# Patient Record
Sex: MALE | Race: WHITE | NOT HISPANIC OR LATINO | Employment: OTHER | URBAN - METROPOLITAN AREA
[De-identification: names, ages, dates, MRNs, and addresses within clinical notes are randomized per-mention and may not be internally consistent; named-entity substitution may affect disease eponyms.]

---

## 2017-07-27 ENCOUNTER — HOSPITAL ENCOUNTER (OUTPATIENT)
Dept: FAMILY MEDICINE CLINIC | Facility: CLINIC | Age: 67
Setting detail: SPECIMEN
Discharge: HOME | End: 2017-07-27
Attending: FAMILY MEDICINE | Admitting: FAMILY MEDICINE

## 2017-07-27 LAB
ALBUMIN SERPL-MCNC: 4.4 G/DL (ref 3.5–4.8)
ALBUMIN/GLOB SERPL: 1.4 {RATIO} (ref 1–1.7)
ALP SERPL-CCNC: 54 IU/L (ref 32–91)
ALT SERPL-CCNC: 43 IU/L (ref 17–63)
ANION GAP SERPL CALC-SCNC: 16.5 MMOL/L (ref 10–20)
AST SERPL-CCNC: 44 IU/L (ref 15–41)
BASOPHILS # BLD AUTO: 0.1 10*3/UL (ref 0–0.2)
BASOPHILS NFR BLD AUTO: 1 % (ref 0–2)
BILIRUB SERPL-MCNC: 0.9 MG/DL (ref 0.3–1.2)
BUN SERPL-MCNC: 8 MG/DL (ref 8–20)
BUN/CREAT SERPL: 8.9 (ref 6.2–20.3)
CALCIUM SERPL-MCNC: 9.6 MG/DL (ref 8.9–10.3)
CHLORIDE SERPL-SCNC: 100 MMOL/L (ref 101–111)
CHOLEST SERPL-MCNC: 306 MG/DL
CHOLEST/HDLC SERPL: 4.1 {RATIO}
CONV CO2: 28 MMOL/L (ref 22–32)
CONV LDL CHOLESTEROL DIRECT: 207 MG/DL (ref 0–100)
CONV TOTAL PROTEIN: 7.5 G/DL (ref 6.1–7.9)
CREAT UR-MCNC: 0.9 MG/DL (ref 0.7–1.2)
DIFFERENTIAL METHOD BLD: (no result)
EOSINOPHIL # BLD AUTO: 0.1 10*3/UL (ref 0–0.3)
EOSINOPHIL # BLD AUTO: 1 % (ref 0–3)
ERYTHROCYTE [DISTWIDTH] IN BLOOD BY AUTOMATED COUNT: 16 % (ref 11.5–14.5)
GLOBULIN UR ELPH-MCNC: 3.1 G/DL (ref 2.5–3.8)
GLUCOSE SERPL-MCNC: 85 MG/DL (ref 65–99)
HCT VFR BLD AUTO: 42.4 % (ref 40–54)
HDLC SERPL-MCNC: 75 MG/DL
HGB BLD-MCNC: 14.2 G/DL (ref 14–18)
LDLC/HDLC SERPL: 2.7 {RATIO}
LIPID INTERPRETATION: ABNORMAL
LYMPHOCYTES # BLD AUTO: 1.8 10*3/UL (ref 0.8–4.8)
LYMPHOCYTES NFR BLD AUTO: 29 % (ref 18–42)
MCH RBC QN AUTO: 32.7 PG (ref 26–32)
MCHC RBC AUTO-ENTMCNC: 33.4 G/DL (ref 32–36)
MCV RBC AUTO: 98 FL (ref 80–94)
MONOCYTES # BLD AUTO: 0.4 10*3/UL (ref 0.1–1.3)
MONOCYTES NFR BLD AUTO: 7 % (ref 2–11)
NEUTROPHILS # BLD AUTO: 4 10*3/UL (ref 2.3–8.6)
NEUTROPHILS NFR BLD AUTO: 62 % (ref 50–75)
NRBC BLD AUTO-RTO: 0 /100{WBCS}
NRBC/RBC NFR BLD MANUAL: 0 10*3/UL
PLATELET # BLD AUTO: 166 10*3/UL (ref 150–450)
PMV BLD AUTO: 9.4 FL (ref 7.4–10.4)
POTASSIUM SERPL-SCNC: 3.5 MMOL/L (ref 3.6–5.1)
PSA SERPL-MCNC: 0.28 NG/ML (ref 0–4)
RBC # BLD AUTO: 4.33 10*6/UL (ref 4.6–6)
SODIUM SERPL-SCNC: 141 MMOL/L (ref 136–144)
TRIGL SERPL-MCNC: 104 MG/DL
VIT B12 SERPL-MCNC: 285 PG/ML (ref 180–914)
VLDLC SERPL CALC-MCNC: 24.2 MG/DL
WBC # BLD AUTO: 6.3 10*3/UL (ref 4.5–11.5)

## 2017-08-08 ENCOUNTER — HOSPITAL ENCOUNTER (OUTPATIENT)
Dept: CARDIOLOGY | Facility: HOSPITAL | Age: 67
Discharge: HOME | End: 2017-08-08
Attending: FAMILY MEDICINE | Admitting: FAMILY MEDICINE

## 2018-03-15 ENCOUNTER — HOSPITAL ENCOUNTER (OUTPATIENT)
Dept: FAMILY MEDICINE CLINIC | Facility: CLINIC | Age: 68
Setting detail: SPECIMEN
Discharge: HOME | End: 2018-03-15
Attending: FAMILY MEDICINE | Admitting: FAMILY MEDICINE

## 2018-03-15 LAB
ALBUMIN SERPL-MCNC: 3.9 G/DL (ref 3.5–4.8)
ALBUMIN/GLOB SERPL: 1.3 {RATIO} (ref 1–1.7)
ALP SERPL-CCNC: 47 IU/L (ref 32–91)
ALT SERPL-CCNC: 33 IU/L (ref 17–63)
ANION GAP SERPL CALC-SCNC: ABNORMAL MMOL/L (ref 10–20)
AST SERPL-CCNC: 47 IU/L (ref 15–41)
BILIRUB SERPL-MCNC: 0.8 MG/DL (ref 0.3–1.2)
BUN SERPL-MCNC: 10 MG/DL (ref 8–20)
BUN/CREAT SERPL: 12.5 (ref 6.2–20.3)
CALCIUM SERPL-MCNC: 8.9 MG/DL (ref 8.9–10.3)
CHLORIDE SERPL-SCNC: 101 MMOL/L (ref 101–111)
CHOLEST SERPL-MCNC: 214 MG/DL
CHOLEST/HDLC SERPL: 2.5 {RATIO}
CONV CO2: ABNORMAL MMOL/L (ref 22–32)
CONV LDL CHOLESTEROL DIRECT: 113 MG/DL (ref 0–100)
CONV TOTAL PROTEIN: 6.8 G/DL (ref 6.1–7.9)
CREAT UR-MCNC: 0.8 MG/DL (ref 0.7–1.2)
GLOBULIN UR ELPH-MCNC: 2.9 G/DL (ref 2.5–3.8)
GLUCOSE SERPL-MCNC: 131 MG/DL (ref 65–99)
HDLC SERPL-MCNC: 86 MG/DL
LDLC/HDLC SERPL: 1.3 {RATIO}
LIPID INTERPRETATION: ABNORMAL
POTASSIUM SERPL-SCNC: 3.4 MMOL/L (ref 3.6–5.1)
SODIUM SERPL-SCNC: 140 MMOL/L (ref 136–144)
TRIGL SERPL-MCNC: 59 MG/DL
VLDLC SERPL CALC-MCNC: ABNORMAL MG/DL

## 2018-06-13 ENCOUNTER — HOSPITAL ENCOUNTER (OUTPATIENT)
Dept: FAMILY MEDICINE CLINIC | Facility: CLINIC | Age: 68
Setting detail: SPECIMEN
Discharge: HOME | End: 2018-06-13
Attending: FAMILY MEDICINE | Admitting: FAMILY MEDICINE

## 2018-06-13 LAB
ALBUMIN SERPL-MCNC: 3.7 G/DL (ref 3.5–4.8)
ALBUMIN/GLOB SERPL: 1.2 {RATIO} (ref 1–1.7)
ALP SERPL-CCNC: 54 IU/L (ref 32–91)
ALT SERPL-CCNC: 21 IU/L (ref 17–63)
ANION GAP SERPL CALC-SCNC: 16.2 MMOL/L (ref 10–20)
AST SERPL-CCNC: 32 IU/L (ref 15–41)
BASOPHILS # BLD AUTO: 0.1 10*3/UL (ref 0–0.2)
BASOPHILS NFR BLD AUTO: 1 % (ref 0–2)
BILIRUB SERPL-MCNC: 0.8 MG/DL (ref 0.3–1.2)
BUN SERPL-MCNC: 8 MG/DL (ref 8–20)
BUN/CREAT SERPL: 8 (ref 6.2–20.3)
CALCIUM SERPL-MCNC: 6.5 MG/DL (ref 8.9–10.3)
CHLORIDE SERPL-SCNC: 100 MMOL/L (ref 101–111)
CONV CO2: 27 MMOL/L (ref 22–32)
CONV TOTAL PROTEIN: 6.9 G/DL (ref 6.1–7.9)
CREAT UR-MCNC: 1 MG/DL (ref 0.7–1.2)
DIFFERENTIAL METHOD BLD: (no result)
EOSINOPHIL # BLD AUTO: 0.1 10*3/UL (ref 0–0.3)
EOSINOPHIL # BLD AUTO: 1 % (ref 0–3)
ERYTHROCYTE [DISTWIDTH] IN BLOOD BY AUTOMATED COUNT: 13.3 % (ref 11.5–14.5)
GLOBULIN UR ELPH-MCNC: 3.2 G/DL (ref 2.5–3.8)
GLUCOSE SERPL-MCNC: 88 MG/DL (ref 65–99)
HCT VFR BLD AUTO: 39.9 % (ref 40–54)
HGB BLD-MCNC: 13.6 G/DL (ref 14–18)
LYMPHOCYTES # BLD AUTO: 1.7 10*3/UL (ref 0.8–4.8)
LYMPHOCYTES NFR BLD AUTO: 23 % (ref 18–42)
MCH RBC QN AUTO: 32.4 PG (ref 26–32)
MCHC RBC AUTO-ENTMCNC: 34.2 G/DL (ref 32–36)
MCV RBC AUTO: 94.8 FL (ref 80–94)
MONOCYTES # BLD AUTO: 0.6 10*3/UL (ref 0.1–1.3)
MONOCYTES NFR BLD AUTO: 8 % (ref 2–11)
NEUTROPHILS # BLD AUTO: 5.1 10*3/UL (ref 2.3–8.6)
NEUTROPHILS NFR BLD AUTO: 67 % (ref 50–75)
NRBC BLD AUTO-RTO: 0 /100{WBCS}
NRBC/RBC NFR BLD MANUAL: 0 10*3/UL
PLATELET # BLD AUTO: 197 10*3/UL (ref 150–450)
PMV BLD AUTO: 10.7 FL (ref 7.4–10.4)
POTASSIUM SERPL-SCNC: 3.2 MMOL/L (ref 3.6–5.1)
RBC # BLD AUTO: 4.2 10*6/UL (ref 4.6–6)
SODIUM SERPL-SCNC: 140 MMOL/L (ref 136–144)
WBC # BLD AUTO: 7.6 10*3/UL (ref 4.5–11.5)

## 2018-06-14 ENCOUNTER — HOSPITAL ENCOUNTER (OUTPATIENT)
Dept: CARDIOLOGY | Facility: HOSPITAL | Age: 68
Discharge: HOME | End: 2018-06-14
Attending: FAMILY MEDICINE | Admitting: FAMILY MEDICINE

## 2018-06-27 ENCOUNTER — HOSPITAL ENCOUNTER (OUTPATIENT)
Dept: FAMILY MEDICINE CLINIC | Facility: CLINIC | Age: 68
Setting detail: SPECIMEN
Discharge: HOME | End: 2018-06-27
Attending: FAMILY MEDICINE | Admitting: FAMILY MEDICINE

## 2018-06-27 LAB
ANION GAP SERPL CALC-SCNC: 13.7 MMOL/L (ref 10–20)
BUN SERPL-MCNC: 7 MG/DL (ref 8–20)
BUN/CREAT SERPL: 8.8 (ref 6.2–20.3)
CALCIUM SERPL-MCNC: 6.4 MG/DL (ref 8.9–10.3)
CHLORIDE SERPL-SCNC: 93 MMOL/L (ref 101–111)
CHOLEST SERPL-MCNC: 221 MG/DL
CHOLEST/HDLC SERPL: 5.5 {RATIO}
CONV CO2: 33 MMOL/L (ref 22–32)
CONV LDL CHOLESTEROL DIRECT: 156 MG/DL (ref 0–100)
CREAT UR-MCNC: 0.8 MG/DL (ref 0.7–1.2)
GLUCOSE SERPL-MCNC: 147 MG/DL (ref 65–99)
HDLC SERPL-MCNC: 40 MG/DL
LDLC/HDLC SERPL: 3.9 {RATIO}
LIPID INTERPRETATION: ABNORMAL
POTASSIUM SERPL-SCNC: 2.7 MMOL/L (ref 3.6–5.1)
SODIUM SERPL-SCNC: 137 MMOL/L (ref 136–144)
TRIGL SERPL-MCNC: 148 MG/DL
VLDLC SERPL CALC-MCNC: 25.3 MG/DL

## 2018-06-28 ENCOUNTER — HOSPITAL ENCOUNTER (OUTPATIENT)
Dept: FAMILY MEDICINE CLINIC | Facility: CLINIC | Age: 68
Setting detail: SPECIMEN
Discharge: HOME | End: 2018-06-28
Attending: FAMILY MEDICINE | Admitting: FAMILY MEDICINE

## 2018-06-28 LAB — MAGNESIUM SERPL-MCNC: 0.5 MG/DL (ref 1.8–2.5)

## 2018-07-02 ENCOUNTER — HOSPITAL ENCOUNTER (OUTPATIENT)
Dept: FAMILY MEDICINE CLINIC | Facility: CLINIC | Age: 68
Setting detail: SPECIMEN
Discharge: HOME | End: 2018-07-02
Attending: FAMILY MEDICINE | Admitting: FAMILY MEDICINE

## 2018-07-02 LAB
ANION GAP SERPL CALC-SCNC: 15.4 MMOL/L (ref 10–20)
BUN SERPL-MCNC: 7 MG/DL (ref 8–20)
BUN/CREAT SERPL: 8.8 (ref 6.2–20.3)
CALCIUM SERPL-MCNC: 6.7 MG/DL (ref 8.9–10.3)
CHLORIDE SERPL-SCNC: 100 MMOL/L (ref 101–111)
CONV CO2: 26 MMOL/L (ref 22–32)
CREAT UR-MCNC: 0.8 MG/DL (ref 0.7–1.2)
GLUCOSE SERPL-MCNC: 222 MG/DL (ref 65–99)
MAGNESIUM SERPL-MCNC: 0.8 MG/DL (ref 1.8–2.5)
PHOSPHATE SERPL-MCNC: 1.4 MG/DL (ref 2.4–4.7)
POTASSIUM SERPL-SCNC: 3.4 MMOL/L (ref 3.6–5.1)
SODIUM SERPL-SCNC: 138 MMOL/L (ref 136–144)

## 2018-07-17 ENCOUNTER — HOSPITAL ENCOUNTER (OUTPATIENT)
Dept: FAMILY MEDICINE CLINIC | Facility: CLINIC | Age: 68
Setting detail: SPECIMEN
Discharge: HOME | End: 2018-07-17
Attending: FAMILY MEDICINE | Admitting: FAMILY MEDICINE

## 2018-07-17 LAB
ANION GAP SERPL CALC-SCNC: 13.6 MMOL/L (ref 10–20)
BUN SERPL-MCNC: 19 MG/DL (ref 8–20)
BUN/CREAT SERPL: 17.3 (ref 6.2–20.3)
CALCIUM SERPL-MCNC: 9.8 MG/DL (ref 8.9–10.3)
CHLORIDE SERPL-SCNC: 97 MMOL/L (ref 101–111)
CONV CO2: 26 MMOL/L (ref 22–32)
CREAT UR-MCNC: 1.1 MG/DL (ref 0.7–1.2)
GLUCOSE SERPL-MCNC: 190 MG/DL (ref 65–99)
MAGNESIUM SERPL-MCNC: 1.3 MG/DL (ref 1.8–2.5)
PHOSPHATE SERPL-MCNC: 3.1 MG/DL (ref 2.4–4.7)
POTASSIUM SERPL-SCNC: 5.6 MMOL/L (ref 3.6–5.1)
SODIUM SERPL-SCNC: 131 MMOL/L (ref 136–144)

## 2018-08-10 ENCOUNTER — HOSPITAL ENCOUNTER (OUTPATIENT)
Dept: FAMILY MEDICINE CLINIC | Facility: CLINIC | Age: 68
Setting detail: SPECIMEN
Discharge: HOME | End: 2018-08-10
Attending: FAMILY MEDICINE | Admitting: FAMILY MEDICINE

## 2018-08-10 LAB
ANION GAP SERPL CALC-SCNC: 14.2 MMOL/L (ref 10–20)
BUN SERPL-MCNC: 19 MG/DL (ref 8–20)
BUN/CREAT SERPL: 17.3 (ref 6.2–20.3)
CALCIUM SERPL-MCNC: 9.6 MG/DL (ref 8.9–10.3)
CHLORIDE SERPL-SCNC: 100 MMOL/L (ref 101–111)
CONV CO2: 28 MMOL/L (ref 22–32)
CREAT UR-MCNC: 1.1 MG/DL (ref 0.7–1.2)
GLUCOSE SERPL-MCNC: 192 MG/DL (ref 65–99)
POTASSIUM SERPL-SCNC: 4.2 MMOL/L (ref 3.6–5.1)
SODIUM SERPL-SCNC: 138 MMOL/L (ref 136–144)

## 2018-10-18 ENCOUNTER — HOSPITAL ENCOUNTER (OUTPATIENT)
Dept: FAMILY MEDICINE CLINIC | Facility: CLINIC | Age: 68
Setting detail: SPECIMEN
Discharge: HOME | End: 2018-10-18
Attending: FAMILY MEDICINE | Admitting: FAMILY MEDICINE

## 2018-10-18 LAB
ALBUMIN SERPL-MCNC: 4.2 G/DL (ref 3.5–4.8)
ALBUMIN/GLOB SERPL: 1.4 {RATIO} (ref 1–1.7)
ALP SERPL-CCNC: 41 IU/L (ref 32–91)
ALT SERPL-CCNC: 21 IU/L (ref 17–63)
ANION GAP SERPL CALC-SCNC: 17.1 MMOL/L (ref 10–20)
AST SERPL-CCNC: 27 IU/L (ref 15–41)
BASOPHILS # BLD AUTO: 0.1 10*3/UL (ref 0–0.2)
BASOPHILS NFR BLD AUTO: 1 % (ref 0–2)
BILIRUB SERPL-MCNC: 0.5 MG/DL (ref 0.3–1.2)
BUN SERPL-MCNC: 34 MG/DL (ref 8–20)
BUN/CREAT SERPL: 17.9 (ref 6.2–20.3)
CALCIUM SERPL-MCNC: 9.3 MG/DL (ref 8.9–10.3)
CHLORIDE SERPL-SCNC: 97 MMOL/L (ref 101–111)
CHOLEST SERPL-MCNC: 258 MG/DL
CHOLEST/HDLC SERPL: 3.5 {RATIO}
CHOLEST/HDLC SERPL: ABNORMAL {RATIO}
CONV CO2: 25 MMOL/L (ref 22–32)
CONV LDL CHOLESTEROL DIRECT: 173 MG/DL (ref 0–100)
CONV TOTAL PROTEIN: 7.1 G/DL (ref 6.1–7.9)
CREAT UR-MCNC: 1.9 MG/DL (ref 0.7–1.2)
DIFFERENTIAL METHOD BLD: (no result)
EOSINOPHIL # BLD AUTO: 0.1 10*3/UL (ref 0–0.3)
EOSINOPHIL # BLD AUTO: 1 % (ref 0–3)
ERYTHROCYTE [DISTWIDTH] IN BLOOD BY AUTOMATED COUNT: 14.5 % (ref 11.5–14.5)
GLOBULIN UR ELPH-MCNC: 2.9 G/DL (ref 2.5–3.8)
GLUCOSE SERPL-MCNC: 56 MG/DL (ref 65–99)
HBA1C MFR BLD: 5.9 % (ref 0–5.6)
HCT VFR BLD AUTO: 37.7 % (ref 40–54)
HDLC SERPL-MCNC: 73 MG/DL
HGB BLD-MCNC: 13 G/DL (ref 14–18)
LDLC/HDLC SERPL: 2.4 {RATIO}
LIPID INTERPRETATION: ABNORMAL
LYMPHOCYTES # BLD AUTO: 2.1 10*3/UL (ref 0.8–4.8)
LYMPHOCYTES NFR BLD AUTO: 26 % (ref 18–42)
MAGNESIUM SERPL-MCNC: 1.2 MG/DL (ref 1.8–2.5)
MCH RBC QN AUTO: 33.6 PG (ref 26–32)
MCHC RBC AUTO-ENTMCNC: 34.4 G/DL (ref 32–36)
MCV RBC AUTO: 97.8 FL (ref 80–94)
MONOCYTES # BLD AUTO: 0.5 10*3/UL (ref 0.1–1.3)
MONOCYTES NFR BLD AUTO: 7 % (ref 2–11)
NEUTROPHILS # BLD AUTO: 5.1 10*3/UL (ref 2.3–8.6)
NEUTROPHILS NFR BLD AUTO: 65 % (ref 50–75)
NRBC BLD AUTO-RTO: 0 /100{WBCS}
NRBC/RBC NFR BLD MANUAL: 0 10*3/UL
PLATELET # BLD AUTO: 205 10*3/UL (ref 150–450)
PMV BLD AUTO: 9.6 FL (ref 7.4–10.4)
POTASSIUM SERPL-SCNC: 5.1 MMOL/L (ref 3.6–5.1)
RBC # BLD AUTO: 3.86 10*6/UL (ref 4.6–6)
SODIUM SERPL-SCNC: 134 MMOL/L (ref 136–144)
TRIGL SERPL-MCNC: 142 MG/DL
VLDLC SERPL CALC-MCNC: 12 MG/DL
VLDLC SERPL CALC-MCNC: ABNORMAL MG/DL
WBC # BLD AUTO: 7.9 10*3/UL (ref 4.5–11.5)

## 2018-10-19 LAB — 25(OH)D3 SERPL-MCNC: 33 NG/ML (ref 30–100)

## 2018-11-01 ENCOUNTER — HOSPITAL ENCOUNTER (OUTPATIENT)
Dept: FAMILY MEDICINE CLINIC | Facility: CLINIC | Age: 68
Setting detail: SPECIMEN
Discharge: HOME | End: 2018-11-01
Attending: FAMILY MEDICINE | Admitting: FAMILY MEDICINE

## 2018-11-01 LAB
ANION GAP SERPL CALC-SCNC: 15.6 MMOL/L (ref 10–20)
BUN SERPL-MCNC: 15 MG/DL (ref 8–20)
BUN/CREAT SERPL: 13.6 (ref 6.2–20.3)
CALCIUM SERPL-MCNC: 9.3 MG/DL (ref 8.9–10.3)
CHLORIDE SERPL-SCNC: 98 MMOL/L (ref 101–111)
CONV CO2: 26 MMOL/L (ref 22–32)
CREAT UR-MCNC: 1.1 MG/DL (ref 0.7–1.2)
GLUCOSE SERPL-MCNC: 100 MG/DL (ref 65–99)
POTASSIUM SERPL-SCNC: 3.6 MMOL/L (ref 3.6–5.1)
SODIUM SERPL-SCNC: 136 MMOL/L (ref 136–144)

## 2019-02-15 ENCOUNTER — HOSPITAL ENCOUNTER (OUTPATIENT)
Dept: FAMILY MEDICINE CLINIC | Facility: CLINIC | Age: 69
Setting detail: SPECIMEN
Discharge: HOME | End: 2019-02-15
Attending: FAMILY MEDICINE | Admitting: FAMILY MEDICINE

## 2019-02-15 LAB
ALBUMIN SERPL-MCNC: 4.1 G/DL (ref 3.5–4.8)
ALBUMIN/GLOB SERPL: 1.3 {RATIO} (ref 1–1.7)
ALP SERPL-CCNC: 41 IU/L (ref 32–91)
ALT SERPL-CCNC: 41 IU/L (ref 17–63)
ANION GAP SERPL CALC-SCNC: 15.3 MMOL/L (ref 10–20)
AST SERPL-CCNC: 50 IU/L (ref 15–41)
BILIRUB SERPL-MCNC: 0.8 MG/DL (ref 0.3–1.2)
BUN SERPL-MCNC: 12 MG/DL (ref 8–20)
BUN/CREAT SERPL: 12 (ref 6.2–20.3)
CALCIUM SERPL-MCNC: 8.6 MG/DL (ref 8.9–10.3)
CHLORIDE SERPL-SCNC: 98 MMOL/L (ref 101–111)
CHOLEST SERPL-MCNC: 227 MG/DL
CHOLEST/HDLC SERPL: 2.6 {RATIO}
CONV CO2: 27 MMOL/L (ref 22–32)
CONV LDL CHOLESTEROL DIRECT: 118 MG/DL (ref 0–100)
CONV TOTAL PROTEIN: 7.2 G/DL (ref 6.1–7.9)
CREAT UR-MCNC: 1 MG/DL (ref 0.7–1.2)
GLOBULIN UR ELPH-MCNC: 3.1 G/DL (ref 2.5–3.8)
GLUCOSE SERPL-MCNC: 102 MG/DL (ref 65–99)
HDLC SERPL-MCNC: 88 MG/DL
LDLC/HDLC SERPL: 1.3 {RATIO}
LIPID INTERPRETATION: ABNORMAL
POTASSIUM SERPL-SCNC: 3.3 MMOL/L (ref 3.6–5.1)
SODIUM SERPL-SCNC: 137 MMOL/L (ref 136–144)
TRIGL SERPL-MCNC: 66 MG/DL
VLDLC SERPL CALC-MCNC: 20.6 MG/DL

## 2019-12-02 ENCOUNTER — TELEPHONE (OUTPATIENT)
Dept: FAMILY MEDICINE CLINIC | Facility: CLINIC | Age: 69
End: 2019-12-02

## 2019-12-02 NOTE — TELEPHONE ENCOUNTER
Spouse called stating that she believes patient is in the beginning stages of dementia. Spouse state that patient is having a hard time remembering things. Spouse is asking if you could call her directly to discuss.     Also, spouse states that she believes patient is due for labs and would like to schedule appt.

## 2019-12-03 NOTE — TELEPHONE ENCOUNTER
Wife Alejandra called to make patient an appt with you.  You are booked for the few weeks.  When would you like to see patient?

## 2019-12-04 ENCOUNTER — OFFICE VISIT (OUTPATIENT)
Dept: FAMILY MEDICINE CLINIC | Facility: CLINIC | Age: 69
End: 2019-12-04

## 2019-12-04 VITALS
HEART RATE: 75 BPM | BODY MASS INDEX: 38.13 KG/M2 | WEIGHT: 273.4 LBS | DIASTOLIC BLOOD PRESSURE: 79 MMHG | RESPIRATION RATE: 12 BRPM | SYSTOLIC BLOOD PRESSURE: 179 MMHG

## 2019-12-04 DIAGNOSIS — R41.3 MEMORY LOSS: Primary | ICD-10-CM

## 2019-12-04 DIAGNOSIS — R27.0 ATAXIA: ICD-10-CM

## 2019-12-04 DIAGNOSIS — I10 ESSENTIAL HYPERTENSION: ICD-10-CM

## 2019-12-04 DIAGNOSIS — E11.9 TYPE 2 DIABETES MELLITUS WITHOUT COMPLICATION, WITHOUT LONG-TERM CURRENT USE OF INSULIN (HCC): ICD-10-CM

## 2019-12-04 DIAGNOSIS — E03.8 HYPOTHYROIDISM DUE TO HASHIMOTO'S THYROIDITIS: ICD-10-CM

## 2019-12-04 DIAGNOSIS — F10.10 ALCOHOL ABUSE: ICD-10-CM

## 2019-12-04 DIAGNOSIS — E06.3 HYPOTHYROIDISM DUE TO HASHIMOTO'S THYROIDITIS: ICD-10-CM

## 2019-12-04 PROCEDURE — 99214 OFFICE O/P EST MOD 30 MIN: CPT | Performed by: FAMILY MEDICINE

## 2019-12-04 RX ORDER — DOXYCYCLINE HYCLATE 100 MG/1
100 CAPSULE ORAL DAILY
Refills: 4 | COMMUNITY
Start: 2019-11-22 | End: 2020-05-26

## 2019-12-04 RX ORDER — LANSOPRAZOLE 30 MG/1
30 CAPSULE, DELAYED RELEASE ORAL DAILY
Refills: 3 | COMMUNITY
Start: 2019-10-30 | End: 2020-01-27

## 2019-12-04 RX ORDER — EZETIMIBE 10 MG/1
10 TABLET ORAL DAILY
Refills: 11 | COMMUNITY
Start: 2019-11-22 | End: 2020-06-29

## 2019-12-04 RX ORDER — PIOGLITAZONEHYDROCHLORIDE 45 MG/1
45 TABLET ORAL DAILY
Refills: 14 | COMMUNITY
Start: 2019-11-22 | End: 2020-06-29

## 2019-12-04 RX ORDER — ATORVASTATIN CALCIUM 40 MG/1
40 TABLET, FILM COATED ORAL DAILY
Refills: 3 | COMMUNITY
Start: 2019-11-21 | End: 2020-04-15

## 2019-12-04 RX ORDER — POTASSIUM CHLORIDE 750 MG/1
20 TABLET, FILM COATED, EXTENDED RELEASE ORAL 2 TIMES DAILY
Refills: 11 | COMMUNITY
Start: 2019-09-11 | End: 2020-09-23 | Stop reason: SDUPTHER

## 2019-12-04 NOTE — PROGRESS NOTES
Rooming Tab(CC,VS,Pt Hx,Fall Screen)  Chief Complaint   Patient presents with   • Extremity Weakness   • Memory Loss   • Balance Issues   • Knee Pain   • Bleeding/Bruising       Subjective 69-year-old here for with his wife for complaints of memory loss.  Patient forgets to eat, he gets frustrated very easily and forgets his phone codes or his iPad codes.  He is short-term memory seems to be getting worse.  He will ask his wife the same question several times in 1 day.  He also stopped playing golf which he generally used to love to do any stop driving his fancy car which he used to love to do.  He is not doing the things he used to like to do.  He sits and watches TV a lot and play solitaire a lot with computer games.  He is unsteady on his feet and ataxic.  He will drink 6 to 8 ounces of bourbon a day throughout the day.  His wife thinks he has trouble learning new things.  He has no headaches and no one-sided weakness and no urinary incontinence or slurred speech.  He does remember to take his medications.  He denies any double vision or blood.  He has diabetes and he checks his sugars daily and they are doing relatively well.  He has not been checking his blood pressure recently.  All this is been going on for several months and progressively worsening.  He is weak in his legs and has trouble standing and has poor balance because he feels like he has trouble getting out of a chair and walking due to his leg weakness.  Of course he does not do much during the day anymore but sit and watch TV and play games.    I have reviewed and updated his medications, medical history and problem list during today's office visit.     Patient Care Team:  Bob Spencer MD as PCP - General (Family Medicine)    Problem List Tab  Medications Tab  Synopsis Tab  Chart Review Tab  Care Everywhere Tab  Immunizations Tab  Patient History Tab    Social History     Tobacco Use   • Smoking status: Never Smoker   • Smokeless tobacco:  Never Used   Substance Use Topics   • Alcohol use: Yes       Review of Systems   Constitutional: Negative for chills, fatigue and fever.   HENT: Negative for nosebleeds.    Eyes: Negative for double vision.   Respiratory: Negative for chest tightness and shortness of breath.    Cardiovascular: Negative for chest pain and palpitations.   Gastrointestinal: Negative for blood in stool.   Genitourinary: Negative for dysuria and hematuria.   Neurological: Positive for memory problem. Negative for dizziness, seizures, syncope and headache.   Psychiatric/Behavioral: Negative for depressed mood.       Objective     Rooming Tab(CC,VS,Pt Hx,Fall Screen)  /79 (BP Location: Right arm, Patient Position: Sitting, Cuff Size: Large Adult)   Pulse 75   Resp 12   Wt 124 kg (273 lb 6.4 oz)   BMI 38.13 kg/m²     Body mass index is 38.13 kg/m².    Physical Exam   Constitutional: He is oriented to person, place, and time. He appears well-developed and well-nourished. No distress.   HENT:   Head: Normocephalic and atraumatic.   Nose: Nose normal.   Mouth/Throat: Oropharynx is clear and moist.   Eyes: Conjunctivae, EOM and lids are normal. Pupils are equal, round, and reactive to light.   Neck: Trachea normal and normal range of motion. Neck supple. No JVD present. Carotid bruit is not present. No thyroid mass and no thyromegaly present.   No carotid bruits   Cardiovascular: Normal rate, regular rhythm, normal heart sounds and intact distal pulses.   Pulmonary/Chest: Effort normal and breath sounds normal.   Abdominal: Soft. Bowel sounds are normal. He exhibits no distension.   Musculoskeletal:   No c/c/e  Sensation in his feet is intact.  He has no diabetic foot ulcers.  DP pulses are palpable.   Neurological: He is alert and oriented to person, place, and time. No cranial nerve deficit.   On his Mini-Mental status exam he did well except he missed all 3 of the memory words.  27 out of 30  He is very ataxic, he has no ulnar  drift.  He is unable to completely do the heel-to-toe, it is not safe for him to even try   Skin: Skin is warm and dry.   Psychiatric: He has a normal mood and affect. His speech is normal and behavior is normal. He is attentive.   Nursing note and vitals reviewed.       Statin Choice Calculator  Data Reviewed:                   Assessment/Plan   Order Review Tab  Health Maintenance Tab  Patient Plan/Order Tab  Diagnoses and all orders for this visit:    1. Memory loss (Primary)  Assessment & Plan:  We will start the work-up with an MRI of the brain.  Also obtain laboratories, he has not fasted today so we will have him come in Friday.  He is not to drive or do any dangerous type work.  We will schedule neuropsychological evaluation as well as start working on getting him a appointment appointment with the memory center at [facility]    Orders:  -     MRI Brain Without Contrast; Future  -     Ambulatory Referral to Neurology  -     Ambulatory Referral to Neuropsychology  -     CBC & Differential; Future  -     Comprehensive Metabolic Panel; Future  -     TSH; Future  -     Vitamin B12; Future  -     THOMAS; Future  -     Sedimentation Rate; Future  -     RPR; Future    2. Ataxia  Assessment & Plan:  He is very ataxic.  I am not certain if this could be related to his chronic alcohol use.  He is instructed in no further alcohol.  Consider physical therapy.  Most of his ataxia is likely due to his proximal leg weakness which is because he sits too much.  He needs to become more active and start exercising and I recommended he go to the [facility].    Orders:  -     MRI Brain Without Contrast; Future    3. Type 2 diabetes mellitus without complication, without long-term current use of insulin (CMS/[facility])  Assessment & Plan:  Diabetes is unchanged.   Continue current treatment regimen.  Reminded to bring in blood sugar diary at next visit.  Dietary recommendations for ADA diet.  Regular aerobic exercise.  Diabetes will be reassessed  in 1 month.    Orders:  -     Lipid Panel; Future  -     Hemoglobin A1c; Future    4. Essential hypertension  Assessment & Plan:  Hypertension is worsening.  Dietary sodium restriction.  Weight loss.  Regular aerobic exercise.  Blood pressure will be reassessed in 4 weeks.  He has a history of whitecoat hypertension.  We are going to monitor his blood pressure 3 times a week and will follow-up in 3 weeks.      5. Hypothyroidism due to Hashimoto's thyroiditis    6. Alcohol abuse  Assessment & Plan:  Not drinking alcohol at this time.  He does not think it will be a problem.  He has stopped in the past without difficulty.  We will see        Wrapup Tab  Return in about 3 weeks (around 12/25/2019) for Recheck.

## 2019-12-05 PROBLEM — E16.0 HYPOGLYCEMIA SECONDARY TO SULFONYLUREA: Status: ACTIVE | Noted: 2018-02-13

## 2019-12-05 PROBLEM — E83.51 HYPOCALCEMIA: Status: ACTIVE | Noted: 2018-06-28

## 2019-12-05 PROBLEM — R27.0 ATAXIA: Status: ACTIVE | Noted: 2019-12-05

## 2019-12-05 PROBLEM — E83.42 HYPOMAGNESEMIA: Status: ACTIVE | Noted: 2018-02-13

## 2019-12-05 PROBLEM — N19 RENAL FAILURE: Status: ACTIVE | Noted: 2018-10-18

## 2019-12-05 PROBLEM — R41.3 MEMORY LOSS: Status: ACTIVE | Noted: 2019-12-05

## 2019-12-05 PROBLEM — E87.6 HYPOKALEMIA: Status: ACTIVE | Noted: 2018-02-13

## 2019-12-05 PROBLEM — T38.3X1A HYPOGLYCEMIA SECONDARY TO SULFONYLUREA: Status: ACTIVE | Noted: 2018-02-13

## 2019-12-05 PROBLEM — I27.20 PULMONARY HYPERTENSION (HCC): Status: ACTIVE | Noted: 2018-02-13

## 2019-12-05 PROBLEM — E53.8 B12 DEFICIENCY: Status: ACTIVE | Noted: 2018-03-15

## 2019-12-05 PROBLEM — E66.9 OBESITY: Status: ACTIVE | Noted: 2019-12-05

## 2019-12-05 PROBLEM — I95.1 ORTHOSTATIC HYPOTENSION: Status: ACTIVE | Noted: 2018-06-13

## 2019-12-05 PROBLEM — L71.9 ROSACEA: Status: ACTIVE | Noted: 2018-02-13

## 2019-12-05 PROBLEM — F10.10 ALCOHOL ABUSE: Status: ACTIVE | Noted: 2019-12-05

## 2019-12-05 PROBLEM — K52.9 CHRONIC DIARRHEA: Status: ACTIVE | Noted: 2018-10-18

## 2019-12-05 PROBLEM — I35.0 AORTIC VALVE STENOSIS: Status: ACTIVE | Noted: 2018-02-13

## 2019-12-05 PROBLEM — E55.9 VITAMIN D DEFICIENCY: Status: ACTIVE | Noted: 2018-10-18

## 2019-12-05 PROBLEM — G47.33 OBSTRUCTIVE SLEEP APNEA SYNDROME: Status: ACTIVE | Noted: 2018-03-09

## 2019-12-05 PROBLEM — R01.1 HEART MURMUR, SYSTOLIC: Status: ACTIVE | Noted: 2017-07-27

## 2019-12-05 NOTE — ASSESSMENT & PLAN NOTE
Hypertension is worsening.  Dietary sodium restriction.  Weight loss.  Regular aerobic exercise.  Blood pressure will be reassessed in 4 weeks.  He has a history of whitecoat hypertension.  We are going to monitor his blood pressure 3 times a week and will follow-up in 3 weeks.

## 2019-12-05 NOTE — ASSESSMENT & PLAN NOTE
We will start the work-up with an MRI of the brain.  Also obtain laboratories, he has not fasted today so we will have him come in Friday.  He is not to drive or do any dangerous type work.  We will schedule neuropsychological evaluation as well as start working on getting him a appointment appointment with the memory center at [facility]

## 2019-12-05 NOTE — ASSESSMENT & PLAN NOTE
Diabetes is unchanged.   Continue current treatment regimen.  Reminded to bring in blood sugar diary at next visit.  Dietary recommendations for ADA diet.  Regular aerobic exercise.  Diabetes will be reassessed in 1 month.

## 2019-12-05 NOTE — ASSESSMENT & PLAN NOTE
Not drinking alcohol at this time.  He does not think it will be a problem.  He has stopped in the past without difficulty.  We will see

## 2019-12-05 NOTE — ASSESSMENT & PLAN NOTE
He is very ataxic.  I am not certain if this could be related to his chronic alcohol use.  He is instructed in no further alcohol.  Consider physical therapy.  Most of his ataxia is likely due to his proximal leg weakness which is because he sits too much.  He needs to become more active and start exercising and I recommended he go to the [facility].

## 2019-12-06 ENCOUNTER — LAB (OUTPATIENT)
Dept: FAMILY MEDICINE CLINIC | Facility: CLINIC | Age: 69
End: 2019-12-06

## 2019-12-06 DIAGNOSIS — E11.9 TYPE 2 DIABETES MELLITUS WITHOUT COMPLICATION, WITHOUT LONG-TERM CURRENT USE OF INSULIN (HCC): ICD-10-CM

## 2019-12-06 DIAGNOSIS — R41.3 MEMORY LOSS: ICD-10-CM

## 2019-12-06 LAB
ALBUMIN SERPL-MCNC: 4.5 G/DL (ref 3.5–5.2)
ALBUMIN/GLOB SERPL: 1.7 G/DL
ALP SERPL-CCNC: 47 U/L (ref 39–117)
ALT SERPL W P-5'-P-CCNC: 29 U/L (ref 1–41)
ANION GAP SERPL CALCULATED.3IONS-SCNC: 14.9 MMOL/L (ref 5–15)
AST SERPL-CCNC: 35 U/L (ref 1–40)
BASOPHILS # BLD AUTO: 0.07 10*3/MM3 (ref 0–0.2)
BASOPHILS NFR BLD AUTO: 1.3 % (ref 0–1.5)
BILIRUB SERPL-MCNC: 0.6 MG/DL (ref 0.2–1.2)
BUN BLD-MCNC: 7 MG/DL (ref 8–23)
BUN/CREAT SERPL: 8.1 (ref 7–25)
CALCIUM SPEC-SCNC: 7.9 MG/DL (ref 8.6–10.5)
CHLORIDE SERPL-SCNC: 96 MMOL/L (ref 98–107)
CHOLEST SERPL-MCNC: 142 MG/DL (ref 0–200)
CO2 SERPL-SCNC: 27.1 MMOL/L (ref 22–29)
CREAT BLD-MCNC: 0.86 MG/DL (ref 0.76–1.27)
DEPRECATED RDW RBC AUTO: 48.8 FL (ref 37–54)
EOSINOPHIL # BLD AUTO: 0.04 10*3/MM3 (ref 0–0.4)
EOSINOPHIL NFR BLD AUTO: 0.8 % (ref 0.3–6.2)
ERYTHROCYTE [DISTWIDTH] IN BLOOD BY AUTOMATED COUNT: 14.5 % (ref 12.3–15.4)
ERYTHROCYTE [SEDIMENTATION RATE] IN BLOOD: 10 MM/HR (ref 0–20)
GFR SERPL CREATININE-BSD FRML MDRD: 88 ML/MIN/1.73
GLOBULIN UR ELPH-MCNC: 2.7 GM/DL
GLUCOSE BLD-MCNC: 187 MG/DL (ref 65–99)
HBA1C MFR BLD: 7.5 % (ref 3.5–5.6)
HCT VFR BLD AUTO: 37 % (ref 37.5–51)
HDLC SERPL-MCNC: 71 MG/DL (ref 40–60)
HGB BLD-MCNC: 12.5 G/DL (ref 13–17.7)
IMM GRANULOCYTES # BLD AUTO: 0.03 10*3/MM3 (ref 0–0.05)
IMM GRANULOCYTES NFR BLD AUTO: 0.6 % (ref 0–0.5)
LDLC SERPL CALC-MCNC: 58 MG/DL (ref 0–100)
LDLC/HDLC SERPL: 0.82 {RATIO}
LYMPHOCYTES # BLD AUTO: 1.49 10*3/MM3 (ref 0.7–3.1)
LYMPHOCYTES NFR BLD AUTO: 28.3 % (ref 19.6–45.3)
MCH RBC QN AUTO: 30.8 PG (ref 26.6–33)
MCHC RBC AUTO-ENTMCNC: 33.8 G/DL (ref 31.5–35.7)
MCV RBC AUTO: 91.1 FL (ref 79–97)
MONOCYTES # BLD AUTO: 0.44 10*3/MM3 (ref 0.1–0.9)
MONOCYTES NFR BLD AUTO: 8.3 % (ref 5–12)
NEUTROPHILS # BLD AUTO: 3.2 10*3/MM3 (ref 1.7–7)
NEUTROPHILS NFR BLD AUTO: 60.7 % (ref 42.7–76)
NRBC BLD AUTO-RTO: 0.2 /100 WBC (ref 0–0.2)
PLATELET # BLD AUTO: 147 10*3/MM3 (ref 140–450)
PMV BLD AUTO: 11.8 FL (ref 6–12)
POTASSIUM BLD-SCNC: 3.5 MMOL/L (ref 3.5–5.2)
PROT SERPL-MCNC: 7.2 G/DL (ref 6–8.5)
RBC # BLD AUTO: 4.06 10*6/MM3 (ref 4.14–5.8)
RPR SER QL: NORMAL
SODIUM BLD-SCNC: 138 MMOL/L (ref 136–145)
TRIGL SERPL-MCNC: 63 MG/DL (ref 0–150)
TSH SERPL DL<=0.05 MIU/L-ACNC: 2.7 UIU/ML (ref 0.27–4.2)
VIT B12 BLD-MCNC: 740 PG/ML (ref 211–946)
VLDLC SERPL-MCNC: 12.6 MG/DL (ref 5–40)
WBC NRBC COR # BLD: 5.27 10*3/MM3 (ref 3.4–10.8)

## 2019-12-06 PROCEDURE — 36415 COLL VENOUS BLD VENIPUNCTURE: CPT

## 2019-12-06 PROCEDURE — 84443 ASSAY THYROID STIM HORMONE: CPT | Performed by: FAMILY MEDICINE

## 2019-12-06 PROCEDURE — 82607 VITAMIN B-12: CPT | Performed by: FAMILY MEDICINE

## 2019-12-06 PROCEDURE — 83036 HEMOGLOBIN GLYCOSYLATED A1C: CPT | Performed by: FAMILY MEDICINE

## 2019-12-06 PROCEDURE — 80053 COMPREHEN METABOLIC PANEL: CPT | Performed by: FAMILY MEDICINE

## 2019-12-06 PROCEDURE — 85025 COMPLETE CBC W/AUTO DIFF WBC: CPT | Performed by: FAMILY MEDICINE

## 2019-12-06 PROCEDURE — 86592 SYPHILIS TEST NON-TREP QUAL: CPT | Performed by: FAMILY MEDICINE

## 2019-12-06 PROCEDURE — 86038 ANTINUCLEAR ANTIBODIES: CPT | Performed by: FAMILY MEDICINE

## 2019-12-06 PROCEDURE — 80061 LIPID PANEL: CPT | Performed by: FAMILY MEDICINE

## 2019-12-06 PROCEDURE — 85652 RBC SED RATE AUTOMATED: CPT | Performed by: FAMILY MEDICINE

## 2019-12-09 ENCOUNTER — HOSPITAL ENCOUNTER (OUTPATIENT)
Dept: MRI IMAGING | Facility: HOSPITAL | Age: 69
Discharge: HOME | End: 2019-12-09
Admitting: FAMILY MEDICINE

## 2019-12-09 DIAGNOSIS — R27.0 ATAXIA: ICD-10-CM

## 2019-12-09 DIAGNOSIS — R41.3 MEMORY LOSS: ICD-10-CM

## 2019-12-09 LAB — ANA SER QL: NEGATIVE

## 2019-12-09 PROCEDURE — 70551 MRI BRAIN STEM W/O DYE: CPT

## 2019-12-23 ENCOUNTER — OFFICE VISIT (OUTPATIENT)
Dept: FAMILY MEDICINE CLINIC | Facility: CLINIC | Age: 69
End: 2019-12-23

## 2019-12-23 VITALS
RESPIRATION RATE: 12 BRPM | BODY MASS INDEX: 37.96 KG/M2 | SYSTOLIC BLOOD PRESSURE: 167 MMHG | DIASTOLIC BLOOD PRESSURE: 91 MMHG | WEIGHT: 272.2 LBS | HEART RATE: 64 BPM

## 2019-12-23 DIAGNOSIS — I10 ESSENTIAL HYPERTENSION: Primary | ICD-10-CM

## 2019-12-23 DIAGNOSIS — M17.12 PRIMARY OSTEOARTHRITIS OF LEFT KNEE: ICD-10-CM

## 2019-12-23 DIAGNOSIS — E11.9 TYPE 2 DIABETES MELLITUS WITHOUT COMPLICATION, WITHOUT LONG-TERM CURRENT USE OF INSULIN (HCC): ICD-10-CM

## 2019-12-23 DIAGNOSIS — R41.3 MEMORY LOSS: ICD-10-CM

## 2019-12-23 PROCEDURE — 99214 OFFICE O/P EST MOD 30 MIN: CPT | Performed by: FAMILY MEDICINE

## 2019-12-23 RX ORDER — AMLODIPINE BESYLATE 2.5 MG/1
2.5 TABLET ORAL DAILY
Qty: 30 TABLET | Refills: 5 | Status: SHIPPED | OUTPATIENT
Start: 2019-12-23 | End: 2020-06-24

## 2019-12-23 NOTE — PROGRESS NOTES
Rooming Tab(CC,VS,Pt Hx,Fall Screen)  Chief Complaint   Patient presents with   • Hypertension       Subjective Patient is here for follow-up of his hypertension.  The patient's blood pressure at home runs 154/ 5/100.  He did have 1 reading of 108/68 and a couple more readings that were in the normal level.  Most of his readings seem to be high though.  Patient diabetes, his sugars are still running in the 200-2 50 range.  He is on the Actos.  He has had no low sugars.    The patient quit drinking alcohol, his mood is a little better but he still still forgets things and continues to have short-term memory loss.  His mother suffered from depression and his sister did as well.  His father had Alzheimer's disease in his maternal grand mother and paternal grandmother had Alzheimer's disease.  His MRI of the brain was normal and his lab work for his memory loss were all normal as well.  He has difficulty remembering simple instructions such as the use of his phone and ask his wife similar things over and over again.  Patient also complains of worsening of his left knee pain.  He has had arthritis in his left knee and he would like to get a referral    I have reviewed and updated his medications, medical history and problem list during today's office visit.     Patient Care Team:  Bob Spencer MD as PCP - General (Family Medicine)    Problem List Tab  Medications Tab  Synopsis Tab  Chart Review Tab  Care Everywhere Tab  Immunizations Tab  Patient History Tab    Social History     Tobacco Use   • Smoking status: Never Smoker   • Smokeless tobacco: Never Used   Substance Use Topics   • Alcohol use: Yes     Frequency: 4 or more times a week     Drinks per session: 3 or 4     Binge frequency: Weekly       Review of Systems   Constitutional: Positive for fatigue. Negative for chills, diaphoresis and fever.   HENT: Negative for nosebleeds.    Eyes: Negative for double vision.   Respiratory: Negative for chest  tightness and shortness of breath.    Cardiovascular: Negative for chest pain and palpitations.   Gastrointestinal: Negative for abdominal pain, blood in stool, nausea and vomiting.   Genitourinary: Negative for dysuria, hematuria and urinary incontinence.   Musculoskeletal: Negative for back pain and neck pain.   Neurological: Positive for weakness, memory problem and confusion. Negative for dizziness, syncope and light-headedness.   Psychiatric/Behavioral: Negative for depressed mood.       Objective     Rooming Tab(CC,VS,Pt Hx,Fall Screen)  /91 (BP Location: Left arm, Patient Position: Sitting, Cuff Size: Large Adult)   Pulse 64   Resp 12   Wt 123 kg (272 lb 3.2 oz)   BMI 37.96 kg/m²     Body mass index is 37.96 kg/m².    Physical Exam   Constitutional: He is oriented to person, place, and time. He appears well-developed and well-nourished. No distress.   HENT:   Head: Normocephalic and atraumatic.   Nose: Nose normal.   Mouth/Throat: Oropharynx is clear and moist.   Eyes: Pupils are equal, round, and reactive to light. Conjunctivae, EOM and lids are normal.   Neck: Trachea normal and normal range of motion. Neck supple. No JVD present. Carotid bruit is not present. No thyroid mass and no thyromegaly present.   No carotid bruits   Cardiovascular: Normal rate, regular rhythm, normal heart sounds and intact distal pulses.   Pulmonary/Chest: Effort normal and breath sounds normal.   Musculoskeletal:   No c/c/e  Osteoarthritic changes to his left knee with crepitation with range of motion no effusion   Neurological: He is alert and oriented to person, place, and time. No cranial nerve deficit.   Mini-Mental status exam was 28 out of 30.   Skin: Skin is warm and dry.   Psychiatric: He has a normal mood and affect. His speech is normal and behavior is normal. He is attentive.   Nursing note and vitals reviewed.       Statin Choice Calculator  Data Reviewed:    Mri Brain Without Contrast    Result Date:  12/9/2019  Impression: 1.No acute intracranial process identified. 2.Mild brain atrophy with findings suggestive of mild chronic small vessel ischemic disease.  Electronically Signed By-DR. Ryan Black MD On:12/9/2019 3:40 PM This report was finalized on [id number] by DR. Ryan Black MD.            Lab Results   Component Value Date    BUN 7 (L) 12/06/2019    CREATININE 0.86 12/06/2019    EGFRIFNONA 88 12/06/2019     12/06/2019    K 3.5 12/06/2019    CL 96 (L) 12/06/2019    CALCIUM 7.9 (L) 12/06/2019    ALBUMIN 4.50 12/06/2019    BILITOT 0.6 12/06/2019    ALKPHOS 47 12/06/2019    AST 35 12/06/2019    ALT 29 12/06/2019    TRIG 63 12/06/2019    HDL 71 (H) 12/06/2019    VLDL 12.6 12/06/2019    LDL 58 12/06/2019    LDLHDL 0.82 12/06/2019    WBC 5.27 12/06/2019    RBC 4.06 (L) 12/06/2019    HCT 37.0 (L) 12/06/2019    MCV 91.1 12/06/2019    MCH 30.8 12/06/2019    TSH 2.700 12/06/2019      Assessment/Plan   Order Review Tab  Health Maintenance Tab  Patient Plan/Order Tab  Diagnoses and all orders for this visit:    1. Essential hypertension (Primary)  Assessment & Plan:  Hypertension is unchanged.  Dietary sodium restriction.  Weight loss.  Regular aerobic exercise.  Medication changes per orders.  Blood pressure will be reassessed at the next regular appointment.  Add Norvasc 2.5 mg daily      2. Primary osteoarthritis of left knee  -     Ambulatory Referral to Orthopedic Surgery    3. Memory loss  Assessment & Plan:  I suspect he has early Alzheimer's dementia.  He has a referral to the [facility] memory clinic however it is going to be several months before he gets in.  We will go ahead and start Namzaric starter pack.  Recommend he stop just sitting around watching TV and put his brain to work doing more in the way of reading and other cognitive enhancing exercises.  There may be a depression component to this as well although he states he is not depressed and he does not act depressed.  We may consider  addressing this at next visit      4. Type 2 diabetes mellitus without complication, without long-term current use of insulin (CMS/[facility])  Assessment & Plan:  Diabetes is worsening.   Reminded to bring in blood sugar diary at next visit.  Dietary recommendations for ADA diet.  Regular aerobic exercise.  Discussed foot care.  Diabetes will be reassessed Next scheduled appointment   Start metformin 500 mg daily.      Other orders  -     Memantine HCl-Donepezil HCl (NAMZARIC) 28-10 MG capsule sustained-release 24 hr; Take 10 mg by mouth Daily.  Dispense: 30 capsule; Refill: 5  -     metFORMIN (GLUCOPHAGE) 500 MG tablet; Take 1 tablet by mouth Daily With Breakfast.  Dispense: 30 tablet; Refill: 5  -     amLODIPine (NORVASC) 2.5 MG tablet; Take 1 tablet by mouth Daily.  Dispense: 30 tablet; Refill: 5  Answers for HPI/ROS submitted by the patient on 12/20/2019   Neurologic complaint  altered mental status: No  clumsiness: No  focal sensory loss: No  focal weakness: Yes  loss of balance: Yes  memory loss: Yes  near-syncope: No  slurred speech: No  visual change: No  Chronicity: recurrent  Onset: more than 1 month ago  Onset quality: gradually  Progression since onset: unchanged  Focality: no focality noted  auditory change: No  aura: No  bowel incontinence: No  headaches: No  vertigo: No  Treatments tried: nothing  Improvement on treatment: no relief      Wrapup Tab  Return in about 6 weeks (around 2/3/2020) for Medicare Wellness.

## 2019-12-26 NOTE — ASSESSMENT & PLAN NOTE
I suspect he has early Alzheimer's dementia.  He has a referral to the [facility] memory clinic however it is going to be several months before he gets in.  We will go ahead and start Namzaric starter pack.  Recommend he stop just sitting around watching TV and put his brain to work doing more in the way of reading and other cognitive enhancing exercises.  There may be a depression component to this as well although he states he is not depressed and he does not act depressed.  We may consider addressing this at next visit

## 2019-12-26 NOTE — ASSESSMENT & PLAN NOTE
Diabetes is worsening.   Reminded to bring in blood sugar diary at next visit.  Dietary recommendations for ADA diet.  Regular aerobic exercise.  Discussed foot care.  Diabetes will be reassessed Next scheduled appointment   Start metformin 500 mg daily.

## 2019-12-26 NOTE — ASSESSMENT & PLAN NOTE
Hypertension is unchanged.  Dietary sodium restriction.  Weight loss.  Regular aerobic exercise.  Medication changes per orders.  Blood pressure will be reassessed at the next regular appointment.  Add Norvasc 2.5 mg daily

## 2020-01-22 RX ORDER — MEMANTINE HYDROCHLORIDE AND DONEPEZIL HYDROCHLORIDE 7-10/14-10
KIT ORAL
Qty: 28 EACH | Refills: 0 | Status: SHIPPED | OUTPATIENT
Start: 2020-01-22 | End: 2020-04-08

## 2020-01-23 ENCOUNTER — TELEPHONE (OUTPATIENT)
Dept: FAMILY MEDICINE CLINIC | Facility: CLINIC | Age: 70
End: 2020-01-23

## 2020-01-23 NOTE — TELEPHONE ENCOUNTER
ASKING FOR HANDWRITTEN RX  Patient is at the end of his Namzaric titration pack. She is asking for a handwritten RX for the maintenance dose of Namzaric. Their insurance has changed and she wants to check with different pharmacies to see which would be cheaper for this RX. I told her I wasn't sure a handwritten RX would be possible. If it's not, can you tell her what the maintenance dose would be and she will call around to see the cost at different pharmacies. Thank you.

## 2020-01-27 RX ORDER — LANSOPRAZOLE 30 MG/1
CAPSULE, DELAYED RELEASE ORAL
Qty: 90 CAPSULE | Refills: 0 | Status: SHIPPED | OUTPATIENT
Start: 2020-01-27 | End: 2020-04-15

## 2020-02-18 ENCOUNTER — TELEPHONE (OUTPATIENT)
Dept: FAMILY MEDICINE CLINIC | Facility: CLINIC | Age: 70
End: 2020-02-18

## 2020-02-18 NOTE — TELEPHONE ENCOUNTER
I left a voicemail re: this option. I just need to know which route they want. We will send to pharmacy

## 2020-02-18 NOTE — TELEPHONE ENCOUNTER
Patient currently takes Namzaric 10/28mg once daily and it costs him $342 a month.  If we split it up with the two separate meds, it will save him 75%.  Needs rx's sent in for Donepezil 10mg once daily and Memantine ER 28mg once daily.

## 2020-02-19 RX ORDER — DONEPEZIL HYDROCHLORIDE 10 MG/1
10 TABLET, ORALLY DISINTEGRATING ORAL NIGHTLY
Qty: 30 TABLET | Refills: 6 | Status: SHIPPED | OUTPATIENT
Start: 2020-02-19 | End: 2021-02-18

## 2020-02-19 RX ORDER — MEMANTINE HYDROCHLORIDE 28 MG/1
28 CAPSULE, EXTENDED RELEASE ORAL DAILY
Qty: 30 CAPSULE | Refills: 6 | Status: SHIPPED | OUTPATIENT
Start: 2020-02-19 | End: 2020-04-08

## 2020-02-19 NOTE — TELEPHONE ENCOUNTER
Daughter called back.   She wants the 2 scripts that she first asked for     Donepezil 10mg  1poqd   Memantine ER 28mg  1poqd

## 2020-04-08 ENCOUNTER — TELEMEDICINE (OUTPATIENT)
Dept: FAMILY MEDICINE CLINIC | Facility: CLINIC | Age: 70
End: 2020-04-08

## 2020-04-08 DIAGNOSIS — I10 ESSENTIAL HYPERTENSION: ICD-10-CM

## 2020-04-08 DIAGNOSIS — E11.9 TYPE 2 DIABETES MELLITUS WITHOUT COMPLICATION, WITHOUT LONG-TERM CURRENT USE OF INSULIN (HCC): ICD-10-CM

## 2020-04-08 DIAGNOSIS — R41.3 MEMORY LOSS: Primary | ICD-10-CM

## 2020-04-08 DIAGNOSIS — F10.10 ALCOHOL ABUSE: ICD-10-CM

## 2020-04-08 PROCEDURE — 99213 OFFICE O/P EST LOW 20 MIN: CPT | Performed by: FAMILY MEDICINE

## 2020-04-08 NOTE — PROGRESS NOTES
Rooming Tab(CC,VS,Pt Hx,Fall Screen)  Chief Complaint   Patient presents with   • Memory Loss   • Diabetes   • Hypertension       Subjective 69-year-old here for follow-up of his memory loss.  He saw Dr. Gilliam on February 20 and March 3.  She felt that his memory loss was primarily related to alcohol use as well as some vitamin deficiencies.  He was placed on B6 folic acid B12 thiamine and vitamin D.  He significantly reduced his alcohol intake and since he is done that his memory has improved.  She recommended he wean off of the medications if he could.  He is on Namenda XR 28 mg daily and Aricept 10 mg daily.  He has a lot of medication less so I hesitate to switch him over we will just go to every other day on the Namenda and if he does well with that he can stop the Aricept and his wife will monitor his symptoms.  Unfortunately he still drinks alcohol but he states a whole lot less than prior.  He was being very active and exercising in the gym but now with a coronavirus he has not been doing near as much.  Patient has diabetes mellitus and his blood sugars in the 150s zone.  He is tolerating metformin once a day.  He cannot take Amaryl because of hypoglycemia.  He is also on Actos 45 mg daily.  He has no low sugars.  He checks his feet daily.  He has not been to the ophthalmologist recently.  Patient has hypertension and his blood pressure was good at his recent visit from  [facility] memory clinic.    I have reviewed and updated his medications, medical history and problem list during today's office visit.     Patient Care Team:  Bob Spencer MD as PCP - General (Family Medicine)    Problem List Tab  Medications Tab  Synopsis Tab  Chart Review Tab  Care Everywhere Tab  Immunizations Tab  Patient History Tab    Social History     Tobacco Use   • Smoking status: Never Smoker   • Smokeless tobacco: Never Used   Substance Use Topics   • Alcohol use: Yes     Frequency: 4 or more times a week      Drinks per session: 3 or 4     Binge frequency: Weekly       Review of Systems   Constitutional: Negative for chills, fatigue and fever.   HENT: Negative for nosebleeds.    Eyes: Negative for double vision.   Respiratory: Negative for chest tightness and shortness of breath.    Cardiovascular: Negative for chest pain and palpitations.   Gastrointestinal: Negative for blood in stool.   Genitourinary: Negative for dysuria and hematuria.   Neurological: Negative for dizziness and syncope.   Psychiatric/Behavioral: Negative for depressed mood.       Objective     Rooming Tab(CC,VS,Pt Hx,Fall Screen)  There were no vitals taken for this visit.    There is no height or weight on file to calculate BMI.    Physical Exam   Constitutional: He is oriented to person, place, and time. He appears well-developed and well-nourished.   HENT:   Head: Normocephalic and atraumatic.   Eyes: Conjunctivae are normal.   Pulmonary/Chest: Effort normal.   Neurological: He is oriented to person, place, and time.   Psychiatric: He has a normal mood and affect. His behavior is normal.        Statin Choice Calculator  Data Reviewed:                   Assessment/Plan   Order Review Tab  Health Maintenance Tab  Patient Plan/Order Tab  Diagnoses and all orders for this visit:    1. Memory loss (Primary)  Comments:  Suspect related to alcoholism which has improved.  Decrease Namenda to every other day for now and when he runs out he can discontinue      2. Essential hypertension  Comments:  Stable    3. Type 2 diabetes mellitus without complication, without long-term current use of insulin (CMS/[facility])  Comments:  Worse  Increase metformin to twice daily, watch for diarrhea    4. Alcohol abuse  Comments:  He needs to completely quit drinking alcohol.  He has decided he will just slow down      Other orders  -     metFORMIN (Glucophage) 500 MG tablet; Take 1 tablet by mouth 2 (Two) Times a Day With Meals.  Dispense: 60 tablet; Refill: 5        Wrapup  Tab  Return in about 2 months (around 6/8/2020) for Medicare Wellness.

## 2020-04-08 NOTE — PATIENT INSTRUCTIONS
Decrease Namenda to every other day until he runs out and discontinue.  If doing well can discontinue Aricept.  I would recommend he get off of alcohol completely but he is just going to continue on a smaller amount.  He is not interested in any other type of treatment for this.  Any type of alcohol use can worsen his memory over time.  I agree with the memory center at [facility] that an attempt to come off of the Namenda and Aricept is certainly reasonable since he really does not have any evidence of Alzheimer's dementia but more related to his alcohol abuse.  Increase metformin to 500 twice daily watch for diarrhea.  Follow-up 2 months for a physical

## 2020-04-15 RX ORDER — LANSOPRAZOLE 30 MG/1
CAPSULE, DELAYED RELEASE ORAL
Qty: 90 CAPSULE | Refills: 0 | Status: SHIPPED | OUTPATIENT
Start: 2020-04-15 | End: 2020-12-10

## 2020-04-15 RX ORDER — ATORVASTATIN CALCIUM 40 MG/1
TABLET, FILM COATED ORAL
Qty: 90 TABLET | Refills: 0 | Status: SHIPPED | OUTPATIENT
Start: 2020-04-15 | End: 2020-12-10

## 2020-05-26 RX ORDER — DOXYCYCLINE HYCLATE 100 MG/1
CAPSULE ORAL
Qty: 90 CAPSULE | Refills: 0 | Status: SHIPPED | OUTPATIENT
Start: 2020-05-26 | End: 2020-07-27

## 2020-06-24 RX ORDER — AMLODIPINE BESYLATE 2.5 MG/1
TABLET ORAL
Qty: 30 TABLET | Refills: 6 | Status: SHIPPED | OUTPATIENT
Start: 2020-06-24 | End: 2021-04-05

## 2020-06-29 RX ORDER — PIOGLITAZONEHYDROCHLORIDE 45 MG/1
TABLET ORAL
Qty: 30 TABLET | Refills: 0 | Status: SHIPPED | OUTPATIENT
Start: 2020-06-29 | End: 2020-07-23

## 2020-06-29 RX ORDER — EZETIMIBE 10 MG/1
TABLET ORAL
Qty: 30 TABLET | Refills: 0 | Status: SHIPPED | OUTPATIENT
Start: 2020-06-29 | End: 2020-07-23

## 2020-07-23 RX ORDER — PIOGLITAZONEHYDROCHLORIDE 45 MG/1
TABLET ORAL
Qty: 30 TABLET | Refills: 0 | Status: SHIPPED | OUTPATIENT
Start: 2020-07-23 | End: 2020-09-01

## 2020-07-23 RX ORDER — EZETIMIBE 10 MG/1
TABLET ORAL
Qty: 30 TABLET | Refills: 0 | Status: SHIPPED | OUTPATIENT
Start: 2020-07-23 | End: 2020-09-01

## 2020-07-27 RX ORDER — DOXYCYCLINE HYCLATE 100 MG/1
CAPSULE ORAL
Qty: 90 CAPSULE | Refills: 0 | Status: SHIPPED | OUTPATIENT
Start: 2020-07-27 | End: 2020-09-22

## 2020-09-01 RX ORDER — EZETIMIBE 10 MG/1
TABLET ORAL
Qty: 30 TABLET | Refills: 0 | Status: SHIPPED | OUTPATIENT
Start: 2020-09-01 | End: 2020-11-02

## 2020-09-01 RX ORDER — PIOGLITAZONEHYDROCHLORIDE 45 MG/1
TABLET ORAL
Qty: 30 TABLET | Refills: 0 | Status: SHIPPED | OUTPATIENT
Start: 2020-09-01 | End: 2020-11-02

## 2020-09-22 RX ORDER — DOXYCYCLINE HYCLATE 100 MG/1
CAPSULE ORAL
Qty: 90 CAPSULE | Refills: 0 | Status: SHIPPED | OUTPATIENT
Start: 2020-09-22 | End: 2021-02-11

## 2020-09-23 RX ORDER — POTASSIUM CHLORIDE 750 MG/1
20 TABLET, FILM COATED, EXTENDED RELEASE ORAL 2 TIMES DAILY
Qty: 360 TABLET | Refills: 1 | Status: SHIPPED | OUTPATIENT
Start: 2020-09-23 | End: 2020-09-24

## 2020-09-23 NOTE — TELEPHONE ENCOUNTER
Caller: Anurag Pickard    Relationship: Self    Best call back number: [phone]     Medication needed:   Requested Prescriptions     Pending Prescriptions Disp Refills   • potassium chloride 10 MEQ CR tablet  11     Sig: Take 2 tablets by mouth 2 (Two) Times a Day.       When do you need the refill by: 09/25/2020  What details did the patient provide when requesting the medication: WILL BE OUT NEXT WEEK    Does the patient have less than a 3 day supply:  [] Yes  [x] No    What is the patient's preferred pharmacy:    [facility] [address] [place] [address] [phone] [facility] [phone]

## 2020-09-24 RX ORDER — POTASSIUM CHLORIDE 750 MG/1
TABLET, FILM COATED, EXTENDED RELEASE ORAL
Qty: 120 TABLET | Refills: 0 | Status: SHIPPED | OUTPATIENT
Start: 2020-09-24 | End: 2021-05-27

## 2020-11-02 RX ORDER — EZETIMIBE 10 MG/1
TABLET ORAL
Qty: 30 TABLET | Refills: 11 | Status: SHIPPED | OUTPATIENT
Start: 2020-11-02 | End: 2021-04-05 | Stop reason: SDUPTHER

## 2020-11-02 RX ORDER — PIOGLITAZONEHYDROCHLORIDE 45 MG/1
TABLET ORAL
Qty: 30 TABLET | Refills: 6 | Status: SHIPPED | OUTPATIENT
Start: 2020-11-02 | End: 2021-04-05 | Stop reason: SDUPTHER

## 2020-12-10 RX ORDER — LANSOPRAZOLE 30 MG/1
CAPSULE, DELAYED RELEASE ORAL
Qty: 90 CAPSULE | Refills: 0 | Status: SHIPPED | OUTPATIENT
Start: 2020-12-10 | End: 2021-02-11

## 2020-12-10 RX ORDER — ATORVASTATIN CALCIUM 40 MG/1
TABLET, FILM COATED ORAL
Qty: 90 TABLET | Refills: 0 | Status: SHIPPED | OUTPATIENT
Start: 2020-12-10 | End: 2021-02-11

## 2021-02-11 RX ORDER — ATORVASTATIN CALCIUM 40 MG/1
TABLET, FILM COATED ORAL
Qty: 90 TABLET | Refills: 0 | Status: SHIPPED | OUTPATIENT
Start: 2021-02-11 | End: 2021-04-05 | Stop reason: SDUPTHER

## 2021-02-11 RX ORDER — LANSOPRAZOLE 30 MG/1
CAPSULE, DELAYED RELEASE ORAL
Qty: 90 CAPSULE | Refills: 0 | Status: SHIPPED | OUTPATIENT
Start: 2021-02-11 | End: 2021-07-22 | Stop reason: SDUPTHER

## 2021-02-11 RX ORDER — DOXYCYCLINE HYCLATE 100 MG/1
CAPSULE ORAL
Qty: 90 CAPSULE | Refills: 0 | Status: SHIPPED | OUTPATIENT
Start: 2021-02-11 | End: 2021-04-05

## 2021-04-05 ENCOUNTER — OFFICE VISIT (OUTPATIENT)
Dept: FAMILY MEDICINE CLINIC | Facility: CLINIC | Age: 71
End: 2021-04-05

## 2021-04-05 ENCOUNTER — LAB (OUTPATIENT)
Dept: FAMILY MEDICINE CLINIC | Facility: CLINIC | Age: 71
End: 2021-04-05

## 2021-04-05 VITALS
OXYGEN SATURATION: 99 % | BODY MASS INDEX: 39.23 KG/M2 | RESPIRATION RATE: 16 BRPM | HEART RATE: 94 BPM | HEIGHT: 71 IN | WEIGHT: 280.25 LBS | TEMPERATURE: 96.8 F | SYSTOLIC BLOOD PRESSURE: 148 MMHG | DIASTOLIC BLOOD PRESSURE: 94 MMHG

## 2021-04-05 DIAGNOSIS — Z11.59 ENCOUNTER FOR HEPATITIS C SCREENING TEST FOR LOW RISK PATIENT: ICD-10-CM

## 2021-04-05 DIAGNOSIS — F10.10 ALCOHOL ABUSE: ICD-10-CM

## 2021-04-05 DIAGNOSIS — Z00.00 MEDICARE ANNUAL WELLNESS VISIT, SUBSEQUENT: Primary | ICD-10-CM

## 2021-04-05 DIAGNOSIS — E11.9 TYPE 2 DIABETES MELLITUS WITHOUT COMPLICATION, WITHOUT LONG-TERM CURRENT USE OF INSULIN (HCC): ICD-10-CM

## 2021-04-05 DIAGNOSIS — I35.1 AORTIC EJECTION MURMUR: ICD-10-CM

## 2021-04-05 PROBLEM — E53.8 B12 DEFICIENCY: Status: RESOLVED | Noted: 2018-03-15 | Resolved: 2021-04-05

## 2021-04-05 PROBLEM — I10 BENIGN ESSENTIAL HYPERTENSION: Status: ACTIVE | Noted: 2021-04-05

## 2021-04-05 PROBLEM — M19.91 LOCALIZED, PRIMARY OSTEOARTHRITIS: Status: ACTIVE | Noted: 2021-04-05

## 2021-04-05 PROBLEM — K52.9 CHRONIC DIARRHEA: Status: RESOLVED | Noted: 2018-10-18 | Resolved: 2021-04-05

## 2021-04-05 PROCEDURE — 85025 COMPLETE CBC W/AUTO DIFF WBC: CPT | Performed by: PHYSICIAN ASSISTANT

## 2021-04-05 PROCEDURE — 86803 HEPATITIS C AB TEST: CPT | Performed by: PHYSICIAN ASSISTANT

## 2021-04-05 PROCEDURE — 36415 COLL VENOUS BLD VENIPUNCTURE: CPT

## 2021-04-05 PROCEDURE — 80053 COMPREHEN METABOLIC PANEL: CPT | Performed by: PHYSICIAN ASSISTANT

## 2021-04-05 PROCEDURE — 80061 LIPID PANEL: CPT | Performed by: PHYSICIAN ASSISTANT

## 2021-04-05 PROCEDURE — G0439 PPPS, SUBSEQ VISIT: HCPCS | Performed by: PHYSICIAN ASSISTANT

## 2021-04-05 PROCEDURE — 99214 OFFICE O/P EST MOD 30 MIN: CPT | Performed by: PHYSICIAN ASSISTANT

## 2021-04-05 PROCEDURE — 82746 ASSAY OF FOLIC ACID SERUM: CPT | Performed by: PHYSICIAN ASSISTANT

## 2021-04-05 PROCEDURE — 83036 HEMOGLOBIN GLYCOSYLATED A1C: CPT | Performed by: PHYSICIAN ASSISTANT

## 2021-04-05 RX ORDER — ATORVASTATIN CALCIUM 40 MG/1
40 TABLET, FILM COATED ORAL DAILY
Qty: 90 TABLET | Refills: 3 | Status: SHIPPED | OUTPATIENT
Start: 2021-04-05 | End: 2021-12-13

## 2021-04-05 RX ORDER — AMLODIPINE BESYLATE 5 MG/1
5 TABLET ORAL DAILY
Qty: 90 TABLET | Refills: 3 | Status: SHIPPED | OUTPATIENT
Start: 2021-04-05 | End: 2021-12-13

## 2021-04-05 RX ORDER — FOLIC ACID 1 MG/1
1 TABLET ORAL DAILY
Qty: 90 TABLET | Refills: 1 | Status: SHIPPED | OUTPATIENT
Start: 2021-04-05 | End: 2022-05-12

## 2021-04-05 RX ORDER — METHION/INOS/CHOL BT/B COM/LIV 110MG-86MG
100 CAPSULE ORAL DAILY
Qty: 90 TABLET | Refills: 3 | Status: SHIPPED | OUTPATIENT
Start: 2021-04-05 | End: 2022-03-04

## 2021-04-05 RX ORDER — EZETIMIBE 10 MG/1
10 TABLET ORAL DAILY
Qty: 90 TABLET | Refills: 3 | Status: SHIPPED | OUTPATIENT
Start: 2021-04-05 | End: 2021-12-13

## 2021-04-05 RX ORDER — PIOGLITAZONEHYDROCHLORIDE 45 MG/1
45 TABLET ORAL DAILY
Qty: 90 TABLET | Refills: 3 | Status: SHIPPED | OUTPATIENT
Start: 2021-04-05 | End: 2021-12-13

## 2021-04-05 NOTE — PROGRESS NOTES
The ABCs of the Annual Wellness Visit  Subsequent Medicare Wellness Visit    Chief Complaint   Patient presents with   • Medicare Wellness-subsequent       Subjective   History of Present Illness:  Anurag Pickard is a 70 y.o. male who presents for a Subsequent Medicare Wellness Visit.  Patient also presents to the clinic for hypertension and hyperlipidemia.    HEALTH RISK ASSESSMENT    Recent Hospitalizations:  No hospitalization(s) within the last year.    Current Medical Providers:  Patient Care Team:  Jorden Whipple PA-C as PCP - General (Physician Assistant)    Smoking Status:  Social History     Tobacco Use   Smoking Status Never Smoker   Smokeless Tobacco Never Used       Alcohol Consumption:  Social History     Substance and Sexual Activity   Alcohol Use Yes       Depression Screen:   PHQ-2/PHQ-9 Depression Screening 4/5/2021   Little interest or pleasure in doing things 0   Feeling down, depressed, or hopeless 0   Total Score 0       Fall Risk Screen:  STEADI Fall Risk Assessment has not been completed.    Health Habits and Functional and Cognitive Screening:  Functional & Cognitive Status 4/5/2021   Do you have difficulty preparing food and eating? No   Do you have difficulty bathing yourself, getting dressed or grooming yourself? No   Do you have difficulty using the toilet? No   Do you have difficulty moving around from place to place? No   Do you have trouble with steps or getting out of a bed or a chair? Yes   Current Diet Well Balanced Diet   Dental Exam Up to date   Eye Exam Up to date   Exercise (times per week) 2 times per week   Current Exercise Activities Include Walking   Do you need help using the phone?  No   Are you deaf or do you have serious difficulty hearing?  No   Do you need help with transportation? No   Do you need help shopping? No   Do you need help preparing meals?  No   Do you need help with housework?  No   Do you need help with laundry? No   Do you need help taking your  medications? No   Do you need help managing money? No   Do you ever drive or ride in a car without wearing a seat belt? No   Have you felt unusual stress, anger or loneliness in the last month? No   Who do you live with? Spouse   If you need help, do you have trouble finding someone available to you? No   Have you been bothered in the last four weeks by sexual problems? No   Do you have difficulty concentrating, remembering or making decisions? No         Does the patient have evidence of cognitive impairment? No    Asprin use counseling:Does not need ASA (and currently is not on it)    Age-appropriate Screening Schedule:  Refer to the list below for future screening recommendations based on patient's age, sex and/or medical conditions. Orders for these recommended tests are listed in the plan section. The patient has been provided with a written plan.    Health Maintenance   Topic Date Due   • URINE MICROALBUMIN  Never done   • DIABETIC EYE EXAM  04/18/2020   • HEMOGLOBIN A1C  06/06/2020   • DIABETIC FOOT EXAM  06/14/2020   • LIPID PANEL  12/06/2020   • ZOSTER VACCINE (1 of 2) 04/14/2022 (Originally 11/23/2001)   • INFLUENZA VACCINE  08/01/2021   • COLONOSCOPY  12/09/2023   • TDAP/TD VACCINES (2 - Td) 12/02/2024          The following portions of the patient's history were reviewed and updated as appropriate: allergies, current medications, past family history, past medical history, past social history, past surgical history and problem list.    Outpatient Medications Prior to Visit   Medication Sig Dispense Refill   • Calcium-Magnesium-Vitamin D 500-250-125 MG-MG-UNIT tablet CALCIUM 500 TABS     • lansoprazole (PREVACID) 30 MG capsule Take 1 capsule by mouth once daily 90 capsule 0   • Memantine HCl-Donepezil HCl 28-10 MG capsule sustained-release 24 hr Take  by mouth.     • ONE TOUCH ULTRA TEST test strip 1 each by Other route 2 (Two) Times a Day. to check glucose  11   • potassium chloride 10 MEQ CR tablet Take  2 tablets by mouth twice daily 120 tablet 0   • amLODIPine (NORVASC) 2.5 MG tablet Take 1 tablet by mouth once daily 30 tablet 6   • atorvastatin (LIPITOR) 40 MG tablet Take 1 tablet by mouth once daily 90 tablet 0   • doxycycline (VIBRAMYCIN) 100 MG capsule Take 1 capsule by mouth once daily 90 capsule 0   • ezetimibe (ZETIA) 10 MG tablet Take 1 tablet by mouth once daily 30 tablet 11   • metFORMIN (Glucophage) 500 MG tablet Take 1 tablet by mouth 2 (Two) Times a Day With Meals. 60 tablet 5   • pioglitazone (ACTOS) 45 MG tablet Take 1 tablet by mouth once daily 30 tablet 6     No facility-administered medications prior to visit.       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   • Hypogonadism   • Hypokalemia   • Hypomagnesemia   • Obesity   • Obstructive sleep apnea syndrome   • Orthostatic hypotension   • Osteoarthritis   • Pulmonary hypertension (CMS/HCC)   • Renal failure   • Rosacea   • Vitamin D deficiency   • Memory loss   • Ataxia   • Alcohol abuse   • Retinal disorder   • Presbyopia   • Nuclear senile cataract   • Localized, primary osteoarthritis   • History of laser assisted in situ keratomileusis   • Benign essential hypertension   • Medicare annual wellness visit, subsequent   • Encounter for hepatitis C screening test for low risk patient   • Aortic ejection murmur       Advanced Care Planning:  ACP discussion was declined by the patient. Patient does not have an advance directive, information provided.    Review of Systems   Constitutional: Negative for activity change, appetite change, fatigue and unexpected weight change.   HENT: Negative for sore throat.    Eyes: Negative for pain and visual disturbance.   Respiratory: Negative for cough, shortness of breath and wheezing.    Cardiovascular: Negative for  "chest pain and leg swelling.   Gastrointestinal: Negative for abdominal distention and blood in stool.   Endocrine: Negative for polydipsia and polyuria.   Genitourinary: Negative for decreased urine volume, difficulty urinating, dysuria, frequency and urgency.   Musculoskeletal: Negative for arthralgias and back pain.   Skin: Negative for color change and rash.   Allergic/Immunologic: Negative for environmental allergies.   Neurological: Negative for dizziness, weakness and headaches.   Hematological: Negative for adenopathy. Does not bruise/bleed easily.   Psychiatric/Behavioral: Positive for confusion. Negative for sleep disturbance. The patient is not nervous/anxious.        Compared to one year ago, the patient feels his physical health is the same.  Compared to one year ago, the patient feels his mental health is worse.    Reviewed chart for potential of high risk medication in the elderly: yes  Reviewed chart for potential of harmful drug interactions in the elderly:yes    Objective         Vitals:    04/05/21 1528   BP: 148/94   BP Location: Right arm   Patient Position: Sitting   Cuff Size: Adult   Pulse: 94   Resp: 16   Temp: 96.8 °F (36 °C)   TempSrc: Skin   SpO2: 99%   Weight: 127 kg (280 lb 4 oz)   Height: 180.3 cm (71\")       Body mass index is 39.09 kg/m².  Discussed the patient's BMI with him. The BMI is above average; BMI management plan is completed.    Physical Exam  Constitutional:       Appearance: He is well-developed. He is obese.   HENT:      Head: Normocephalic and atraumatic.   Eyes:      Conjunctiva/sclera: Conjunctivae normal.      Pupils: Pupils are equal, round, and reactive to light.   Cardiovascular:      Rate and Rhythm: Normal rate and regular rhythm.      Heart sounds: Murmur heard.        Comments: There is a blowing systolic murmur radiating to the neck  Pulmonary:      Effort: Pulmonary effort is normal.      Breath sounds: Normal breath sounds.   Abdominal:      General: Bowel " sounds are normal.      Palpations: Abdomen is soft.      Tenderness: There is no abdominal tenderness.   Musculoskeletal:         General: No deformity. Normal range of motion.      Cervical back: Normal range of motion and neck supple.   Lymphadenopathy:      Cervical: No cervical adenopathy.   Skin:     General: Skin is warm and dry.      Capillary Refill: Capillary refill takes less than 2 seconds.      Findings: No rash.   Neurological:      Mental Status: He is alert and oriented to person, place, and time.      Cranial Nerves: No cranial nerve deficit.      Comments: Shuffling gait   Psychiatric:         Behavior: Behavior normal.         Thought Content: Thought content normal.         Judgment: Judgment normal.               Assessment/Plan   Medicare Risks and Personalized Health Plan  CMS Preventative Services Quick Reference  Alcohol Misuse  Cardiovascular risk  Dementia/Memory   Depression/Dysphoria  Obesity/Overweight     The above risks/problems have been discussed with the patient.  Pertinent information has been shared with the patient in the After Visit Summary.  Follow up plans and orders are seen below in the Assessment/Plan Section.    Diagnoses and all orders for this visit:    1. Medicare annual wellness visit, subsequent (Primary)    2. Type 2 diabetes mellitus without complication, without long-term current use of insulin (CMS/[facility])  Comments:  Continue current medications.  I would like to Actos and switch this but he is reluctant.  Revisit  Orders:  -     Lipid Panel; Future  -     Hemoglobin A1c; Future  -     Comprehensive metabolic panel; Future  -     CBC w AUTO Differential; Future    3. Aortic ejection murmur  Comments:  Will refer to cardiology for further evaluation.  Update echo  Orders:  -     Ambulatory Referral to Cardiology  -     Adult Transthoracic Echo Complete W/ Cont if Necessary Per Protocol; Future    4. Alcohol abuse  Comments:  Begin folate/thiamine. Check lags  including ammonia. Needs to quit drinking but is not interested. This is unfortunately his biggest problem.  Orders:  -     Folate; Future  -     Ammonia; Future  -     US Liver; Future  -     folic acid (FOLVITE) 1 MG tablet; Take 1 tablet by mouth Daily.  Dispense: 90 tablet; Refill: 1  -     thiamine (thiamine) 100 MG tablet tablet; Take 1 tablet by mouth Daily.  Dispense: 90 tablet; Refill: 3    5. Encounter for hepatitis C screening test for low risk patient  -     Hepatitis C antibody; Future    Other orders  -     Cancel: Pneumococcal Polysaccharide Vaccine 23-Valent Greater Than or Equal To 3yo Subcutaneous / IM  -     Cancel: Microalbumin / Creatinine Urine Ratio - Urine, Clean Catch; Future  -     amLODIPine (Norvasc) 5 MG tablet; Take 1 tablet by mouth Daily for 360 days.  Dispense: 90 tablet; Refill: 3  -     atorvastatin (LIPITOR) 40 MG tablet; Take 1 tablet by mouth Daily.  Dispense: 90 tablet; Refill: 3  -     ezetimibe (ZETIA) 10 MG tablet; Take 1 tablet by mouth Daily.  Dispense: 90 tablet; Refill: 3  -     pioglitazone (ACTOS) 45 MG tablet; Take 1 tablet by mouth Daily.  Dispense: 90 tablet; Refill: 3  -     metFORMIN (Glucophage) 500 MG tablet; Take 1 tablet by mouth 2 (Two) Times a Day With Meals for 90 days.  Dispense: 180 tablet; Refill: 3      Follow Up:  Return in about 3 months (around 7/5/2021), or if symptoms worsen or fail to improve.     An After Visit Summary and PPPS were given to the patient.

## 2021-04-06 LAB
ALBUMIN SERPL-MCNC: 4.7 G/DL (ref 3.5–5.2)
ALBUMIN/GLOB SERPL: 1.7 G/DL
ALP SERPL-CCNC: 55 U/L (ref 39–117)
ALT SERPL W P-5'-P-CCNC: 21 U/L (ref 1–41)
ANION GAP SERPL CALCULATED.3IONS-SCNC: 14.7 MMOL/L (ref 5–15)
AST SERPL-CCNC: 25 U/L (ref 1–40)
BASOPHILS # BLD AUTO: 0.05 10*3/MM3 (ref 0–0.2)
BASOPHILS NFR BLD AUTO: 0.6 % (ref 0–1.5)
BILIRUB SERPL-MCNC: 0.5 MG/DL (ref 0–1.2)
BUN SERPL-MCNC: 16 MG/DL (ref 8–23)
BUN/CREAT SERPL: 15 (ref 7–25)
CALCIUM SPEC-SCNC: 10.1 MG/DL (ref 8.6–10.5)
CHLORIDE SERPL-SCNC: 94 MMOL/L (ref 98–107)
CHOLEST SERPL-MCNC: 179 MG/DL (ref 0–200)
CO2 SERPL-SCNC: 27.3 MMOL/L (ref 22–29)
CREAT SERPL-MCNC: 1.07 MG/DL (ref 0.76–1.27)
DEPRECATED RDW RBC AUTO: 43.9 FL (ref 37–54)
EOSINOPHIL # BLD AUTO: 0.03 10*3/MM3 (ref 0–0.4)
EOSINOPHIL NFR BLD AUTO: 0.4 % (ref 0.3–6.2)
ERYTHROCYTE [DISTWIDTH] IN BLOOD BY AUTOMATED COUNT: 13.2 % (ref 12.3–15.4)
FOLATE SERPL-MCNC: 5.25 NG/ML (ref 4.78–24.2)
GFR SERPL CREATININE-BSD FRML MDRD: 69 ML/MIN/1.73
GLOBULIN UR ELPH-MCNC: 2.8 GM/DL
GLUCOSE SERPL-MCNC: 221 MG/DL (ref 65–99)
HBA1C MFR BLD: 8.4 % (ref 3.5–5.6)
HCT VFR BLD AUTO: 45.2 % (ref 37.5–51)
HCV AB SER DONR QL: NORMAL
HDLC SERPL-MCNC: 81 MG/DL (ref 40–60)
HGB BLD-MCNC: 15.1 G/DL (ref 13–17.7)
IMM GRANULOCYTES # BLD AUTO: 0.04 10*3/MM3 (ref 0–0.05)
IMM GRANULOCYTES NFR BLD AUTO: 0.5 % (ref 0–0.5)
LDLC SERPL CALC-MCNC: 78 MG/DL (ref 0–100)
LDLC/HDLC SERPL: 0.93 {RATIO}
LYMPHOCYTES # BLD AUTO: 1.81 10*3/MM3 (ref 0.7–3.1)
LYMPHOCYTES NFR BLD AUTO: 22.1 % (ref 19.6–45.3)
MCH RBC QN AUTO: 30.6 PG (ref 26.6–33)
MCHC RBC AUTO-ENTMCNC: 33.4 G/DL (ref 31.5–35.7)
MCV RBC AUTO: 91.5 FL (ref 79–97)
MONOCYTES # BLD AUTO: 0.67 10*3/MM3 (ref 0.1–0.9)
MONOCYTES NFR BLD AUTO: 8.2 % (ref 5–12)
NEUTROPHILS NFR BLD AUTO: 5.59 10*3/MM3 (ref 1.7–7)
NEUTROPHILS NFR BLD AUTO: 68.2 % (ref 42.7–76)
NRBC BLD AUTO-RTO: 0 /100 WBC (ref 0–0.2)
PLATELET # BLD AUTO: 185 10*3/MM3 (ref 140–450)
PMV BLD AUTO: 11.9 FL (ref 6–12)
POTASSIUM SERPL-SCNC: 4.2 MMOL/L (ref 3.5–5.2)
PROT SERPL-MCNC: 7.5 G/DL (ref 6–8.5)
RBC # BLD AUTO: 4.94 10*6/MM3 (ref 4.14–5.8)
SODIUM SERPL-SCNC: 136 MMOL/L (ref 136–145)
TRIGL SERPL-MCNC: 115 MG/DL (ref 0–150)
VLDLC SERPL-MCNC: 20 MG/DL (ref 5–40)
WBC # BLD AUTO: 8.19 10*3/MM3 (ref 3.4–10.8)

## 2021-04-15 ENCOUNTER — HOSPITAL ENCOUNTER (OUTPATIENT)
Dept: ULTRASOUND IMAGING | Facility: HOSPITAL | Age: 71
Discharge: HOME | End: 2021-04-15

## 2021-04-15 ENCOUNTER — HOSPITAL ENCOUNTER (OUTPATIENT)
Dept: CARDIOLOGY | Facility: HOSPITAL | Age: 71
Discharge: HOME | End: 2021-04-15

## 2021-04-15 DIAGNOSIS — F10.10 ALCOHOL ABUSE: ICD-10-CM

## 2021-04-15 DIAGNOSIS — I35.1 AORTIC EJECTION MURMUR: ICD-10-CM

## 2021-04-15 PROCEDURE — 93306 TTE W/DOPPLER COMPLETE: CPT | Performed by: INTERNAL MEDICINE

## 2021-04-15 PROCEDURE — 76705 ECHO EXAM OF ABDOMEN: CPT

## 2021-04-15 PROCEDURE — 93306 TTE W/DOPPLER COMPLETE: CPT

## 2021-04-20 LAB
BH CV ECHO MEAS - ACS: 1.8 CM
BH CV ECHO MEAS - AO MAX PG (FULL): 43.2 MMHG
BH CV ECHO MEAS - AO MAX PG: 50 MMHG
BH CV ECHO MEAS - AO MEAN PG (FULL): 23.5 MMHG
BH CV ECHO MEAS - AO MEAN PG: 26.8 MMHG
BH CV ECHO MEAS - AO ROOT AREA (BSA CORRECTED): 1.6
BH CV ECHO MEAS - AO ROOT AREA: 11.4 CM^2
BH CV ECHO MEAS - AO ROOT DIAM: 3.8 CM
BH CV ECHO MEAS - AO V2 MAX: 350.7 CM/SEC
BH CV ECHO MEAS - AO V2 MEAN: 238.6 CM/SEC
BH CV ECHO MEAS - AO V2 VTI: 79.7 CM
BH CV ECHO MEAS - AVA(I,A): 1.5 CM^2
BH CV ECHO MEAS - AVA(I,D): 1.5 CM^2
BH CV ECHO MEAS - AVA(V,A): 1.2 CM^2
BH CV ECHO MEAS - AVA(V,D): 1.2 CM^2
BH CV ECHO MEAS - BSA(HAYCOCK): 2.6 M^2
BH CV ECHO MEAS - BSA: 2.4 M^2
BH CV ECHO MEAS - BZI_BMI: 39.1 KILOGRAMS/M^2
BH CV ECHO MEAS - BZI_METRIC_HEIGHT: 180.3 CM
BH CV ECHO MEAS - BZI_METRIC_WEIGHT: 127 KG
BH CV ECHO MEAS - EDV(CUBED): 201.7 ML
BH CV ECHO MEAS - EDV(MOD-SP4): 93.8 ML
BH CV ECHO MEAS - EDV(TEICH): 170.8 ML
BH CV ECHO MEAS - EF(CUBED): 80.5 %
BH CV ECHO MEAS - EF(MOD-BP): 58 %
BH CV ECHO MEAS - EF(MOD-SP4): 57.9 %
BH CV ECHO MEAS - EF(TEICH): 72.2 %
BH CV ECHO MEAS - ESV(CUBED): 39.3 ML
BH CV ECHO MEAS - ESV(MOD-SP4): 39.5 ML
BH CV ECHO MEAS - ESV(TEICH): 47.4 ML
BH CV ECHO MEAS - FS: 42 %
BH CV ECHO MEAS - IVSD: 1.2 CM
BH CV ECHO MEAS - LA DIMENSION(2D): 4.3 CM
BH CV ECHO MEAS - LV DIASTOLIC VOL/BSA (35-75): 38.6 ML/M^2
BH CV ECHO MEAS - LV MAX PG: 6.8 MMHG
BH CV ECHO MEAS - LV MEAN PG: 3.3 MMHG
BH CV ECHO MEAS - LV SYSTOLIC VOL/BSA (12-30): 16.2 ML/M^2
BH CV ECHO MEAS - LV V1 MAX: 130.5 CM/SEC
BH CV ECHO MEAS - LV V1 MEAN: 84.6 CM/SEC
BH CV ECHO MEAS - LV V1 VTI: 37.9 CM
BH CV ECHO MEAS - LVIDD: 5.9 CM
BH CV ECHO MEAS - LVIDS: 3.4 CM
BH CV ECHO MEAS - LVOT AREA: 3.3 CM^2
BH CV ECHO MEAS - LVOT DIAM: 2 CM
BH CV ECHO MEAS - MV A MAX VEL: 83.6 CM/SEC
BH CV ECHO MEAS - MV DEC SLOPE: 394.1 CM/SEC^2
BH CV ECHO MEAS - MV DEC TIME: 0.26 SEC
BH CV ECHO MEAS - MV E MAX VEL: 102.4 CM/SEC
BH CV ECHO MEAS - MV E/A: 1.2
BH CV ECHO MEAS - MV MAX PG: 4 MMHG
BH CV ECHO MEAS - MV MEAN PG: 1.5 MMHG
BH CV ECHO MEAS - MV V2 MAX: 100.2 CM/SEC
BH CV ECHO MEAS - MV V2 MEAN: 56.3 CM/SEC
BH CV ECHO MEAS - MV V2 VTI: 33 CM
BH CV ECHO MEAS - MVA(VTI): 3.7 CM^2
BH CV ECHO MEAS - PA MAX PG (FULL): 5.4 MMHG
BH CV ECHO MEAS - PA MAX PG: 9.5 MMHG
BH CV ECHO MEAS - PA V2 MAX: 154 CM/SEC
BH CV ECHO MEAS - PULM A REVS DUR: 0.09 SEC
BH CV ECHO MEAS - PULM A REVS VEL: 31.6 CM/SEC
BH CV ECHO MEAS - PULM DIAS VEL: 47.3 CM/SEC
BH CV ECHO MEAS - PULM S/D: 1.6
BH CV ECHO MEAS - PULM SYS VEL: 75.4 CM/SEC
BH CV ECHO MEAS - RV MAX PG: 4.1 MMHG
BH CV ECHO MEAS - RV MEAN PG: 2.4 MMHG
BH CV ECHO MEAS - RV V1 MAX: 101.8 CM/SEC
BH CV ECHO MEAS - RV V1 MEAN: 74.1 CM/SEC
BH CV ECHO MEAS - RV V1 VTI: 27 CM
BH CV ECHO MEAS - RVDD: 4 CM
BH CV ECHO MEAS - SI(AO): 374.2 ML/M^2
BH CV ECHO MEAS - SI(CUBED): 66.7 ML/M^2
BH CV ECHO MEAS - SI(LVOT): 50.7 ML/M^2
BH CV ECHO MEAS - SI(MOD-SP4): 22.3 ML/M^2
BH CV ECHO MEAS - SI(TEICH): 50.7 ML/M^2
BH CV ECHO MEAS - SV(AO): 910.4 ML
BH CV ECHO MEAS - SV(CUBED): 162.4 ML
BH CV ECHO MEAS - SV(LVOT): 123.5 ML
BH CV ECHO MEAS - SV(MOD-SP4): 54.3 ML
BH CV ECHO MEAS - SV(TEICH): 123.4 ML
BH CV ECHO MEAS - TR MAX VEL: 231.9 CM/SEC
LV EF 2D ECHO EST: 55 %

## 2021-04-27 RX ORDER — NAPROXEN SODIUM 220 MG
440 TABLET ORAL DAILY
COMMUNITY
End: 2022-04-04 | Stop reason: HOSPADM

## 2021-04-28 ENCOUNTER — HOSPITAL ENCOUNTER (OUTPATIENT)
Dept: GENERAL RADIOLOGY | Facility: HOSPITAL | Age: 71
Discharge: HOME | End: 2021-04-28

## 2021-04-28 ENCOUNTER — HOSPITAL ENCOUNTER (OUTPATIENT)
Dept: CARDIOLOGY | Facility: HOSPITAL | Age: 71
Discharge: HOME | End: 2021-04-28

## 2021-04-28 ENCOUNTER — LAB (OUTPATIENT)
Dept: LAB | Facility: HOSPITAL | Age: 71
End: 2021-04-28

## 2021-04-28 LAB
ABO GROUP BLD: NORMAL
ANION GAP SERPL CALCULATED.3IONS-SCNC: 14.7 MMOL/L (ref 5–15)
APTT PPP: 25.9 SECONDS (ref 24–31)
BILIRUB UR QL STRIP: NEGATIVE
BLD GP AB SCN SERPL QL: NEGATIVE
BUN SERPL-MCNC: 16 MG/DL (ref 8–23)
BUN/CREAT SERPL: 17 (ref 7–25)
CALCIUM SPEC-SCNC: 9.5 MG/DL (ref 8.6–10.5)
CHLORIDE SERPL-SCNC: 100 MMOL/L (ref 98–107)
CLARITY UR: CLEAR
CO2 SERPL-SCNC: 25.3 MMOL/L (ref 22–29)
COLOR UR: ABNORMAL
CREAT SERPL-MCNC: 0.94 MG/DL (ref 0.76–1.27)
GFR SERPL CREATININE-BSD FRML MDRD: 80 ML/MIN/1.73
GLUCOSE SERPL-MCNC: 137 MG/DL (ref 65–99)
GLUCOSE UR STRIP-MCNC: NEGATIVE MG/DL
HGB UR QL STRIP.AUTO: NEGATIVE
INR PPP: 0.98 (ref 0.93–1.1)
KETONES UR QL STRIP: ABNORMAL
LEUKOCYTE ESTERASE UR QL STRIP.AUTO: NEGATIVE
MRSA DNA SPEC QL NAA+PROBE: NORMAL
NITRITE UR QL STRIP: NEGATIVE
PH UR STRIP.AUTO: <=5 [PH] (ref 5–8)
POTASSIUM SERPL-SCNC: 4.4 MMOL/L (ref 3.5–5.2)
PROT UR QL STRIP: ABNORMAL
PROTHROMBIN TIME: 10.8 SECONDS (ref 9.6–11.7)
RH BLD: POSITIVE
SODIUM SERPL-SCNC: 140 MMOL/L (ref 136–145)
SP GR UR STRIP: 1.03 (ref 1–1.03)
T&S EXPIRATION DATE: NORMAL
UROBILINOGEN UR QL STRIP: ABNORMAL

## 2021-04-28 PROCEDURE — 81003 URINALYSIS AUTO W/O SCOPE: CPT

## 2021-04-28 PROCEDURE — 86850 RBC ANTIBODY SCREEN: CPT

## 2021-04-28 PROCEDURE — 86900 BLOOD TYPING SEROLOGIC ABO: CPT

## 2021-04-28 PROCEDURE — 71046 X-RAY EXAM CHEST 2 VIEWS: CPT

## 2021-04-28 PROCEDURE — 36415 COLL VENOUS BLD VENIPUNCTURE: CPT | Performed by: ORTHOPAEDIC SURGERY

## 2021-04-28 PROCEDURE — 80048 BASIC METABOLIC PNL TOTAL CA: CPT

## 2021-04-28 PROCEDURE — 85610 PROTHROMBIN TIME: CPT | Performed by: ORTHOPAEDIC SURGERY

## 2021-04-28 PROCEDURE — 85730 THROMBOPLASTIN TIME PARTIAL: CPT | Performed by: ORTHOPAEDIC SURGERY

## 2021-04-28 PROCEDURE — 87641 MR-STAPH DNA AMP PROBE: CPT

## 2021-04-28 PROCEDURE — 86901 BLOOD TYPING SEROLOGIC RH(D): CPT

## 2021-04-28 PROCEDURE — 93010 ELECTROCARDIOGRAM REPORT: CPT | Performed by: INTERNAL MEDICINE

## 2021-04-28 PROCEDURE — 93005 ELECTROCARDIOGRAM TRACING: CPT | Performed by: ORTHOPAEDIC SURGERY

## 2021-05-01 ENCOUNTER — LAB (OUTPATIENT)
Dept: LAB | Facility: HOSPITAL | Age: 71
End: 2021-05-01

## 2021-05-01 PROCEDURE — C9803 HOPD COVID-19 SPEC COLLECT: HCPCS

## 2021-05-01 PROCEDURE — U0005 INFEC AGEN DETEC AMPLI PROBE: HCPCS

## 2021-05-01 PROCEDURE — U0004 COV-19 TEST NON-CDC HGH THRU: HCPCS

## 2021-05-02 LAB — SARS-COV-2 ORF1AB RESP QL NAA+PROBE: NOT DETECTED

## 2021-05-03 ENCOUNTER — ANESTHESIA EVENT (OUTPATIENT)
Dept: PERIOP | Facility: HOSPITAL | Age: 71
End: 2021-05-03

## 2021-05-04 ENCOUNTER — APPOINTMENT (OUTPATIENT)
Dept: GENERAL RADIOLOGY | Facility: HOSPITAL | Age: 71
End: 2021-05-04

## 2021-05-04 ENCOUNTER — HOSPITAL ENCOUNTER (OUTPATIENT)
Facility: HOSPITAL | Age: 71
Discharge: HOME HEALTH | End: 2021-05-05
Attending: ORTHOPAEDIC SURGERY | Admitting: ORTHOPAEDIC SURGERY

## 2021-05-04 ENCOUNTER — ANESTHESIA (OUTPATIENT)
Dept: PERIOP | Facility: HOSPITAL | Age: 71
End: 2021-05-04

## 2021-05-04 DIAGNOSIS — Z96.652 STATUS POST TOTAL KNEE REPLACEMENT, LEFT: ICD-10-CM

## 2021-05-04 DIAGNOSIS — M17.12 ARTHRITIS OF LEFT KNEE: Primary | ICD-10-CM

## 2021-05-04 LAB
GLUCOSE BLDC GLUCOMTR-MCNC: 182 MG/DL (ref 70–105)
GLUCOSE BLDC GLUCOMTR-MCNC: 190 MG/DL (ref 70–105)
GLUCOSE BLDC GLUCOMTR-MCNC: 192 MG/DL (ref 70–105)
GLUCOSE BLDC GLUCOMTR-MCNC: 225 MG/DL (ref 70–105)

## 2021-05-04 PROCEDURE — 97116 GAIT TRAINING THERAPY: CPT

## 2021-05-04 PROCEDURE — 63710000001 AMLODIPINE 2.5 MG TABLET: Performed by: ORTHOPAEDIC SURGERY

## 2021-05-04 PROCEDURE — C1713 ANCHOR/SCREW BN/BN,TIS/BN: HCPCS | Performed by: ORTHOPAEDIC SURGERY

## 2021-05-04 PROCEDURE — A9270 NON-COVERED ITEM OR SERVICE: HCPCS | Performed by: ORTHOPAEDIC SURGERY

## 2021-05-04 PROCEDURE — 97161 PT EVAL LOW COMPLEX 20 MIN: CPT

## 2021-05-04 PROCEDURE — 25010000002 EPINEPHRINE PER 0.1 MG: Performed by: ORTHOPAEDIC SURGERY

## 2021-05-04 PROCEDURE — 63710000001 ATORVASTATIN 40 MG TABLET: Performed by: ORTHOPAEDIC SURGERY

## 2021-05-04 PROCEDURE — 25010000002 ROPIVACAINE PER 1 MG: Performed by: ORTHOPAEDIC SURGERY

## 2021-05-04 PROCEDURE — 25010000002 CEFAZOLIN PER 500 MG: Performed by: ORTHOPAEDIC SURGERY

## 2021-05-04 PROCEDURE — 63710000001 ASPIRIN EC 325 MG TABLET DELAYED-RELEASE: Performed by: ORTHOPAEDIC SURGERY

## 2021-05-04 PROCEDURE — G0378 HOSPITAL OBSERVATION PER HR: HCPCS

## 2021-05-04 PROCEDURE — 27447 TOTAL KNEE ARTHROPLASTY: CPT | Performed by: NURSE PRACTITIONER

## 2021-05-04 PROCEDURE — 94799 UNLISTED PULMONARY SVC/PX: CPT

## 2021-05-04 PROCEDURE — 63710000001 POLYETHYLENE GLYCOL 17 G PACK: Performed by: ORTHOPAEDIC SURGERY

## 2021-05-04 PROCEDURE — 25010000002 MIDAZOLAM PER 1 MG: Performed by: NURSE ANESTHETIST, CERTIFIED REGISTERED

## 2021-05-04 PROCEDURE — 63710000001 OXYCODONE 5 MG TABLET: Performed by: ORTHOPAEDIC SURGERY

## 2021-05-04 PROCEDURE — 82962 GLUCOSE BLOOD TEST: CPT

## 2021-05-04 PROCEDURE — 99214 OFFICE O/P EST MOD 30 MIN: CPT | Performed by: HOSPITALIST

## 2021-05-04 PROCEDURE — 73560 X-RAY EXAM OF KNEE 1 OR 2: CPT

## 2021-05-04 PROCEDURE — 63710000001 PREGABALIN 75 MG CAPSULE: Performed by: ORTHOPAEDIC SURGERY

## 2021-05-04 PROCEDURE — C1776 JOINT DEVICE (IMPLANTABLE): HCPCS | Performed by: ORTHOPAEDIC SURGERY

## 2021-05-04 PROCEDURE — 25010000002 PROPOFOL 10 MG/ML EMULSION: Performed by: NURSE ANESTHETIST, CERTIFIED REGISTERED

## 2021-05-04 DEVICE — INSRT TIB/KN TRIATHLON PS X3 NMBR6 11MM: Type: IMPLANTABLE DEVICE | Site: KNEE | Status: FUNCTIONAL

## 2021-05-04 DEVICE — CMT BONE SIMPLEX/P SPEEDSET 40G: Type: IMPLANTABLE DEVICE | Site: KNEE | Status: FUNCTIONAL

## 2021-05-04 DEVICE — COMP FEM TRIATH PS CMT NO7 LT: Type: IMPLANTABLE DEVICE | Site: KNEE | Status: FUNCTIONAL

## 2021-05-04 DEVICE — BASEPLT TIB TRIATH CMT NO6: Type: IMPLANTABLE DEVICE | Site: KNEE | Status: FUNCTIONAL

## 2021-05-04 DEVICE — PAT TRIATH SYMM X3 9X33MM: Type: IMPLANTABLE DEVICE | Site: KNEE | Status: FUNCTIONAL

## 2021-05-04 DEVICE — TOTL KN HI DEMAND STRYKER: Type: IMPLANTABLE DEVICE | Site: KNEE | Status: FUNCTIONAL

## 2021-05-04 RX ORDER — IPRATROPIUM BROMIDE AND ALBUTEROL SULFATE 2.5; .5 MG/3ML; MG/3ML
3 SOLUTION RESPIRATORY (INHALATION) ONCE AS NEEDED
Status: DISCONTINUED | OUTPATIENT
Start: 2021-05-04 | End: 2021-05-04 | Stop reason: HOSPADM

## 2021-05-04 RX ORDER — SODIUM CHLORIDE 0.9 % (FLUSH) 0.9 %
3 SYRINGE (ML) INJECTION EVERY 12 HOURS SCHEDULED
Status: DISCONTINUED | OUTPATIENT
Start: 2021-05-04 | End: 2021-05-05 | Stop reason: HOSPADM

## 2021-05-04 RX ORDER — ACETAMINOPHEN 500 MG
1000 TABLET ORAL ONCE
Status: COMPLETED | OUTPATIENT
Start: 2021-05-04 | End: 2021-05-04

## 2021-05-04 RX ORDER — NALOXONE HCL 0.4 MG/ML
0.4 VIAL (ML) INJECTION
Status: DISCONTINUED | OUTPATIENT
Start: 2021-05-04 | End: 2021-05-05 | Stop reason: HOSPADM

## 2021-05-04 RX ORDER — ONDANSETRON 2 MG/ML
4 INJECTION INTRAMUSCULAR; INTRAVENOUS ONCE AS NEEDED
Status: DISCONTINUED | OUTPATIENT
Start: 2021-05-04 | End: 2021-05-04 | Stop reason: HOSPADM

## 2021-05-04 RX ORDER — SODIUM CHLORIDE 9 MG/ML
50 INJECTION, SOLUTION INTRAVENOUS CONTINUOUS
Status: DISCONTINUED | OUTPATIENT
Start: 2021-05-04 | End: 2021-05-04

## 2021-05-04 RX ORDER — PANTOPRAZOLE SODIUM 40 MG/1
40 TABLET, DELAYED RELEASE ORAL EVERY MORNING
Status: DISCONTINUED | OUTPATIENT
Start: 2021-05-05 | End: 2021-05-05 | Stop reason: HOSPADM

## 2021-05-04 RX ORDER — PIOGLITAZONEHYDROCHLORIDE 45 MG/1
45 TABLET ORAL DAILY
Status: DISCONTINUED | OUTPATIENT
Start: 2021-05-05 | End: 2021-05-05 | Stop reason: HOSPADM

## 2021-05-04 RX ORDER — CELECOXIB 200 MG/1
200 CAPSULE ORAL ONCE
Status: COMPLETED | OUTPATIENT
Start: 2021-05-04 | End: 2021-05-04

## 2021-05-04 RX ORDER — POTASSIUM CHLORIDE 20 MEQ/1
20 TABLET, EXTENDED RELEASE ORAL 2 TIMES DAILY
Status: DISCONTINUED | OUTPATIENT
Start: 2021-05-05 | End: 2021-05-05 | Stop reason: HOSPADM

## 2021-05-04 RX ORDER — BUPIVACAINE HYDROCHLORIDE 7.5 MG/ML
INJECTION, SOLUTION EPIDURAL; RETROBULBAR
Status: COMPLETED | OUTPATIENT
Start: 2021-05-04 | End: 2021-05-04

## 2021-05-04 RX ORDER — MORPHINE SULFATE 4 MG/ML
4 INJECTION, SOLUTION INTRAMUSCULAR; INTRAVENOUS
Status: DISCONTINUED | OUTPATIENT
Start: 2021-05-04 | End: 2021-05-05 | Stop reason: HOSPADM

## 2021-05-04 RX ORDER — SODIUM CHLORIDE 9 MG/ML
100 INJECTION, SOLUTION INTRAVENOUS CONTINUOUS
Status: DISCONTINUED | OUTPATIENT
Start: 2021-05-04 | End: 2021-05-04

## 2021-05-04 RX ORDER — SODIUM CHLORIDE 0.9 % (FLUSH) 0.9 %
10 SYRINGE (ML) INJECTION AS NEEDED
Status: DISCONTINUED | OUTPATIENT
Start: 2021-05-04 | End: 2021-05-04 | Stop reason: HOSPADM

## 2021-05-04 RX ORDER — OXYCODONE HYDROCHLORIDE 5 MG/1
5 TABLET ORAL EVERY 4 HOURS PRN
Status: DISCONTINUED | OUTPATIENT
Start: 2021-05-04 | End: 2021-05-05 | Stop reason: HOSPADM

## 2021-05-04 RX ORDER — AMLODIPINE BESYLATE 2.5 MG/1
2.5 TABLET ORAL DAILY
Status: DISCONTINUED | OUTPATIENT
Start: 2021-05-04 | End: 2021-05-05 | Stop reason: HOSPADM

## 2021-05-04 RX ORDER — EPHEDRINE SULFATE 50 MG/ML
INJECTION INTRAVENOUS AS NEEDED
Status: DISCONTINUED | OUTPATIENT
Start: 2021-05-04 | End: 2021-05-04 | Stop reason: SURG

## 2021-05-04 RX ORDER — MELOXICAM 15 MG/1
15 TABLET ORAL ONCE
Status: DISCONTINUED | OUTPATIENT
Start: 2021-05-04 | End: 2021-05-04 | Stop reason: HOSPADM

## 2021-05-04 RX ORDER — PROPOFOL 10 MG/ML
VIAL (ML) INTRAVENOUS CONTINUOUS PRN
Status: DISCONTINUED | OUTPATIENT
Start: 2021-05-04 | End: 2021-05-04 | Stop reason: SURG

## 2021-05-04 RX ORDER — EPHEDRINE SULFATE 50 MG/ML
5 INJECTION, SOLUTION INTRAVENOUS ONCE AS NEEDED
Status: DISCONTINUED | OUTPATIENT
Start: 2021-05-04 | End: 2021-05-04 | Stop reason: HOSPADM

## 2021-05-04 RX ORDER — MIDAZOLAM HYDROCHLORIDE 1 MG/ML
1 INJECTION INTRAMUSCULAR; INTRAVENOUS
Status: DISCONTINUED | OUTPATIENT
Start: 2021-05-04 | End: 2021-05-04 | Stop reason: HOSPADM

## 2021-05-04 RX ORDER — MIDAZOLAM HYDROCHLORIDE 1 MG/ML
INJECTION INTRAMUSCULAR; INTRAVENOUS AS NEEDED
Status: DISCONTINUED | OUTPATIENT
Start: 2021-05-04 | End: 2021-05-04 | Stop reason: SURG

## 2021-05-04 RX ORDER — ATORVASTATIN CALCIUM 40 MG/1
40 TABLET, FILM COATED ORAL NIGHTLY
Status: DISCONTINUED | OUTPATIENT
Start: 2021-05-04 | End: 2021-05-05 | Stop reason: HOSPADM

## 2021-05-04 RX ORDER — LABETALOL HYDROCHLORIDE 5 MG/ML
5 INJECTION, SOLUTION INTRAVENOUS
Status: DISCONTINUED | OUTPATIENT
Start: 2021-05-04 | End: 2021-05-04 | Stop reason: HOSPADM

## 2021-05-04 RX ORDER — NALOXONE HCL 0.4 MG/ML
0.1 VIAL (ML) INJECTION
Status: DISCONTINUED | OUTPATIENT
Start: 2021-05-04 | End: 2021-05-05 | Stop reason: HOSPADM

## 2021-05-04 RX ORDER — LIDOCAINE HYDROCHLORIDE 10 MG/ML
0.5 INJECTION, SOLUTION INFILTRATION; PERINEURAL ONCE AS NEEDED
Status: DISCONTINUED | OUTPATIENT
Start: 2021-05-04 | End: 2021-05-04 | Stop reason: HOSPADM

## 2021-05-04 RX ORDER — ACETAMINOPHEN 650 MG/1
650 SUPPOSITORY RECTAL ONCE AS NEEDED
Status: DISCONTINUED | OUTPATIENT
Start: 2021-05-04 | End: 2021-05-04 | Stop reason: HOSPADM

## 2021-05-04 RX ORDER — ASPIRIN 325 MG
325 TABLET, DELAYED RELEASE (ENTERIC COATED) ORAL EVERY 12 HOURS SCHEDULED
Status: DISCONTINUED | OUTPATIENT
Start: 2021-05-04 | End: 2021-05-05 | Stop reason: HOSPADM

## 2021-05-04 RX ORDER — ACETAMINOPHEN 325 MG/1
325 TABLET ORAL EVERY 4 HOURS PRN
Status: DISCONTINUED | OUTPATIENT
Start: 2021-05-04 | End: 2021-05-05 | Stop reason: HOSPADM

## 2021-05-04 RX ORDER — PHENYLEPHRINE HCL IN 0.9% NACL 1 MG/10 ML
SYRINGE (ML) INTRAVENOUS AS NEEDED
Status: DISCONTINUED | OUTPATIENT
Start: 2021-05-04 | End: 2021-05-04 | Stop reason: SURG

## 2021-05-04 RX ORDER — SODIUM CHLORIDE, SODIUM LACTATE, POTASSIUM CHLORIDE, CALCIUM CHLORIDE 600; 310; 30; 20 MG/100ML; MG/100ML; MG/100ML; MG/100ML
1000 INJECTION, SOLUTION INTRAVENOUS CONTINUOUS
Status: DISCONTINUED | OUTPATIENT
Start: 2021-05-04 | End: 2021-05-05 | Stop reason: HOSPADM

## 2021-05-04 RX ORDER — MELOXICAM 15 MG/1
15 TABLET ORAL DAILY
Status: DISCONTINUED | OUTPATIENT
Start: 2021-05-05 | End: 2021-05-05 | Stop reason: HOSPADM

## 2021-05-04 RX ORDER — IBUPROFEN 400 MG/1
600 TABLET ORAL ONCE AS NEEDED
Status: DISCONTINUED | OUTPATIENT
Start: 2021-05-04 | End: 2021-05-04 | Stop reason: HOSPADM

## 2021-05-04 RX ORDER — TRANEXAMIC ACID 10 MG/ML
1000 INJECTION, SOLUTION INTRAVENOUS ONCE
Status: COMPLETED | OUTPATIENT
Start: 2021-05-04 | End: 2021-05-04

## 2021-05-04 RX ORDER — OXYCODONE HYDROCHLORIDE 5 MG/1
10 TABLET ORAL EVERY 4 HOURS PRN
Status: DISCONTINUED | OUTPATIENT
Start: 2021-05-04 | End: 2021-05-05 | Stop reason: HOSPADM

## 2021-05-04 RX ORDER — OXYCODONE HYDROCHLORIDE 5 MG/1
7.5 TABLET ORAL EVERY 4 HOURS PRN
Status: DISCONTINUED | OUTPATIENT
Start: 2021-05-04 | End: 2021-05-04 | Stop reason: HOSPADM

## 2021-05-04 RX ORDER — SODIUM CHLORIDE 0.9 % (FLUSH) 0.9 %
3-10 SYRINGE (ML) INJECTION AS NEEDED
Status: DISCONTINUED | OUTPATIENT
Start: 2021-05-04 | End: 2021-05-05 | Stop reason: HOSPADM

## 2021-05-04 RX ORDER — TRANEXAMIC ACID 10 MG/ML
1000 INJECTION, SOLUTION INTRAVENOUS ONCE
Status: DISCONTINUED | OUTPATIENT
Start: 2021-05-04 | End: 2021-05-04 | Stop reason: HOSPADM

## 2021-05-04 RX ORDER — HYDROMORPHONE HCL 110MG/55ML
0.5 PATIENT CONTROLLED ANALGESIA SYRINGE INTRAVENOUS
Status: DISCONTINUED | OUTPATIENT
Start: 2021-05-04 | End: 2021-05-04 | Stop reason: HOSPADM

## 2021-05-04 RX ORDER — POLYETHYLENE GLYCOL 3350 17 G/17G
17 POWDER, FOR SOLUTION ORAL DAILY
Status: DISCONTINUED | OUTPATIENT
Start: 2021-05-04 | End: 2021-05-05 | Stop reason: HOSPADM

## 2021-05-04 RX ORDER — SODIUM CHLORIDE 9 MG/ML
125 INJECTION, SOLUTION INTRAVENOUS CONTINUOUS
Status: DISCONTINUED | OUTPATIENT
Start: 2021-05-04 | End: 2021-05-05 | Stop reason: HOSPADM

## 2021-05-04 RX ORDER — HYDROMORPHONE HCL 110MG/55ML
0.5 PATIENT CONTROLLED ANALGESIA SYRINGE INTRAVENOUS
Status: DISCONTINUED | OUTPATIENT
Start: 2021-05-04 | End: 2021-05-05 | Stop reason: HOSPADM

## 2021-05-04 RX ORDER — HYDROMORPHONE HCL 110MG/55ML
1 PATIENT CONTROLLED ANALGESIA SYRINGE INTRAVENOUS
Status: DISCONTINUED | OUTPATIENT
Start: 2021-05-04 | End: 2021-05-04 | Stop reason: HOSPADM

## 2021-05-04 RX ORDER — ACETAMINOPHEN 325 MG/1
650 TABLET ORAL ONCE AS NEEDED
Status: DISCONTINUED | OUTPATIENT
Start: 2021-05-04 | End: 2021-05-04 | Stop reason: HOSPADM

## 2021-05-04 RX ORDER — ONDANSETRON 2 MG/ML
4 INJECTION INTRAMUSCULAR; INTRAVENOUS EVERY 6 HOURS PRN
Status: DISCONTINUED | OUTPATIENT
Start: 2021-05-04 | End: 2021-05-05 | Stop reason: HOSPADM

## 2021-05-04 RX ORDER — ONDANSETRON 4 MG/1
4 TABLET, FILM COATED ORAL EVERY 6 HOURS PRN
Status: DISCONTINUED | OUTPATIENT
Start: 2021-05-04 | End: 2021-05-05 | Stop reason: HOSPADM

## 2021-05-04 RX ORDER — PHENYLEPHRINE HCL IN 0.9% NACL 0.5 MG/5ML
.5-3 SYRINGE (ML) INTRAVENOUS CONTINUOUS PRN
Status: DISCONTINUED | OUTPATIENT
Start: 2021-05-04 | End: 2021-05-05 | Stop reason: HOSPADM

## 2021-05-04 RX ORDER — CEFAZOLIN SODIUM IN 0.9 % NACL 3 G/100 ML
3 INTRAVENOUS SOLUTION, PIGGYBACK (ML) INTRAVENOUS EVERY 8 HOURS
Status: COMPLETED | OUTPATIENT
Start: 2021-05-04 | End: 2021-05-05

## 2021-05-04 RX ORDER — LORAZEPAM 2 MG/ML
1 INJECTION INTRAMUSCULAR
Status: DISCONTINUED | OUTPATIENT
Start: 2021-05-04 | End: 2021-05-04 | Stop reason: HOSPADM

## 2021-05-04 RX ORDER — PREGABALIN 75 MG/1
75 CAPSULE ORAL ONCE
Status: COMPLETED | OUTPATIENT
Start: 2021-05-04 | End: 2021-05-04

## 2021-05-04 RX ADMIN — ACETAMINOPHEN 1000 MG: 500 TABLET, FILM COATED ORAL at 06:43

## 2021-05-04 RX ADMIN — EPHEDRINE SULFATE 5 MG: 50 INJECTION INTRAVENOUS at 07:59

## 2021-05-04 RX ADMIN — SODIUM CHLORIDE: 9 INJECTION, SOLUTION INTRAVENOUS at 09:02

## 2021-05-04 RX ADMIN — ATORVASTATIN CALCIUM 40 MG: 40 TABLET, FILM COATED ORAL at 21:05

## 2021-05-04 RX ADMIN — CEFAZOLIN SODIUM 3 G: 10 INJECTION, POWDER, FOR SOLUTION INTRAVENOUS at 21:05

## 2021-05-04 RX ADMIN — OXYCODONE 5 MG: 5 TABLET ORAL at 16:36

## 2021-05-04 RX ADMIN — EPHEDRINE SULFATE 10 MG: 50 INJECTION INTRAVENOUS at 08:00

## 2021-05-04 RX ADMIN — AMLODIPINE BESYLATE 2.5 MG: 2.5 TABLET ORAL at 21:05

## 2021-05-04 RX ADMIN — OXYCODONE 5 MG: 5 TABLET ORAL at 12:43

## 2021-05-04 RX ADMIN — MIDAZOLAM 2 MG: 1 INJECTION INTRAMUSCULAR; INTRAVENOUS at 07:37

## 2021-05-04 RX ADMIN — SODIUM CHLORIDE 50 ML/HR: 9 INJECTION, SOLUTION INTRAVENOUS at 06:43

## 2021-05-04 RX ADMIN — OXYCODONE 10 MG: 5 TABLET ORAL at 21:10

## 2021-05-04 RX ADMIN — EPHEDRINE SULFATE 10 MG: 50 INJECTION INTRAVENOUS at 09:05

## 2021-05-04 RX ADMIN — CELECOXIB 200 MG: 200 CAPSULE ORAL at 06:43

## 2021-05-04 RX ADMIN — Medication 3 ML: at 21:05

## 2021-05-04 RX ADMIN — MIDAZOLAM 2 MG: 1 INJECTION INTRAMUSCULAR; INTRAVENOUS at 07:53

## 2021-05-04 RX ADMIN — ASPIRIN 325 MG: 325 TABLET, COATED ORAL at 21:04

## 2021-05-04 RX ADMIN — BUPIVACAINE HYDROCHLORIDE 1.8 ML: 7.5 INJECTION, SOLUTION EPIDURAL; RETROBULBAR at 07:48

## 2021-05-04 RX ADMIN — POLYETHYLENE GLYCOL 3350 17 G: 17 POWDER, FOR SOLUTION ORAL at 21:05

## 2021-05-04 RX ADMIN — SODIUM CHLORIDE 125 ML/HR: 9 INJECTION, SOLUTION INTRAVENOUS at 21:10

## 2021-05-04 RX ADMIN — TRANEXAMIC ACID 1000 MG: 10 INJECTION, SOLUTION INTRAVENOUS at 07:43

## 2021-05-04 RX ADMIN — CEFAZOLIN SODIUM 3 G: 1 INJECTION, POWDER, FOR SOLUTION INTRAMUSCULAR; INTRAVENOUS at 07:43

## 2021-05-04 RX ADMIN — PREGABALIN 75 MG: 75 CAPSULE ORAL at 07:07

## 2021-05-04 RX ADMIN — Medication 100 MCG: at 07:43

## 2021-05-04 RX ADMIN — PROPOFOL 125 MCG/KG/MIN: 10 INJECTION, EMULSION INTRAVENOUS at 07:53

## 2021-05-04 NOTE — ANESTHESIA PROCEDURE NOTES
Spinal Block    Pre-sedation assessment completed: 5/4/2021 7:41 AM    Patient reassessed immediately prior to procedure    Patient location during procedure: OR  Start Time: 5/4/2021 7:41 AM  Stop Time: 5/4/2021 7:48 AM  Indication:at surgeon's request and post-op pain management  Performed By  Anesthesiologist: Mai Kevin CRNA  Preanesthetic Checklist  Completed: patient identified, IV checked, site marked, risks and benefits discussed, surgical consent, monitors and equipment checked, pre-op evaluation and timeout performed  Spinal Block Prep:  Patient Position:sitting  Sterile Tech:gloves, mask, sterile barriers and cap  Prep:Betadine  Patient Monitoring:blood pressure monitoring, continuous pulse oximetry and EKG  Spinal Block Procedure  Approach:midline  Guidance:landmark technique and palpation technique  Location:L3-L4  Needle Type:Alyson  Needle Gauge:22 G  Placement of Spinal needle event:cerebrospinal fluid aspirated  Paresthesia: no  Fluid Appearance:clear  Medications: bupivacaine PF (MARCAINE) injection 0.75%, 1.8 mL  Med Administered at 5/4/2021 7:48 AM   Post Assessment  Patient Tolerance:patient tolerated the procedure well with no apparent complications  Complications no

## 2021-05-04 NOTE — ANESTHESIA POSTPROCEDURE EVALUATION
Patient: Anurag Pickard    Procedure Summary     Date: 05/04/21 Room / Location: [facility] OR 11 / [facility] MAIN OR    Anesthesia Start: 0733 Anesthesia Stop: 0938    Procedure: TOTAL KNEE ARTHROPLASTY (Left Knee) Diagnosis:       Osteoarthritis of left knee      (Osteoarthritis of left knee [M17.12])    Surgeons: Otf Redmond MD Provider: Ruben Jarrett MD    Anesthesia Type: spinal ASA Status: 3          Anesthesia Type: spinal    Vitals  Vitals Value Taken Time   /53 05/04/21 1134   Temp 98.7 °F (37.1 °C) 05/04/21 1134   Pulse 57 05/04/21 1136   Resp 15 05/04/21 1134   SpO2 96 % 05/04/21 1136   Vitals shown include unvalidated device data.        Post Anesthesia Care and Evaluation    Patient location during evaluation: PACU  Patient participation: complete - patient participated  Level of consciousness: awake  Pain scale: See nurse's notes for pain score.  Pain management: adequate  Airway patency: patent  Anesthetic complications: No anesthetic complications  PONV Status: none  Cardiovascular status: acceptable  Respiratory status: acceptable  Hydration status: acceptable    Comments: Patient seen and examined postoperatively; vital signs stable; SpO2 greater than or equal to 90%; cardiopulmonary status stable; nausea/vomiting adequately controlled; pain adequately controlled; no apparent anesthesia complications; patient discharged from anesthesia care when discharge criteria were met

## 2021-05-04 NOTE — ANESTHESIA PREPROCEDURE EVALUATION
Anesthesia Evaluation     Patient summary reviewed and Nursing notes reviewed   NPO Solid Status: > 8 hours  NPO Liquid Status: > 8 hours           Airway   Mallampati: II  TM distance: >3 FB  Neck ROM: full  No difficulty expected  Dental - normal exam     Pulmonary - normal exam    breath sounds clear to auscultation  (+) sleep apnea,   Cardiovascular - normal exam  Exercise tolerance: unable to assess    Rhythm: regular  Rate: normal    (+) hypertension, valvular problems/murmurs AS and TI, hyperlipidemia,     ROS comment: Interpretation Summary    · The left ventricular cavity is mildly dilated.  · Left ventricular wall thickness is consistent with mild concentric hypertrophy.  · Estimated left ventricular EF = 55% Estimated left ventricular EF was in agreement with the calculated left ventricular EF. Left ventricular systolic function is normal.  · The right ventricular cavity is mildly dilated.  · Moderate         Neuro/Psych  (+) psychiatric history Anxiety,     GI/Hepatic/Renal/Endo    (+) morbid obesity, GERD,  renal disease CRI, diabetes mellitus,     Musculoskeletal     Abdominal  - normal exam   Substance History   (+) alcohol use,      OB/GYN negative ob/gyn ROS         Other   arthritis,                      Anesthesia Plan    ASA 3     spinal   (ERAS, Spinal, BEWARE OF AFTERLOAD REDUCTION - HX AORTIC STENOSIS. )  intravenous induction     Anesthetic plan, all risks, benefits, and alternatives have been provided, discussed and informed consent has been obtained with: patient.

## 2021-05-05 ENCOUNTER — READMISSION MANAGEMENT (OUTPATIENT)
Dept: CALL CENTER | Facility: HOSPITAL | Age: 71
End: 2021-05-05

## 2021-05-05 VITALS
RESPIRATION RATE: 20 BRPM | BODY MASS INDEX: 38.86 KG/M2 | WEIGHT: 277.6 LBS | SYSTOLIC BLOOD PRESSURE: 132 MMHG | OXYGEN SATURATION: 95 % | HEIGHT: 71 IN | HEART RATE: 78 BPM | DIASTOLIC BLOOD PRESSURE: 60 MMHG | TEMPERATURE: 99.2 F

## 2021-05-05 LAB
ANION GAP SERPL CALCULATED.3IONS-SCNC: 11 MMOL/L (ref 5–15)
BUN SERPL-MCNC: 8 MG/DL (ref 8–23)
BUN/CREAT SERPL: 9.8 (ref 7–25)
CALCIUM SPEC-SCNC: 8.7 MG/DL (ref 8.6–10.5)
CHLORIDE SERPL-SCNC: 100 MMOL/L (ref 98–107)
CO2 SERPL-SCNC: 27 MMOL/L (ref 22–29)
CREAT SERPL-MCNC: 0.82 MG/DL (ref 0.76–1.27)
GFR SERPL CREATININE-BSD FRML MDRD: 93 ML/MIN/1.73
GLUCOSE SERPL-MCNC: 231 MG/DL (ref 65–99)
HCT VFR BLD AUTO: 36 % (ref 37.5–51)
HGB BLD-MCNC: 12 G/DL (ref 13–17.7)
POTASSIUM SERPL-SCNC: 3.9 MMOL/L (ref 3.5–5.2)
SODIUM SERPL-SCNC: 138 MMOL/L (ref 136–145)

## 2021-05-05 PROCEDURE — A9270 NON-COVERED ITEM OR SERVICE: HCPCS | Performed by: ORTHOPAEDIC SURGERY

## 2021-05-05 PROCEDURE — 63710000001 OXYCODONE 5 MG TABLET: Performed by: ORTHOPAEDIC SURGERY

## 2021-05-05 PROCEDURE — 85014 HEMATOCRIT: CPT | Performed by: ORTHOPAEDIC SURGERY

## 2021-05-05 PROCEDURE — 63710000001 MELOXICAM 15 MG TABLET: Performed by: ORTHOPAEDIC SURGERY

## 2021-05-05 PROCEDURE — 85018 HEMOGLOBIN: CPT | Performed by: ORTHOPAEDIC SURGERY

## 2021-05-05 PROCEDURE — 97110 THERAPEUTIC EXERCISES: CPT

## 2021-05-05 PROCEDURE — 97116 GAIT TRAINING THERAPY: CPT

## 2021-05-05 PROCEDURE — 63710000001 PIOGLITAZONE 45 MG TABLET: Performed by: ORTHOPAEDIC SURGERY

## 2021-05-05 PROCEDURE — G0378 HOSPITAL OBSERVATION PER HR: HCPCS

## 2021-05-05 PROCEDURE — 63710000001 AMLODIPINE 2.5 MG TABLET: Performed by: ORTHOPAEDIC SURGERY

## 2021-05-05 PROCEDURE — 63710000001 POLYETHYLENE GLYCOL 17 G PACK: Performed by: ORTHOPAEDIC SURGERY

## 2021-05-05 PROCEDURE — 63710000001 PANTOPRAZOLE 40 MG TABLET DELAYED-RELEASE: Performed by: ORTHOPAEDIC SURGERY

## 2021-05-05 PROCEDURE — 63710000001 ASPIRIN EC 325 MG TABLET DELAYED-RELEASE: Performed by: ORTHOPAEDIC SURGERY

## 2021-05-05 PROCEDURE — 63710000001 POTASSIUM CHLORIDE 20 MEQ TABLET CONTROLLED-RELEASE: Performed by: ORTHOPAEDIC SURGERY

## 2021-05-05 PROCEDURE — 99213 OFFICE O/P EST LOW 20 MIN: CPT | Performed by: HOSPITALIST

## 2021-05-05 PROCEDURE — 25010000002 CEFAZOLIN PER 500 MG: Performed by: ORTHOPAEDIC SURGERY

## 2021-05-05 PROCEDURE — 80048 BASIC METABOLIC PNL TOTAL CA: CPT | Performed by: ORTHOPAEDIC SURGERY

## 2021-05-05 PROCEDURE — 63710000001 ACETAMINOPHEN 325 MG TABLET: Performed by: ORTHOPAEDIC SURGERY

## 2021-05-05 PROCEDURE — 63710000001 METFORMIN 500 MG TABLET: Performed by: ORTHOPAEDIC SURGERY

## 2021-05-05 RX ORDER — GABAPENTIN 300 MG/1
300 CAPSULE ORAL NIGHTLY
Qty: 14 CAPSULE | Refills: 0 | Status: SHIPPED | OUTPATIENT
Start: 2021-05-05 | End: 2021-05-27

## 2021-05-05 RX ORDER — ASPIRIN 325 MG
325 TABLET, DELAYED RELEASE (ENTERIC COATED) ORAL EVERY 12 HOURS SCHEDULED
Qty: 28 TABLET | Status: ON HOLD
Start: 2021-05-05 | End: 2021-12-17

## 2021-05-05 RX ORDER — OXYCODONE HYDROCHLORIDE AND ACETAMINOPHEN 5; 325 MG/1; MG/1
TABLET ORAL
Qty: 40 TABLET | Refills: 0 | Status: SHIPPED | OUTPATIENT
Start: 2021-05-05 | End: 2021-08-17

## 2021-05-05 RX ADMIN — OXYCODONE 10 MG: 5 TABLET ORAL at 09:45

## 2021-05-05 RX ADMIN — PANTOPRAZOLE SODIUM 40 MG: 40 TABLET, DELAYED RELEASE ORAL at 06:15

## 2021-05-05 RX ADMIN — AMLODIPINE BESYLATE 2.5 MG: 2.5 TABLET ORAL at 09:45

## 2021-05-05 RX ADMIN — METFORMIN HYDROCHLORIDE 500 MG: 500 TABLET ORAL at 09:46

## 2021-05-05 RX ADMIN — POLYETHYLENE GLYCOL 3350 17 G: 17 POWDER, FOR SOLUTION ORAL at 09:45

## 2021-05-05 RX ADMIN — OXYCODONE 10 MG: 5 TABLET ORAL at 04:50

## 2021-05-05 RX ADMIN — CEFAZOLIN SODIUM 3 G: 10 INJECTION, POWDER, FOR SOLUTION INTRAVENOUS at 03:31

## 2021-05-05 RX ADMIN — MELOXICAM 15 MG: 15 TABLET ORAL at 09:46

## 2021-05-05 RX ADMIN — POTASSIUM CHLORIDE 20 MEQ: 1500 TABLET, EXTENDED RELEASE ORAL at 09:46

## 2021-05-05 RX ADMIN — PIOGLITAZONE 45 MG: 45 TABLET ORAL at 09:46

## 2021-05-05 RX ADMIN — ACETAMINOPHEN 325 MG: 325 TABLET, FILM COATED ORAL at 04:50

## 2021-05-05 RX ADMIN — ASPIRIN 325 MG: 325 TABLET, COATED ORAL at 09:45

## 2021-05-05 NOTE — OUTREACH NOTE
Prep Survey      Responses   [facility] patient discharged from?  [facility]   Is LACE score < 7 ?  Yes   Emergency Room discharge w/ pulse ox?  No   Eligibility  [facility]   Date of Admission  05/04/21   Date of Discharge  05/05/21   Discharge Disposition  Home or Self Care   Discharge diagnosis  TOTAL KNEE ARTHROPLASTY   Does the patient have one of the following disease processes/diagnoses(primary or secondary)?  Total Joint Replacement   Does the patient have Home health ordered?  Yes   What is the Home health agency?    [facility]   Is there a DME ordered?  Yes   What DME was ordered?  RW from [place]   Prep survey completed?  Yes          Dalila Dang RN

## 2021-05-06 ENCOUNTER — TRANSITIONAL CARE MANAGEMENT TELEPHONE ENCOUNTER (OUTPATIENT)
Dept: CALL CENTER | Facility: HOSPITAL | Age: 71
End: 2021-05-06

## 2021-05-06 NOTE — OUTREACH NOTE
Call Center TCM Note      Responses   [facility] patient discharged from?  [facility]   Does the patient have one of the following disease processes/diagnoses(primary or secondary)?  Total Joint Replacement   TCM attempt successful?  Yes [Verbal release out of date ]   Call start time  1346   Call end time  1349   Discharge diagnosis  TOTAL KNEE ARTHROPLASTY   Does the patient have all medications related to this admission filled (includes all antibiotics, pain medications, etc.)  Yes   Is the patient taking all medications as directed (includes completed medication regime)?  Yes   Is the patient able to teach back alternate methods of pain control?  Ice   Comments regarding appointments  3:00 today PT    Does the patient have a follow up appointment with their surgeon?  -- [unsure-pt said he will check on that today ]   Nursing Interventions  Advised patient to make appointment   Has the patient kept scheduled appointments due by today?  N/A   Comments  Patient states he would like to make his own hosp d/c f/u apt    What is the Home health agency?    [facility]   What DME was ordered?  RW from [employer]   Psychosocial issues?  No   Did the patient receive a copy of their discharge instructions?  Yes   Nursing interventions  Reviewed instructions with patient   What is the patient's perception of their functional status since discharge?  Improving   Is the patient able to teach back signs and symptoms of infection?  Increased swelling or redness around incision (not associated with surgical edema)   Is the patient able to teach back how to prevent infection?  Check incision daily   Is the patient able to teach back signs and symptoms of DVT?  Redness in calf, Swelling in calf   If the patient is a current smoker, are they able to teach back resources for cessation?  Not a smoker   Is the patient/caregiver able to teach back the hierarchy of who to call/visit for symptoms/problems? PCP, Specialist, Home health  nurse, Urgent Care, ED, 911  Yes   TCM call completed?  Yes          Мария Flannery RN    5/6/2021, 13:50 EDT

## 2021-05-14 ENCOUNTER — OFFICE VISIT (OUTPATIENT)
Dept: FAMILY MEDICINE CLINIC | Facility: CLINIC | Age: 71
End: 2021-05-14

## 2021-05-14 VITALS
TEMPERATURE: 96.9 F | SYSTOLIC BLOOD PRESSURE: 150 MMHG | DIASTOLIC BLOOD PRESSURE: 76 MMHG | OXYGEN SATURATION: 98 % | HEIGHT: 71 IN | BODY MASS INDEX: 39.76 KG/M2 | HEART RATE: 64 BPM | WEIGHT: 284 LBS

## 2021-05-14 DIAGNOSIS — E11.9 TYPE 2 DIABETES MELLITUS WITHOUT COMPLICATION, WITHOUT LONG-TERM CURRENT USE OF INSULIN (HCC): ICD-10-CM

## 2021-05-14 DIAGNOSIS — E78.5 HYPERLIPIDEMIA, UNSPECIFIED HYPERLIPIDEMIA TYPE: ICD-10-CM

## 2021-05-14 DIAGNOSIS — Z09 HOSPITAL DISCHARGE FOLLOW-UP: Primary | ICD-10-CM

## 2021-05-14 DIAGNOSIS — Z12.5 SCREENING PSA (PROSTATE SPECIFIC ANTIGEN): ICD-10-CM

## 2021-05-14 PROCEDURE — 99213 OFFICE O/P EST LOW 20 MIN: CPT | Performed by: NURSE PRACTITIONER

## 2021-05-14 NOTE — PROGRESS NOTES
Transitional Care Follow Up Visit  Subjective     Anurag MARCUS Melly is a 70 y.o. male who presents for a transitional care management visit.    Within 48 business hours after discharge our office contacted him via telephone to coordinate his care and needs.      I reviewed and discussed the details of that call along with the discharge summary, hospital problems, inpatient lab results, inpatient diagnostic studies, and consultation reports with Anurag.     Current outpatient and discharge medications have been reconciled for the patient.  Reviewed by: SERAFIN Sanabria        Date of TCM Phone Call 5/5/2021   [facility]   Date of Admission 5/4/2021   Date of Discharge 5/5/2021   Discharge Disposition Home or Self Care     Risk for Readmission (LACE) Score: 6 (5/5/2021  6:01 AM)      History of Present Illness   Course During Hospital Stay:     Admitted for 1 overnight for left total knee replacement    The following portions of the patient's history were reviewed and updated as appropriate: allergies, current medications, past family history, past medical history, past social history, past surgical history and problem list.    Review of Systems   Respiratory: Negative.    Cardiovascular: Positive for leg swelling.   Gastrointestinal: Negative.    Neurological: Negative.    Psychiatric/Behavioral: Negative.      Denies any problems or concerns today  Had follow up with ortho today, staples removed  Does have some lle edema  Continues to take medicine for pain, percoset every 6-8 hours when needed   Denies any problem with constipation, appetite is ok  BP is elevated today at 150/76, recheck BP was 140/70  Denies any c/o chest pain, shortness of breath, ha, dizziness, weakness  Does have some edema of the right ankle, and expected post-surgical edema of lle  Home health is seeing him twice weekly, expects that to go on another week or two  Thinks BP with Home Health has been running  130s/70s    Checks blood sugars at home running 140-170 and checks them either before meals or 2 hours post-prandial    Declines labs today    Objective   Physical Exam  Vitals reviewed.   Constitutional:       Appearance: Normal appearance. He is well-developed and well-groomed.   HENT:      Head: Normocephalic.   Cardiovascular:      Rate and Rhythm: Normal rate.      Heart sounds: Normal heart sounds.   Pulmonary:      Effort: Pulmonary effort is normal.      Breath sounds: Normal breath sounds.   Abdominal:      General: Bowel sounds are normal.      Palpations: Abdomen is soft.   Musculoskeletal:         General: Normal range of motion.      Cervical back: Normal range of motion.   Skin:     General: Skin is warm.   Neurological:      Mental Status: He is alert and oriented to person, place, and time.   Psychiatric:         Mood and Affect: Mood normal.         Assessment/Plan   There are no diagnoses linked to this encounter.     Recommend annual physical, follow-up diabetes and HTN within next 3 months     Will return prior to next schedule visit for fasting labs, ordered today LIPID, A1C, PSA   scheduled today for labs 8/10 & follow-up 8/17

## 2021-05-27 ENCOUNTER — CONSULT (OUTPATIENT)
Dept: CARDIOLOGY | Facility: CLINIC | Age: 71
End: 2021-05-27

## 2021-05-27 VITALS
HEART RATE: 66 BPM | BODY MASS INDEX: 39.9 KG/M2 | WEIGHT: 285 LBS | RESPIRATION RATE: 18 BRPM | HEIGHT: 71 IN | DIASTOLIC BLOOD PRESSURE: 85 MMHG | SYSTOLIC BLOOD PRESSURE: 159 MMHG | OXYGEN SATURATION: 100 %

## 2021-05-27 DIAGNOSIS — E78.5 HYPERLIPIDEMIA, UNSPECIFIED HYPERLIPIDEMIA TYPE: ICD-10-CM

## 2021-05-27 DIAGNOSIS — I10 ESSENTIAL HYPERTENSION: ICD-10-CM

## 2021-05-27 DIAGNOSIS — I35.1 AORTIC EJECTION MURMUR: Primary | ICD-10-CM

## 2021-05-27 DIAGNOSIS — E11.9 TYPE 2 DIABETES MELLITUS WITHOUT COMPLICATION, WITHOUT LONG-TERM CURRENT USE OF INSULIN (HCC): ICD-10-CM

## 2021-05-27 DIAGNOSIS — I10 BENIGN ESSENTIAL HYPERTENSION: ICD-10-CM

## 2021-05-27 DIAGNOSIS — I35.0 NONRHEUMATIC AORTIC VALVE STENOSIS: ICD-10-CM

## 2021-05-27 PROCEDURE — 99204 OFFICE O/P NEW MOD 45 MIN: CPT | Performed by: INTERNAL MEDICINE

## 2021-05-27 NOTE — PROGRESS NOTES
Cardiology Office Consultation      Encounter Date:  05/27/2021    Patient ID:   Anurag Pickard is a 70 y.o. male.    Reason For Consultation:  Valvular heart disease    History of Present Illness:  Dear Jorden Martin PA-C    I had the pleasure of seeing Anurag Pickard today. As you are well aware, this is a 70 y.o. male with no established history of ischemic heart disease.  He does have a history of aortic stenosis, hypertension, diabetes mellitus, hyperlipidemia, osteoarthritis, obesity, and antiplatelet therapy.  He presents today to establish care on his valvular heart disease.    He denies any chest pain pressure heaviness or tightness.  He does report some shortness of breath however this is related to his recent knee surgery.  He denies any PND orthopnea.  He denies any syncope or near syncope.    He had a physical exam before having his knee replaced.  He had a notable murmur that was auscultated on his exam.  2D echocardiogram performed in April 2021 demonstrated moderate to severe aortic stenosis.  His 2D echocardiogram report and images were personally reviewed with the patient today in the office in detail.    The patient reports that he is never been told he had a murmur before.  He does not have a strong family history of coronary disease.  In fact his mom and dad did not suffer from CAD.    He did have his left knee replaced about 2 weeks ago.  He reports that he was up on that knee and walking in 2 days and has been doing quite well with this.  He had no complications from surgery.    He reports that he is normally an active individual.  He plays golf frequently.  He does not smoke.  He does have 2-3 mixed drinks per evening.    Assessment & Plan    Impressions:  Aortic stenosis     Moderate to severe echocardiogram April 2021  Hypertension  Hypertensive heart disease  Hyperlipidemia  Osteoarthritis  Diabetes mellitus  Obesity  Antiplatelet therapy    Recommendations:  Continuation of  "his current cardiovascular regimen at the present time.  Guideline directed medical therapy for his diabetes, lipids, and blood pressure control.  Risk benefits and options were discussed with the patient.  Transesophageal echocardiogram offered.  Plan for repeat limited echocardiogram in 6 months to evaluate for progression.  Follow-up in 6 months time sooner should there be difficulties    Objective:    Vitals:  Vitals:    05/27/21 1451   BP: 159/85   BP Location: Left arm   Patient Position: Sitting   Cuff Size: Large Adult   Pulse: 66   Resp: 18   SpO2: 100%   Weight: 129 kg (285 lb)   Height: 180.3 cm (71\")       Physical Exam:    General: Alert, cooperative, no distress, appears stated age  Head:  Normocephalic, atraumatic, mucous membranes moist  Eyes:  Conjunctiva/corneas clear, EOM's intact     Neck:  Supple,  no bruit     Lungs: Clear to auscultation bilaterally, no wheezes rhonchi rales are noted  Chest wall: No tenderness  Heart::  Regular rate and rhythm, S1 and S2 normal, 2/6 systolic ejection murmur.  No rub or gallop  Abdomen: Soft, non-tender, nondistended bowel sounds active.  Obese  Extremities: No cyanosis, clubbing, or edema  Pulses: 2+ and symmetric all extremities  Skin:  No rashes or lesions.  Nicely healing incisions on left knee.  Neuro/psych: A&O x3. CN II through XII are grossly intact with appropriate affect    History of Present Illness      Allergies:  Allergies   Allergen Reactions   • Lisinopril Cough   • Glipizide Other (See Comments)     Sugar too low       Medication Review:     Current Outpatient Medications:   •  amLODIPine (Norvasc) 5 MG tablet, Take 1 tablet by mouth Daily for 360 days. (Patient taking differently: Take 2.5 mg by mouth Daily. Take preop), Disp: 90 tablet, Rfl: 3  •  aspirin  MG tablet, Take 1 tablet by mouth Every 12 (Twelve) Hours., Disp: 28 tablet, Rfl:   •  atorvastatin (LIPITOR) 40 MG tablet, Take 1 tablet by mouth Daily. (Patient taking differently: " Take 40 mg by mouth Daily. May take preop), Disp: 90 tablet, Rfl: 3  •  DOXYCYCLINE HYCLATE PO, Take 100 mg by mouth Daily. Take preop, Disp: , Rfl:   •  ezetimibe (ZETIA) 10 MG tablet, Take 1 tablet by mouth Daily. (Patient taking differently: Take 10 mg by mouth Daily. Take preop), Disp: 90 tablet, Rfl: 3  •  folic acid (FOLVITE) 1 MG tablet, Take 1 tablet by mouth Daily., Disp: 90 tablet, Rfl: 1  •  lansoprazole (PREVACID) 30 MG capsule, Take 1 capsule by mouth once daily (Patient taking differently: Take 30 mg by mouth Daily. Take preop), Disp: 90 capsule, Rfl: 0  •  metFORMIN (Glucophage) 1000 MG tablet, Take 1 tablet by mouth 2 (Two) Times a Day With Meals for 90 days. (Patient taking differently: Take 500 mg by mouth 2 (Two) Times a Day With Meals. Last dose 5/2 before 8am), Disp: 180 tablet, Rfl: 3  •  naproxen sodium (ALEVE) 220 MG tablet, Take 440 mg by mouth Daily., Disp: , Rfl:   •  ONE TOUCH ULTRA TEST test strip, 1 each by Other route 2 (Two) Times a Day. to check glucose, Disp: , Rfl: 11  •  oxyCODONE-acetaminophen (PERCOCET) 5-325 MG per tablet, 1 or 2 tablets p.o. every 4 hours as needed, Disp: 40 tablet, Rfl: 0  •  pioglitazone (ACTOS) 45 MG tablet, Take 1 tablet by mouth Daily. (Patient taking differently: Take 45 mg by mouth Daily. dont take preop), Disp: 90 tablet, Rfl: 3  •  thiamine (thiamine) 100 MG tablet tablet, Take 1 tablet by mouth Daily., Disp: 90 tablet, Rfl: 3    Family History:  Family History   Problem Relation Age of Onset   • Diabetes Mother    • Other Mother    • Other Father        Past Medical History:  Past Medical History:   Diagnosis Date   • Aortic stenosis     Moderate   • B12 deficiency    • Bell's palsy    • CRF (chronic renal failure)    • Diabetes mellitus (CMS/HCC)    • Erectile dysfunction    • GERD (gastroesophageal reflux disease)    • Heart murmur    • Hyperlipidemia    • Hypertension    • Hypokalemia    • Hypomagnesemia    • YANA (obstructive sleep apnea)     cpap  bring dos   • Osteoarthritis    • Pulmonary HTN (CMS/HCC)    • Rosacea    • Vitamin D deficiency        Past surgical History:  Past Surgical History:   Procedure Laterality Date   • CARDIAC CATHETERIZATION     • COLONOSCOPY     • JOINT REPLACEMENT     • TONSILLECTOMY     • TOTAL KNEE ARTHROPLASTY     • TOTAL KNEE ARTHROPLASTY Left 5/4/2021    Procedure: TOTAL KNEE ARTHROPLASTY;  Surgeon: Otf Redmond MD;  Location: [facility] MAIN OR;  Service: Orthopedics;  Laterality: Left;       Social History:  Social History     Socioeconomic History   • Marital status:      Spouse name: Not on file   • Number of children: Not on file   • Years of education: Not on file   • Highest education level: Not on file   Tobacco Use   • Smoking status: Never Smoker   • Smokeless tobacco: Never Used   Vaping Use   • Vaping Use: Never used   Substance and Sexual Activity   • Alcohol use: Yes     Alcohol/week: 14.0 standard drinks     Types: 14 Shots of liquor per week   • Drug use: No   • Sexual activity: Defer       Review of Systems:  The following systems were reviewed as they relate to the cardiovascular system: Constitutional, Eyes, ENT, Cardiovascular, Respiratory, Gastrointestinal, Integumentary, Neurological, Psychiatric, Hematologic, Endocrine, Musculoskeletal, and Genitourinary. The pertinent cardiovascular findings are reported above with all other cardiovascular points within those systems being negative.    Diagnostic Study Review:     Current Electrocardiogram:  Procedures no new EKG.  EKG dated 28 April 2021 demonstrates sinus rhythm with a ventricular rate of 69 bpm.      NOTE: The following portions of the patient's note were reviewed, confirmed and/or updated this visit as appropriate: History of present illness/Interval history, physical examination, assessment & plan, allergies, current medications, past family history, past medical history, past social history, past surgical history and problem  list.      Answers for HPI/ROS submitted by the patient on 5/26/2021  What is the primary reason for your visit?: Other  Please describe your symptoms.: Heart murmur  Have you had these symptoms before?: Yes  How long have you been having these symptoms?: Greater than 2 weeks  Please describe any probable cause for these symptoms. : See echocardiogram test results

## 2021-07-22 RX ORDER — LANSOPRAZOLE 30 MG/1
30 CAPSULE, DELAYED RELEASE ORAL DAILY
Qty: 90 CAPSULE | Refills: 0 | Status: SHIPPED | OUTPATIENT
Start: 2021-07-22 | End: 2021-11-01

## 2021-08-10 ENCOUNTER — LAB (OUTPATIENT)
Dept: FAMILY MEDICINE CLINIC | Facility: CLINIC | Age: 71
End: 2021-08-10

## 2021-08-10 DIAGNOSIS — Z12.5 SCREENING PSA (PROSTATE SPECIFIC ANTIGEN): ICD-10-CM

## 2021-08-10 DIAGNOSIS — E78.5 HYPERLIPIDEMIA, UNSPECIFIED HYPERLIPIDEMIA TYPE: ICD-10-CM

## 2021-08-10 DIAGNOSIS — E11.9 TYPE 2 DIABETES MELLITUS WITHOUT COMPLICATION, WITHOUT LONG-TERM CURRENT USE OF INSULIN (HCC): ICD-10-CM

## 2021-08-10 LAB
CHOLEST SERPL-MCNC: 116 MG/DL (ref 0–200)
HBA1C MFR BLD: 7.5 % (ref 3.5–5.6)
HDLC SERPL-MCNC: 48 MG/DL (ref 40–60)
LDLC SERPL CALC-MCNC: 47 MG/DL (ref 0–100)
LDLC/HDLC SERPL: 0.94 {RATIO}
PSA SERPL-MCNC: 0.23 NG/ML (ref 0–4)
TRIGL SERPL-MCNC: 115 MG/DL (ref 0–150)
VLDLC SERPL-MCNC: 21 MG/DL (ref 5–40)

## 2021-08-10 PROCEDURE — 80061 LIPID PANEL: CPT | Performed by: NURSE PRACTITIONER

## 2021-08-10 PROCEDURE — 83036 HEMOGLOBIN GLYCOSYLATED A1C: CPT | Performed by: NURSE PRACTITIONER

## 2021-08-10 PROCEDURE — G0103 PSA SCREENING: HCPCS | Performed by: NURSE PRACTITIONER

## 2021-08-10 PROCEDURE — 36415 COLL VENOUS BLD VENIPUNCTURE: CPT

## 2021-08-11 ENCOUNTER — LAB (OUTPATIENT)
Dept: LAB | Facility: HOSPITAL | Age: 71
End: 2021-08-11

## 2021-08-11 DIAGNOSIS — F10.10 ALCOHOL ABUSE: ICD-10-CM

## 2021-08-11 LAB — AMMONIA BLD-SCNC: 12 UMOL/L (ref 16–60)

## 2021-08-11 PROCEDURE — 82140 ASSAY OF AMMONIA: CPT

## 2021-08-11 PROCEDURE — 36415 COLL VENOUS BLD VENIPUNCTURE: CPT

## 2021-08-17 ENCOUNTER — OFFICE VISIT (OUTPATIENT)
Dept: FAMILY MEDICINE CLINIC | Facility: CLINIC | Age: 71
End: 2021-08-17

## 2021-08-17 VITALS
BODY MASS INDEX: 38.78 KG/M2 | DIASTOLIC BLOOD PRESSURE: 86 MMHG | RESPIRATION RATE: 16 BRPM | WEIGHT: 277 LBS | HEIGHT: 71 IN | HEART RATE: 66 BPM | OXYGEN SATURATION: 98 % | SYSTOLIC BLOOD PRESSURE: 144 MMHG

## 2021-08-17 DIAGNOSIS — E11.65 TYPE 2 DIABETES MELLITUS WITH HYPERGLYCEMIA, WITHOUT LONG-TERM CURRENT USE OF INSULIN (HCC): ICD-10-CM

## 2021-08-17 DIAGNOSIS — I35.0 NONRHEUMATIC AORTIC VALVE STENOSIS: Primary | ICD-10-CM

## 2021-08-17 DIAGNOSIS — I10 BENIGN ESSENTIAL HYPERTENSION: ICD-10-CM

## 2021-08-17 PROBLEM — M19.91 LOCALIZED, PRIMARY OSTEOARTHRITIS: Status: RESOLVED | Noted: 2021-04-05 | Resolved: 2021-08-17

## 2021-08-17 PROBLEM — N19 RENAL FAILURE: Status: RESOLVED | Noted: 2018-10-18 | Resolved: 2021-08-17

## 2021-08-17 PROBLEM — M17.12 ARTHRITIS OF LEFT KNEE: Status: RESOLVED | Noted: 2021-05-04 | Resolved: 2021-08-17

## 2021-08-17 PROBLEM — I35.1 AORTIC EJECTION MURMUR: Status: RESOLVED | Noted: 2021-04-05 | Resolved: 2021-08-17

## 2021-08-17 PROBLEM — Z11.59 ENCOUNTER FOR HEPATITIS C SCREENING TEST FOR LOW RISK PATIENT: Status: RESOLVED | Noted: 2021-04-05 | Resolved: 2021-08-17

## 2021-08-17 PROBLEM — E83.51 HYPOCALCEMIA: Status: RESOLVED | Noted: 2018-06-28 | Resolved: 2021-08-17

## 2021-08-17 PROBLEM — E16.0 HYPOGLYCEMIA SECONDARY TO SULFONYLUREA: Status: RESOLVED | Noted: 2018-02-13 | Resolved: 2021-08-17

## 2021-08-17 PROBLEM — I95.1 ORTHOSTATIC HYPOTENSION: Status: RESOLVED | Noted: 2018-06-13 | Resolved: 2021-08-17

## 2021-08-17 PROBLEM — Z47.1 AFTERCARE FOLLOWING JOINT REPLACEMENT SURGERY: Status: RESOLVED | Noted: 2021-08-17 | Resolved: 2021-08-17

## 2021-08-17 PROBLEM — E83.42 HYPOMAGNESEMIA: Status: RESOLVED | Noted: 2018-02-13 | Resolved: 2021-08-17

## 2021-08-17 PROBLEM — E87.6 HYPOKALEMIA: Status: RESOLVED | Noted: 2018-02-13 | Resolved: 2021-08-17

## 2021-08-17 PROBLEM — T38.3X1A HYPOGLYCEMIA SECONDARY TO SULFONYLUREA: Status: RESOLVED | Noted: 2018-02-13 | Resolved: 2021-08-17

## 2021-08-17 PROBLEM — Z47.1 AFTERCARE FOLLOWING JOINT REPLACEMENT SURGERY: Status: ACTIVE | Noted: 2021-08-17

## 2021-08-17 PROCEDURE — 99214 OFFICE O/P EST MOD 30 MIN: CPT | Performed by: PHYSICIAN ASSISTANT

## 2021-08-17 NOTE — PROGRESS NOTES
"Subjective   Anurag Pickard is a 70 y.o. male.     Chief Complaint   Patient presents with   • Hypertension     3 month f/u       /86 (BP Location: Left arm, Patient Position: Sitting, Cuff Size: Adult)   Pulse 66   Resp 16   Ht 180.3 cm (71\")   Wt 126 kg (277 lb)   SpO2 98%   BMI 38.63 kg/m²     BP Readings from Last 3 Encounters:   08/17/21 144/86   05/27/21 159/85   05/14/21 150/76       Wt Readings from Last 3 Encounters:   08/17/21 126 kg (277 lb)   05/27/21 129 kg (285 lb)   05/14/21 129 kg (284 lb)       HPI Patient presents to the clinic for follow up on diabetes, htn, AS, and hepatic steatosis. No current problems or issues. A1c went down from 8.4% to 7.5%. He is watching his diet. He denies polyuria but does have nocturia. No pain on urination. Following with cards now for AS and has repeat echo in 6 months. No chest pain, soa, syncope, or dizziness. Still drinking about the same amount. Liver us showed no cirrhosis- had hepatic steatosis. Had knee replacement on the left and is doing well.     The following portions of the patient's history were reviewed and updated as appropriate: allergies, current medications, past family history, past medical history, past social history, past surgical history and problem list.    Review of Systems    Objective   Physical Exam  Constitutional:       Appearance: He is well-developed.   HENT:      Head: Normocephalic and atraumatic.   Eyes:      Conjunctiva/sclera: Conjunctivae normal.      Pupils: Pupils are equal, round, and reactive to light.   Cardiovascular:      Rate and Rhythm: Normal rate and regular rhythm.      Heart sounds: No murmur heard.     Pulmonary:      Effort: Pulmonary effort is normal.      Breath sounds: Normal breath sounds.   Abdominal:      General: Bowel sounds are normal.      Palpations: Abdomen is soft.      Tenderness: There is no abdominal tenderness.   Musculoskeletal:         General: No deformity. Normal range of motion. "      Cervical back: Normal range of motion and neck supple.   Lymphadenopathy:      Cervical: No cervical adenopathy.   Skin:     General: Skin is warm and dry.      Capillary Refill: Capillary refill takes less than 2 seconds.      Findings: No rash.   Neurological:      Mental Status: He is alert and oriented to person, place, and time.      Cranial Nerves: No cranial nerve deficit.   Psychiatric:         Behavior: Behavior normal.         Thought Content: Thought content normal.         Judgment: Judgment normal.           Diagnoses and all orders for this visit:    1. Nonrheumatic aortic valve stenosis (Primary)  Comments:  Continue follow up with  and repeat echo in november .    2. Benign essential hypertension  Comments:  we will continue to monitor but we may need to add an agent if remains up. has come down with weight loss    3. Type 2 diabetes mellitus with hyperglycemia, without long-term current use of insulin (CMS/[facility])  Comments:  doing better with diet. continue current treatments. Again consider swtich actos at some point to glp 1 or sglt2. Cost is prohibitive.     Other orders  -     Cancel: Microalbumin / Creatinine Urine Ratio - Urine, Clean Catch; Future        Return in about 6 months (around 2/17/2022), or if symptoms worsen or fail to improve.

## 2021-11-01 RX ORDER — LANSOPRAZOLE 30 MG/1
CAPSULE, DELAYED RELEASE ORAL
Qty: 90 CAPSULE | Refills: 0 | Status: ON HOLD | OUTPATIENT
Start: 2021-11-01 | End: 2021-12-17

## 2021-11-02 RX ORDER — POTASSIUM CHLORIDE 750 MG/1
20 TABLET, EXTENDED RELEASE ORAL 2 TIMES DAILY
Qty: 360 TABLET | Refills: 2 | Status: SHIPPED | OUTPATIENT
Start: 2021-11-02 | End: 2022-04-04 | Stop reason: HOSPADM

## 2021-11-09 ENCOUNTER — HOSPITAL ENCOUNTER (OUTPATIENT)
Dept: CARDIOLOGY | Facility: HOSPITAL | Age: 71
Discharge: HOME | End: 2021-11-09
Admitting: INTERNAL MEDICINE

## 2021-11-09 VITALS
HEIGHT: 71 IN | BODY MASS INDEX: 38.78 KG/M2 | WEIGHT: 277 LBS | DIASTOLIC BLOOD PRESSURE: 52 MMHG | SYSTOLIC BLOOD PRESSURE: 150 MMHG | HEART RATE: 58 BPM

## 2021-11-09 DIAGNOSIS — I35.1 AORTIC EJECTION MURMUR: ICD-10-CM

## 2021-11-09 DIAGNOSIS — I35.0 NONRHEUMATIC AORTIC VALVE STENOSIS: ICD-10-CM

## 2021-11-09 PROCEDURE — 93325 DOPPLER ECHO COLOR FLOW MAPG: CPT

## 2021-11-09 PROCEDURE — 93308 TTE F-UP OR LMTD: CPT

## 2021-11-09 PROCEDURE — 93321 DOPPLER ECHO F-UP/LMTD STD: CPT

## 2021-11-09 PROCEDURE — 93321 DOPPLER ECHO F-UP/LMTD STD: CPT | Performed by: INTERNAL MEDICINE

## 2021-11-09 PROCEDURE — 93308 TTE F-UP OR LMTD: CPT | Performed by: INTERNAL MEDICINE

## 2021-11-09 PROCEDURE — 93325 DOPPLER ECHO COLOR FLOW MAPG: CPT | Performed by: INTERNAL MEDICINE

## 2021-11-15 LAB
BH CV ECHO MEAS - ACS: 0.71 CM
BH CV ECHO MEAS - AO MAX PG (FULL): 69.9 MMHG
BH CV ECHO MEAS - AO MAX PG: 76.5 MMHG
BH CV ECHO MEAS - AO MEAN PG (FULL): 44.2 MMHG
BH CV ECHO MEAS - AO MEAN PG: 47.7 MMHG
BH CV ECHO MEAS - AO V2 MAX: 437.5 CM/SEC
BH CV ECHO MEAS - AO V2 MEAN: 329.3 CM/SEC
BH CV ECHO MEAS - AO V2 VTI: 123 CM
BH CV ECHO MEAS - AVA PLANIMETRY TRACED: 1.1 CM2
BH CV ECHO MEAS - AVA(I,A): 0.95 CM^2
BH CV ECHO MEAS - AVA(I,D): 0.95 CM^2
BH CV ECHO MEAS - AVA(V,A): 1 CM^2
BH CV ECHO MEAS - AVA(V,D): 1 CM^2
BH CV ECHO MEAS - BSA(HAYCOCK): 2.6 M^2
BH CV ECHO MEAS - BSA: 2.4 M^2
BH CV ECHO MEAS - BZI_BMI: 38.6 KILOGRAMS/M^2
BH CV ECHO MEAS - BZI_METRIC_HEIGHT: 180.3 CM
BH CV ECHO MEAS - BZI_METRIC_WEIGHT: 125.6 KG
BH CV ECHO MEAS - IVS/LVPW: 1.1
BH CV ECHO MEAS - IVSD: 1.5 CM
BH CV ECHO MEAS - LV MAX PG: 6.6 MMHG
BH CV ECHO MEAS - LV MEAN PG: 3.5 MMHG
BH CV ECHO MEAS - LV V1 MAX: 128.6 CM/SEC
BH CV ECHO MEAS - LV V1 MEAN: 87.6 CM/SEC
BH CV ECHO MEAS - LV V1 VTI: 34.4 CM
BH CV ECHO MEAS - LVOT AREA: 3.4 CM^2
BH CV ECHO MEAS - LVOT DIAM: 2.1 CM
BH CV ECHO MEAS - LVPWD: 1.4 CM
BH CV ECHO MEAS - SI(LVOT): 48.5 ML/M^2
BH CV ECHO MEAS - SV(LVOT): 117.5 ML
BH CV VAS BP LEFT ARM: NORMAL MMHG

## 2021-12-08 ENCOUNTER — OFFICE VISIT (OUTPATIENT)
Dept: CARDIOLOGY | Facility: CLINIC | Age: 71
End: 2021-12-08

## 2021-12-08 VITALS
HEIGHT: 71 IN | OXYGEN SATURATION: 100 % | WEIGHT: 278 LBS | BODY MASS INDEX: 38.92 KG/M2 | HEART RATE: 75 BPM | DIASTOLIC BLOOD PRESSURE: 86 MMHG | SYSTOLIC BLOOD PRESSURE: 166 MMHG

## 2021-12-08 DIAGNOSIS — I10 BENIGN ESSENTIAL HYPERTENSION: ICD-10-CM

## 2021-12-08 DIAGNOSIS — R06.09 DYSPNEA ON EXERTION: ICD-10-CM

## 2021-12-08 DIAGNOSIS — I35.0 NONRHEUMATIC AORTIC VALVE STENOSIS: Primary | ICD-10-CM

## 2021-12-08 DIAGNOSIS — E78.5 HYPERLIPIDEMIA, UNSPECIFIED HYPERLIPIDEMIA TYPE: ICD-10-CM

## 2021-12-08 PROCEDURE — 99214 OFFICE O/P EST MOD 30 MIN: CPT | Performed by: INTERNAL MEDICINE

## 2021-12-08 PROCEDURE — 93000 ELECTROCARDIOGRAM COMPLETE: CPT | Performed by: INTERNAL MEDICINE

## 2021-12-08 NOTE — PROGRESS NOTES
Cardiology Office Visit      Encounter Date:  12/08/2021    Patient ID:   Anurag Pickard is a 71 y.o. male.    Reason For Followup:  Shortness of breath  Aortic stenosis    Brief Clinical History:  Dear Jorden Martin PA-C    I had the pleasure of seeing Anurag Pickard today. As you are well aware, this is a 71 y.o. male with no established history of ischemic heart disease.  He does have a history of aortic stenosis, hypertension, diabetes mellitus, hyperlipidemia, osteoarthritis, obesity, and antiplatelet therapy.  He presents today to establish care on his valvular heart disease.    Interval History:  At his last visit, he denied any chest pain pressure heaviness or tightness.  He does report some shortness of breath however this is related to his recent knee surgery.  He denies any PND orthopnea.  He denies any syncope or near syncope.  He reported feeling quite well.    His wife accompanies him to his visit today and she reports that he is severely under playing his symptoms.  She reports that he has a significant unsteady gait and has fallen several times.  She reports that they have had to walk short distances downtown and he has had to stop several times to catch his breath.  Even though the patient reported that he plays golf regularly, his wife says that he has not played golf in 2 years.  She reports that his memory is not very good.  She notes that his mom did suffer from coronary artery disease although he did not recall that his last visit.    He had a 2D echocardiogram performed in April 2021 secondary to a incidental murmur.  The echocardiogram demonstrated moderate to severe aortic stenosis.  It certainly possible that this could be more hemodynamically significant given the patient's symptoms.  We discussed risk benefits and options together today.  They wish to proceed with PAULO and cardiac catheterization to determine epicardial vasculature and to get definitive images and  "hemodynamic assessment of the aortic valve.    Assessment & Plan    Impressions:  Aortic stenosis     Moderate to severe echocardiogram April 2021     Now with significant dyspnea on exertion and decreased endurance  Hypertension  Hypertensive heart disease  Hyperlipidemia  Osteoarthritis  Diabetes mellitus  Obesity  Antiplatelet therapy    Recommendations:  Transesophageal echocardiogram  Left and right heart catheterization  Continue with his current medication regimen including antiplatelet therapy, statin therapy, and antihypertensives.  Follow-up in 6 weeks    Objective:    Vitals:  Vitals:    12/08/21 1441   BP: 166/86   BP Location: Left arm   Patient Position: Sitting   Cuff Size: Large Adult   Pulse: 75   SpO2: 100%   Weight: 126 kg (278 lb)   Height: 180.3 cm (71\")       Physical Exam:    General: Alert, cooperative, no distress, appears stated age  Head:  Normocephalic, atraumatic, mucous membranes moist  Eyes:  Conjunctiva/corneas clear, EOM's intact     Neck:  Supple,  no bruit  Lungs: Clear to auscultation bilaterally, no wheezes rhonchi rales are noted  Chest wall: No tenderness  Heart::  Regular rate and rhythm, S1 and S2 normal, 2/6 systolic ejection murmur.  No rub or gallop  Abdomen: Soft, non-tender, nondistended bowel sounds active.  Obese  Extremities: No cyanosis, clubbing, or edema  Pulses: 2+ and symmetric all extremities  Skin:  No rashes or lesions  Neuro/psych: A&O x3. CN II through XII are grossly intact with appropriate affect      Allergies:  Allergies   Allergen Reactions   • Lisinopril Cough   • Glipizide Other (See Comments)     Sugar too low       Medication Review:     Current Outpatient Medications:   •  amLODIPine (Norvasc) 5 MG tablet, Take 1 tablet by mouth Daily for 360 days. (Patient taking differently: Take 2.5 mg by mouth Daily. Take preop), Disp: 90 tablet, Rfl: 3  •  aspirin  MG tablet, Take 1 tablet by mouth Every 12 (Twelve) Hours., Disp: 28 tablet, Rfl:   •  " atorvastatin (LIPITOR) 40 MG tablet, Take 1 tablet by mouth Daily. (Patient taking differently: Take 40 mg by mouth Daily. May take preop), Disp: 90 tablet, Rfl: 3  •  ezetimibe (ZETIA) 10 MG tablet, Take 1 tablet by mouth Daily. (Patient taking differently: Take 10 mg by mouth Daily. Take preop), Disp: 90 tablet, Rfl: 3  •  folic acid (FOLVITE) 1 MG tablet, Take 1 tablet by mouth Daily., Disp: 90 tablet, Rfl: 1  •  lansoprazole (PREVACID) 30 MG capsule, Take 1 capsule by mouth once daily, Disp: 90 capsule, Rfl: 0  •  naproxen sodium (ALEVE) 220 MG tablet, Take 440 mg by mouth Daily., Disp: , Rfl:   •  ONE TOUCH ULTRA TEST test strip, 1 each by Other route 2 (Two) Times a Day. to check glucose, Disp: , Rfl: 11  •  pioglitazone (ACTOS) 45 MG tablet, Take 1 tablet by mouth Daily. (Patient taking differently: Take 45 mg by mouth Daily. dont take preop), Disp: 90 tablet, Rfl: 3  •  potassium chloride (K-DUR,KLOR-CON) 10 MEQ CR tablet, Take 2 tablets by mouth 2 (Two) Times a Day., Disp: 360 tablet, Rfl: 2  •  thiamine (thiamine) 100 MG tablet tablet, Take 1 tablet by mouth Daily., Disp: 90 tablet, Rfl: 3  •  metFORMIN (Glucophage) 1000 MG tablet, Take 1 tablet by mouth 2 (Two) Times a Day With Meals for 90 days. (Patient taking differently: Take 500 mg by mouth 2 (Two) Times a Day With Meals. Last dose 5/2 before 8am), Disp: 180 tablet, Rfl: 3    Family History:  Family History   Problem Relation Age of Onset   • Diabetes Mother    • Other Mother    • Other Father    • Heart attack Father    • Other Sister    • Diabetes Sister    • Diabetes Brother    • Other Other    • Diabetes Other        Past Medical History:  Past Medical History:   Diagnosis Date   • Aortic stenosis     Moderate   • B12 deficiency    • Bell's palsy    • CRF (chronic renal failure)    • Diabetes mellitus (HCC)    • Erectile dysfunction    • GERD (gastroesophageal reflux disease)    • Heart murmur    • Hyperlipidemia    • Hypertension    •  Hypokalemia    • Hypomagnesemia    • YANA (obstructive sleep apnea)     cpap bring dos   • Osteoarthritis    • Pulmonary HTN (HCC)    • Rosacea    • Vitamin D deficiency        Past surgical History:  Past Surgical History:   Procedure Laterality Date   • CARDIAC CATHETERIZATION     • COLONOSCOPY     • JOINT REPLACEMENT     • TONSILLECTOMY     • TOTAL KNEE ARTHROPLASTY     • TOTAL KNEE ARTHROPLASTY Left 5/4/2021    Procedure: TOTAL KNEE ARTHROPLASTY;  Surgeon: Otf Redmond MD;  Location: [facility] MAIN OR;  Service: Orthopedics;  Laterality: Left;       Social History:  Social History     Socioeconomic History   • Marital status:    Tobacco Use   • Smoking status: Never Smoker   • Smokeless tobacco: Never Used   Vaping Use   • Vaping Use: Never used   Substance and Sexual Activity   • Alcohol use: Yes     Comment: Socially   • Drug use: No   • Sexual activity: Defer       Review of Systems:  The following systems were reviewed as they relate to the cardiovascular system: Constitutional, Eyes, ENT, Cardiovascular, Respiratory, Gastrointestinal, Integumentary, Neurological, Psychiatric, Hematologic, Endocrine, Musculoskeletal, and Genitourinary. The pertinent cardiovascular findings are reported above with all other cardiovascular points within those systems being negative.    Diagnostic Study Review:     Current Electrocardiogram:    ECG 12 Lead    Date/Time: 12/8/2021 6:05 PM  Performed by: Juan Castellon DO  Authorized by: Juan Castellon DO   Comparison: not compared with previous ECG   Previous ECG: no previous ECG available  Comments: Normal sinus rhythm with a ventricular rate of 78 bpm.  Nonspecific polarization changes.  Normal QT and QTc intervals.              NOTE: The following portions of the patient's note were reviewed, confirmed and/or updated this visit as appropriate: History of present illness/Interval history, physical examination, assessment & plan, allergies,  current medications, past family history, past medical history, past social history, past surgical history and problem list.

## 2021-12-08 NOTE — H&P (VIEW-ONLY)
Cardiology Office Visit      Encounter Date:  12/08/2021    Patient ID:   Anruag Pickard is a 71 y.o. male.    Reason For Followup:  Shortness of breath  Aortic stenosis    Brief Clinical History:  Dear Jorden Martin PA-C    I had the pleasure of seeing Anurag Pickard today. As you are well aware, this is a 71 y.o. male with no established history of ischemic heart disease.  He does have a history of aortic stenosis, hypertension, diabetes mellitus, hyperlipidemia, osteoarthritis, obesity, and antiplatelet therapy.  He presents today to establish care on his valvular heart disease.    Interval History:  At his last visit, he denied any chest pain pressure heaviness or tightness.  He does report some shortness of breath however this is related to his recent knee surgery.  He denies any PND orthopnea.  He denies any syncope or near syncope.  He reported feeling quite well.    His wife accompanies him to his visit today and she reports that he is severely under playing his symptoms.  She reports that he has a significant unsteady gait and has fallen several times.  She reports that they have had to walk short distances downtown and he has had to stop several times to catch his breath.  Even though the patient reported that he plays golf regularly, his wife says that he has not played golf in 2 years.  She reports that his memory is not very good.  She notes that his mom did suffer from coronary artery disease although he did not recall that his last visit.    He had a 2D echocardiogram performed in April 2021 secondary to a incidental murmur.  The echocardiogram demonstrated moderate to severe aortic stenosis.  It certainly possible that this could be more hemodynamically significant given the patient's symptoms.  We discussed risk benefits and options together today.  They wish to proceed with PAULO and cardiac catheterization to determine epicardial vasculature and to get definitive images and  "hemodynamic assessment of the aortic valve.    Assessment & Plan    Impressions:  Aortic stenosis     Moderate to severe echocardiogram April 2021     Now with significant dyspnea on exertion and decreased endurance  Hypertension  Hypertensive heart disease  Hyperlipidemia  Osteoarthritis  Diabetes mellitus  Obesity  Antiplatelet therapy    Recommendations:  Transesophageal echocardiogram  Left and right heart catheterization  Continue with his current medication regimen including antiplatelet therapy, statin therapy, and antihypertensives.  Follow-up in 6 weeks    Objective:    Vitals:  Vitals:    12/08/21 1441   BP: 166/86   BP Location: Left arm   Patient Position: Sitting   Cuff Size: Large Adult   Pulse: 75   SpO2: 100%   Weight: 126 kg (278 lb)   Height: 180.3 cm (71\")       Physical Exam:    General: Alert, cooperative, no distress, appears stated age  Head:  Normocephalic, atraumatic, mucous membranes moist  Eyes:  Conjunctiva/corneas clear, EOM's intact     Neck:  Supple,  no bruit  Lungs: Clear to auscultation bilaterally, no wheezes rhonchi rales are noted  Chest wall: No tenderness  Heart::  Regular rate and rhythm, S1 and S2 normal, 2/6 systolic ejection murmur.  No rub or gallop  Abdomen: Soft, non-tender, nondistended bowel sounds active.  Obese  Extremities: No cyanosis, clubbing, or edema  Pulses: 2+ and symmetric all extremities  Skin:  No rashes or lesions  Neuro/psych: A&O x3. CN II through XII are grossly intact with appropriate affect      Allergies:  Allergies   Allergen Reactions   • Lisinopril Cough   • Glipizide Other (See Comments)     Sugar too low       Medication Review:     Current Outpatient Medications:   •  amLODIPine (Norvasc) 5 MG tablet, Take 1 tablet by mouth Daily for 360 days. (Patient taking differently: Take 2.5 mg by mouth Daily. Take preop), Disp: 90 tablet, Rfl: 3  •  aspirin  MG tablet, Take 1 tablet by mouth Every 12 (Twelve) Hours., Disp: 28 tablet, Rfl:   •  " atorvastatin (LIPITOR) 40 MG tablet, Take 1 tablet by mouth Daily. (Patient taking differently: Take 40 mg by mouth Daily. May take preop), Disp: 90 tablet, Rfl: 3  •  ezetimibe (ZETIA) 10 MG tablet, Take 1 tablet by mouth Daily. (Patient taking differently: Take 10 mg by mouth Daily. Take preop), Disp: 90 tablet, Rfl: 3  •  folic acid (FOLVITE) 1 MG tablet, Take 1 tablet by mouth Daily., Disp: 90 tablet, Rfl: 1  •  lansoprazole (PREVACID) 30 MG capsule, Take 1 capsule by mouth once daily, Disp: 90 capsule, Rfl: 0  •  naproxen sodium (ALEVE) 220 MG tablet, Take 440 mg by mouth Daily., Disp: , Rfl:   •  ONE TOUCH ULTRA TEST test strip, 1 each by Other route 2 (Two) Times a Day. to check glucose, Disp: , Rfl: 11  •  pioglitazone (ACTOS) 45 MG tablet, Take 1 tablet by mouth Daily. (Patient taking differently: Take 45 mg by mouth Daily. dont take preop), Disp: 90 tablet, Rfl: 3  •  potassium chloride (K-DUR,KLOR-CON) 10 MEQ CR tablet, Take 2 tablets by mouth 2 (Two) Times a Day., Disp: 360 tablet, Rfl: 2  •  thiamine (thiamine) 100 MG tablet tablet, Take 1 tablet by mouth Daily., Disp: 90 tablet, Rfl: 3  •  metFORMIN (Glucophage) 1000 MG tablet, Take 1 tablet by mouth 2 (Two) Times a Day With Meals for 90 days. (Patient taking differently: Take 500 mg by mouth 2 (Two) Times a Day With Meals. Last dose 5/2 before 8am), Disp: 180 tablet, Rfl: 3    Family History:  Family History   Problem Relation Age of Onset   • Diabetes Mother    • Other Mother    • Other Father    • Heart attack Father    • Other Sister    • Diabetes Sister    • Diabetes Brother    • Other Other    • Diabetes Other        Past Medical History:  Past Medical History:   Diagnosis Date   • Aortic stenosis     Moderate   • B12 deficiency    • Bell's palsy    • CRF (chronic renal failure)    • Diabetes mellitus (HCC)    • Erectile dysfunction    • GERD (gastroesophageal reflux disease)    • Heart murmur    • Hyperlipidemia    • Hypertension    •  Hypokalemia    • Hypomagnesemia    • YANA (obstructive sleep apnea)     cpap bring dos   • Osteoarthritis    • Pulmonary HTN (HCC)    • Rosacea    • Vitamin D deficiency        Past surgical History:  Past Surgical History:   Procedure Laterality Date   • CARDIAC CATHETERIZATION     • COLONOSCOPY     • JOINT REPLACEMENT     • TONSILLECTOMY     • TOTAL KNEE ARTHROPLASTY     • TOTAL KNEE ARTHROPLASTY Left 5/4/2021    Procedure: TOTAL KNEE ARTHROPLASTY;  Surgeon: Otf Redmond MD;  Location: [facility] MAIN OR;  Service: Orthopedics;  Laterality: Left;       Social History:  Social History     Socioeconomic History   • Marital status:    Tobacco Use   • Smoking status: Never Smoker   • Smokeless tobacco: Never Used   Vaping Use   • Vaping Use: Never used   Substance and Sexual Activity   • Alcohol use: Yes     Comment: Socially   • Drug use: No   • Sexual activity: Defer       Review of Systems:  The following systems were reviewed as they relate to the cardiovascular system: Constitutional, Eyes, ENT, Cardiovascular, Respiratory, Gastrointestinal, Integumentary, Neurological, Psychiatric, Hematologic, Endocrine, Musculoskeletal, and Genitourinary. The pertinent cardiovascular findings are reported above with all other cardiovascular points within those systems being negative.    Diagnostic Study Review:     Current Electrocardiogram:    ECG 12 Lead    Date/Time: 12/8/2021 6:05 PM  Performed by: Juan Castellon DO  Authorized by: Juan Castellon DO   Comparison: not compared with previous ECG   Previous ECG: no previous ECG available  Comments: Normal sinus rhythm with a ventricular rate of 78 bpm.  Nonspecific polarization changes.  Normal QT and QTc intervals.              NOTE: The following portions of the patient's note were reviewed, confirmed and/or updated this visit as appropriate: History of present illness/Interval history, physical examination, assessment & plan, allergies,  current medications, past family history, past medical history, past social history, past surgical history and problem list.

## 2021-12-09 PROBLEM — R06.09 DYSPNEA ON EXERTION: Status: ACTIVE | Noted: 2021-12-09

## 2021-12-13 RX ORDER — AMLODIPINE BESYLATE 2.5 MG/1
2.5 TABLET ORAL DAILY
COMMUNITY
End: 2022-03-17

## 2021-12-13 RX ORDER — ATORVASTATIN CALCIUM 40 MG/1
40 TABLET, FILM COATED ORAL DAILY
COMMUNITY
End: 2022-02-21

## 2021-12-13 RX ORDER — PIOGLITAZONEHYDROCHLORIDE 45 MG/1
45 TABLET ORAL DAILY
COMMUNITY
End: 2022-03-02

## 2021-12-13 RX ORDER — EZETIMIBE 10 MG/1
10 TABLET ORAL DAILY
COMMUNITY
End: 2022-03-02

## 2021-12-15 ENCOUNTER — LAB (OUTPATIENT)
Dept: LAB | Facility: HOSPITAL | Age: 71
End: 2021-12-15

## 2021-12-15 ENCOUNTER — HOSPITAL ENCOUNTER (OUTPATIENT)
Dept: GENERAL RADIOLOGY | Facility: HOSPITAL | Age: 71
Discharge: HOME | End: 2021-12-15

## 2021-12-15 DIAGNOSIS — N18.4 CHRONIC KIDNEY DISEASE, STAGE 4 (SEVERE) (HCC): ICD-10-CM

## 2021-12-15 DIAGNOSIS — Z01.818 PRE-PROCEDURAL EXAMINATION: ICD-10-CM

## 2021-12-15 LAB
ANION GAP SERPL CALCULATED.3IONS-SCNC: 10.8 MMOL/L (ref 5–15)
APTT PPP: 27.3 SECONDS (ref 24–31)
BASOPHILS # BLD AUTO: 0.1 10*3/MM3 (ref 0–0.2)
BASOPHILS NFR BLD AUTO: 1.3 % (ref 0–1.5)
BUN SERPL-MCNC: 10 MG/DL (ref 8–23)
BUN/CREAT SERPL: 10 (ref 7–25)
CALCIUM SPEC-SCNC: 9.7 MG/DL (ref 8.6–10.5)
CHLORIDE SERPL-SCNC: 97 MMOL/L (ref 98–107)
CO2 SERPL-SCNC: 29.2 MMOL/L (ref 22–29)
CREAT SERPL-MCNC: 1 MG/DL (ref 0.76–1.27)
DEPRECATED RDW RBC AUTO: 49.4 FL (ref 37–54)
EOSINOPHIL # BLD AUTO: 0.1 10*3/MM3 (ref 0–0.4)
EOSINOPHIL NFR BLD AUTO: 1.4 % (ref 0.3–6.2)
ERYTHROCYTE [DISTWIDTH] IN BLOOD BY AUTOMATED COUNT: 14.9 % (ref 12.3–15.4)
GFR SERPL CREATININE-BSD FRML MDRD: 74 ML/MIN/1.73
GLUCOSE SERPL-MCNC: 161 MG/DL (ref 65–99)
HCT VFR BLD AUTO: 40 % (ref 37.5–51)
HGB BLD-MCNC: 13.2 G/DL (ref 13–17.7)
INR PPP: 0.97 (ref 0.93–1.1)
LYMPHOCYTES # BLD AUTO: 1.9 10*3/MM3 (ref 0.7–3.1)
LYMPHOCYTES NFR BLD AUTO: 30.5 % (ref 19.6–45.3)
MCH RBC QN AUTO: 31.6 PG (ref 26.6–33)
MCHC RBC AUTO-ENTMCNC: 33.1 G/DL (ref 31.5–35.7)
MCV RBC AUTO: 95.3 FL (ref 79–97)
MONOCYTES # BLD AUTO: 0.5 10*3/MM3 (ref 0.1–0.9)
MONOCYTES NFR BLD AUTO: 8.9 % (ref 5–12)
NEUTROPHILS NFR BLD AUTO: 3.5 10*3/MM3 (ref 1.7–7)
NEUTROPHILS NFR BLD AUTO: 57.9 % (ref 42.7–76)
NRBC BLD AUTO-RTO: 0 /100 WBC (ref 0–0.2)
PLATELET # BLD AUTO: 159 10*3/MM3 (ref 140–450)
PMV BLD AUTO: 8.9 FL (ref 6–12)
POTASSIUM SERPL-SCNC: 4.5 MMOL/L (ref 3.5–5.2)
PROTHROMBIN TIME: 10.8 SECONDS (ref 9.6–11.7)
RBC # BLD AUTO: 4.2 10*6/MM3 (ref 4.14–5.8)
SARS-COV-2 ORF1AB RESP QL NAA+PROBE: NOT DETECTED
SODIUM SERPL-SCNC: 137 MMOL/L (ref 136–145)
WBC NRBC COR # BLD: 6.1 10*3/MM3 (ref 3.4–10.8)

## 2021-12-15 PROCEDURE — U0005 INFEC AGEN DETEC AMPLI PROBE: HCPCS

## 2021-12-15 PROCEDURE — 71046 X-RAY EXAM CHEST 2 VIEWS: CPT

## 2021-12-15 PROCEDURE — 36415 COLL VENOUS BLD VENIPUNCTURE: CPT

## 2021-12-15 PROCEDURE — 85025 COMPLETE CBC W/AUTO DIFF WBC: CPT | Performed by: INTERNAL MEDICINE

## 2021-12-15 PROCEDURE — 85730 THROMBOPLASTIN TIME PARTIAL: CPT

## 2021-12-15 PROCEDURE — 85610 PROTHROMBIN TIME: CPT | Performed by: INTERNAL MEDICINE

## 2021-12-15 PROCEDURE — U0004 COV-19 TEST NON-CDC HGH THRU: HCPCS

## 2021-12-15 PROCEDURE — 80048 BASIC METABOLIC PNL TOTAL CA: CPT

## 2021-12-15 PROCEDURE — C9803 HOPD COVID-19 SPEC COLLECT: HCPCS

## 2021-12-17 ENCOUNTER — ANESTHESIA EVENT (OUTPATIENT)
Dept: CARDIOLOGY | Facility: HOSPITAL | Age: 71
End: 2021-12-17

## 2021-12-17 ENCOUNTER — HOSPITAL ENCOUNTER (OUTPATIENT)
Dept: CARDIOLOGY | Facility: HOSPITAL | Age: 71
Discharge: HOME | End: 2021-12-17

## 2021-12-17 ENCOUNTER — HOSPITAL ENCOUNTER (OUTPATIENT)
Facility: HOSPITAL | Age: 71
Setting detail: HOSPITAL OUTPATIENT SURGERY
Discharge: HOME | End: 2021-12-17
Attending: INTERNAL MEDICINE | Admitting: INTERNAL MEDICINE

## 2021-12-17 ENCOUNTER — ANESTHESIA (OUTPATIENT)
Dept: CARDIOLOGY | Facility: HOSPITAL | Age: 71
End: 2021-12-17

## 2021-12-17 VITALS
RESPIRATION RATE: 18 BRPM | OXYGEN SATURATION: 99 % | DIASTOLIC BLOOD PRESSURE: 77 MMHG | SYSTOLIC BLOOD PRESSURE: 147 MMHG | HEART RATE: 50 BPM

## 2021-12-17 VITALS — SYSTOLIC BLOOD PRESSURE: 116 MMHG | OXYGEN SATURATION: 100 % | DIASTOLIC BLOOD PRESSURE: 66 MMHG

## 2021-12-17 VITALS
HEART RATE: 57 BPM | WEIGHT: 277.34 LBS | SYSTOLIC BLOOD PRESSURE: 164 MMHG | DIASTOLIC BLOOD PRESSURE: 94 MMHG | RESPIRATION RATE: 17 BRPM | OXYGEN SATURATION: 100 % | BODY MASS INDEX: 39.7 KG/M2 | HEIGHT: 70 IN | TEMPERATURE: 98.1 F

## 2021-12-17 DIAGNOSIS — R06.09 DYSPNEA ON EXERTION: ICD-10-CM

## 2021-12-17 DIAGNOSIS — N18.4 CHRONIC KIDNEY DISEASE, STAGE 4 (SEVERE) (HCC): ICD-10-CM

## 2021-12-17 DIAGNOSIS — I35.0 NONRHEUMATIC AORTIC VALVE STENOSIS: Primary | ICD-10-CM

## 2021-12-17 DIAGNOSIS — I35.0 NONRHEUMATIC AORTIC VALVE STENOSIS: ICD-10-CM

## 2021-12-17 DIAGNOSIS — Z01.818 PRE-PROCEDURAL EXAMINATION: Primary | ICD-10-CM

## 2021-12-17 DIAGNOSIS — Z01.812 PRE-PROCEDURE LAB EXAM: Primary | ICD-10-CM

## 2021-12-17 LAB
ARTERIAL PATENCY WRIST A: ABNORMAL
ARTERIAL PATENCY WRIST A: ABNORMAL
ATMOSPHERIC PRESS: ABNORMAL MM[HG]
ATMOSPHERIC PRESS: ABNORMAL MM[HG]
BASE EXCESS BLDA CALC-SCNC: 0.8 MMOL/L (ref 0–3)
BASE EXCESS BLDA CALC-SCNC: 1.7 MMOL/L (ref 0–3)
BDY SITE: ABNORMAL
BDY SITE: ABNORMAL
CO2 BLDA-SCNC: 27.6 MMOL/L (ref 22–29)
CO2 BLDA-SCNC: 27.9 MMOL/L (ref 22–29)
HCO3 BLDA-SCNC: 26.3 MMOL/L (ref 21–28)
HCO3 BLDA-SCNC: 26.6 MMOL/L (ref 21–28)
HEMODILUTION: NO
HEMODILUTION: NO
INHALED O2 CONCENTRATION: 21 %
INHALED O2 CONCENTRATION: 21 %
MODALITY: ABNORMAL
MODALITY: ABNORMAL
PCO2 BLDA: 42.1 MM HG (ref 35–48)
PCO2 BLDA: 44.5 MM HG (ref 35–48)
PH BLDA: 7.38 PH UNITS (ref 7.35–7.45)
PH BLDA: 7.41 PH UNITS (ref 7.35–7.45)
PO2 BLDA: 36.5 MM HG (ref 83–108)
PO2 BLDA: 70.6 MM HG (ref 83–108)
SAO2 % BLDCOA: 68.2 % (ref 94–98)
SAO2 % BLDCOA: 94 % (ref 94–98)

## 2021-12-17 PROCEDURE — 36600 WITHDRAWAL OF ARTERIAL BLOOD: CPT

## 2021-12-17 PROCEDURE — 0 IOPAMIDOL PER 1 ML: Performed by: INTERNAL MEDICINE

## 2021-12-17 PROCEDURE — 93312 ECHO TRANSESOPHAGEAL: CPT

## 2021-12-17 PROCEDURE — C1894 INTRO/SHEATH, NON-LASER: HCPCS | Performed by: INTERNAL MEDICINE

## 2021-12-17 PROCEDURE — 93325 DOPPLER ECHO COLOR FLOW MAPG: CPT | Performed by: INTERNAL MEDICINE

## 2021-12-17 PROCEDURE — 99152 MOD SED SAME PHYS/QHP 5/>YRS: CPT | Performed by: INTERNAL MEDICINE

## 2021-12-17 PROCEDURE — 93456 R HRT CORONARY ARTERY ANGIO: CPT | Performed by: INTERNAL MEDICINE

## 2021-12-17 PROCEDURE — C1769 GUIDE WIRE: HCPCS | Performed by: INTERNAL MEDICINE

## 2021-12-17 PROCEDURE — 93325 DOPPLER ECHO COLOR FLOW MAPG: CPT

## 2021-12-17 PROCEDURE — 93312 ECHO TRANSESOPHAGEAL: CPT | Performed by: INTERNAL MEDICINE

## 2021-12-17 PROCEDURE — 99153 MOD SED SAME PHYS/QHP EA: CPT | Performed by: INTERNAL MEDICINE

## 2021-12-17 PROCEDURE — 82803 BLOOD GASES ANY COMBINATION: CPT

## 2021-12-17 PROCEDURE — 25010000002 FENTANYL CITRATE (PF) 100 MCG/2ML SOLUTION: Performed by: INTERNAL MEDICINE

## 2021-12-17 PROCEDURE — 25010000002 MIDAZOLAM PER 1 MG: Performed by: INTERNAL MEDICINE

## 2021-12-17 PROCEDURE — 93320 DOPPLER ECHO COMPLETE: CPT

## 2021-12-17 PROCEDURE — 93320 DOPPLER ECHO COMPLETE: CPT | Performed by: INTERNAL MEDICINE

## 2021-12-17 PROCEDURE — 25010000002 PROPOFOL 10 MG/ML EMULSION

## 2021-12-17 RX ORDER — ASPIRIN 325 MG
325 TABLET ORAL DAILY
COMMUNITY
End: 2022-04-04 | Stop reason: HOSPADM

## 2021-12-17 RX ORDER — PHENYLEPHRINE HCL IN 0.9% NACL 1 MG/10 ML
SYRINGE (ML) INTRAVENOUS AS NEEDED
Status: DISCONTINUED | OUTPATIENT
Start: 2021-12-17 | End: 2021-12-17 | Stop reason: SURG

## 2021-12-17 RX ORDER — SODIUM CHLORIDE 9 MG/ML
250 INJECTION, SOLUTION INTRAVENOUS ONCE AS NEEDED
Status: DISCONTINUED | OUTPATIENT
Start: 2021-12-17 | End: 2021-12-17 | Stop reason: HOSPADM

## 2021-12-17 RX ORDER — SODIUM CHLORIDE 9 MG/ML
30 INJECTION, SOLUTION INTRAVENOUS CONTINUOUS
Status: DISPENSED | OUTPATIENT
Start: 2021-12-17 | End: 2021-12-17

## 2021-12-17 RX ORDER — ONDANSETRON 2 MG/ML
4 INJECTION INTRAMUSCULAR; INTRAVENOUS EVERY 6 HOURS PRN
Status: DISCONTINUED | OUTPATIENT
Start: 2021-12-17 | End: 2021-12-17 | Stop reason: HOSPADM

## 2021-12-17 RX ORDER — MIDAZOLAM HYDROCHLORIDE 1 MG/ML
INJECTION INTRAMUSCULAR; INTRAVENOUS AS NEEDED
Status: DISCONTINUED | OUTPATIENT
Start: 2021-12-17 | End: 2021-12-17 | Stop reason: HOSPADM

## 2021-12-17 RX ORDER — LIDOCAINE HYDROCHLORIDE 20 MG/ML
INJECTION, SOLUTION INFILTRATION; PERINEURAL AS NEEDED
Status: DISCONTINUED | OUTPATIENT
Start: 2021-12-17 | End: 2021-12-17 | Stop reason: HOSPADM

## 2021-12-17 RX ORDER — ONDANSETRON 4 MG/1
4 TABLET, FILM COATED ORAL EVERY 6 HOURS PRN
Status: DISCONTINUED | OUTPATIENT
Start: 2021-12-17 | End: 2021-12-17 | Stop reason: HOSPADM

## 2021-12-17 RX ORDER — LANSOPRAZOLE 30 MG/1
30 CAPSULE, DELAYED RELEASE ORAL DAILY
COMMUNITY
End: 2022-02-21

## 2021-12-17 RX ORDER — SODIUM CHLORIDE 9 MG/ML
INJECTION, SOLUTION INTRAVENOUS CONTINUOUS PRN
Status: DISCONTINUED | OUTPATIENT
Start: 2021-12-17 | End: 2021-12-17 | Stop reason: SURG

## 2021-12-17 RX ORDER — ACETAMINOPHEN 325 MG/1
650 TABLET ORAL EVERY 4 HOURS PRN
Status: DISCONTINUED | OUTPATIENT
Start: 2021-12-17 | End: 2021-12-17 | Stop reason: HOSPADM

## 2021-12-17 RX ORDER — ALUMINA, MAGNESIA, AND SIMETHICONE 2400; 2400; 240 MG/30ML; MG/30ML; MG/30ML
15 SUSPENSION ORAL EVERY 6 HOURS PRN
Status: DISCONTINUED | OUTPATIENT
Start: 2021-12-17 | End: 2021-12-17 | Stop reason: HOSPADM

## 2021-12-17 RX ORDER — FENTANYL CITRATE 50 UG/ML
INJECTION, SOLUTION INTRAMUSCULAR; INTRAVENOUS AS NEEDED
Status: DISCONTINUED | OUTPATIENT
Start: 2021-12-17 | End: 2021-12-17 | Stop reason: HOSPADM

## 2021-12-17 RX ORDER — DIPHENHYDRAMINE HCL 25 MG
25 CAPSULE ORAL EVERY 6 HOURS PRN
Status: DISCONTINUED | OUTPATIENT
Start: 2021-12-17 | End: 2021-12-17 | Stop reason: HOSPADM

## 2021-12-17 RX ORDER — ASPIRIN 81 MG/1
81 TABLET ORAL DAILY
Status: DISCONTINUED | OUTPATIENT
Start: 2021-12-17 | End: 2021-12-17 | Stop reason: HOSPADM

## 2021-12-17 RX ORDER — PROPOFOL 10 MG/ML
VIAL (ML) INTRAVENOUS AS NEEDED
Status: DISCONTINUED | OUTPATIENT
Start: 2021-12-17 | End: 2021-12-17 | Stop reason: SURG

## 2021-12-17 RX ADMIN — Medication 200 MCG: at 10:47

## 2021-12-17 RX ADMIN — PROPOFOL 50 MG: 10 INJECTION, EMULSION INTRAVENOUS at 10:51

## 2021-12-17 RX ADMIN — Medication 100 MCG: at 11:03

## 2021-12-17 RX ADMIN — SODIUM CHLORIDE: 0.9 INJECTION, SOLUTION INTRAVENOUS at 10:46

## 2021-12-17 RX ADMIN — PROPOFOL 70 MG: 10 INJECTION, EMULSION INTRAVENOUS at 10:47

## 2021-12-17 RX ADMIN — PROPOFOL 50 MG: 10 INJECTION, EMULSION INTRAVENOUS at 11:00

## 2021-12-17 RX ADMIN — PROPOFOL 50 MG: 10 INJECTION, EMULSION INTRAVENOUS at 10:55

## 2021-12-17 RX ADMIN — Medication 100 MCG: at 10:54

## 2021-12-17 NOTE — DISCHARGE INSTR - ACTIVITY
Post Cath Instructions      Call Dr. Castellon’s office to schedule a follow up appointment.  Specific Physician Instructions: ***    Drink plenty of fluids for the next 24 hours.  This helps to eliminate the dye used in your procedure through urination.  You may resume a normal diet; however, try to avoid foods that would cause gas or constipation.    Sedative medication given to you during your catheterization may decrease your judgement and reaction time for up to 24-48 hours.  Therefore:  DO NOT drive or operate hazardous machinery (48 hours)  DO NOT consume alcoholic beverages  DO NOT make any important/legal decisions  Have someone stay with you for at least 24 hours    To allow proper healing and prevent bleeding, the following activities are to be strictly avoided for the next 24-48 hours:  Excessive bending at wound site  Straining (anything that would tense up muscles around the affected puncture site)  Lifting objects greater than 5 pounds, pushing, or pulling for 5 days    For Groin Cases:  Refrain from sexual activity  Refrain from running or vigorous walking  No prolonged sitting or standing  Limit stair climbing as much as possible    Keep the puncture site clean and dry.  You may remove the dressing tomorrow and replace it with a band-aid for at least one additional day.  Gently clean the site with mild soap and water.  No scrubbing/rubbing and lightly pat the area dry.  Showers are acceptable; however, avoid submerging in water (tub baths, hot tubs, swimming pools, etc…) for at least one week.  The site should be completely healed before resuming these activities to reduce the risk of infection.  Check the site often.  Watch for signs and symptoms of infection and notify your physician if any of the following occur:  Bleeding or an increase in swelling at the puncture site  Fever  Increased soreness around puncture site  Foul odor or significant drainage from the puncture site  Swelling, redness, or  warmth at the puncture site    **A bruise or small “pea sized” lump under the skin at the puncture site is not unusual.  This should disappear within 3-4 weeks.**  CONTACT YOUR PHYSICIAN OR CALL 911 IF YOU EXPERIENCE ANY OF THE FOLLOWING:  Increased angina (chest pain) or frequent sensations of pressure, burning, pain, or other discomfort in the chest, arm, jaws, or stomach  Lightheadedness, dizziness, faint feeling, sweating, or difficulty breathing  Odd sensation changes like numbness, tingling, coldness, or pain in the arm or leg in which the catheter was inserted  Limb in which the catheter was inserted becomes pale/bluish in color    IMPORTANT:  Although this occurs very rarely, if you should develop bright red or excessive bleeding, feel a “pop” inside at the insertion site, or notice a sudden increase in swelling larger than a walnut, you should call 911.  Hold continuous firm pressure to the access site until emergency personnel arrive.  It is best if someone else can do this for you.

## 2021-12-17 NOTE — ANESTHESIA POSTPROCEDURE EVALUATION
Patient: Anurag Pickard    Procedure Summary     Date: 12/17/21 Room / Location: [facility]    Anesthesia Start: 1046 Anesthesia Stop: 1107    Procedure: ADULT TRANSESOPHAGEAL ECHO (PAULO) W/ CONT IF NECESSARY PER PROTOCOL Diagnosis:       Nonrheumatic aortic valve stenosis      Dyspnea on exertion      (Valvular Disease)      (Aortic Valve Assessment)    Scheduled Providers: Juan Castellon DO Provider: Edenilson Beebe MD    Anesthesia Type: Not recorded ASA Status: Not recorded          Anesthesia Type: No value filed.    Vitals  Vitals Value Taken Time   BP 97/81 12/17/21 1535   Temp     Pulse 60 12/17/21 1535   Resp 17 12/17/21 1150   SpO2 100 % 12/17/21 1400           Post Anesthesia Care and Evaluation    Patient location during evaluation: bedside  Patient participation: complete - patient participated  Level of consciousness: awake  Pain scale: See nurse's notes for pain score.  Pain management: adequate  Airway patency: patent  Anesthetic complications: No anesthetic complications  PONV Status: none  Cardiovascular status: acceptable  Respiratory status: acceptable  Hydration status: acceptable    Comments: Patient seen and examined postoperatively; vital signs stable; SpO2 greater than or equal to 90%; cardiopulmonary status stable; nausea/vomiting adequately controlled; pain adequately controlled; no apparent anesthesia complications; patient discharged from anesthesia care when discharge criteria were met

## 2021-12-17 NOTE — ANESTHESIA PREPROCEDURE EVALUATION
Anesthesia Evaluation     Patient summary reviewed and Nursing notes reviewed   no history of anesthetic complications:  NPO Solid Status: > 8 hours  NPO Liquid Status: > 8 hours           Airway   Dental      Pulmonary    (+) sleep apnea,   Cardiovascular     ECG reviewed  PT is on anticoagulation therapy    (+) hypertension, valvular problems/murmurs AS, MR and TI, ABDUL, hyperlipidemia,       Neuro/Psych  GI/Hepatic/Renal/Endo    (+) obesity,  GERD,  diabetes mellitus,     Musculoskeletal     Abdominal    Substance History   (+) alcohol use,      OB/GYN          Other   arthritis,      ROS/Med Hx Other: H/O alcohol abuse, pulmonary hypertension, memory loss, ataxia, low vit D and B12, hypogonadism, Bell's palsy, hypomagnesemia    Left Ventricle Left ventricular ejection fraction appears to be 61 - 65%. Left ventricular systolic function is normal.   Normal left ventricular cavity size noted. Left ventricular wall thickness is consistent with moderate concentric hypertrophy.  Aortic Valve The aortic valve is abnormal in structure. There is severe calcification of the aortic valve mainly affecting the non-coronary, left coronary and right coronary cusp(s). Mild aortic valve regurgitation is present. Severe aortic valve stenosis is present. Aortic valve maximum pressure gradient is 76.5 mmHg. Aortic valve mean pressure gradient is 47.7 mmHg.  Pericardium There is no evidence of pericardial effusion. .    Echocardiogram Findings    Left Ventricle Calculated left ventricular EF = 58% Estimated left ventricular EF = 55% Estimated left ventricular EF was in agreement with the calculated left ventricular EF. Left ventricular systolic function is normal.   The left ventricular cavity is mildly dilated. Left ventricular wall thickness is consistent with mild concentric hypertrophy. All left ventricular wall segments contract normally.  Right Ventricle The right ventricular cavity is mildly dilated. Normal right ventricular  systolic function noted.  Left Atrium The left atrial cavity is moderately dilated.  Right Atrium Normal right atrial cavity size noted. The inferior vena cava is normally sized. Normal IVC inspiratory collapse of greater than 50% noted.  Aortic Valve The aortic valve is abnormal in structure. There is calcification of the aortic valve. No aortic valve regurgitation is present. Moderate to severe aortic valve stenosis is present.  Mitral Valve The mitral valve is grossly normal in structure. Mild mitral valve regurgitation is present. No significant mitral valve stenosis is present.  Tricuspid Valve Mild tricuspid valve regurgitation is present. Estimated right ventricular systolic pressure from tricuspid regurgitation is normal (<35 mmHg). No evidence of significant tricuspid valve stenosis is present.  Pulmonic Valve The pulmonic valve is not well visualized. There is trace pulmonic valve regurgitation present. There is no pulmonic valve stenosis present.  Greater Vessels No dilation of the aortic root is present.  Pericardium The pericardium is normal. There is no evidence of pericardial effusion. .        PSH  CARDIAC CATHETERIZATION TONSILLECTOMY  COLONOSCOPY TOTAL KNEE ARTHROPLASTY  TOTAL KNEE ARTHROPLASTY JOINT REPLACEMENT                Anesthesia Plan    ASA 4     MAC   (Patient identified; pre-operative vital signs, all relevant labs/studies, complete medical/surgical/anesthetic history, full medication list, full allergy list, and NPO status obtained/reviewed; physical assessment performed; anesthetic options, side effects, potential complications, risks, and benefits discussed; questions answered; written anesthesia consent obtained; patient cleared for procedure; anesthesia machine and equipment checked and functioning)    Anesthetic plan, all risks, benefits, and alternatives have been provided, discussed and informed consent has been obtained with: patient.    Plan discussed with CRNA and CAA.

## 2021-12-24 LAB
BH CV ECHO MEAS - AO MAX PG: 72 MMHG
BH CV ECHO MEAS - AO MEAN PG: 44.6 MMHG
BH CV ECHO MEAS - AO V2 MAX: 424.4 CM/SEC
BH CV ECHO MEAS - AO V2 MEAN: 317.4 CM/SEC
BH CV ECHO MEAS - AO V2 VTI: 140.6 CM

## 2022-01-03 ENCOUNTER — TELEPHONE (OUTPATIENT)
Dept: CARDIAC SURGERY | Facility: CLINIC | Age: 72
End: 2022-01-03

## 2022-01-03 ENCOUNTER — OFFICE VISIT (OUTPATIENT)
Dept: CARDIOLOGY | Facility: CLINIC | Age: 72
End: 2022-01-03

## 2022-01-03 VITALS
RESPIRATION RATE: 18 BRPM | SYSTOLIC BLOOD PRESSURE: 130 MMHG | HEART RATE: 81 BPM | HEIGHT: 71 IN | WEIGHT: 284 LBS | DIASTOLIC BLOOD PRESSURE: 64 MMHG | OXYGEN SATURATION: 97 % | BODY MASS INDEX: 39.76 KG/M2

## 2022-01-03 DIAGNOSIS — I50.31 ACUTE DIASTOLIC CHF (CONGESTIVE HEART FAILURE): ICD-10-CM

## 2022-01-03 DIAGNOSIS — I35.0 NONRHEUMATIC AORTIC VALVE STENOSIS: Primary | ICD-10-CM

## 2022-01-03 DIAGNOSIS — R06.09 DYSPNEA ON EXERTION: ICD-10-CM

## 2022-01-03 DIAGNOSIS — I10 BENIGN ESSENTIAL HYPERTENSION: ICD-10-CM

## 2022-01-03 PROCEDURE — 99214 OFFICE O/P EST MOD 30 MIN: CPT | Performed by: INTERNAL MEDICINE

## 2022-01-03 NOTE — PROGRESS NOTES
Subjective:     Encounter Date:01/03/2022      Patient ID: Anurag Pickard is a 71 y.o. male.    Chief Complaint:  Chief Complaint   Patient presents with   • Establish Care   • Cardiac Valve Problem     Discuss TAVR       HPI:  Sj is a very pleasant 71-year-old male patient of Dr. Castellon past medical history significant for aortic stenosis, pulmonary hypertension, dyslipidemia, and diabetes mellitus type 2.    His most recent cardiac catheterization which I personally reviewed the images from revealed nonobstructive insignificant coronary artery disease.  I also personally reviewed his transesophageal echo which showed severe aortic stenosis with compensated LV systolic function with an ejection fraction of 55 to 60% with moderate aortic regurgitation (mean gradient across aortic valve was 45 mmHg with a V-max of 4.2 m/s.  He is being referred for evaluation of aortic valve replacement.    He currently has what I would classify as class III New York Heart Association symptoms with orthopnea but without PND.  His [facility] Q12 questionnaire shows a moderate decrease in his overall quality of life.  His 15 foot walk test took 6 to 7 seconds.    The following portions of the patient's history were reviewed and updated as appropriate: allergies, current medications, past family history, past medical history, past social history, past surgical history and problem list.    Problem List:  Patient Active Problem List   Diagnosis   • Aortic valve stenosis   • Diabetes mellitus, type II (HCC)   • Gastroesophageal reflux disease   • Hyperlipidemia   • Hypogonadism   • Obesity   • Obstructive sleep apnea syndrome   • Osteoarthritis   • Pulmonary hypertension (HCC)   • Rosacea   • Vitamin D deficiency   • Memory loss   • Ataxia   • Alcohol abuse   • Retinal disorder   • Presbyopia   • Nuclear senile cataract   • History of laser assisted in situ keratomileusis   • Benign essential hypertension   • Medicare annual wellness  visit, subsequent   • Dyspnea on exertion   • Acute diastolic CHF (congestive heart failure) ([facility])       Past Medical History:  Past Medical History:   Diagnosis Date   • Acute diastolic CHF (congestive heart failure) ([facility]) 1/3/2022   • Aortic stenosis     Moderate   • B12 deficiency    • Bell's palsy    • Diabetes mellitus (HCC)    • Erectile dysfunction    • GERD (gastroesophageal reflux disease)    • Heart murmur    • Hyperlipidemia    • Hypertension    • Hypokalemia    • Hypomagnesemia    • YANA (obstructive sleep apnea)     cpap bring dos   • Osteoarthritis    • Pulmonary HTN ([facility])    • Rosacea    • Vitamin D deficiency        Past Surgical History:  Past Surgical History:   Procedure Laterality Date   • CARDIAC CATHETERIZATION     • CARDIAC CATHETERIZATION N/A 12/17/2021    Procedure: Right and Left Heart Cath;  Surgeon: Juan Castellon DO;  Location: [facility] CATH INVASIVE LOCATION;  Service: Cardiology;  Laterality: N/A;   • COLONOSCOPY     • JOINT REPLACEMENT     • TONSILLECTOMY     • TOTAL KNEE ARTHROPLASTY     • TOTAL KNEE ARTHROPLASTY Left 5/4/2021    Procedure: TOTAL KNEE ARTHROPLASTY;  Surgeon: Otf Redmond MD;  Location: [facility] MAIN OR;  Service: Orthopedics;  Laterality: Left;       Social History:  Social History     Socioeconomic History   • Marital status:    Tobacco Use   • Smoking status: Never Smoker   • Smokeless tobacco: Never Used   Vaping Use   • Vaping Use: Never used   Substance and Sexual Activity   • Alcohol use: Yes     Comment: Socially   • Drug use: No   • Sexual activity: Defer       Allergies:  Allergies   Allergen Reactions   • Lisinopril Cough   • Glipizide Other (See Comments)     Sugar too low           Review of Symptoms:  Constitutional: Patient afebrile no chills or unexpected weight changes  Respiratory: No cough, no wheezing or dyspnea  Cardiovascular: Today the patient complains of no chest pain, palpitations, dyspnea, orthopnea and no  "edema  Gastrointestinal: No nausea, vomiting, constipation or diarrhea.  No melena or dark stools    All other systems reviewed and are negative           Objective:         /64   Pulse 81   Resp 18   Ht 180.3 cm (71\")   Wt 129 kg (284 lb)   SpO2 97%   BMI 39.61 kg/m²       Physical exam  Constitutional: well-nourished, and appears stated age in no acute distress  PERRL: Conjunctiva clear, no pallor, anicteric  HENMT: normocephalic, normal dentition, no cyanosis or pallor  Neck:no bruits, or thrills and bilateral normal carotid upstroke. Normal jugular venous pressure  Cardiovascular: No parasternal heaves an non-displaced focal PMI. Normal rate and rhythm: no rub, gallop, 2 out of 6 late peaking systolic ejection murmur with an audible A2 component of S2 and normal S1; no lower or upper extremity edema.   Lungs: unlabored, no wheezing with no rales or rhonchi on auscultation.  Extremities: Warm, no clubbing, cyanosis. Full and equal peripheral pulses in extremities with no bruits appreciated.   Abdomen: soft, non-tender, non-distended  Musculoskeletal: no joint tenderness or swelling and no erythema  Skin: Warm and dry, non-erythematous   Neuro:alert and normal affect. Oriented to time, place and person.       In-Office Procedure(s):  Procedures    ASCVD RIsk Score::  The ASCVD Risk score (Jsose DC Jr., et al., 2013) failed to calculate for the following reasons:    The valid total cholesterol range is 130 to 320 mg/dL    Recent Radiology:  Imaging Results (Most Recent)     None          Lab Review:   Hospital Outpatient Visit on 12/17/2021   Component Date Value   • Ao pk thoe 12/17/2021 424.4    • Ao max PG 12/17/2021 72.0    • Ao V2 mean 12/17/2021 317.4    • Ao mean PG 12/17/2021 44.6    • Ao V2 VTI 12/17/2021 140.6    Admission on 12/17/2021, Discharged on 12/17/2021   Component Date Value   • Protime 12/15/2021 10.8    • INR 12/15/2021 0.97    • WBC 12/15/2021 6.10    • RBC 12/15/2021 4.20    • " Hemoglobin 12/15/2021 13.2    • Hematocrit 12/15/2021 40.0    • MCV 12/15/2021 95.3    • MCH 12/15/2021 31.6    • MCHC 12/15/2021 33.1    • RDW 12/15/2021 14.9    • RDW-SD 12/15/2021 49.4    • MPV 12/15/2021 8.9    • Platelets 12/15/2021 159    • Neutrophil % 12/15/2021 57.9    • Lymphocyte % 12/15/2021 30.5    • Monocyte % 12/15/2021 8.9    • Eosinophil % 12/15/2021 1.4    • Basophil % 12/15/2021 1.3    • Neutrophils, Absolute 12/15/2021 3.50    • Lymphocytes, Absolute 12/15/2021 1.90    • Monocytes, Absolute 12/15/2021 0.50    • Eosinophils, Absolute 12/15/2021 0.10    • Basophils, Absolute 12/15/2021 0.10    • nRBC 12/15/2021 0.0    • Site 12/17/2021 PA    • Trevin's Test 12/17/2021 N/A    • pH, Arterial 12/17/2021 7.379    • pCO2, Arterial 12/17/2021 44.5    • pO2, Arterial 12/17/2021 36.5*   • HCO3, Arterial 12/17/2021 26.3    • Base Excess, Arterial 12/17/2021 0.8    • O2 Saturation, Arterial 12/17/2021 68.2*   • CO2 Content 12/17/2021 27.6    • Barometric Pressure for * 12/17/2021     • Modality 12/17/2021 Room Air    • FIO2 12/17/2021 21    • Hemodilution 12/17/2021 No    • Site 12/17/2021 Arterial Line    • Trevin's Test 12/17/2021 N/A    • pH, Arterial 12/17/2021 7.409    • pCO2, Arterial 12/17/2021 42.1    • pO2, Arterial 12/17/2021 70.6*   • HCO3, Arterial 12/17/2021 26.6    • Base Excess, Arterial 12/17/2021 1.7    • O2 Saturation, Arterial 12/17/2021 94.0    • CO2 Content 12/17/2021 27.9    • Barometric Pressure for * 12/17/2021     • Modality 12/17/2021 Room Air    • FIO2 12/17/2021 21    • Hemodilution 12/17/2021 No    Lab on 12/15/2021   Component Date Value   • Glucose 12/15/2021 161*   • BUN 12/15/2021 10    • Creatinine 12/15/2021 1.00    • Sodium 12/15/2021 137    • Potassium 12/15/2021 4.5    • Chloride 12/15/2021 97*   • CO2 12/15/2021 29.2*   • Calcium 12/15/2021 9.7    • eGFR Non  Amer 12/15/2021 74    • BUN/Creatinine Ratio 12/15/2021 10.0    • Anion Gap 12/15/2021 10.8    • PTT  12/15/2021 27.3    • COVID19 12/15/2021 Not Detected    Hospital Outpatient Visit on 11/09/2021   Component Date Value   • BSA 11/09/2021 2.4    • IVSd 11/09/2021 1.5    • LVPWd 11/09/2021 1.4    • IVS/LVPW 11/09/2021 1.1    • ACS 11/09/2021 0.71    • LVOT diam 11/09/2021 2.1    • LVOT area 11/09/2021 3.4    • Ao pk theo 11/09/2021 437.5    • Ao max PG 11/09/2021 76.5    • Ao max PG (full) 11/09/2021 69.9    • Ao V2 mean 11/09/2021 329.3    • Ao mean PG 11/09/2021 47.7    • Ao mean PG (full) 11/09/2021 44.2    • Ao V2 VTI 11/09/2021 123.0    • YOSI(I,A) 11/09/2021 0.95    • YOSI(I,D) 11/09/2021 0.95    • YOSI(V,A) 11/09/2021 1.0    • YOSI(V,D) 11/09/2021 1.0    • LV V1 max PG 11/09/2021 6.6    • LV V1 mean PG 11/09/2021 3.5    • LV V1 max 11/09/2021 128.6    • LV V1 mean 11/09/2021 87.6    • LV V1 VTI 11/09/2021 34.4    • SV(LVOT) 11/09/2021 117.5    • SI(LVOT) 11/09/2021 48.5    •  CV ECHO MEREDITH - BZI_BMI 11/09/2021 38.6    •  CV ECHO MEREDITH - BSA(HA* 11/09/2021 2.6    •  CV ECHO MEREDITH - BZI_ME* 11/09/2021 125.6    •  CV ECHO MEREDITH - BZI_ME* 11/09/2021 180.3    •  CV VAS BP LEFT ARM 11/09/2021 150/52    • YOSI (traced) 11/09/2021 1.1               Invalid input(s): ALKPO4                        Invalid input(s): LDLCALC                Assessment:          Diagnosis Plan   1. Nonrheumatic aortic valve stenosis     2. Dyspnea on exertion     3. Benign essential hypertension     4. Acute diastolic CHF (congestive heart failure) (HCC)            Plan:         1. Nonrheumatic aortic valve stenosis  Severe symptomatic aortic stenosis in a patient who would likely be a candidate for either surgical aortic valve replacement versus transcatheter aortic valve replacement.  He has no obstructive coronary artery disease and no other significant valvular heart disease.  I will refer him to cardiothoracic surgery so that we can get a valve team consensus as to the best strategy to consider.  He would alternatively be an excellent  candidate for transcatheter aortic valve replacement should this strategy be chosen.    2. Dyspnea on exertion  Multifactorial but in large part secondary to severe symptomatic aortic stenosis with underlying diastolic dysfunction.    3. Benign essential hypertension  Stable on medical therapy    4. Acute diastolic CHF (congestive heart failure) (HCC)  Euvolemic stable CHF today.       Paolo Garcia MD  01/03/22  .

## 2022-01-10 ENCOUNTER — PATIENT ROUNDING (BHMG ONLY) (OUTPATIENT)
Dept: CARDIAC SURGERY | Facility: CLINIC | Age: 72
End: 2022-01-10

## 2022-01-10 ENCOUNTER — OFFICE VISIT (OUTPATIENT)
Dept: CARDIAC SURGERY | Facility: CLINIC | Age: 72
End: 2022-01-10

## 2022-01-10 VITALS
BODY MASS INDEX: 40.18 KG/M2 | SYSTOLIC BLOOD PRESSURE: 145 MMHG | HEART RATE: 62 BPM | HEIGHT: 71 IN | WEIGHT: 287 LBS | OXYGEN SATURATION: 98 % | RESPIRATION RATE: 20 BRPM | TEMPERATURE: 97.3 F | DIASTOLIC BLOOD PRESSURE: 74 MMHG

## 2022-01-10 DIAGNOSIS — I35.0 NONRHEUMATIC AORTIC VALVE STENOSIS: Primary | ICD-10-CM

## 2022-01-10 DIAGNOSIS — I10 BENIGN ESSENTIAL HYPERTENSION: ICD-10-CM

## 2022-01-10 DIAGNOSIS — I50.32 CHRONIC DIASTOLIC CONGESTIVE HEART FAILURE: ICD-10-CM

## 2022-01-10 DIAGNOSIS — G47.33 OBSTRUCTIVE SLEEP APNEA SYNDROME: ICD-10-CM

## 2022-01-10 DIAGNOSIS — E66.01 MORBID (SEVERE) OBESITY DUE TO EXCESS CALORIES: ICD-10-CM

## 2022-01-10 DIAGNOSIS — E11.65 TYPE 2 DIABETES MELLITUS WITH HYPERGLYCEMIA, WITHOUT LONG-TERM CURRENT USE OF INSULIN: ICD-10-CM

## 2022-01-10 DIAGNOSIS — I27.20 PULMONARY HYPERTENSION: ICD-10-CM

## 2022-01-10 DIAGNOSIS — F10.10 ALCOHOL ABUSE: ICD-10-CM

## 2022-01-10 DIAGNOSIS — E78.5 HYPERLIPIDEMIA, UNSPECIFIED HYPERLIPIDEMIA TYPE: ICD-10-CM

## 2022-01-10 PROCEDURE — 99204 OFFICE O/P NEW MOD 45 MIN: CPT | Performed by: THORACIC SURGERY (CARDIOTHORACIC VASCULAR SURGERY)

## 2022-01-10 NOTE — PROGRESS NOTES
January 10, 2022    MyChart Message    My name is Radha Jaron, Practice Manager.     I am with   [facility] CARDIAC SURGERY     [address] [place] [id number]     [address] [place] [address]    [phone]    I am writing to officially welcome you to our practice and ask about your recent visit.   Can you tell me about your visit with us? What things went well?           We're always looking for ways to make our patients' experiences even better.   Do you have recommendations on ways we may improve?       Overall were you satisfied with your first visit to our practice?     Please let me know if there is anything else we can do.     Thank you for choosing [facility], and have a great day.

## 2022-01-11 ENCOUNTER — PATIENT ROUNDING (BHMG ONLY) (OUTPATIENT)
Dept: CARDIOLOGY | Facility: CLINIC | Age: 72
End: 2022-01-11

## 2022-01-11 NOTE — PROGRESS NOTES
"January 11, 2022    Hello, may I speak with Anurag Pickard?    My name is 'Darryl Vinson'      I am  with [facility] CARDIOLOGY  [address]  [facility] [id number] [phone].    Before we get started may I verify your date of birth? 1950    I am calling to officially welcome you to our practice and ask about your recent visit. Is this a good time to talk? yes    Tell me about your visit with us. What things went well?  \"Excellent communication. He read my chart and was up to date on everything. It was good.\"        We're always looking for ways to make our patients' experiences even better. Do you have recommendations on ways we may improve?  no    Overall were you satisfied with your first visit to our practice? yes       I appreciate you taking the time to speak with me today. Is there anything else I can do for you? no      Thank you, and have a great day.      "

## 2022-01-15 ENCOUNTER — APPOINTMENT (OUTPATIENT)
Dept: LAB | Facility: HOSPITAL | Age: 72
End: 2022-01-15

## 2022-01-15 ENCOUNTER — LAB (OUTPATIENT)
Dept: LAB | Facility: HOSPITAL | Age: 72
End: 2022-01-15

## 2022-01-15 DIAGNOSIS — G47.33 OBSTRUCTIVE SLEEP APNEA SYNDROME: ICD-10-CM

## 2022-01-15 DIAGNOSIS — R06.09 DYSPNEA ON EXERTION: ICD-10-CM

## 2022-01-15 LAB — SARS-COV-2 ORF1AB RESP QL NAA+PROBE: NOT DETECTED

## 2022-01-15 PROCEDURE — U0005 INFEC AGEN DETEC AMPLI PROBE: HCPCS

## 2022-01-15 PROCEDURE — U0004 COV-19 TEST NON-CDC HGH THRU: HCPCS

## 2022-01-15 PROCEDURE — C9803 HOPD COVID-19 SPEC COLLECT: HCPCS

## 2022-01-18 ENCOUNTER — TRANSCRIBE ORDERS (OUTPATIENT)
Dept: ADMINISTRATIVE | Facility: HOSPITAL | Age: 72
End: 2022-01-18

## 2022-01-18 ENCOUNTER — APPOINTMENT (OUTPATIENT)
Dept: RESPIRATORY THERAPY | Facility: HOSPITAL | Age: 72
End: 2022-01-18

## 2022-01-18 ENCOUNTER — HOSPITAL ENCOUNTER (OUTPATIENT)
Dept: CT IMAGING | Facility: HOSPITAL | Age: 72
Discharge: HOME | End: 2022-01-18
Admitting: THORACIC SURGERY (CARDIOTHORACIC VASCULAR SURGERY)

## 2022-01-18 DIAGNOSIS — Z01.818 PRE-OP EXAM: Primary | ICD-10-CM

## 2022-01-18 DIAGNOSIS — I35.0 NONRHEUMATIC AORTIC VALVE STENOSIS: ICD-10-CM

## 2022-01-18 PROCEDURE — 71250 CT THORAX DX C-: CPT

## 2022-01-23 NOTE — PROGRESS NOTES
BCS CONSULT    Reason for Consultation: Surgical opinion regarding aortic valve stenosis.    History of Present Illness:     This is a very pleasant 71 year old man with multiple co-morbidities. He has been suffering worsening dyspnea, even on minor exertion now (NYHA III), over the last several months. He will often have some vague chest tightness as well. He also admits to light headedness, although not to any disorientation or outright syncope. He admits to periodic lower extremity edema that improves with leg elevation. He also has one pillow orthopnea (long standing history of YANA).    Transesophageal echocardiogram on 12/17/2021 showed a mean aortic valve gradient of 44.58 mmHg, a Vmax of 4.23 m/s, and an YOSI of 0.9 cm2. Images also showed moderate aortic valve regurgitation, mild to moderate mitral valve regurgitation, trace tricuspid valve regurgitation, and a left ventricular ejection fraction over 50%.    A right and left heart catheterization on 12/17/2021 showed no significant coronary disease and a RVSBP of 42 mmHg. Pulmonary artery pressure measured at 42/4 mmHg.    Review of Systems   Constitutional: Positive for activity change, fatigue and unexpected weight change. Negative for appetite change, chills, diaphoresis and fever.   HENT: Negative for congestion, dental problem, hearing loss, mouth sores, nosebleeds, postnasal drip, rhinorrhea, sneezing, sore throat, trouble swallowing and voice change.    Eyes: Negative for photophobia, pain, discharge, redness, itching and visual disturbance.   Respiratory: Positive for chest tightness and shortness of breath. Negative for apnea, cough, choking, wheezing and stridor.    Cardiovascular: Positive for leg swelling. Negative for chest pain and palpitations.   Gastrointestinal: Negative for abdominal distention, abdominal pain, constipation, diarrhea, nausea, rectal pain and vomiting.   Endocrine: Negative for cold intolerance, heat intolerance and polyuria.    Genitourinary: Negative for decreased urine volume, dysuria, flank pain, frequency, hematuria and urgency.   Musculoskeletal: Positive for back pain, myalgias and neck stiffness. Negative for arthralgias, gait problem, joint swelling and neck pain.   Skin: Negative for color change, pallor, rash and wound.   Allergic/Immunologic: Negative for environmental allergies, food allergies and immunocompromised state.   Neurological: Negative for dizziness, tremors, seizures, syncope, facial asymmetry, speech difficulty, weakness, light-headedness, numbness and headaches.   Hematological: Negative for adenopathy. Does not bruise/bleed easily.   Psychiatric/Behavioral: Positive for decreased concentration and sleep disturbance. Negative for agitation, behavioral problems, confusion, dysphoric mood, hallucinations, self-injury and suicidal ideas. The patient is not nervous/anxious and is not hyperactive.         Past Medical History:   Diagnosis Date   • Acute diastolic CHF (congestive heart failure) ([facility]) 1/3/2022   • Aortic stenosis     Moderate   • B12 deficiency    • Bell's palsy    • Diabetes mellitus ([facility])    • Erectile dysfunction    • GERD (gastroesophageal reflux disease)    • Heart murmur    • Hyperlipidemia    • Hypertension    • Hypokalemia    • Hypomagnesemia    • YANA (obstructive sleep apnea)     cpap bring dos   • Osteoarthritis    • Pulmonary HTN (HCC)    • Rosacea    • Vitamin D deficiency      Past Surgical History:   Procedure Laterality Date   • CARDIAC CATHETERIZATION     • CARDIAC CATHETERIZATION N/A 12/17/2021    Procedure: Right and Left Heart Cath;  Surgeon: Juan Castellon DO;  Location: [facility] CATH INVASIVE LOCATION;  Service: Cardiology;  Laterality: N/A;   • COLONOSCOPY     • JOINT REPLACEMENT     • TONSILLECTOMY     • TOTAL KNEE ARTHROPLASTY     • TOTAL KNEE ARTHROPLASTY Left 5/4/2021    Procedure: TOTAL KNEE ARTHROPLASTY;  Surgeon: Otf Redmond MD;  Location: [facility] MAIN OR;   "Service: Orthopedics;  Laterality: Left;     Family History   Problem Relation Age of Onset   • Diabetes Mother    • Other Mother    • Other Father    • Heart attack Father    • Other Sister    • Diabetes Sister    • Diabetes Brother    • Other Other    • Diabetes Other      Social History     Tobacco Use   • Smoking status: Never Smoker   • Smokeless tobacco: Never Used   Vaping Use   • Vaping Use: Never used   Substance Use Topics   • Alcohol use: Yes     Comment: Socially   • Drug use: No     (Not in a hospital admission)      Current Outpatient Medications:   •  amLODIPine (NORVASC) 2.5 MG tablet, Take 2.5 mg by mouth Daily., Disp: , Rfl:   •  aspirin 325 MG tablet, Take 325 mg by mouth Daily., Disp: , Rfl:   •  atorvastatin (LIPITOR) 40 MG tablet, Take 40 mg by mouth Daily., Disp: , Rfl:   •  ezetimibe (ZETIA) 10 MG tablet, Take 10 mg by mouth Daily., Disp: , Rfl:   •  folic acid (FOLVITE) 1 MG tablet, Take 1 tablet by mouth Daily., Disp: 90 tablet, Rfl: 1  •  lansoprazole (PREVACID) 30 MG capsule, Take 30 mg by mouth Daily., Disp: , Rfl:   •  metFORMIN (GLUCOPHAGE) 1000 MG tablet, Take 1,000 mg by mouth 2 (Two) Times a Day With Meals., Disp: , Rfl:   •  naproxen sodium (ALEVE) 220 MG tablet, Take 440 mg by mouth Daily., Disp: , Rfl:   •  ONE TOUCH ULTRA TEST test strip, 1 each by Other route 2 (Two) Times a Day. to check glucose, Disp: , Rfl: 11  •  pioglitazone (ACTOS) 45 MG tablet, Take 45 mg by mouth Daily., Disp: , Rfl:   •  potassium chloride (K-DUR,KLOR-CON) 10 MEQ CR tablet, Take 2 tablets by mouth 2 (Two) Times a Day., Disp: 360 tablet, Rfl: 2  •  thiamine (thiamine) 100 MG tablet tablet, Take 1 tablet by mouth Daily., Disp: 90 tablet, Rfl: 3    Allergies:  Lisinopril and Glipizide    Objective      Vital Signs       Flowsheet Rows      First Filed Value   Admission Height 180.3 cm (71\") Documented at 01/10/2022 1156   Admission Weight 130 kg (287 lb) Documented at 01/10/2022 1156        180.3 cm " "(71\")    Physical Exam  Vitals reviewed.   Constitutional:       General: He is awake. He is not in acute distress.     Appearance: Normal appearance. He is well-developed and well-groomed. He is obese. He is not ill-appearing, toxic-appearing or diaphoretic.      Interventions: He is not intubated.  HENT:      Head: Normocephalic and atraumatic. No right periorbital erythema or left periorbital erythema.      Jaw: There is normal jaw occlusion.      Nose: Nose normal. No nasal deformity, congestion or rhinorrhea.      Right Nostril: No foreign body.      Left Nostril: No foreign body.      Right Sinus: No maxillary sinus tenderness or frontal sinus tenderness.      Left Sinus: No maxillary sinus tenderness or frontal sinus tenderness.      Mouth/Throat:      Lips: Pink.      Mouth: Mucous membranes are moist.      Dentition: Normal dentition. No dental tenderness or dental caries.      Tongue: No lesions.      Palate: No mass.      Pharynx: Oropharynx is clear. No posterior oropharyngeal erythema.   Eyes:      General: No scleral icterus.        Right eye: No foreign body, discharge or hordeolum.         Left eye: No foreign body, discharge or hordeolum.      Extraocular Movements: Extraocular movements intact.      Right eye: Normal extraocular motion and no nystagmus.      Left eye: Normal extraocular motion and no nystagmus.      Conjunctiva/sclera: Conjunctivae normal.      Right eye: Right conjunctiva is not injected.      Left eye: Left conjunctiva is not injected.      Pupils: Pupils are equal, round, and reactive to light.   Neck:      Thyroid: No thyroid mass, thyromegaly or thyroid tenderness.      Vascular: Normal carotid pulses. No carotid bruit, hepatojugular reflux or JVD.      Trachea: Trachea normal. No tracheostomy or tracheal deviation.      Meningeal: Brudzinski's sign absent.   Cardiovascular:      Rate and Rhythm: Normal rate and regular rhythm.  No extrasystoles are present.     Chest Wall: " PMI is not displaced. No thrill.      Pulses:           Radial pulses are 2+ on the right side and 2+ on the left side.        Femoral pulses are 2+ on the right side and 2+ on the left side.       Dorsalis pedis pulses are 1+ on the right side and 1+ on the left side.      Heart sounds: Murmur heard.    Systolic murmur is present with a grade of 4/6.   Diastolic murmur is present with a grade of 2/4.  No friction rub. No gallop.    Pulmonary:      Effort: Pulmonary effort is normal. No tachypnea, bradypnea, accessory muscle usage, prolonged expiration, respiratory distress or retractions. He is not intubated.      Breath sounds: No stridor, decreased air movement or transmitted upper airway sounds. Examination of the right-lower field reveals rales. Examination of the left-lower field reveals rales. Rales present. No decreased breath sounds, wheezing or rhonchi.   Chest:      Chest wall: No mass, lacerations, deformity, swelling, tenderness, crepitus or edema. There is no dullness to percussion.   Breasts:      Right: No supraclavicular adenopathy.      Left: No supraclavicular adenopathy.        Comments: No sternal abnormality.  Abdominal:      General: Abdomen is protuberant. Bowel sounds are normal. There is no distension or abdominal bruit. There are no signs of injury.      Palpations: Abdomen is soft. There is no shifting dullness, fluid wave, hepatomegaly, splenomegaly, mass or pulsatile mass.      Tenderness: There is no abdominal tenderness. There is no right CVA tenderness or left CVA tenderness. Negative signs include Fuentes's sign, Rovsing's sign and McBurney's sign.   Musculoskeletal:         General: No swelling, deformity or signs of injury.      Cervical back: Neck supple. No edema, erythema, signs of trauma, rigidity, torticollis, tenderness or crepitus. No pain with movement, spinous process tenderness or muscular tenderness. Decreased range of motion.      Right lower le+ Edema present.       Left lower le+ Edema present.   Lymphadenopathy:      Cervical: No cervical adenopathy.      Right cervical: No superficial cervical adenopathy.     Left cervical: No superficial cervical adenopathy.      Upper Body:      Right upper body: No supraclavicular adenopathy.      Left upper body: No supraclavicular adenopathy.   Skin:     General: Skin is warm and dry.      Capillary Refill: Capillary refill takes 2 to 3 seconds.      Coloration: Skin is not jaundiced or pale.      Findings: No bruising, erythema, lesion or rash.   Neurological:      General: No focal deficit present.      Mental Status: He is alert and oriented to person, place, and time. Mental status is at baseline.      GCS: GCS eye subscore is 4. GCS verbal subscore is 5. GCS motor subscore is 6.      Cranial Nerves: Cranial nerves are intact. No cranial nerve deficit.      Sensory: Sensation is intact. No sensory deficit.      Motor: Motor function is intact. No weakness.      Coordination: Coordination is intact. Coordination normal.      Gait: Gait normal.      Deep Tendon Reflexes: Reflexes normal.   Psychiatric:         Attention and Perception: Attention and perception normal.         Mood and Affect: Mood and affect normal.         Speech: Speech normal.         Behavior: Behavior normal. Behavior is cooperative.         Thought Content: Thought content normal. Thought content is not paranoid or delusional. Thought content does not include homicidal or suicidal ideation. Thought content does not include homicidal or suicidal plan.         Cognition and Memory: Cognition and memory normal.         Judgment: Judgment normal.       Results Review:       Assessment/Plan:     This is a pleasant 71 year old man with severe aortic valve stenosis, moderate aortic valve regurgitation, and chronic diastolic congestive heart failure (NYHA grade III). He qualifies for aortic valve intervention. Given his age and good functional status he should be  considered for surgical aortic valve replacement.     I have requested PFTs and a CT scan of the chest. We are also in the process of obtaining dental clearance. Should workup reveal an elevated risk for surgery he can then be worked up for a TAVR.    Thank you for this kind consultation.    Dennis Brandon MD  01/23/22  12:28 EST

## 2022-02-07 ENCOUNTER — LAB (OUTPATIENT)
Dept: LAB | Facility: HOSPITAL | Age: 72
End: 2022-02-07

## 2022-02-07 DIAGNOSIS — Z01.818 PRE-OP TESTING: Primary | ICD-10-CM

## 2022-02-07 LAB — SARS-COV-2 ORF1AB RESP QL NAA+PROBE: NOT DETECTED

## 2022-02-07 PROCEDURE — U0005 INFEC AGEN DETEC AMPLI PROBE: HCPCS

## 2022-02-07 PROCEDURE — U0004 COV-19 TEST NON-CDC HGH THRU: HCPCS

## 2022-02-09 ENCOUNTER — HOSPITAL ENCOUNTER (OUTPATIENT)
Dept: RESPIRATORY THERAPY | Facility: HOSPITAL | Age: 72
Discharge: HOME | End: 2022-02-09
Admitting: THORACIC SURGERY (CARDIOTHORACIC VASCULAR SURGERY)

## 2022-02-09 DIAGNOSIS — G47.33 OBSTRUCTIVE SLEEP APNEA SYNDROME: ICD-10-CM

## 2022-02-09 LAB
ARTERIAL PATENCY WRIST A: POSITIVE
ATMOSPHERIC PRESS: ABNORMAL MM[HG]
BASE EXCESS BLDA CALC-SCNC: -0.3 MMOL/L (ref 0–3)
BDY SITE: ABNORMAL
CO2 BLDA-SCNC: 24.1 MMOL/L (ref 22–29)
HCO3 BLDA-SCNC: 23.1 MMOL/L (ref 21–28)
HEMODILUTION: NO
INHALED O2 CONCENTRATION: 21 %
MODALITY: ABNORMAL
PCO2 BLDA: 33.2 MM HG (ref 35–48)
PH BLDA: 7.45 PH UNITS (ref 7.35–7.45)
PO2 BLDA: 80.1 MM HG (ref 83–108)
SAO2 % BLDCOA: 96.4 % (ref 94–98)

## 2022-02-09 PROCEDURE — 94729 DIFFUSING CAPACITY: CPT

## 2022-02-09 PROCEDURE — 94010 BREATHING CAPACITY TEST: CPT

## 2022-02-09 PROCEDURE — 94726 PLETHYSMOGRAPHY LUNG VOLUMES: CPT

## 2022-02-09 PROCEDURE — 82803 BLOOD GASES ANY COMBINATION: CPT

## 2022-02-09 PROCEDURE — 36600 WITHDRAWAL OF ARTERIAL BLOOD: CPT

## 2022-02-21 RX ORDER — ATORVASTATIN CALCIUM 40 MG/1
TABLET, FILM COATED ORAL
Qty: 90 TABLET | Refills: 0 | Status: SHIPPED | OUTPATIENT
Start: 2022-02-21 | End: 2023-01-23

## 2022-02-21 RX ORDER — LANSOPRAZOLE 30 MG/1
CAPSULE, DELAYED RELEASE ORAL
Qty: 90 CAPSULE | Refills: 0 | Status: SHIPPED | OUTPATIENT
Start: 2022-02-21 | End: 2022-07-27 | Stop reason: SDUPTHER

## 2022-03-02 RX ORDER — EZETIMIBE 10 MG/1
TABLET ORAL
Qty: 90 TABLET | Refills: 0 | Status: SHIPPED | OUTPATIENT
Start: 2022-03-02 | End: 2023-02-21

## 2022-03-02 RX ORDER — PIOGLITAZONEHYDROCHLORIDE 45 MG/1
TABLET ORAL
Qty: 90 TABLET | Refills: 0 | Status: SHIPPED | OUTPATIENT
Start: 2022-03-02 | End: 2023-01-23

## 2022-03-04 ENCOUNTER — OFFICE VISIT (OUTPATIENT)
Dept: CARDIOLOGY | Facility: CLINIC | Age: 72
End: 2022-03-04

## 2022-03-04 VITALS
SYSTOLIC BLOOD PRESSURE: 140 MMHG | HEART RATE: 69 BPM | HEIGHT: 71 IN | BODY MASS INDEX: 39.62 KG/M2 | DIASTOLIC BLOOD PRESSURE: 72 MMHG | OXYGEN SATURATION: 99 % | WEIGHT: 283 LBS

## 2022-03-04 DIAGNOSIS — F10.10 ALCOHOL ABUSE: ICD-10-CM

## 2022-03-04 DIAGNOSIS — I10 BENIGN ESSENTIAL HYPERTENSION: ICD-10-CM

## 2022-03-04 DIAGNOSIS — I35.0 NONRHEUMATIC AORTIC VALVE STENOSIS: Primary | ICD-10-CM

## 2022-03-04 DIAGNOSIS — E78.2 MIXED HYPERLIPIDEMIA: ICD-10-CM

## 2022-03-04 PROCEDURE — 93000 ELECTROCARDIOGRAM COMPLETE: CPT | Performed by: INTERNAL MEDICINE

## 2022-03-04 PROCEDURE — 99214 OFFICE O/P EST MOD 30 MIN: CPT | Performed by: INTERNAL MEDICINE

## 2022-03-04 RX ORDER — IBUPROFEN 800 MG/1
800 TABLET ORAL EVERY 6 HOURS PRN
COMMUNITY
Start: 2022-02-17 | End: 2022-04-04 | Stop reason: HOSPADM

## 2022-03-04 RX ORDER — AMLODIPINE BESYLATE 5 MG/1
5 TABLET ORAL
COMMUNITY
Start: 2022-02-19 | End: 2022-04-04 | Stop reason: HOSPADM

## 2022-03-04 RX ORDER — LANOLIN ALCOHOL/MO/W.PET/CERES
CREAM (GRAM) TOPICAL
Qty: 90 TABLET | Refills: 0 | Status: SHIPPED | OUTPATIENT
Start: 2022-03-04 | End: 2022-03-04 | Stop reason: ALTCHOICE

## 2022-03-04 RX ORDER — CHLORHEXIDINE GLUCONATE 0.12 MG/ML
RINSE ORAL
COMMUNITY
Start: 2022-02-10 | End: 2022-04-04 | Stop reason: HOSPADM

## 2022-03-04 RX ORDER — TOCOPHERSOLAN (VITAMIN E TPGS) 400/15ML
1 LIQUID (ML) ORAL DAILY
COMMUNITY
End: 2022-04-04 | Stop reason: HOSPADM

## 2022-03-04 NOTE — PROGRESS NOTES
Cardiology Office Visit      Encounter Date:  03/04/2022    Patient ID:   Anurag Pickard is a 71 y.o. male.    Reason For Followup:  Shortness of breath  Aortic stenosis    Brief Clinical History:  Dear Jorden Martin PA-C    I had the pleasure of seeing Anurag Pickard today. As you are well aware, this is a 71 y.o. male with no established history of ischemic heart disease.  He does have a history of aortic stenosis, hypertension, diabetes mellitus, hyperlipidemia, osteoarthritis, obesity, and antiplatelet therapy.  He presents today to establish care on his valvular heart disease.    Interval History:  At his last visit, he denied any chest pain pressure heaviness or tightness.  He is reporting some shortness of breath with activity..  He denies any PND orthopnea.  He denies any syncope or near syncope.  He otherwise reports feeling well.    His wife accompanied him to his last visit and she reports that he is severely under playing his symptoms.  She reports that he has a significant unsteady gait and has fallen several times.  She reports that they have had to walk short distances downtown and he has had to stop several times to catch his breath.  Even though the patient reported that he plays golf regularly, his wife says that he has not played golf in 2 years.  She reports that his memory is not very good.    He had a 2D echocardiogram performed in April 2021 secondary to a incidental murmur.  The echocardiogram demonstrated moderate to severe aortic stenosis.  Subsequent to this, the patient underwent a transesophageal echocardiogram demonstrating normal left ventricular systolic function with severe aortic stenosis and a mean pressure gradient of 44.6 mmHg.  Moderate aortic insufficiency was noted.  Lipomatous hypertrophy of the interatrial septum was noted.  The patient underwent cardiac catheterization demonstrating no significant occlusive disease.  He has been seen by the TAVR team.  He is  "awaiting a final decision regarding TAVR versus SAVR.  He would really prefer TAVR however understands that there may be a need for traditional surgical approach.    Case was discussed after visit with CT surgery.  CT surgery will review notes but initial impression was SAVR was the most appropriate therapy.    Assessment & Plan    Impressions:  Severe aortic stenosis  Hypertension  Hypertensive heart disease  Hyperlipidemia  Osteoarthritis  Diabetes mellitus  Obesity  Antiplatelet therapy    Recommendations:  Continuation of his current cardiovascular regimen at the present time.     This includes antihypertensives, antiplatelet, statin, Zetia, and potassium.  Follow-up with TAVR team as scheduled  Follow-up in 2 months time sooner should there be difficulties.    Diagnoses and all orders for this visit:    1. Nonrheumatic aortic valve stenosis (Primary)  -     ECG 12 Lead    2. Benign essential hypertension  -     ECG 12 Lead    3. Mixed hyperlipidemia  -     ECG 12 Lead          Objective:    Vitals:  Vitals:    03/04/22 1313   BP: 140/72   Pulse: 69   SpO2: 99%   Weight: 128 kg (283 lb)   Height: 180.3 cm (71\")     Body mass index is 39.47 kg/m².      Physical Exam:    General: Alert, cooperative, no distress, appears stated age  Head:  Normocephalic, atraumatic, mucous membranes moist  Eyes:  Conjunctiva/corneas clear, EOM's intact     Neck:  Supple,  no bruit    Lungs: Clear to auscultation bilaterally, no wheezes rhonchi rales are noted  Chest wall: No tenderness  Heart::  Regular rate and rhythm, S1 and S2 normal, 2/6 systolic ejection murmur.  No rub or gallop  Abdomen: Soft, non-tender, nondistended bowel sounds active.  Obese  Extremities: No cyanosis, clubbing, or edema  Pulses: 2+ and symmetric all extremities  Skin:  No rashes or lesions  Neuro/psych: A&O x3. CN II through XII are grossly intact with appropriate affect      Allergies:  Allergies   Allergen Reactions   • Lisinopril Cough   • Glipizide " Other (See Comments)     Sugar too low       Medication Review:     Current Outpatient Medications:   •  amLODIPine (NORVASC) 2.5 MG tablet, Take 2.5 mg by mouth Daily., Disp: , Rfl:   •  aspirin 325 MG tablet, Take 325 mg by mouth Daily., Disp: , Rfl:   •  atorvastatin (LIPITOR) 40 MG tablet, Take 1 tablet by mouth once daily, Disp: 90 tablet, Rfl: 0  •  Calcium-Magnesium-Vitamin D 500-250-200 MG-MG-UNIT tablet, Take 1 tablet by mouth Daily., Disp: , Rfl:   •  ezetimibe (ZETIA) 10 MG tablet, Take 1 tablet by mouth once daily, Disp: 90 tablet, Rfl: 0  •  folic acid (FOLVITE) 1 MG tablet, Take 1 tablet by mouth Daily., Disp: 90 tablet, Rfl: 1  •  ibuprofen (ADVIL,MOTRIN) 800 MG tablet, TAKE 1 TABLET BY MOUTH EVERY 6 TO 8 HOURS, Disp: , Rfl:   •  lansoprazole (PREVACID) 30 MG capsule, Take 1 capsule by mouth once daily, Disp: 90 capsule, Rfl: 0  •  metFORMIN (GLUCOPHAGE) 1000 MG tablet, Take 1,000 mg by mouth 2 (Two) Times a Day With Meals., Disp: , Rfl:   •  naproxen sodium (ALEVE) 220 MG tablet, Take 440 mg by mouth Daily., Disp: , Rfl:   •  pioglitazone (ACTOS) 45 MG tablet, Take 1 tablet by mouth once daily, Disp: 90 tablet, Rfl: 0  •  potassium chloride (K-DUR,KLOR-CON) 10 MEQ CR tablet, Take 2 tablets by mouth 2 (Two) Times a Day., Disp: 360 tablet, Rfl: 2  •  amLODIPine (NORVASC) 5 MG tablet, Take 5 mg by mouth Daily., Disp: , Rfl:   •  chlorhexidine (PERIDEX) 0.12 % solution, RINSE FOR 30 SECOUNDS WITH 1/2OZ (15MLS) IN MOUTH TWICE DAILY, Disp: , Rfl:   •  ONE TOUCH ULTRA TEST test strip, 1 each by Other route 2 (Two) Times a Day. to check glucose, Disp: , Rfl: 11    Family History:  Family History   Problem Relation Age of Onset   • Diabetes Mother    • Other Mother    • Other Father    • Heart attack Father    • Other Sister    • Diabetes Sister    • Diabetes Brother    • Other Other    • Diabetes Other        Past Medical History:  Past Medical History:   Diagnosis Date   • Acute diastolic CHF (congestive  heart failure) (HCC) 1/3/2022   • Aortic stenosis     Moderate   • B12 deficiency    • Bell's palsy    • Diabetes mellitus (HCC)    • Erectile dysfunction    • GERD (gastroesophageal reflux disease)    • Heart murmur    • Hyperlipidemia    • Hypertension    • Hypokalemia    • Hypomagnesemia    • YANA (obstructive sleep apnea)     cpap bring dos   • Osteoarthritis    • Pulmonary HTN (HCC)    • Rosacea    • Vitamin D deficiency        Past Surgical History:  Past Surgical History:   Procedure Laterality Date   • CARDIAC CATHETERIZATION     • CARDIAC CATHETERIZATION N/A 12/17/2021    Procedure: Right and Left Heart Cath;  Surgeon: Juan Castellon DO;  Location: [facility] CATH INVASIVE LOCATION;  Service: Cardiology;  Laterality: N/A;   • COLONOSCOPY     • JOINT REPLACEMENT     • TONSILLECTOMY     • TOTAL KNEE ARTHROPLASTY     • TOTAL KNEE ARTHROPLASTY Left 5/4/2021    Procedure: TOTAL KNEE ARTHROPLASTY;  Surgeon: Otf Redmond MD;  Location: [facility] MAIN OR;  Service: Orthopedics;  Laterality: Left;       Social History:  Social History     Socioeconomic History   • Marital status:    Tobacco Use   • Smoking status: Never Smoker   • Smokeless tobacco: Never Used   Vaping Use   • Vaping Use: Never used   Substance and Sexual Activity   • Alcohol use: Yes     Comment: Socially   • Drug use: No   • Sexual activity: Defer       Review of Systems:  The following systems were reviewed as they relate to the cardiovascular system: Constitutional, Eyes, ENT, Cardiovascular, Respiratory, Gastrointestinal, Integumentary, Neurological, Psychiatric, Hematologic, Endocrine, Musculoskeletal, and Genitourinary. The pertinent cardiovascular findings are reported above with all other cardiovascular points within those systems being negative.    Diagnostic Study Review:     Current Electrocardiogram:    ECG 12 Lead    Date/Time: 3/4/2022 4:47 PM  Performed by: Juan Castellon DO  Authorized by: Ravinder  Juan Dover DO   Comparison: not compared with previous ECG   Previous ECG: no previous ECG available  Comments: Normal sinus rhythm with a ventricular rate of 69 bpm.  Normal QT and QTc intervals.  Normal QRS axis.            Laboratory Data:  Lab Results   Component Value Date    GLUCOSE 161 (H) 12/15/2021    BUN 10 12/15/2021    CREATININE 1.00 12/15/2021    EGFRIFNONA 74 12/15/2021    EGFRIFAFRI 96 05/20/2016    BCR 10.0 12/15/2021    K 4.5 12/15/2021    CO2 29.2 (H) 12/15/2021    CALCIUM 9.7 12/15/2021    ALBUMIN 4.70 04/05/2021    LABIL2 1.5 06/14/2019    AST 25 04/05/2021    ALT 21 04/05/2021     Lab Results   Component Value Date    GLUCOSE 161 (H) 12/15/2021    CALCIUM 9.7 12/15/2021     12/15/2021    K 4.5 12/15/2021    CO2 29.2 (H) 12/15/2021    CL 97 (L) 12/15/2021    BUN 10 12/15/2021    CREATININE 1.00 12/15/2021    EGFRIFAFRI 96 05/20/2016    EGFRIFNONA 74 12/15/2021    BCR 10.0 12/15/2021    ANIONGAP 10.8 12/15/2021     Lab Results   Component Value Date    WBC 6.10 12/15/2021    HGB 13.2 12/15/2021    HCT 40.0 12/15/2021    MCV 95.3 12/15/2021     12/15/2021     Lab Results   Component Value Date    CHOL 116 08/10/2021    TRIG 115 08/10/2021    HDL 48 08/10/2021    LDL 47 08/10/2021     Lab Results   Component Value Date    HGBA1C 7.5 (H) 08/10/2021     Lab Results   Component Value Date    INR 0.97 12/15/2021    INR 0.98 04/28/2021    PROTIME 10.8 12/15/2021    PROTIME 10.8 04/28/2021       Most Recent Echo:  Results for orders placed during the hospital encounter of 12/17/21    Adult Transesophageal Echo (PAULO) W/ Cont if Necessary Per Protocol    Interpretation Summary  · Left ventricular ejection fraction appears to be 56 - 60%. Left ventricular systolic function is normal.  · Moderate aortic valve regurgitation is present.  · Severe aortic valve stenosis is present.  · Aortic valve maximum pressure gradient is 72 mmHg. Aortic valve mean pressure gradient is 44.6 mmHg.  ·  Saline test results are negative.  · Lipomatous hypertrophy of the interatrial septum present.       Most Recent Stress Test:       Most Recent Cardiac Catheterization:   Results for orders placed during the hospital encounter of 12/17/21    Cardiac Catheterization/Vascular Study    Narrative  Cardiac Catheterization Operative Report    Anurag Pickard  [id number]  12/17/2021  @PCP@    He underwent cardiac catheterization.    Indications for the procedure include: Aortic stenosis.    Procedure Details:  The risks, benefits, complications, treatment options, and expected outcomes were discussed with the patient. The patient and/or family concurred with the proposed plan, giving informed consent.    After informed consent the patient was brought to the cath lab after appropriate IV hydration was begun and oral premedication was given. He was further sedated with fentanyl and midazolam. He was prepped and draped in the usual manner. Using the modified Seldinger access technique, a 6 Cam[place]onian sheath was placed in the femoral artery and a 7 [place]onian sheath was placed in the femoral vein. A left and right heart catheterization with coronary arteriography was performed. Findings are discussed below.    After the procedure was completed, sedation was stopped and the sheaths and catheters were all removed. Hemostasis was achieved per established hospital protocols.    Conscious sedation:  Conscious sedation was performed according to protocol.  I supervised and directed an independent trained observer with the assistance of monitoring the patient's level of consciousness and physiologic status throughout the procedure.  Intraoperative service time was 60 minutes.    Findings:    Hemodynamics  LVEDP: Unable to obtain  LV: Unable to obtain  Ao: 132/61  RA: 6  RV: 42/4  PA: 43/15  PAWP: 15  Cardiac output Diane: 6.84 (2.85)  Cardiac output thermal: 7.27 (3.03)  Left Main  no significant disease  RCA  luminal irregularities with no  significant disease  LAD  luminal irregularities with no significant disease  Circ  luminal irregularities with no significant disease  SVG(s)  not applicable  SVITLANA  not applicable  LV  not imaged  Coronary Dominance  circumflex    Estimated Blood Loss:  Minimal    Specimens: None    Complications:  None; patient tolerated the procedure well.    Disposition: PACU - hemodynamically stable    Condition: stable    Impressions:  No angiographic evidence for significant epicardial occlusive disease  Severe aortic stenosis  Moderate pulmonary hypertension    Recommendations:  Structural heart disease referral for TAVR versus SAVR       NOTE: The following portions of the patient's note were reviewed, confirmed and/or updated this visit as appropriate: History of present illness/Interval history, physical examination, assessment & plan, allergies, current medications, past family history, past medical history, past social history, past surgical history and problem list.

## 2022-03-16 ENCOUNTER — PREP FOR SURGERY (OUTPATIENT)
Dept: OTHER | Facility: HOSPITAL | Age: 72
End: 2022-03-16

## 2022-03-16 DIAGNOSIS — R93.3 ABNORMAL FINDINGS ON DIAGNOSTIC IMAGING OF OTHER PARTS OF DIGESTIVE TRACT: ICD-10-CM

## 2022-03-16 DIAGNOSIS — I35.0 NONRHEUMATIC AORTIC VALVE STENOSIS: Primary | ICD-10-CM

## 2022-03-16 DIAGNOSIS — R79.1 ABNORMAL COAGULATION PROFILE: ICD-10-CM

## 2022-03-16 DIAGNOSIS — R79.9 ABNORMAL FINDING OF BLOOD CHEMISTRY, UNSPECIFIED: ICD-10-CM

## 2022-03-16 DIAGNOSIS — Z01.810 PREOP CARDIOVASCULAR EXAM: ICD-10-CM

## 2022-03-16 RX ORDER — ACETAMINOPHEN 325 MG/1
650 TABLET ORAL EVERY 4 HOURS PRN
Status: CANCELLED | OUTPATIENT
Start: 2022-03-16

## 2022-03-16 RX ORDER — NITROGLYCERIN 0.4 MG/1
0.4 TABLET SUBLINGUAL
Status: CANCELLED | OUTPATIENT
Start: 2022-03-16

## 2022-03-16 RX ORDER — CHLORHEXIDINE GLUCONATE 0.12 MG/ML
15 RINSE ORAL ONCE
Status: CANCELLED | OUTPATIENT
Start: 2022-03-16 | End: 2022-03-16

## 2022-03-16 RX ORDER — SODIUM CHLORIDE 0.9 % (FLUSH) 0.9 %
3-10 SYRINGE (ML) INJECTION AS NEEDED
Status: CANCELLED | OUTPATIENT
Start: 2022-03-16

## 2022-03-16 RX ORDER — SODIUM CHLORIDE 0.9 % (FLUSH) 0.9 %
3 SYRINGE (ML) INJECTION EVERY 12 HOURS SCHEDULED
Status: CANCELLED | OUTPATIENT
Start: 2022-03-16

## 2022-03-16 RX ORDER — CHLORHEXIDINE GLUCONATE 0.12 MG/ML
15 RINSE ORAL EVERY 12 HOURS
Status: CANCELLED | OUTPATIENT
Start: 2022-03-16 | End: 2022-03-17

## 2022-03-16 RX ORDER — ALPRAZOLAM 0.25 MG/1
0.25 TABLET ORAL ONCE
Status: CANCELLED | OUTPATIENT
Start: 2022-03-16 | End: 2022-03-16

## 2022-03-16 RX ORDER — CHLORHEXIDINE GLUCONATE 500 MG/1
1 CLOTH TOPICAL EVERY 12 HOURS PRN
Status: CANCELLED | OUTPATIENT
Start: 2022-03-16

## 2022-03-17 ENCOUNTER — TELEPHONE (OUTPATIENT)
Dept: CARDIAC SURGERY | Facility: CLINIC | Age: 72
End: 2022-03-17

## 2022-03-17 NOTE — TELEPHONE ENCOUNTER
Spoke to patient wife with PAT and surgery times. PAT is on 3- at 0800 with any testing to follow.. Surgery is on 3- at 0730 with an 0500 arrival time.  She expressed a verbal understanding of these instructions. Was instructed to call office with any further questions.

## 2022-03-25 ENCOUNTER — HOSPITAL ENCOUNTER (OUTPATIENT)
Dept: CARDIOLOGY | Facility: HOSPITAL | Age: 72
Discharge: HOME | End: 2022-03-25

## 2022-03-25 ENCOUNTER — LAB (OUTPATIENT)
Dept: LAB | Facility: HOSPITAL | Age: 72
End: 2022-03-25

## 2022-03-25 ENCOUNTER — HOSPITAL ENCOUNTER (OUTPATIENT)
Dept: RESPIRATORY THERAPY | Facility: HOSPITAL | Age: 72
Discharge: HOME | End: 2022-03-25

## 2022-03-25 ENCOUNTER — HOSPITAL ENCOUNTER (OUTPATIENT)
Dept: GENERAL RADIOLOGY | Facility: HOSPITAL | Age: 72
Discharge: HOME | End: 2022-03-25

## 2022-03-25 ENCOUNTER — PRE-ADMISSION TESTING (OUTPATIENT)
Dept: PREADMISSION TESTING | Facility: HOSPITAL | Age: 72
End: 2022-03-25

## 2022-03-25 VITALS
TEMPERATURE: 98.1 F | WEIGHT: 281 LBS | SYSTOLIC BLOOD PRESSURE: 148 MMHG | HEART RATE: 82 BPM | DIASTOLIC BLOOD PRESSURE: 82 MMHG | BODY MASS INDEX: 40.23 KG/M2 | OXYGEN SATURATION: 97 % | RESPIRATION RATE: 18 BRPM | HEIGHT: 70 IN

## 2022-03-25 DIAGNOSIS — Z01.810 PREOP CARDIOVASCULAR EXAM: ICD-10-CM

## 2022-03-25 DIAGNOSIS — I35.0 NONRHEUMATIC AORTIC VALVE STENOSIS: ICD-10-CM

## 2022-03-25 DIAGNOSIS — R93.3 ABNORMAL FINDINGS ON DIAGNOSTIC IMAGING OF OTHER PARTS OF DIGESTIVE TRACT: ICD-10-CM

## 2022-03-25 DIAGNOSIS — R79.1 ABNORMAL COAGULATION PROFILE: ICD-10-CM

## 2022-03-25 DIAGNOSIS — R79.9 ABNORMAL FINDING OF BLOOD CHEMISTRY, UNSPECIFIED: ICD-10-CM

## 2022-03-25 LAB
ABO GROUP BLD: NORMAL
ALBUMIN SERPL-MCNC: 4.3 G/DL (ref 3.5–5.2)
ALBUMIN/GLOB SERPL: 1.3 G/DL
ALP SERPL-CCNC: 57 U/L (ref 39–117)
ALT SERPL W P-5'-P-CCNC: 20 U/L (ref 1–41)
ANION GAP SERPL CALCULATED.3IONS-SCNC: 16 MMOL/L (ref 5–15)
APTT PPP: 27.9 SECONDS (ref 24–31)
ARTERIAL PATENCY WRIST A: POSITIVE
AST SERPL-CCNC: 26 U/L (ref 1–40)
ATMOSPHERIC PRESS: ABNORMAL MM[HG]
BASE EXCESS BLDA CALC-SCNC: -0.6 MMOL/L (ref 0–3)
BASOPHILS # BLD AUTO: 0.1 10*3/MM3 (ref 0–0.2)
BASOPHILS NFR BLD AUTO: 1 % (ref 0–1.5)
BDY SITE: ABNORMAL
BH CV XLRA MEAS LEFT DIST CCA EDV: -11.5 CM/SEC
BH CV XLRA MEAS LEFT DIST CCA PSV: -82.9 CM/SEC
BH CV XLRA MEAS LEFT DIST ICA EDV: -23 CM/SEC
BH CV XLRA MEAS LEFT DIST ICA PSV: -91.1 CM/SEC
BH CV XLRA MEAS LEFT ICA/CCA RATIO: -0.77
BH CV XLRA MEAS LEFT PROX CCA EDV: 18.1 CM/SEC
BH CV XLRA MEAS LEFT PROX CCA PSV: 118 CM/SEC
BH CV XLRA MEAS LEFT PROX ECA PSV: -113 CM/SEC
BH CV XLRA MEAS LEFT PROX ICA EDV: 15.4 CM/SEC
BH CV XLRA MEAS LEFT PROX ICA PSV: 80.1 CM/SEC
BH CV XLRA MEAS LEFT PROX SCLA PSV: 180 CM/SEC
BH CV XLRA MEAS LEFT VERTEBRAL A EDV: -10.4 CM/SEC
BH CV XLRA MEAS LEFT VERTEBRAL A PSV: -56 CM/SEC
BH CV XLRA MEAS RIGHT DIST CCA EDV: 14.9 CM/SEC
BH CV XLRA MEAS RIGHT DIST CCA PSV: 82 CM/SEC
BH CV XLRA MEAS RIGHT DIST ICA EDV: -16.5 CM/SEC
BH CV XLRA MEAS RIGHT DIST ICA PSV: -83.4 CM/SEC
BH CV XLRA MEAS RIGHT ICA/CCA RATIO: -0.81
BH CV XLRA MEAS RIGHT PROX CCA EDV: 13 CM/SEC
BH CV XLRA MEAS RIGHT PROX CCA PSV: 103 CM/SEC
BH CV XLRA MEAS RIGHT PROX ECA PSV: -157 CM/SEC
BH CV XLRA MEAS RIGHT PROX ICA EDV: -13.2 CM/SEC
BH CV XLRA MEAS RIGHT PROX ICA PSV: -66.4 CM/SEC
BH CV XLRA MEAS RIGHT PROX SCLA PSV: 137 CM/SEC
BH CV XLRA MEAS RIGHT VERTEBRAL A EDV: -13.2 CM/SEC
BH CV XLRA MEAS RIGHT VERTEBRAL A PSV: -63.7 CM/SEC
BILIRUB SERPL-MCNC: 0.4 MG/DL (ref 0–1.2)
BILIRUB UR QL STRIP: NEGATIVE
BLD GP AB SCN SERPL QL: NEGATIVE
BUN SERPL-MCNC: 8 MG/DL (ref 8–23)
BUN/CREAT SERPL: 9.8 (ref 7–25)
CALCIUM SPEC-SCNC: 9.6 MG/DL (ref 8.6–10.5)
CHLORIDE SERPL-SCNC: 101 MMOL/L (ref 98–107)
CHOLEST SERPL-MCNC: 140 MG/DL (ref 0–200)
CLARITY UR: CLEAR
CLOSE TME COLL+ADP + EPINEP PNL BLD: 98 % (ref 86–100)
CO2 BLDA-SCNC: 24.6 MMOL/L (ref 22–29)
CO2 SERPL-SCNC: 23 MMOL/L (ref 22–29)
COLOR UR: YELLOW
CREAT SERPL-MCNC: 0.82 MG/DL (ref 0.76–1.27)
DEPRECATED RDW RBC AUTO: 46.4 FL (ref 37–54)
EGFRCR SERPLBLD CKD-EPI 2021: 94.5 ML/MIN/1.73
EOSINOPHIL # BLD AUTO: 0.1 10*3/MM3 (ref 0–0.4)
EOSINOPHIL NFR BLD AUTO: 1.6 % (ref 0.3–6.2)
ERYTHROCYTE [DISTWIDTH] IN BLOOD BY AUTOMATED COUNT: 14.5 % (ref 12.3–15.4)
GLOBULIN UR ELPH-MCNC: 3.2 GM/DL
GLUCOSE SERPL-MCNC: 150 MG/DL (ref 65–99)
GLUCOSE UR STRIP-MCNC: NEGATIVE MG/DL
HBA1C MFR BLD: 8 % (ref 3.5–5.6)
HCO3 BLDA-SCNC: 23.5 MMOL/L (ref 21–28)
HCT VFR BLD AUTO: 39.3 % (ref 37.5–51)
HDLC SERPL-MCNC: 63 MG/DL (ref 40–60)
HEMODILUTION: NO
HGB BLD-MCNC: 13.3 G/DL (ref 13–17.7)
HGB UR QL STRIP.AUTO: NEGATIVE
INHALED O2 CONCENTRATION: 21 %
INR PPP: <0.93 (ref 0.93–1.1)
KETONES UR QL STRIP: ABNORMAL
LDLC SERPL CALC-MCNC: 59 MG/DL (ref 0–100)
LDLC/HDLC SERPL: 0.92 {RATIO}
LEFT ARM BP: NORMAL MMHG
LEUKOCYTE ESTERASE UR QL STRIP.AUTO: NEGATIVE
LYMPHOCYTES # BLD AUTO: 1.8 10*3/MM3 (ref 0.7–3.1)
LYMPHOCYTES NFR BLD AUTO: 32.4 % (ref 19.6–45.3)
MAXIMAL PREDICTED HEART RATE: 150 BPM
MCH RBC QN AUTO: 31.1 PG (ref 26.6–33)
MCHC RBC AUTO-ENTMCNC: 33.8 G/DL (ref 31.5–35.7)
MCV RBC AUTO: 91.9 FL (ref 79–97)
MODALITY: ABNORMAL
MONOCYTES # BLD AUTO: 0.4 10*3/MM3 (ref 0.1–0.9)
MONOCYTES NFR BLD AUTO: 6.3 % (ref 5–12)
MRSA DNA SPEC QL NAA+PROBE: NORMAL
NEUTROPHILS NFR BLD AUTO: 3.3 10*3/MM3 (ref 1.7–7)
NEUTROPHILS NFR BLD AUTO: 58.7 % (ref 42.7–76)
NITRITE UR QL STRIP: NEGATIVE
NRBC BLD AUTO-RTO: 0.1 /100 WBC (ref 0–0.2)
PCO2 BLDA: 36.5 MM HG (ref 35–48)
PH BLDA: 7.42 PH UNITS (ref 7.35–7.45)
PH UR STRIP.AUTO: <=5 [PH] (ref 5–8)
PLATELET # BLD AUTO: 136 10*3/MM3 (ref 140–450)
PMV BLD AUTO: 8.7 FL (ref 6–12)
PO2 BLDA: 80.8 MM HG (ref 83–108)
POTASSIUM SERPL-SCNC: 3.8 MMOL/L (ref 3.5–5.2)
PROT SERPL-MCNC: 7.5 G/DL (ref 6–8.5)
PROT UR QL STRIP: NEGATIVE
PROTHROMBIN TIME: 10.3 SECONDS (ref 9.6–11.7)
QT INTERVAL: 409 MS
RBC # BLD AUTO: 4.28 10*6/MM3 (ref 4.14–5.8)
RH BLD: POSITIVE
RIGHT ARM BP: NORMAL MMHG
SAO2 % BLDCOA: 96.1 % (ref 94–98)
SARS-COV-2 ORF1AB RESP QL NAA+PROBE: NOT DETECTED
SODIUM SERPL-SCNC: 140 MMOL/L (ref 136–145)
SP GR UR STRIP: 1.02 (ref 1–1.03)
STRESS TARGET HR: 128 BPM
T&S EXPIRATION DATE: NORMAL
TRIGL SERPL-MCNC: 96 MG/DL (ref 0–150)
UROBILINOGEN UR QL STRIP: ABNORMAL
VLDLC SERPL-MCNC: 18 MG/DL (ref 5–40)
WBC NRBC COR # BLD: 5.7 10*3/MM3 (ref 3.4–10.8)

## 2022-03-25 PROCEDURE — 83036 HEMOGLOBIN GLYCOSYLATED A1C: CPT

## 2022-03-25 PROCEDURE — 36415 COLL VENOUS BLD VENIPUNCTURE: CPT

## 2022-03-25 PROCEDURE — 85610 PROTHROMBIN TIME: CPT

## 2022-03-25 PROCEDURE — 80061 LIPID PANEL: CPT

## 2022-03-25 PROCEDURE — 86850 RBC ANTIBODY SCREEN: CPT

## 2022-03-25 PROCEDURE — 81003 URINALYSIS AUTO W/O SCOPE: CPT

## 2022-03-25 PROCEDURE — C9803 HOPD COVID-19 SPEC COLLECT: HCPCS

## 2022-03-25 PROCEDURE — U0004 COV-19 TEST NON-CDC HGH THRU: HCPCS

## 2022-03-25 PROCEDURE — 36600 WITHDRAWAL OF ARTERIAL BLOOD: CPT

## 2022-03-25 PROCEDURE — 82803 BLOOD GASES ANY COMBINATION: CPT

## 2022-03-25 PROCEDURE — 93880 EXTRACRANIAL BILAT STUDY: CPT

## 2022-03-25 PROCEDURE — 86900 BLOOD TYPING SEROLOGIC ABO: CPT

## 2022-03-25 PROCEDURE — 87641 MR-STAPH DNA AMP PROBE: CPT

## 2022-03-25 PROCEDURE — U0005 INFEC AGEN DETEC AMPLI PROBE: HCPCS

## 2022-03-25 PROCEDURE — 85730 THROMBOPLASTIN TIME PARTIAL: CPT

## 2022-03-25 PROCEDURE — 93005 ELECTROCARDIOGRAM TRACING: CPT | Performed by: NURSE PRACTITIONER

## 2022-03-25 PROCEDURE — 93010 ELECTROCARDIOGRAM REPORT: CPT | Performed by: INTERNAL MEDICINE

## 2022-03-25 PROCEDURE — 86901 BLOOD TYPING SEROLOGIC RH(D): CPT

## 2022-03-25 PROCEDURE — 86923 COMPATIBILITY TEST ELECTRIC: CPT

## 2022-03-25 PROCEDURE — 85576 BLOOD PLATELET AGGREGATION: CPT

## 2022-03-25 PROCEDURE — 85025 COMPLETE CBC W/AUTO DIFF WBC: CPT

## 2022-03-25 PROCEDURE — 71046 X-RAY EXAM CHEST 2 VIEWS: CPT

## 2022-03-25 PROCEDURE — 80053 COMPREHEN METABOLIC PANEL: CPT

## 2022-03-27 ENCOUNTER — ANESTHESIA EVENT (OUTPATIENT)
Dept: PERIOP | Facility: HOSPITAL | Age: 72
End: 2022-03-27

## 2022-03-28 ENCOUNTER — HOSPITAL ENCOUNTER (INPATIENT)
Facility: HOSPITAL | Age: 72
LOS: 7 days | Discharge: NURSING, REHAB OR LONG-TERM CARE | End: 2022-04-04
Attending: THORACIC SURGERY (CARDIOTHORACIC VASCULAR SURGERY) | Admitting: THORACIC SURGERY (CARDIOTHORACIC VASCULAR SURGERY)

## 2022-03-28 ENCOUNTER — APPOINTMENT (OUTPATIENT)
Dept: GENERAL RADIOLOGY | Facility: HOSPITAL | Age: 72
End: 2022-03-28

## 2022-03-28 ENCOUNTER — ANESTHESIA (OUTPATIENT)
Dept: PERIOP | Facility: HOSPITAL | Age: 72
End: 2022-03-28

## 2022-03-28 DIAGNOSIS — I35.0 NONRHEUMATIC AORTIC VALVE STENOSIS: ICD-10-CM

## 2022-03-28 DIAGNOSIS — Z95.2 S/P AVR: Primary | ICD-10-CM

## 2022-03-28 LAB
ACT BLD: 118 SECONDS (ref 89–137)
ACT BLD: 130 SECONDS (ref 89–137)
ACT BLD: 416 SECONDS (ref 89–137)
ACT BLD: 446 SECONDS (ref 89–137)
ACT BLD: 499 SECONDS (ref 89–137)
ACT BLD: 505 SECONDS (ref 89–137)
ALBUMIN SERPL-MCNC: 4.5 G/DL (ref 3.5–5.2)
ALBUMIN SERPL-MCNC: 4.5 G/DL (ref 3.5–5.2)
ALBUMIN/GLOB SERPL: 2 G/DL
ALP SERPL-CCNC: 39 U/L (ref 39–117)
ALT SERPL W P-5'-P-CCNC: 12 U/L (ref 1–41)
ANION GAP SERPL CALCULATED.3IONS-SCNC: 13 MMOL/L (ref 5–15)
ANION GAP SERPL CALCULATED.3IONS-SCNC: 13 MMOL/L (ref 5–15)
ANION GAP SERPL CALCULATED.3IONS-SCNC: 16 MMOL/L (ref 5–15)
APTT PPP: 33.8 SECONDS (ref 24–31)
ARTERIAL PATENCY WRIST A: ABNORMAL
ARTERIAL PATENCY WRIST A: POSITIVE
AST SERPL-CCNC: 44 U/L (ref 1–40)
ATMOSPHERIC PRESS: ABNORMAL MM[HG]
BASE DEFICIT: ABNORMAL
BASE EXCESS BLDA CALC-SCNC: -0.8 MMOL/L (ref 0–3)
BASE EXCESS BLDA CALC-SCNC: -1 MMOL/L (ref 0–3)
BASE EXCESS BLDA CALC-SCNC: -1.6 MMOL/L (ref 0–3)
BASE EXCESS BLDA CALC-SCNC: -2.5 MMOL/L (ref 0–3)
BASE EXCESS BLDA CALC-SCNC: 0.2 MMOL/L (ref 0–3)
BASE EXCESS BLDA CALC-SCNC: 5 MMOL/L (ref 0–3)
BASE EXCESS BLDA CALC-SCNC: 6 MMOL/L (ref 0–3)
BASE EXCESS BLDA CALC-SCNC: 7 MMOL/L (ref 0–3)
BASE EXCESS BLDV CALC-SCNC: ABNORMAL MMOL/L
BASOPHILS # BLD AUTO: 0 10*3/MM3 (ref 0–0.2)
BASOPHILS NFR BLD AUTO: 0.6 % (ref 0–1.5)
BDY SITE: ABNORMAL
BH BB BLOOD EXPIRATION DATE: NORMAL
BH BB BLOOD EXPIRATION DATE: NORMAL
BH BB BLOOD TYPE BARCODE: 6200
BH BB BLOOD TYPE BARCODE: 6200
BH BB DISPENSE STATUS: NORMAL
BH BB DISPENSE STATUS: NORMAL
BH BB PRODUCT CODE: NORMAL
BH BB PRODUCT CODE: NORMAL
BH BB UNIT NUMBER: NORMAL
BH BB UNIT NUMBER: NORMAL
BILIRUB SERPL-MCNC: 0.4 MG/DL (ref 0–1.2)
BUN SERPL-MCNC: 11 MG/DL (ref 8–23)
BUN SERPL-MCNC: 12 MG/DL (ref 8–23)
BUN SERPL-MCNC: 13 MG/DL (ref 8–23)
BUN/CREAT SERPL: 10.9 (ref 7–25)
BUN/CREAT SERPL: 12.4 (ref 7–25)
BUN/CREAT SERPL: 14.1 (ref 7–25)
CA-I BLDA-SCNC: 1.08 MMOL/L (ref 1.12–1.32)
CA-I BLDA-SCNC: 1.09 MMOL/L (ref 1.12–1.32)
CA-I BLDA-SCNC: 1.12 MMOL/L (ref 1.12–1.32)
CA-I BLDA-SCNC: 1.14 MMOL/L (ref 1.12–1.32)
CA-I BLDA-SCNC: 1.18 MMOL/L (ref 1.12–1.32)
CA-I BLDA-SCNC: 1.19 MMOL/L (ref 1.15–1.33)
CA-I BLDA-SCNC: 1.21 MMOL/L (ref 1.15–1.33)
CA-I BLDA-SCNC: 1.22 MMOL/L (ref 1.15–1.33)
CA-I BLDA-SCNC: 1.24 MMOL/L (ref 1.15–1.33)
CA-I BLDA-SCNC: 1.24 MMOL/L (ref 1.15–1.33)
CA-I BLDA-SCNC: 1.31 MMOL/L (ref 1.12–1.32)
CA-I SERPL ISE-MCNC: 1.22 MMOL/L (ref 1.2–1.3)
CALCIUM SPEC-SCNC: 9 MG/DL (ref 8.6–10.5)
CALCIUM SPEC-SCNC: 9 MG/DL (ref 8.6–10.5)
CALCIUM SPEC-SCNC: 9.5 MG/DL (ref 8.6–10.5)
CHLORIDE SERPL-SCNC: 102 MMOL/L (ref 98–107)
CHLORIDE SERPL-SCNC: 103 MMOL/L (ref 98–107)
CHLORIDE SERPL-SCNC: 104 MMOL/L (ref 98–107)
CO2 BLDA-SCNC: 23.6 MMOL/L (ref 22–29)
CO2 BLDA-SCNC: 24.4 MMOL/L (ref 22–29)
CO2 BLDA-SCNC: 25.7 MMOL/L (ref 22–29)
CO2 BLDA-SCNC: 25.8 MMOL/L (ref 22–29)
CO2 BLDA-SCNC: 26.3 MMOL/L (ref 22–29)
CO2 BLDA-SCNC: 31 MMOL/L (ref 23–27)
CO2 BLDA-SCNC: 32 MMOL/L (ref 23–27)
CO2 BLDA-SCNC: 32 MMOL/L (ref 23–27)
CO2 BLDA-SCNC: 33 MMOL/L (ref 23–27)
CO2 BLDA-SCNC: 33 MMOL/L (ref 23–27)
CO2 CONTENT VENOUS: ABNORMAL
CO2 SERPL-SCNC: 22 MMOL/L (ref 22–29)
CO2 SERPL-SCNC: 24 MMOL/L (ref 22–29)
CO2 SERPL-SCNC: 24 MMOL/L (ref 22–29)
CREAT SERPL-MCNC: 0.92 MG/DL (ref 0.76–1.27)
CREAT SERPL-MCNC: 0.97 MG/DL (ref 0.76–1.27)
CREAT SERPL-MCNC: 1.01 MG/DL (ref 0.76–1.27)
CROSSMATCH INTERPRETATION: NORMAL
CROSSMATCH INTERPRETATION: NORMAL
DEPRECATED RDW RBC AUTO: 44.6 FL (ref 37–54)
EGFRCR SERPLBLD CKD-EPI 2021: 80 ML/MIN/1.73
EGFRCR SERPLBLD CKD-EPI 2021: 84 ML/MIN/1.73
EGFRCR SERPLBLD CKD-EPI 2021: 89.5 ML/MIN/1.73
EOSINOPHIL # BLD AUTO: 0 10*3/MM3 (ref 0–0.4)
EOSINOPHIL NFR BLD AUTO: 0.5 % (ref 0.3–6.2)
ERYTHROCYTE [DISTWIDTH] IN BLOOD BY AUTOMATED COUNT: 14.5 % (ref 12.3–15.4)
GLOBULIN UR ELPH-MCNC: 2.2 GM/DL
GLUCOSE BLDC GLUCOMTR-MCNC: 106 MG/DL (ref 74–100)
GLUCOSE BLDC GLUCOMTR-MCNC: 106 MG/DL (ref 74–100)
GLUCOSE BLDC GLUCOMTR-MCNC: 146 MG/DL (ref 70–105)
GLUCOSE BLDC GLUCOMTR-MCNC: 148 MG/DL (ref 70–105)
GLUCOSE BLDC GLUCOMTR-MCNC: 157 MG/DL (ref 70–105)
GLUCOSE BLDC GLUCOMTR-MCNC: 159 MG/DL (ref 70–105)
GLUCOSE BLDC GLUCOMTR-MCNC: 161 MG/DL (ref 70–105)
GLUCOSE BLDC GLUCOMTR-MCNC: 163 MG/DL (ref 70–105)
GLUCOSE BLDC GLUCOMTR-MCNC: 164 MG/DL (ref 74–100)
GLUCOSE BLDC GLUCOMTR-MCNC: 164 MG/DL (ref 74–100)
GLUCOSE BLDC GLUCOMTR-MCNC: 165 MG/DL (ref 70–105)
GLUCOSE BLDC GLUCOMTR-MCNC: 167 MG/DL (ref 70–105)
GLUCOSE BLDC GLUCOMTR-MCNC: 171 MG/DL (ref 70–105)
GLUCOSE BLDC GLUCOMTR-MCNC: 173 MG/DL (ref 74–100)
GLUCOSE BLDC GLUCOMTR-MCNC: 173 MG/DL (ref 74–100)
GLUCOSE BLDC GLUCOMTR-MCNC: 179 MG/DL (ref 70–105)
GLUCOSE BLDC GLUCOMTR-MCNC: 186 MG/DL (ref 74–100)
GLUCOSE BLDC GLUCOMTR-MCNC: 186 MG/DL (ref 74–100)
GLUCOSE BLDC GLUCOMTR-MCNC: 197 MG/DL (ref 74–100)
GLUCOSE BLDC GLUCOMTR-MCNC: 197 MG/DL (ref 74–100)
GLUCOSE SERPL-MCNC: 164 MG/DL (ref 65–99)
GLUCOSE SERPL-MCNC: 172 MG/DL (ref 65–99)
GLUCOSE SERPL-MCNC: 98 MG/DL (ref 65–99)
HCO3 BLDA-SCNC: 22.4 MMOL/L (ref 21–28)
HCO3 BLDA-SCNC: 23.3 MMOL/L (ref 21–28)
HCO3 BLDA-SCNC: 24.5 MMOL/L (ref 21–28)
HCO3 BLDA-SCNC: 24.6 MMOL/L (ref 21–28)
HCO3 BLDA-SCNC: 24.9 MMOL/L (ref 21–28)
HCO3 BLDA-SCNC: 29.7 MMOL/L (ref 22–26)
HCO3 BLDA-SCNC: 30.4 MMOL/L (ref 22–26)
HCO3 BLDA-SCNC: 31.1 MMOL/L (ref 22–26)
HCO3 BLDA-SCNC: 31.5 MMOL/L (ref 22–26)
HCO3 BLDA-SCNC: 31.7 MMOL/L (ref 22–26)
HCO3 BLDV-SCNC: 27.8 MMOL/L (ref 23–28)
HCT VFR BLD AUTO: 32.2 % (ref 37.5–51)
HCT VFR BLDA CALC: 31 % (ref 38–51)
HCT VFR BLDA CALC: 32 % (ref 38–51)
HCT VFR BLDA CALC: 32 % (ref 38–51)
HCT VFR BLDA CALC: 33 % (ref 38–51)
HCT VFR BLDA CALC: 34 % (ref 38–51)
HEMODILUTION: NO
HEMODILUTION: NO
HEMODILUTION: YES
HGB BLD-MCNC: 11.3 G/DL (ref 13–17.7)
HGB BLDA-MCNC: 10.5 G/DL (ref 12–17)
HGB BLDA-MCNC: 10.9 G/DL (ref 12–17)
HGB BLDA-MCNC: 10.9 G/DL (ref 12–17)
HGB BLDA-MCNC: 11.2 G/DL (ref 12–17)
HGB BLDA-MCNC: 11.3 G/DL (ref 12–17)
HGB BLDA-MCNC: 11.4 G/DL (ref 12–17)
HGB BLDA-MCNC: 11.5 G/DL (ref 12–17)
HGB BLDA-MCNC: 11.6 G/DL (ref 12–17)
HGB BLDA-MCNC: 11.6 G/DL (ref 12–17)
INHALED O2 CONCENTRATION: 36 %
INHALED O2 CONCENTRATION: 40 %
INHALED O2 CONCENTRATION: 70 %
INR PPP: 1.09 (ref 0.93–1.1)
LYMPHOCYTES # BLD AUTO: 0.8 10*3/MM3 (ref 0.7–3.1)
LYMPHOCYTES NFR BLD AUTO: 10.5 % (ref 19.6–45.3)
MCH RBC QN AUTO: 31.7 PG (ref 26.6–33)
MCHC RBC AUTO-ENTMCNC: 35.2 G/DL (ref 31.5–35.7)
MCV RBC AUTO: 89.8 FL (ref 79–97)
MODALITY: ABNORMAL
MONOCYTES # BLD AUTO: 0.5 10*3/MM3 (ref 0.1–0.9)
MONOCYTES NFR BLD AUTO: 6.9 % (ref 5–12)
NEUTROPHILS NFR BLD AUTO: 6.4 10*3/MM3 (ref 1.7–7)
NEUTROPHILS NFR BLD AUTO: 81.5 % (ref 42.7–76)
NRBC BLD AUTO-RTO: 0 /100 WBC (ref 0–0.2)
PCO2 BLDA: 37.9 MM HG (ref 35–48)
PCO2 BLDA: 38.4 MM HG (ref 35–48)
PCO2 BLDA: 38.7 MM HG (ref 35–48)
PCO2 BLDA: 42.5 MM HG (ref 35–48)
PCO2 BLDA: 44.8 MM HG (ref 35–45)
PCO2 BLDA: 44.8 MM HG (ref 35–45)
PCO2 BLDA: 45.9 MM HG (ref 35–48)
PCO2 BLDA: 47.6 MM HG (ref 35–45)
PCO2 BLDA: 49.3 MM HG (ref 35–45)
PCO2 BLDA: 49.5 MM HG (ref 35–45)
PCO2 BLDV: 44.7 MM HG (ref 41–51)
PEEP RESPIRATORY: 5 CM[H2O]
PEEP RESPIRATORY: 5 CM[H2O]
PEEP RESPIRATORY: 8 CM[H2O]
PEEP RESPIRATORY: 8 CM[H2O]
PH BLDA: 7.34 PH UNITS (ref 7.35–7.45)
PH BLDA: 7.37 PH UNITS (ref 7.35–7.45)
PH BLDA: 7.37 PH UNITS (ref 7.35–7.45)
PH BLDA: 7.39 PH UNITS (ref 7.35–7.45)
PH BLDA: 7.41 PH UNITS (ref 7.35–7.45)
PH BLDA: 7.42 PH UNITS (ref 7.35–7.45)
PH BLDA: 7.43 PH UNITS (ref 7.35–7.45)
PH BLDA: 7.44 PH UNITS (ref 7.35–7.45)
PH BLDV: 7.4 PH UNITS (ref 7.31–7.41)
PHOSPHATE SERPL-MCNC: 1.6 MG/DL (ref 2.5–4.5)
PLATELET # BLD AUTO: 95 10*3/MM3 (ref 140–450)
PMV BLD AUTO: 8.9 FL (ref 6–12)
PO2 BLDA: 115.9 MM HG (ref 83–108)
PO2 BLDA: 116.7 MM HG (ref 83–108)
PO2 BLDA: 118.3 MM HG (ref 83–108)
PO2 BLDA: 178.8 MM HG (ref 83–108)
PO2 BLDA: 371 MM HG (ref 80–105)
PO2 BLDA: 420 MM HG (ref 80–105)
PO2 BLDA: 422 MM HG (ref 80–105)
PO2 BLDA: 423 MM HG (ref 80–105)
PO2 BLDA: 446 MM HG (ref 80–105)
PO2 BLDA: 99.5 MM HG (ref 83–108)
PO2 BLDV: 45 MM HG (ref 35–42)
POTASSIUM BLDA-SCNC: 2.9 MMOL/L (ref 3.5–4.5)
POTASSIUM BLDA-SCNC: 3.4 MMOL/L (ref 3.5–4.9)
POTASSIUM BLDA-SCNC: 3.5 MMOL/L (ref 3.5–4.5)
POTASSIUM BLDA-SCNC: 3.5 MMOL/L (ref 3.5–4.9)
POTASSIUM BLDA-SCNC: 3.6 MMOL/L (ref 3.5–4.9)
POTASSIUM BLDA-SCNC: 3.9 MMOL/L (ref 3.5–4.9)
POTASSIUM BLDA-SCNC: 4 MMOL/L (ref 3.5–4.5)
POTASSIUM SERPL-SCNC: 3.2 MMOL/L (ref 3.5–5.2)
POTASSIUM SERPL-SCNC: 3.6 MMOL/L (ref 3.5–5.2)
POTASSIUM SERPL-SCNC: 4.1 MMOL/L (ref 3.5–5.2)
PROT SERPL-MCNC: 6.7 G/DL (ref 6–8.5)
PROTHROMBIN TIME: 12 SECONDS (ref 9.6–11.7)
PSV: 10 CMH2O
RBC # BLD AUTO: 3.58 10*6/MM3 (ref 4.14–5.8)
RESPIRATORY RATE: 14
SAO2 % BLDCOA: 100 % (ref 95–98)
SAO2 % BLDCOA: 97.3 % (ref 94–98)
SAO2 % BLDCOA: 98.5 % (ref 94–98)
SAO2 % BLDCOA: 99.6 % (ref 94–98)
SAO2 % BLDCOV: 29 % (ref 45–75)
SODIUM BLD-SCNC: 136 MMOL/L (ref 138–146)
SODIUM BLD-SCNC: 137 MMOL/L (ref 138–146)
SODIUM BLD-SCNC: 137 MMOL/L (ref 138–146)
SODIUM BLD-SCNC: 138 MMOL/L (ref 138–146)
SODIUM BLD-SCNC: 139 MMOL/L (ref 138–146)
SODIUM BLD-SCNC: 141 MMOL/L (ref 138–146)
SODIUM BLD-SCNC: 142 MMOL/L (ref 138–146)
SODIUM BLD-SCNC: 143 MMOL/L (ref 138–146)
SODIUM BLD-SCNC: 143 MMOL/L (ref 138–146)
SODIUM SERPL-SCNC: 139 MMOL/L (ref 136–145)
SODIUM SERPL-SCNC: 141 MMOL/L (ref 136–145)
SODIUM SERPL-SCNC: 141 MMOL/L (ref 136–145)
UNIT  ABO: NORMAL
UNIT  ABO: NORMAL
UNIT  RH: NORMAL
UNIT  RH: NORMAL
VENTILATOR MODE: ABNORMAL
VT ON VENT VENT: 650 ML
WBC NRBC COR # BLD: 7.9 10*3/MM3 (ref 3.4–10.8)

## 2022-03-28 PROCEDURE — 25010000002 FENTANYL CITRATE (PF) 250 MCG/5ML SOLUTION: Performed by: ANESTHESIOLOGY

## 2022-03-28 PROCEDURE — 33999 UNLISTED PX CARDIAC SURGERY: CPT

## 2022-03-28 PROCEDURE — C1751 CATH, INF, PER/CENT/MIDLINE: HCPCS | Performed by: ANESTHESIOLOGY

## 2022-03-28 PROCEDURE — 88311 DECALCIFY TISSUE: CPT | Performed by: THORACIC SURGERY (CARDIOTHORACIC VASCULAR SURGERY)

## 2022-03-28 PROCEDURE — 25010000002 CEFAZOLIN PER 500 MG: Performed by: NURSE PRACTITIONER

## 2022-03-28 PROCEDURE — 02RF08Z REPLACEMENT OF AORTIC VALVE WITH ZOOPLASTIC TISSUE, OPEN APPROACH: ICD-10-PCS | Performed by: THORACIC SURGERY (CARDIOTHORACIC VASCULAR SURGERY)

## 2022-03-28 PROCEDURE — C1889 IMPLANT/INSERT DEVICE, NOC: HCPCS | Performed by: THORACIC SURGERY (CARDIOTHORACIC VASCULAR SURGERY)

## 2022-03-28 PROCEDURE — 71045 X-RAY EXAM CHEST 1 VIEW: CPT

## 2022-03-28 PROCEDURE — 33999 UNLISTED PX CARDIAC SURGERY: CPT | Performed by: THORACIC SURGERY (CARDIOTHORACIC VASCULAR SURGERY)

## 2022-03-28 PROCEDURE — 82962 GLUCOSE BLOOD TEST: CPT

## 2022-03-28 PROCEDURE — P9041 ALBUMIN (HUMAN),5%, 50ML: HCPCS | Performed by: ANESTHESIOLOGY

## 2022-03-28 PROCEDURE — 63710000001 INSULIN REGULAR HUMAN PER 5 UNITS: Performed by: ANESTHESIOLOGY

## 2022-03-28 PROCEDURE — 0 POTASSIUM CHLORIDE 10 MEQ/100ML SOLUTION: Performed by: NURSE PRACTITIONER

## 2022-03-28 PROCEDURE — 33405 REPLACEMENT AORTIC VALVE OPN: CPT | Performed by: THORACIC SURGERY (CARDIOTHORACIC VASCULAR SURGERY)

## 2022-03-28 PROCEDURE — 85610 PROTHROMBIN TIME: CPT | Performed by: NURSE PRACTITIONER

## 2022-03-28 PROCEDURE — B24BZZ4 ULTRASONOGRAPHY OF HEART WITH AORTA, TRANSESOPHAGEAL: ICD-10-PCS | Performed by: ANESTHESIOLOGY

## 2022-03-28 PROCEDURE — 25010000002 SUCCINYLCHOLINE PER 20 MG: Performed by: ANESTHESIOLOGY

## 2022-03-28 PROCEDURE — 94799 UNLISTED PULMONARY SVC/PX: CPT

## 2022-03-28 PROCEDURE — 85730 THROMBOPLASTIN TIME PARTIAL: CPT | Performed by: NURSE PRACTITIONER

## 2022-03-28 PROCEDURE — P9041 ALBUMIN (HUMAN),5%, 50ML: HCPCS | Performed by: NURSE PRACTITIONER

## 2022-03-28 PROCEDURE — 82947 ASSAY GLUCOSE BLOOD QUANT: CPT

## 2022-03-28 PROCEDURE — 82803 BLOOD GASES ANY COMBINATION: CPT

## 2022-03-28 PROCEDURE — 88305 TISSUE EXAM BY PATHOLOGIST: CPT | Performed by: THORACIC SURGERY (CARDIOTHORACIC VASCULAR SURGERY)

## 2022-03-28 PROCEDURE — 85347 COAGULATION TIME ACTIVATED: CPT

## 2022-03-28 PROCEDURE — 93318 ECHO TRANSESOPHAGEAL INTRAOP: CPT | Performed by: ANESTHESIOLOGY

## 2022-03-28 PROCEDURE — 25010000002 PROTAMINE SULFATE PER 10 MG: Performed by: ANESTHESIOLOGY

## 2022-03-28 PROCEDURE — 84132 ASSAY OF SERUM POTASSIUM: CPT

## 2022-03-28 PROCEDURE — 25010000002 MIDAZOLAM PER 1 MG: Performed by: ANESTHESIOLOGY

## 2022-03-28 PROCEDURE — 84100 ASSAY OF PHOSPHORUS: CPT | Performed by: NURSE PRACTITIONER

## 2022-03-28 PROCEDURE — 84295 ASSAY OF SERUM SODIUM: CPT

## 2022-03-28 PROCEDURE — 25010000002 MAGNESIUM SULFATE PER 500 MG OF MAGNESIUM: Performed by: ANESTHESIOLOGY

## 2022-03-28 PROCEDURE — 0 POTASSIUM CHLORIDE 10 MEQ/100ML SOLUTION

## 2022-03-28 PROCEDURE — 02C00ZZ EXTIRPATION OF MATTER FROM CORONARY ARTERY, ONE ARTERY, OPEN APPROACH: ICD-10-PCS | Performed by: THORACIC SURGERY (CARDIOTHORACIC VASCULAR SURGERY)

## 2022-03-28 PROCEDURE — 25010000002 ALBUMIN HUMAN 5% PER 50 ML

## 2022-03-28 PROCEDURE — 25010000002 HEPARIN (PORCINE) PER 1000 UNITS: Performed by: THORACIC SURGERY (CARDIOTHORACIC VASCULAR SURGERY)

## 2022-03-28 PROCEDURE — 5A1221Z PERFORMANCE OF CARDIAC OUTPUT, CONTINUOUS: ICD-10-PCS | Performed by: THORACIC SURGERY (CARDIOTHORACIC VASCULAR SURGERY)

## 2022-03-28 PROCEDURE — P9041 ALBUMIN (HUMAN),5%, 50ML: HCPCS

## 2022-03-28 PROCEDURE — 25010000002 CEFAZOLIN PER 500 MG: Performed by: ANESTHESIOLOGY

## 2022-03-28 PROCEDURE — 80053 COMPREHEN METABOLIC PANEL: CPT | Performed by: NURSE PRACTITIONER

## 2022-03-28 PROCEDURE — 25010000002 AMIODARONE PER 30 MG: Performed by: ANESTHESIOLOGY

## 2022-03-28 PROCEDURE — 33405 REPLACEMENT AORTIC VALVE OPN: CPT

## 2022-03-28 PROCEDURE — 25010000002 MAGNESIUM SULFATE IN D5W 1G/100ML (PREMIX) 1-5 GM/100ML-% SOLUTION: Performed by: NURSE PRACTITIONER

## 2022-03-28 PROCEDURE — 80051 ELECTROLYTE PANEL: CPT

## 2022-03-28 PROCEDURE — 25010000002 HEPARIN (PORCINE) PER 1000 UNITS: Performed by: ANESTHESIOLOGY

## 2022-03-28 PROCEDURE — C1729 CATH, DRAINAGE: HCPCS | Performed by: THORACIC SURGERY (CARDIOTHORACIC VASCULAR SURGERY)

## 2022-03-28 PROCEDURE — 85018 HEMOGLOBIN: CPT

## 2022-03-28 PROCEDURE — C1713 ANCHOR/SCREW BN/BN,TIS/BN: HCPCS | Performed by: THORACIC SURGERY (CARDIOTHORACIC VASCULAR SURGERY)

## 2022-03-28 PROCEDURE — 82330 ASSAY OF CALCIUM: CPT

## 2022-03-28 PROCEDURE — 88304 TISSUE EXAM BY PATHOLOGIST: CPT | Performed by: THORACIC SURGERY (CARDIOTHORACIC VASCULAR SURGERY)

## 2022-03-28 PROCEDURE — 85025 COMPLETE CBC W/AUTO DIFF WBC: CPT | Performed by: NURSE PRACTITIONER

## 2022-03-28 PROCEDURE — 25010000002 ALBUMIN HUMAN 5% PER 50 ML: Performed by: ANESTHESIOLOGY

## 2022-03-28 PROCEDURE — 82330 ASSAY OF CALCIUM: CPT | Performed by: NURSE PRACTITIONER

## 2022-03-28 PROCEDURE — 25010000002 ALBUMIN HUMAN 5% PER 50 ML: Performed by: NURSE PRACTITIONER

## 2022-03-28 PROCEDURE — 85014 HEMATOCRIT: CPT

## 2022-03-28 PROCEDURE — 99221 1ST HOSP IP/OBS SF/LOW 40: CPT | Performed by: INTERNAL MEDICINE

## 2022-03-28 PROCEDURE — 94002 VENT MGMT INPAT INIT DAY: CPT

## 2022-03-28 DEVICE — SS SUTURE, 6 PER SLEEVE
Type: IMPLANTABLE DEVICE | Site: STERNUM | Status: FUNCTIONAL
Brand: MYO/WIRE II

## 2022-03-28 DEVICE — INCISIONLINE PLEDGET TFLN SFT LG: Type: IMPLANTABLE DEVICE | Site: CHEST | Status: FUNCTIONAL

## 2022-03-28 DEVICE — COR-KNOT® QUICK LOAD® SINGLES
Type: IMPLANTABLE DEVICE | Site: HEART | Status: FUNCTIONAL
Brand: COR-KNOT® QUICK LOAD®

## 2022-03-28 DEVICE — ABSORBABLE HEMOSTAT (OXIDIZED REGENERATED CELLULOSE, U.S.P.)
Type: IMPLANTABLE DEVICE | Site: CHEST | Status: FUNCTIONAL
Brand: SURGICEL

## 2022-03-28 DEVICE — VLV PERICARD PERIMOUNT MAGNA EASE 27: Type: IMPLANTABLE DEVICE | Site: HEART | Status: FUNCTIONAL

## 2022-03-28 DEVICE — COR-KNOT MINI® COMBO KITBASE PACKAGE TYPE - KITEACH STERILE PACKAGE KIT CONTAINS (2) SINGLE PATIENT USE COR-KNOT MINI® DEVICES AND (12) COR-KNOT® QUICK LOADS®.
Type: IMPLANTABLE DEVICE | Site: HEART | Status: FUNCTIONAL
Brand: COR-KNOT MINI®

## 2022-03-28 DEVICE — DEV CONTRL TISS STRATAFIX SPIRAL MNCRYL UD 3/0 PLS 30CM: Type: IMPLANTABLE DEVICE | Site: CHEST | Status: FUNCTIONAL

## 2022-03-28 RX ORDER — CHLORHEXIDINE GLUCONATE 0.12 MG/ML
15 RINSE ORAL EVERY 12 HOURS
Status: DISCONTINUED | OUTPATIENT
Start: 2022-03-28 | End: 2022-04-04 | Stop reason: HOSPADM

## 2022-03-28 RX ORDER — NICOTINE POLACRILEX 4 MG
15 LOZENGE BUCCAL
Status: DISCONTINUED | OUTPATIENT
Start: 2022-03-28 | End: 2022-04-04 | Stop reason: HOSPADM

## 2022-03-28 RX ORDER — MAGNESIUM HYDROXIDE 1200 MG/15ML
LIQUID ORAL AS NEEDED
Status: DISCONTINUED | OUTPATIENT
Start: 2022-03-28 | End: 2022-03-28 | Stop reason: HOSPADM

## 2022-03-28 RX ORDER — POTASSIUM CHLORIDE 1.5 G/1.77G
20 POWDER, FOR SOLUTION ORAL AS NEEDED
Status: DISCONTINUED | OUTPATIENT
Start: 2022-03-29 | End: 2022-04-04 | Stop reason: HOSPADM

## 2022-03-28 RX ORDER — AMINOCAPROIC ACID 250 MG/ML
INJECTION, SOLUTION INTRAVENOUS AS NEEDED
Status: DISCONTINUED | OUTPATIENT
Start: 2022-03-28 | End: 2022-03-28 | Stop reason: SURG

## 2022-03-28 RX ORDER — SODIUM CHLORIDE 9 MG/ML
INJECTION, SOLUTION INTRAVENOUS CONTINUOUS PRN
Status: DISCONTINUED | OUTPATIENT
Start: 2022-03-28 | End: 2022-03-28 | Stop reason: SURG

## 2022-03-28 RX ORDER — MIDAZOLAM HYDROCHLORIDE 1 MG/ML
INJECTION INTRAMUSCULAR; INTRAVENOUS AS NEEDED
Status: DISCONTINUED | OUTPATIENT
Start: 2022-03-28 | End: 2022-03-28 | Stop reason: SURG

## 2022-03-28 RX ORDER — ACETAMINOPHEN 650 MG/1
650 SUPPOSITORY RECTAL EVERY 6 HOURS
Status: COMPLETED | OUTPATIENT
Start: 2022-03-28 | End: 2022-03-29

## 2022-03-28 RX ORDER — SODIUM CHLORIDE 9 MG/ML
30 INJECTION, SOLUTION INTRAVENOUS CONTINUOUS PRN
Status: DISCONTINUED | OUTPATIENT
Start: 2022-03-28 | End: 2022-03-28

## 2022-03-28 RX ORDER — POTASSIUM CHLORIDE 7.45 MG/ML
10 INJECTION INTRAVENOUS
Status: DISCONTINUED | OUTPATIENT
Start: 2022-03-28 | End: 2022-03-30

## 2022-03-28 RX ORDER — FUROSEMIDE 10 MG/ML
20 INJECTION INTRAMUSCULAR; INTRAVENOUS ONCE
Status: COMPLETED | OUTPATIENT
Start: 2022-03-29 | End: 2022-03-28

## 2022-03-28 RX ORDER — XYLITOL/YERBA SANTA
2 AEROSOL, SPRAY WITH PUMP (ML) MUCOUS MEMBRANE EVERY 4 HOURS PRN
Status: DISCONTINUED | OUTPATIENT
Start: 2022-03-28 | End: 2022-03-30

## 2022-03-28 RX ORDER — POLYETHYLENE GLYCOL 3350 17 G/17G
17 POWDER, FOR SOLUTION ORAL 2 TIMES DAILY
Status: DISCONTINUED | OUTPATIENT
Start: 2022-03-29 | End: 2022-04-04 | Stop reason: HOSPADM

## 2022-03-28 RX ORDER — NOREPINEPHRINE BIT/0.9 % NACL 8 MG/250ML
.02-.06 INFUSION BOTTLE (ML) INTRAVENOUS CONTINUOUS PRN
Status: DISCONTINUED | OUTPATIENT
Start: 2022-03-28 | End: 2022-03-29

## 2022-03-28 RX ORDER — SODIUM CHLORIDE 0.9 % (FLUSH) 0.9 %
30 SYRINGE (ML) INJECTION ONCE AS NEEDED
Status: DISCONTINUED | OUTPATIENT
Start: 2022-03-28 | End: 2022-03-31

## 2022-03-28 RX ORDER — CEFAZOLIN SODIUM 1 G/3ML
INJECTION, POWDER, FOR SOLUTION INTRAMUSCULAR; INTRAVENOUS AS NEEDED
Status: DISCONTINUED | OUTPATIENT
Start: 2022-03-28 | End: 2022-03-28 | Stop reason: SURG

## 2022-03-28 RX ORDER — VECURONIUM BROMIDE 1 MG/ML
INJECTION, POWDER, LYOPHILIZED, FOR SOLUTION INTRAVENOUS AS NEEDED
Status: DISCONTINUED | OUTPATIENT
Start: 2022-03-28 | End: 2022-03-28 | Stop reason: SURG

## 2022-03-28 RX ORDER — OLANZAPINE 10 MG/2ML
1 INJECTION, POWDER, LYOPHILIZED, FOR SOLUTION INTRAMUSCULAR
Status: DISCONTINUED | OUTPATIENT
Start: 2022-03-28 | End: 2022-04-04 | Stop reason: HOSPADM

## 2022-03-28 RX ORDER — AMIODARONE HCL/D5W 450 MG/250
PLASTIC BAG, INJECTION (ML) INTRAVENOUS CONTINUOUS PRN
Status: DISCONTINUED | OUTPATIENT
Start: 2022-03-28 | End: 2022-03-28 | Stop reason: SURG

## 2022-03-28 RX ORDER — ALBUMIN, HUMAN INJ 5% 5 %
SOLUTION INTRAVENOUS CONTINUOUS PRN
Status: DISCONTINUED | OUTPATIENT
Start: 2022-03-28 | End: 2022-03-28 | Stop reason: SURG

## 2022-03-28 RX ORDER — SENNA PLUS 8.6 MG/1
2 TABLET ORAL NIGHTLY
Status: DISCONTINUED | OUTPATIENT
Start: 2022-03-28 | End: 2022-03-28

## 2022-03-28 RX ORDER — NALOXONE HCL 0.4 MG/ML
0.4 VIAL (ML) INJECTION
Status: DISCONTINUED | OUTPATIENT
Start: 2022-03-28 | End: 2022-04-04 | Stop reason: HOSPADM

## 2022-03-28 RX ORDER — ACETAMINOPHEN 650 MG/1
650 SUPPOSITORY RECTAL EVERY 4 HOURS PRN
Status: DISCONTINUED | OUTPATIENT
Start: 2022-03-29 | End: 2022-04-02

## 2022-03-28 RX ORDER — CHLORHEXIDINE GLUCONATE 0.12 MG/ML
15 RINSE ORAL ONCE
Status: DISCONTINUED | OUTPATIENT
Start: 2022-03-28 | End: 2022-03-28 | Stop reason: HOSPADM

## 2022-03-28 RX ORDER — MEPERIDINE HYDROCHLORIDE 25 MG/ML
25 INJECTION INTRAMUSCULAR; INTRAVENOUS; SUBCUTANEOUS ONCE AS NEEDED
Status: DISCONTINUED | OUTPATIENT
Start: 2022-03-28 | End: 2022-03-29

## 2022-03-28 RX ORDER — DEXMEDETOMIDINE HYDROCHLORIDE 4 UG/ML
.2-1.5 INJECTION, SOLUTION INTRAVENOUS
Status: DISCONTINUED | OUTPATIENT
Start: 2022-03-28 | End: 2022-03-29

## 2022-03-28 RX ORDER — SUCCINYLCHOLINE CHLORIDE 20 MG/ML
INJECTION INTRAMUSCULAR; INTRAVENOUS AS NEEDED
Status: DISCONTINUED | OUTPATIENT
Start: 2022-03-28 | End: 2022-03-28 | Stop reason: SURG

## 2022-03-28 RX ORDER — CHLORHEXIDINE GLUCONATE 500 MG/1
1 CLOTH TOPICAL EVERY 12 HOURS PRN
Status: DISCONTINUED | OUTPATIENT
Start: 2022-03-28 | End: 2022-03-28 | Stop reason: HOSPADM

## 2022-03-28 RX ORDER — ALBUMIN, HUMAN INJ 5% 5 %
12.5 SOLUTION INTRAVENOUS AS NEEDED
Status: DISCONTINUED | OUTPATIENT
Start: 2022-03-28 | End: 2022-03-30

## 2022-03-28 RX ORDER — PANTOPRAZOLE SODIUM 40 MG/10ML
40 INJECTION, POWDER, LYOPHILIZED, FOR SOLUTION INTRAVENOUS ONCE
Status: COMPLETED | OUTPATIENT
Start: 2022-03-28 | End: 2022-03-28

## 2022-03-28 RX ORDER — ASPIRIN 325 MG
325 TABLET ORAL ONCE
Status: COMPLETED | OUTPATIENT
Start: 2022-03-28 | End: 2022-03-28

## 2022-03-28 RX ORDER — ALPRAZOLAM 0.25 MG/1
0.25 TABLET ORAL ONCE
Status: COMPLETED | OUTPATIENT
Start: 2022-03-28 | End: 2022-03-28

## 2022-03-28 RX ORDER — NITROGLYCERIN 20 MG/100ML
5-50 INJECTION INTRAVENOUS CONTINUOUS PRN
Status: DISCONTINUED | OUTPATIENT
Start: 2022-03-28 | End: 2022-03-29

## 2022-03-28 RX ORDER — MORPHINE SULFATE 2 MG/ML
2 INJECTION, SOLUTION INTRAMUSCULAR; INTRAVENOUS
Status: DISCONTINUED | OUTPATIENT
Start: 2022-03-28 | End: 2022-03-29

## 2022-03-28 RX ORDER — AMOXICILLIN 250 MG
2 CAPSULE ORAL 2 TIMES DAILY
Status: DISCONTINUED | OUTPATIENT
Start: 2022-03-29 | End: 2022-04-04 | Stop reason: HOSPADM

## 2022-03-28 RX ORDER — ACETAMINOPHEN 325 MG/1
650 TABLET ORAL EVERY 4 HOURS PRN
Status: DISCONTINUED | OUTPATIENT
Start: 2022-03-28 | End: 2022-03-28 | Stop reason: HOSPADM

## 2022-03-28 RX ORDER — PANTOPRAZOLE SODIUM 40 MG/1
40 TABLET, DELAYED RELEASE ORAL
Status: DISCONTINUED | OUTPATIENT
Start: 2022-03-29 | End: 2022-04-04 | Stop reason: HOSPADM

## 2022-03-28 RX ORDER — SODIUM CHLORIDE 9 MG/ML
INJECTION, SOLUTION INTRAVENOUS AS NEEDED
Status: DISCONTINUED | OUTPATIENT
Start: 2022-03-28 | End: 2022-03-28 | Stop reason: HOSPADM

## 2022-03-28 RX ORDER — NICARDIPINE HYDROCHLORIDE 2.5 MG/ML
INJECTION INTRAVENOUS AS NEEDED
Status: DISCONTINUED | OUTPATIENT
Start: 2022-03-28 | End: 2022-03-28 | Stop reason: SURG

## 2022-03-28 RX ORDER — SODIUM CHLORIDE 0.9 % (FLUSH) 0.9 %
3-10 SYRINGE (ML) INJECTION AS NEEDED
Status: DISCONTINUED | OUTPATIENT
Start: 2022-03-28 | End: 2022-03-28 | Stop reason: HOSPADM

## 2022-03-28 RX ORDER — POTASSIUM CHLORIDE 20 MEQ/1
20 TABLET, EXTENDED RELEASE ORAL AS NEEDED
Status: DISCONTINUED | OUTPATIENT
Start: 2022-03-29 | End: 2022-04-04 | Stop reason: HOSPADM

## 2022-03-28 RX ORDER — POTASSIUM CHLORIDE 7.45 MG/ML
INJECTION INTRAVENOUS
Status: COMPLETED
Start: 2022-03-28 | End: 2022-03-28

## 2022-03-28 RX ORDER — ACETAMINOPHEN 325 MG/1
650 TABLET ORAL EVERY 4 HOURS PRN
Status: DISCONTINUED | OUTPATIENT
Start: 2022-03-29 | End: 2022-04-02

## 2022-03-28 RX ORDER — SODIUM CHLORIDE 0.9 % (FLUSH) 0.9 %
3 SYRINGE (ML) INJECTION EVERY 12 HOURS SCHEDULED
Status: DISCONTINUED | OUTPATIENT
Start: 2022-03-28 | End: 2022-03-28 | Stop reason: HOSPADM

## 2022-03-28 RX ORDER — NITROGLYCERIN 0.4 MG/1
0.4 TABLET SUBLINGUAL
Status: DISCONTINUED | OUTPATIENT
Start: 2022-03-28 | End: 2022-03-28 | Stop reason: HOSPADM

## 2022-03-28 RX ORDER — ONDANSETRON 2 MG/ML
4 INJECTION INTRAMUSCULAR; INTRAVENOUS EVERY 6 HOURS PRN
Status: DISCONTINUED | OUTPATIENT
Start: 2022-03-28 | End: 2022-04-04 | Stop reason: HOSPADM

## 2022-03-28 RX ORDER — ATORVASTATIN CALCIUM 40 MG/1
40 TABLET, FILM COATED ORAL NIGHTLY
Status: DISCONTINUED | OUTPATIENT
Start: 2022-03-29 | End: 2022-04-04 | Stop reason: HOSPADM

## 2022-03-28 RX ORDER — SODIUM CHLORIDE 9 MG/ML
30 INJECTION, SOLUTION INTRAVENOUS CONTINUOUS
Status: DISCONTINUED | OUTPATIENT
Start: 2022-03-28 | End: 2022-03-29

## 2022-03-28 RX ORDER — HYDROCODONE BITARTRATE AND ACETAMINOPHEN 5; 325 MG/1; MG/1
1 TABLET ORAL EVERY 4 HOURS PRN
Status: DISCONTINUED | OUTPATIENT
Start: 2022-03-28 | End: 2022-04-04 | Stop reason: HOSPADM

## 2022-03-28 RX ORDER — ACETAMINOPHEN 160 MG/5ML
650 SOLUTION ORAL EVERY 4 HOURS PRN
Status: DISCONTINUED | OUTPATIENT
Start: 2022-03-29 | End: 2022-04-02

## 2022-03-28 RX ORDER — ACETAMINOPHEN 325 MG/1
650 TABLET ORAL EVERY 6 HOURS
Status: COMPLETED | OUTPATIENT
Start: 2022-03-28 | End: 2022-03-29

## 2022-03-28 RX ORDER — ALBUMIN, HUMAN INJ 5% 5 %
SOLUTION INTRAVENOUS
Status: COMPLETED
Start: 2022-03-28 | End: 2022-03-28

## 2022-03-28 RX ORDER — ASPIRIN 81 MG/1
81 TABLET ORAL DAILY
Status: DISCONTINUED | OUTPATIENT
Start: 2022-03-29 | End: 2022-04-02

## 2022-03-28 RX ORDER — KETAMINE HCL IN NACL, ISO-OSM 100MG/10ML
SYRINGE (ML) INJECTION AS NEEDED
Status: DISCONTINUED | OUTPATIENT
Start: 2022-03-28 | End: 2022-03-28 | Stop reason: SURG

## 2022-03-28 RX ORDER — MAGNESIUM SULFATE 1 G/100ML
1 INJECTION INTRAVENOUS EVERY 8 HOURS
Status: COMPLETED | OUTPATIENT
Start: 2022-03-28 | End: 2022-03-29

## 2022-03-28 RX ORDER — DEXTROSE MONOHYDRATE 25 G/50ML
25-50 INJECTION, SOLUTION INTRAVENOUS
Status: DISCONTINUED | OUTPATIENT
Start: 2022-03-28 | End: 2022-03-31

## 2022-03-28 RX ORDER — ACETAMINOPHEN 160 MG/5ML
650 SOLUTION ORAL EVERY 6 HOURS
Status: COMPLETED | OUTPATIENT
Start: 2022-03-28 | End: 2022-03-29

## 2022-03-28 RX ORDER — SODIUM CHLORIDE 0.9 % (FLUSH) 0.9 %
30 SYRINGE (ML) INJECTION ONCE AS NEEDED
Status: DISCONTINUED | OUTPATIENT
Start: 2022-03-28 | End: 2022-04-04 | Stop reason: HOSPADM

## 2022-03-28 RX ORDER — HEPARIN SODIUM 1000 [USP'U]/ML
INJECTION, SOLUTION INTRAVENOUS; SUBCUTANEOUS AS NEEDED
Status: DISCONTINUED | OUTPATIENT
Start: 2022-03-28 | End: 2022-03-28 | Stop reason: SURG

## 2022-03-28 RX ORDER — FENTANYL CITRATE 50 UG/ML
INJECTION, SOLUTION INTRAMUSCULAR; INTRAVENOUS AS NEEDED
Status: DISCONTINUED | OUTPATIENT
Start: 2022-03-28 | End: 2022-03-28 | Stop reason: SURG

## 2022-03-28 RX ORDER — DEXTROSE MONOHYDRATE 25 G/50ML
25 INJECTION, SOLUTION INTRAVENOUS
Status: DISCONTINUED | OUTPATIENT
Start: 2022-03-28 | End: 2022-04-04 | Stop reason: HOSPADM

## 2022-03-28 RX ORDER — NITROGLYCERIN 20 MG/100ML
INJECTION INTRAVENOUS AS NEEDED
Status: DISCONTINUED | OUTPATIENT
Start: 2022-03-28 | End: 2022-03-28 | Stop reason: SURG

## 2022-03-28 RX ADMIN — WATER 2 G: 1000 INJECTION, SOLUTION INTRAVENOUS at 17:51

## 2022-03-28 RX ADMIN — MIDAZOLAM 4 MG: 1 INJECTION INTRAMUSCULAR; INTRAVENOUS at 07:09

## 2022-03-28 RX ADMIN — SODIUM CHLORIDE 6 UNITS/HR: 900 INJECTION INTRAVENOUS at 08:10

## 2022-03-28 RX ADMIN — NITROGLYCERIN 200 MCG: 20 INJECTION INTRAVENOUS at 08:19

## 2022-03-28 RX ADMIN — ALBUMIN (HUMAN): 12.5 INJECTION, SOLUTION INTRAVENOUS at 10:51

## 2022-03-28 RX ADMIN — PANTOPRAZOLE SODIUM 40 MG: 40 INJECTION, POWDER, LYOPHILIZED, FOR SOLUTION INTRAVENOUS at 17:51

## 2022-03-28 RX ADMIN — POTASSIUM CHLORIDE 10 MEQ: 7.45 INJECTION INTRAVENOUS at 13:00

## 2022-03-28 RX ADMIN — VECURONIUM BROMIDE 4 MG: 1 INJECTION, POWDER, LYOPHILIZED, FOR SOLUTION INTRAVENOUS at 08:12

## 2022-03-28 RX ADMIN — ASPIRIN 325 MG ORAL TABLET 325 MG: 325 PILL ORAL at 17:51

## 2022-03-28 RX ADMIN — VECURONIUM BROMIDE 2 MG: 1 INJECTION, POWDER, LYOPHILIZED, FOR SOLUTION INTRAVENOUS at 10:29

## 2022-03-28 RX ADMIN — NITROGLYCERIN 40 MCG/MIN: 20 INJECTION INTRAVENOUS at 08:22

## 2022-03-28 RX ADMIN — Medication 0.5 MG/MIN: at 10:15

## 2022-03-28 RX ADMIN — NICARDIPINE HYDROCHLORIDE 0.5 MG: 25 INJECTION, SOLUTION INTRAVENOUS at 10:46

## 2022-03-28 RX ADMIN — VECURONIUM BROMIDE 10 MG: 1 INJECTION, POWDER, LYOPHILIZED, FOR SOLUTION INTRAVENOUS at 07:15

## 2022-03-28 RX ADMIN — ALBUMIN (HUMAN) 12.5 G: 12.5 INJECTION, SOLUTION INTRAVENOUS at 13:00

## 2022-03-28 RX ADMIN — FUROSEMIDE 20 MG: 10 INJECTION, SOLUTION INTRAMUSCULAR; INTRAVENOUS at 23:22

## 2022-03-28 RX ADMIN — FENTANYL CITRATE 500 MCG: 0.05 INJECTION, SOLUTION INTRAMUSCULAR; INTRAVENOUS at 08:04

## 2022-03-28 RX ADMIN — MIDAZOLAM 4 MG: 1 INJECTION INTRAMUSCULAR; INTRAVENOUS at 07:01

## 2022-03-28 RX ADMIN — FENTANYL CITRATE 250 MCG: 0.05 INJECTION, SOLUTION INTRAMUSCULAR; INTRAVENOUS at 08:19

## 2022-03-28 RX ADMIN — VECURONIUM BROMIDE 6 MG: 1 INJECTION, POWDER, LYOPHILIZED, FOR SOLUTION INTRAVENOUS at 08:43

## 2022-03-28 RX ADMIN — FENTANYL CITRATE 250 MCG: 0.05 INJECTION, SOLUTION INTRAMUSCULAR; INTRAVENOUS at 09:55

## 2022-03-28 RX ADMIN — FENTANYL CITRATE 250 MCG: 0.05 INJECTION, SOLUTION INTRAMUSCULAR; INTRAVENOUS at 07:09

## 2022-03-28 RX ADMIN — POTASSIUM CHLORIDE 10 MEQ: 10 INJECTION, SOLUTION INTRAVENOUS at 22:48

## 2022-03-28 RX ADMIN — CHLORHEXIDINE GLUCONATE 15 ML: 1.2 SOLUTION ORAL at 21:29

## 2022-03-28 RX ADMIN — PROTAMINE SULFATE 250 MG: 10 INJECTION, SOLUTION INTRAVENOUS at 10:28

## 2022-03-28 RX ADMIN — SODIUM CHLORIDE: 0.9 INJECTION, SOLUTION INTRAVENOUS at 06:56

## 2022-03-28 RX ADMIN — HEPARIN SODIUM 30000 UNITS: 1000 INJECTION INTRAVENOUS; SUBCUTANEOUS at 08:29

## 2022-03-28 RX ADMIN — POTASSIUM PHOSPHATE, MONOBASIC AND POTASSIUM PHOSPHATE, DIBASIC 21 MMOL: 224; 236 INJECTION, SOLUTION, CONCENTRATE INTRAVENOUS at 14:05

## 2022-03-28 RX ADMIN — DEXMEDETOMIDINE HYDROCHLORIDE 0.5 MCG/KG/HR: 100 INJECTION, SOLUTION INTRAVENOUS at 07:01

## 2022-03-28 RX ADMIN — MAGNESIUM SULFATE HEPTAHYDRATE 2 G: 500 INJECTION, SOLUTION INTRAMUSCULAR; INTRAVENOUS at 10:06

## 2022-03-28 RX ADMIN — ALBUMIN (HUMAN) 12.5 G: 12.5 INJECTION, SOLUTION INTRAVENOUS at 13:54

## 2022-03-28 RX ADMIN — Medication 25 MG: at 07:09

## 2022-03-28 RX ADMIN — ALPRAZOLAM 0.25 MG: 0.25 TABLET ORAL at 06:11

## 2022-03-28 RX ADMIN — CEFAZOLIN SODIUM 3 G: 1 INJECTION, POWDER, FOR SOLUTION INTRAMUSCULAR; INTRAVENOUS at 10:28

## 2022-03-28 RX ADMIN — POTASSIUM CHLORIDE 10 MEQ: 10 INJECTION, SOLUTION INTRAVENOUS at 13:00

## 2022-03-28 RX ADMIN — MAGNESIUM SULFATE 1 G: 1 INJECTION INTRAVENOUS at 17:50

## 2022-03-28 RX ADMIN — MUPIROCIN 1 APPLICATION: 20 OINTMENT TOPICAL at 21:29

## 2022-03-28 RX ADMIN — VECURONIUM BROMIDE 3 MG: 1 INJECTION, POWDER, LYOPHILIZED, FOR SOLUTION INTRAVENOUS at 09:52

## 2022-03-28 RX ADMIN — DEXMEDETOMIDINE HYDROCHLORIDE 0.5 MCG/KG/HR: 4 INJECTION, SOLUTION INTRAVENOUS at 13:00

## 2022-03-28 RX ADMIN — SODIUM CHLORIDE 30 ML/HR: 9 INJECTION, SOLUTION INTRAVENOUS at 13:55

## 2022-03-28 RX ADMIN — Medication 25 MG: at 07:01

## 2022-03-28 RX ADMIN — METOPROLOL TARTRATE 12.5 MG: 25 TABLET, FILM COATED ORAL at 06:15

## 2022-03-28 RX ADMIN — ACETAMINOPHEN 649.6 MG: 160 SOLUTION ORAL at 21:29

## 2022-03-28 RX ADMIN — WATER 3 G: 1000 INJECTION, SOLUTION INTRAVENOUS at 07:39

## 2022-03-28 RX ADMIN — AMINOCAPROIC ACID 10 G: 250 INJECTION, SOLUTION INTRAVENOUS at 08:04

## 2022-03-28 RX ADMIN — SUCCINYLCHOLINE CHLORIDE 100 MG: 20 INJECTION INTRAMUSCULAR; INTRAVENOUS at 07:09

## 2022-03-28 RX ADMIN — SODIUM CHLORIDE: 0.9 INJECTION, SOLUTION INTRAVENOUS at 10:49

## 2022-03-28 NOTE — ANESTHESIA PROCEDURE NOTES
Emergent/Open-Heart Anesthesia PAULO    Procedure Performed: Emergent/Open-Heart Anesthesia PAULO     Start Time:        End Time:        General Procedure Information  Location performed:  OR  Intubated  Bite block placed  Heart visualized  Probe Insertion:  Easy  Probe Type:  Biplane and multiplane  Modalities:  Color flow mapping, pulse wave Doppler and continuous wave Doppler    Echocardiographic and Doppler Measurements    Ventricles    Right Ventricle:  Ejection Fraction 60%.    Left Ventricle:  Global Function normal.  Ejection Fraction 60%.                                  Anesthesia Information  Performed Personally  Anesthesiologist:  Van Frost MD      Echocardiogram Comments:       PAULO placed easily with no resistance ,  Lubricated , bite guard used     Pre cpb   LV EF 60 no wma   RV nl SF   MV tr/mild MR   AV Ca++ mild mod AI ,  Mod AS YOSI 1.2cm2 by cont  tv TR TR   Suspicious for smal PFO by CFD       Post cpb   #27 av well seated no para / trans leak seen   No other change

## 2022-03-28 NOTE — ANESTHESIA PROCEDURE NOTES
Central Line      Patient reassessed immediately prior to procedure    Staff  Anesthesiologist: Van Frost MD  Preanesthetic Checklist  Completed: patient identified, IV checked, site marked, risks and benefits discussed, surgical consent, monitors and equipment checked, pre-op evaluation and timeout performed  Central Line Prep  Sterile Tech:gloves, cap, gown, mask and sterile barriers  Prep: chloraprep  Patient monitoring: blood pressure monitoring, continuous pulse oximetry and EKG  Central Line Procedure  Laterality:right  Location:internal jugular  Catheter Type:[place]-Luz  Catheter Size:9 Fr  Guidance:landmark technique and palpation technique  Assessment  Post procedure:biopatch applied, line sutured and occlusive dressing applied  Assessement:blood return through all ports, free fluid flow, chest x-ray ordered and Alfred Test  Complications:no  Patient Tolerance:patient tolerated the procedure well with no apparent complications  Additional Notes  Floated to 50cm 1 attempt no resistance no wedge

## 2022-03-28 NOTE — ANESTHESIA POSTPROCEDURE EVALUATION
Patient: Anurag Pickard    Procedure Summary     Date: 03/28/22 Room / Location: [facility] CVOR 02 / [facility] CVOR    Anesthesia Start: 0656 Anesthesia Stop: 1210    Procedure: AORTIC VALVE REPAIR/REPLACEMENT (N/A Chest) Diagnosis:       Nonrheumatic aortic valve stenosis      (Nonrheumatic aortic valve stenosis [I35.0])    Surgeons: Dennis Brandon MD Provider: Van Frost MD    Anesthesia Type: general, CVL, PAC, [place] ASA Status: 4          Anesthesia Type: general, CVL, PAC, [place]    Vitals  Vitals Value Taken Time   BP     Temp 95.18 °F (35.1 °C) 03/28/22 1216   Pulse 80 03/28/22 1216   Resp     SpO2 100 % 03/28/22 1216   Vitals shown include unvalidated device data.        Post Anesthesia Care and Evaluation    Patient location during evaluation: ICU  Patient participation: complete - patient cannot participate  Level of consciousness: obtunded/minimal responses  Pain scale: See nurse's notes for pain score.  Pain management: adequate  Airway patency: patent  Anesthetic complications: No anesthetic complications  PONV Status: none  Cardiovascular status: acceptable  Respiratory status: acceptable  Hydration status: acceptable    Comments: Patient seen and examined postoperatively; vital signs stable; SpO2 greater than or equal to 90%; cardiopulmonary status stable; nausea/vomiting adequately controlled; pain adequately controlled; no apparent anesthesia complications; patient discharged from anesthesia care when discharge criteria were met

## 2022-03-28 NOTE — ANESTHESIA PROCEDURE NOTES
Arterial Line      Patient reassessed immediately prior to procedure    Patient location during procedure: OR  Start time: 3/28/2022 7:00 AM  Stop Time:3/28/2022 7:05 AM       Line placed for hemodynamic monitoring and ABGs/Labs/ISTAT.  Performed By   Anesthesiologist: Van Frost MD  Preanesthetic Checklist  Completed: patient identified, IV checked, site marked, risks and benefits discussed, surgical consent, monitors and equipment checked, pre-op evaluation and timeout performed  Arterial Line Prep   Sterile Tech: cap, gloves and mask  Prep: ChloraPrep  Patient monitoring: blood pressure monitoring, continuous pulse oximetry and EKG  Arterial Line Procedure   Laterality:left  Location:  radial artery  Catheter size: 20 G   Guidance: landmark technique and palpation technique  Number of attempts: 1  Successful placement: yes  Post Assessment   Dressing Type: occlusive dressing applied, secured with tape and wrist guard applied.   Complications no  Circ/Move/Sens Assessment: unchanged.   Patient Tolerance: patient tolerated the procedure well with no apparent complications  Additional Notes  Pre-procedure:  Patient identified; pre-procedure vital signs, all relevant labs/studies, complete medical/surgical/anesthetic history, full medication list, full allergy list, and NPO status obtained/reviewed; physical assessment performed; anesthetic options, side effects, potential complications, risks, and benefits discussed; questions answered; written anesthesia procedure consent obtained; patient cleared for procedure; IV access in situ    Procedure:  Arterial line placed under anesthesia time, but before induction of general anesthesia; ASA monitor placed; patient positioned; hand hygiene performed; sterile technique maintained throughout the procedure; sterile prep and drape applied; insertion site determined by anatomical landmarks and palpation; skin and subcutaneous tissues numbed by injection of 1% lidocaine;  needle placed into the skin and advanced into the target artery with correct placement confirmed by return of arterial blood; wire slide into the target artery; arterial catheter threaded into the target artery over the needle/wire; needle/wire removed; correct catheter placement confirmed by transducing systemic arterial blood pressure; catheter secured with occlusive dressing and tape    Post-procedure:  Arterial catheter placed successfully; no apparent complications; minimal estimated blood loss; vital signs stable throughout; general anesthesia induced

## 2022-03-28 NOTE — ANESTHESIA PROCEDURE NOTES
Airway  Urgency: elective    Date/Time: 3/28/2022 7:12 AM  Airway not difficult    General Information and Staff    Patient location during procedure: OR  Anesthesiologist: Edenilson Beebe MD    Indications and Patient Condition  Indications for airway management: airway protection    Preoxygenated: yes  MILS not maintained throughout  Mask difficulty assessment: 1 - vent by mask    Final Airway Details  Final airway type: endotracheal airway      Successful airway: ETT  Cuffed: yes   Successful intubation technique: direct laryngoscopy  Endotracheal tube insertion site: oral  Blade: Lida  Blade size: 4  ETT size (mm): 7.5  Cormack-Lehane Classification: grade I - full view of glottis  Placement verified by: capnometry and palpation of cuff   Measured from: lips  ETT/EBT  to lips (cm): 21  Number of attempts at approach: 1  Assessment: lips, teeth, and gum same as pre-op and atraumatic intubation    Additional Comments  ASA monitors applied; preoxygenated with 100% FiO2 via anesthesia face mask; induction of general anesthesia; bag-mask ventilation; patient's position optimized; laryngoscopy; cuffed ETT lubricated with lidocaine jelly and placed into the trachea; cuff inflated to seal with minimally occlusive airway cuff pressure; ETT connected to anesthesia circuit; atraumatic/dentition in preoperative condition; ETT secured in place; correct placement in the trachea confirmed by bilateral chest rise, tube condensation, and return of EtCO2 > 30 mmHg x3

## 2022-03-28 NOTE — ANESTHESIA PROCEDURE NOTES
Central Line      Patient reassessed immediately prior to procedure    Start time: 3/28/2022 7:20 AM  Stop Time:3/28/2022 7:36 AM  Staff  Anesthesiologist: Van Frost MD  Preanesthetic Checklist  Completed: patient identified, IV checked, site marked, risks and benefits discussed, surgical consent, monitors and equipment checked, pre-op evaluation and timeout performed  Central Line Prep  Sterile Tech:gloves, cap, gown, mask and sterile barriers  Prep: chloraprep  Patient monitoring: blood pressure monitoring, continuous pulse oximetry and EKG  Central Line Procedure  Laterality:right  Location:internal jugular  Catheter Type:Cordis  Catheter Size:9 Fr  Guidance:ultrasound guided, landmark technique and palpation technique  PROCEDURE NOTE/ULTRASOUND INTERPRETATION.  Using ultrasound guidance the potential vascular sites for insertion of the catheter were visualized to determine the patency of the vessel to be used for vascular access.  After selecting the appropriate site for insertion, the needle was visualized under ultrasound being inserted into the internal jugular vein, followed by ultrasound confirmation of wire and catheter placement. There were no abnormalities seen on ultrasound; an image was taken; and the patient tolerated the procedure with no complications.   Assessment  Post procedure:biopatch applied, line sutured and occlusive dressing applied  Assessement:blood return through all ports, free fluid flow, chest x-ray ordered and Alfred Test  Complications:no  Patient Tolerance:patient tolerated the procedure well with no apparent complications  Additional Notes  Sterile prep, drape, gown and gloves.  Negative alfred negative carotid, no difficulties.  Tolerated well.

## 2022-03-28 NOTE — ANESTHESIA PREPROCEDURE EVALUATION
Anesthesia Evaluation     Patient summary reviewed and Nursing notes reviewed   NPO Solid Status: > 8 hours  NPO Liquid Status: > 8 hours           Airway   Mallampati: II  TM distance: >3 FB  Neck ROM: full  No difficulty expected  Dental - normal exam   (+) poor dentition        Pulmonary - normal exam   (+) shortness of breath, sleep apnea,   Cardiovascular - normal exam    ECG reviewed  Beta blocker given within 24 hours of surgery    (+) hypertension, valvular problems/murmurs AS, CHF Diastolic >=55%, hyperlipidemia,       Neuro/Psych  (+) numbness,    GI/Hepatic/Renal/Endo    (+) morbid obesity, GERD,  diabetes mellitus,     Musculoskeletal     Abdominal  - normal exam    Bowel sounds: normal.   Substance History   (+) alcohol use,      OB/GYN          Other   arthritis,      ROS/Med Hx Other: No angiographic evidence for significant epicardial occlusive disease  Severe aortic stenosis  Moderate pulmonary hypertension      · Left ventricular ejection fraction appears to be 56 - 60%. Left ventricular systolic function is normal.  · Moderate aortic valve regurgitation is present.  · Severe aortic valve stenosis is present.  · Aortic valve maximum pressure gradient is 72 mmHg. Aortic valve mean pressure gradient is 44.6 mmHg.  · Saline test results are negative.  · Lipomatous hypertrophy of the interatrial septum present.                     Anesthesia Plan    ASA 4     general, CVL, PAC and [place]     intravenous induction   Postoperative Plan: Expected vent after surgery  Anesthetic plan, all risks, benefits, and alternatives have been provided, discussed and informed consent has been obtained with: patient.        CODE STATUS:

## 2022-03-29 ENCOUNTER — APPOINTMENT (OUTPATIENT)
Dept: GENERAL RADIOLOGY | Facility: HOSPITAL | Age: 72
End: 2022-03-29

## 2022-03-29 LAB
ANION GAP SERPL CALCULATED.3IONS-SCNC: 14 MMOL/L (ref 5–15)
APTT PPP: 28.7 SECONDS (ref 24–31)
BASOPHILS # BLD MANUAL: 0.11 10*3/MM3 (ref 0–0.2)
BASOPHILS NFR BLD MANUAL: 1 % (ref 0–1.5)
BUN SERPL-MCNC: 11 MG/DL (ref 8–23)
BUN/CREAT SERPL: 11.1 (ref 7–25)
CA-I SERPL ISE-MCNC: 1.16 MMOL/L (ref 1.2–1.3)
CALCIUM SPEC-SCNC: 8.9 MG/DL (ref 8.6–10.5)
CHLORIDE SERPL-SCNC: 102 MMOL/L (ref 98–107)
CO2 SERPL-SCNC: 23 MMOL/L (ref 22–29)
CREAT SERPL-MCNC: 0.99 MG/DL (ref 0.76–1.27)
DEPRECATED RDW RBC AUTO: 45.1 FL (ref 37–54)
EGFRCR SERPLBLD CKD-EPI 2021: 81.9 ML/MIN/1.73
ERYTHROCYTE [DISTWIDTH] IN BLOOD BY AUTOMATED COUNT: 14.7 % (ref 12.3–15.4)
GLUCOSE BLDC GLUCOMTR-MCNC: 105 MG/DL (ref 70–105)
GLUCOSE BLDC GLUCOMTR-MCNC: 107 MG/DL (ref 70–105)
GLUCOSE BLDC GLUCOMTR-MCNC: 117 MG/DL (ref 70–105)
GLUCOSE BLDC GLUCOMTR-MCNC: 118 MG/DL (ref 70–105)
GLUCOSE BLDC GLUCOMTR-MCNC: 122 MG/DL (ref 70–105)
GLUCOSE BLDC GLUCOMTR-MCNC: 125 MG/DL (ref 70–105)
GLUCOSE BLDC GLUCOMTR-MCNC: 135 MG/DL (ref 70–105)
GLUCOSE BLDC GLUCOMTR-MCNC: 137 MG/DL (ref 70–105)
GLUCOSE BLDC GLUCOMTR-MCNC: 139 MG/DL (ref 70–105)
GLUCOSE BLDC GLUCOMTR-MCNC: 148 MG/DL (ref 70–105)
GLUCOSE BLDC GLUCOMTR-MCNC: 148 MG/DL (ref 70–105)
GLUCOSE BLDC GLUCOMTR-MCNC: 230 MG/DL (ref 70–105)
GLUCOSE BLDC GLUCOMTR-MCNC: 90 MG/DL (ref 70–105)
GLUCOSE SERPL-MCNC: 134 MG/DL (ref 65–99)
HCT VFR BLD AUTO: 31.6 % (ref 37.5–51)
HGB BLD-MCNC: 11 G/DL (ref 13–17.7)
INR PPP: 0.96 (ref 0.93–1.1)
LAB AP CASE REPORT: NORMAL
LYMPHOCYTES # BLD MANUAL: 0.8 10*3/MM3 (ref 0.7–3.1)
LYMPHOCYTES NFR BLD MANUAL: 7 % (ref 5–12)
MAGNESIUM SERPL-MCNC: 1.8 MG/DL (ref 1.6–2.4)
MCH RBC QN AUTO: 31.3 PG (ref 26.6–33)
MCHC RBC AUTO-ENTMCNC: 34.8 G/DL (ref 31.5–35.7)
MCV RBC AUTO: 89.8 FL (ref 79–97)
MONOCYTES # BLD: 0.8 10*3/MM3 (ref 0.1–0.9)
NEUTROPHILS # BLD AUTO: 9.69 10*3/MM3 (ref 1.7–7)
NEUTROPHILS NFR BLD MANUAL: 73 % (ref 42.7–76)
NEUTS BAND NFR BLD MANUAL: 12 % (ref 0–5)
PATH REPORT.FINAL DX SPEC: NORMAL
PATH REPORT.GROSS SPEC: NORMAL
PLAT MORPH BLD: NORMAL
PLATELET # BLD AUTO: 120 10*3/MM3 (ref 140–450)
PMV BLD AUTO: 8.9 FL (ref 6–12)
POTASSIUM SERPL-SCNC: 4.1 MMOL/L (ref 3.5–5.2)
PROTHROMBIN TIME: 10.7 SECONDS (ref 9.6–11.7)
RBC # BLD AUTO: 3.52 10*6/MM3 (ref 4.14–5.8)
RBC MORPH BLD: NORMAL
SCAN SLIDE: NORMAL
SODIUM SERPL-SCNC: 139 MMOL/L (ref 136–145)
VARIANT LYMPHS NFR BLD MANUAL: 1 % (ref 0–5)
VARIANT LYMPHS NFR BLD MANUAL: 6 % (ref 19.6–45.3)
WBC MORPH BLD: NORMAL
WBC NRBC COR # BLD: 11.4 10*3/MM3 (ref 3.4–10.8)

## 2022-03-29 PROCEDURE — 85025 COMPLETE CBC W/AUTO DIFF WBC: CPT | Performed by: NURSE PRACTITIONER

## 2022-03-29 PROCEDURE — 97166 OT EVAL MOD COMPLEX 45 MIN: CPT

## 2022-03-29 PROCEDURE — 25010000002 CEFAZOLIN PER 500 MG: Performed by: NURSE PRACTITIONER

## 2022-03-29 PROCEDURE — 82962 GLUCOSE BLOOD TEST: CPT

## 2022-03-29 PROCEDURE — 97163 PT EVAL HIGH COMPLEX 45 MIN: CPT

## 2022-03-29 PROCEDURE — 93010 ELECTROCARDIOGRAM REPORT: CPT | Performed by: INTERNAL MEDICINE

## 2022-03-29 PROCEDURE — 25010000002 CALCIUM GLUCONATE-NACL 1-0.675 GM/50ML-% SOLUTION: Performed by: NURSE PRACTITIONER

## 2022-03-29 PROCEDURE — 25010000002 MAGNESIUM SULFATE IN D5W 1G/100ML (PREMIX) 1-5 GM/100ML-% SOLUTION: Performed by: NURSE PRACTITIONER

## 2022-03-29 PROCEDURE — 85730 THROMBOPLASTIN TIME PARTIAL: CPT | Performed by: THORACIC SURGERY (CARDIOTHORACIC VASCULAR SURGERY)

## 2022-03-29 PROCEDURE — 25010000002 FUROSEMIDE PER 20 MG: Performed by: NURSE PRACTITIONER

## 2022-03-29 PROCEDURE — 80048 BASIC METABOLIC PNL TOTAL CA: CPT | Performed by: THORACIC SURGERY (CARDIOTHORACIC VASCULAR SURGERY)

## 2022-03-29 PROCEDURE — 0 POTASSIUM CHLORIDE 10 MEQ/100ML SOLUTION: Performed by: NURSE PRACTITIONER

## 2022-03-29 PROCEDURE — 71045 X-RAY EXAM CHEST 1 VIEW: CPT

## 2022-03-29 PROCEDURE — 83735 ASSAY OF MAGNESIUM: CPT | Performed by: NURSE PRACTITIONER

## 2022-03-29 PROCEDURE — 85007 BL SMEAR W/DIFF WBC COUNT: CPT | Performed by: NURSE PRACTITIONER

## 2022-03-29 PROCEDURE — 85610 PROTHROMBIN TIME: CPT | Performed by: NURSE PRACTITIONER

## 2022-03-29 PROCEDURE — 63710000001 INSULIN REGULAR HUMAN PER 5 UNITS: Performed by: NURSE PRACTITIONER

## 2022-03-29 PROCEDURE — 99024 POSTOP FOLLOW-UP VISIT: CPT | Performed by: NURSE PRACTITIONER

## 2022-03-29 PROCEDURE — 82330 ASSAY OF CALCIUM: CPT | Performed by: NURSE PRACTITIONER

## 2022-03-29 PROCEDURE — 99232 SBSQ HOSP IP/OBS MODERATE 35: CPT | Performed by: INTERNAL MEDICINE

## 2022-03-29 PROCEDURE — 25010000002 FUROSEMIDE PER 20 MG: Performed by: THORACIC SURGERY (CARDIOTHORACIC VASCULAR SURGERY)

## 2022-03-29 PROCEDURE — 25010000002 MORPHINE PER 10 MG: Performed by: NURSE PRACTITIONER

## 2022-03-29 PROCEDURE — 94799 UNLISTED PULMONARY SVC/PX: CPT

## 2022-03-29 PROCEDURE — 93005 ELECTROCARDIOGRAM TRACING: CPT | Performed by: NURSE PRACTITIONER

## 2022-03-29 RX ORDER — POTASSIUM CHLORIDE 20 MEQ/1
20 TABLET, EXTENDED RELEASE ORAL ONCE
Status: COMPLETED | OUTPATIENT
Start: 2022-03-29 | End: 2022-03-29

## 2022-03-29 RX ORDER — FUROSEMIDE 10 MG/ML
20 INJECTION INTRAMUSCULAR; INTRAVENOUS ONCE
Status: DISCONTINUED | OUTPATIENT
Start: 2022-03-30 | End: 2022-03-30

## 2022-03-29 RX ORDER — GUAIFENESIN 600 MG/1
1200 TABLET, EXTENDED RELEASE ORAL EVERY 12 HOURS SCHEDULED
Status: DISCONTINUED | OUTPATIENT
Start: 2022-03-29 | End: 2022-04-04 | Stop reason: HOSPADM

## 2022-03-29 RX ORDER — CALCIUM GLUCONATE 20 MG/ML
1 INJECTION, SOLUTION INTRAVENOUS ONCE
Status: COMPLETED | OUTPATIENT
Start: 2022-03-29 | End: 2022-03-29

## 2022-03-29 RX ORDER — DIPHENOXYLATE HYDROCHLORIDE AND ATROPINE SULFATE 2.5; .025 MG/1; MG/1
1 TABLET ORAL DAILY
Status: DISCONTINUED | OUTPATIENT
Start: 2022-03-29 | End: 2022-04-04 | Stop reason: HOSPADM

## 2022-03-29 RX ORDER — FUROSEMIDE 10 MG/ML
40 INJECTION INTRAMUSCULAR; INTRAVENOUS ONCE
Status: COMPLETED | OUTPATIENT
Start: 2022-03-29 | End: 2022-03-29

## 2022-03-29 RX ORDER — FOLIC ACID 1 MG/1
1 TABLET ORAL DAILY
Status: DISCONTINUED | OUTPATIENT
Start: 2022-03-29 | End: 2022-04-04 | Stop reason: HOSPADM

## 2022-03-29 RX ADMIN — CHLORHEXIDINE GLUCONATE 15 ML: 1.2 SOLUTION ORAL at 08:51

## 2022-03-29 RX ADMIN — GUAIFENESIN 1200 MG: 600 TABLET, EXTENDED RELEASE ORAL at 20:21

## 2022-03-29 RX ADMIN — POTASSIUM CHLORIDE 10 MEQ: 10 INJECTION, SOLUTION INTRAVENOUS at 03:26

## 2022-03-29 RX ADMIN — CALCIUM GLUCONATE 1 G: 20 INJECTION, SOLUTION INTRAVENOUS at 11:14

## 2022-03-29 RX ADMIN — WATER 2 G: 1000 INJECTION, SOLUTION INTRAVENOUS at 09:36

## 2022-03-29 RX ADMIN — WATER 2 G: 1000 INJECTION, SOLUTION INTRAVENOUS at 01:54

## 2022-03-29 RX ADMIN — FUROSEMIDE 40 MG: 10 INJECTION, SOLUTION INTRAMUSCULAR; INTRAVENOUS at 08:50

## 2022-03-29 RX ADMIN — FOLIC ACID 1 MG: 1 TABLET ORAL at 11:15

## 2022-03-29 RX ADMIN — SENNOSIDES AND DOCUSATE SODIUM 2 TABLET: 50; 8.6 TABLET ORAL at 08:50

## 2022-03-29 RX ADMIN — ATORVASTATIN CALCIUM 40 MG: 40 TABLET, FILM COATED ORAL at 20:21

## 2022-03-29 RX ADMIN — MAGNESIUM SULFATE 1 G: 1 INJECTION INTRAVENOUS at 01:54

## 2022-03-29 RX ADMIN — MORPHINE SULFATE 2 MG: 2 INJECTION, SOLUTION INTRAMUSCULAR; INTRAVENOUS at 08:46

## 2022-03-29 RX ADMIN — POTASSIUM CHLORIDE 20 MEQ: 1500 TABLET, EXTENDED RELEASE ORAL at 08:57

## 2022-03-29 RX ADMIN — ACETAMINOPHEN 650 MG: 325 TABLET ORAL at 15:11

## 2022-03-29 RX ADMIN — POLYETHYLENE GLYCOL 3350 17 G: 17 POWDER, FOR SOLUTION ORAL at 20:45

## 2022-03-29 RX ADMIN — THERA TABS 1 TABLET: TAB at 11:15

## 2022-03-29 RX ADMIN — ACETAMINOPHEN 650 MG: 325 TABLET ORAL at 02:02

## 2022-03-29 RX ADMIN — SENNOSIDES AND DOCUSATE SODIUM 2 TABLET: 50; 8.6 TABLET ORAL at 20:21

## 2022-03-29 RX ADMIN — HYDROCODONE BITARTRATE AND ACETAMINOPHEN 1 TABLET: 5; 325 TABLET ORAL at 11:14

## 2022-03-29 RX ADMIN — ASPIRIN 81 MG: 81 TABLET, COATED ORAL at 08:50

## 2022-03-29 RX ADMIN — POTASSIUM CHLORIDE 10 MEQ: 10 INJECTION, SOLUTION INTRAVENOUS at 01:10

## 2022-03-29 RX ADMIN — SODIUM CHLORIDE 5.4 UNITS/HR: 9 INJECTION, SOLUTION INTRAVENOUS at 03:25

## 2022-03-29 RX ADMIN — MUPIROCIN 1 APPLICATION: 20 OINTMENT TOPICAL at 08:58

## 2022-03-29 RX ADMIN — HYDROCODONE BITARTRATE AND ACETAMINOPHEN 1 TABLET: 5; 325 TABLET ORAL at 20:21

## 2022-03-29 RX ADMIN — PANTOPRAZOLE SODIUM 40 MG: 40 TABLET, DELAYED RELEASE ORAL at 06:06

## 2022-03-29 RX ADMIN — POTASSIUM CHLORIDE 10 MEQ: 10 INJECTION, SOLUTION INTRAVENOUS at 02:00

## 2022-03-29 RX ADMIN — SILVER NITRATE APPLICATORS 1 APPLICATION: 25; 75 STICK TOPICAL at 08:50

## 2022-03-29 RX ADMIN — CHLORHEXIDINE GLUCONATE 15 ML: 1.2 SOLUTION ORAL at 20:21

## 2022-03-29 RX ADMIN — POLYETHYLENE GLYCOL 3350 17 G: 17 POWDER, FOR SOLUTION ORAL at 08:49

## 2022-03-29 RX ADMIN — WATER 2 G: 1000 INJECTION, SOLUTION INTRAVENOUS at 17:49

## 2022-03-29 RX ADMIN — MAGNESIUM SULFATE 1 G: 1 INJECTION INTRAVENOUS at 09:36

## 2022-03-29 RX ADMIN — Medication 100 MG: at 11:14

## 2022-03-29 RX ADMIN — GUAIFENESIN 1200 MG: 600 TABLET, EXTENDED RELEASE ORAL at 11:14

## 2022-03-29 RX ADMIN — ACETAMINOPHEN 650 MG: 325 TABLET ORAL at 08:50

## 2022-03-30 ENCOUNTER — APPOINTMENT (OUTPATIENT)
Dept: GENERAL RADIOLOGY | Facility: HOSPITAL | Age: 72
End: 2022-03-30

## 2022-03-30 LAB
ANION GAP SERPL CALCULATED.3IONS-SCNC: 14 MMOL/L (ref 5–15)
BUN SERPL-MCNC: 14 MG/DL (ref 8–23)
BUN/CREAT SERPL: 13.5 (ref 7–25)
CALCIUM SPEC-SCNC: 9.1 MG/DL (ref 8.6–10.5)
CHLORIDE SERPL-SCNC: 95 MMOL/L (ref 98–107)
CO2 SERPL-SCNC: 23 MMOL/L (ref 22–29)
CREAT SERPL-MCNC: 1.04 MG/DL (ref 0.76–1.27)
DEPRECATED RDW RBC AUTO: 46.4 FL (ref 37–54)
EGFRCR SERPLBLD CKD-EPI 2021: 77.2 ML/MIN/1.73
ERYTHROCYTE [DISTWIDTH] IN BLOOD BY AUTOMATED COUNT: 14.9 % (ref 12.3–15.4)
GLUCOSE BLDC GLUCOMTR-MCNC: 137 MG/DL (ref 70–105)
GLUCOSE BLDC GLUCOMTR-MCNC: 141 MG/DL (ref 70–105)
GLUCOSE BLDC GLUCOMTR-MCNC: 147 MG/DL (ref 70–105)
GLUCOSE BLDC GLUCOMTR-MCNC: 149 MG/DL (ref 70–105)
GLUCOSE BLDC GLUCOMTR-MCNC: 151 MG/DL (ref 70–105)
GLUCOSE BLDC GLUCOMTR-MCNC: 153 MG/DL (ref 70–105)
GLUCOSE BLDC GLUCOMTR-MCNC: 158 MG/DL (ref 70–105)
GLUCOSE BLDC GLUCOMTR-MCNC: 159 MG/DL (ref 70–105)
GLUCOSE BLDC GLUCOMTR-MCNC: 167 MG/DL (ref 70–105)
GLUCOSE BLDC GLUCOMTR-MCNC: 170 MG/DL (ref 70–105)
GLUCOSE BLDC GLUCOMTR-MCNC: 174 MG/DL (ref 70–105)
GLUCOSE BLDC GLUCOMTR-MCNC: 196 MG/DL (ref 70–105)
GLUCOSE SERPL-MCNC: 139 MG/DL (ref 65–99)
HCT VFR BLD AUTO: 29.7 % (ref 37.5–51)
HGB BLD-MCNC: 10.2 G/DL (ref 13–17.7)
HOLD SPECIMEN: NORMAL
MCH RBC QN AUTO: 31.5 PG (ref 26.6–33)
MCHC RBC AUTO-ENTMCNC: 34.4 G/DL (ref 31.5–35.7)
MCV RBC AUTO: 91.5 FL (ref 79–97)
PLATELET # BLD AUTO: 100 10*3/MM3 (ref 140–450)
PMV BLD AUTO: 10 FL (ref 6–12)
POTASSIUM SERPL-SCNC: 3.7 MMOL/L (ref 3.5–5.2)
RBC # BLD AUTO: 3.24 10*6/MM3 (ref 4.14–5.8)
SODIUM SERPL-SCNC: 132 MMOL/L (ref 136–145)
WBC NRBC COR # BLD: 11.4 10*3/MM3 (ref 3.4–10.8)

## 2022-03-30 PROCEDURE — 63710000001 INSULIN REGULAR HUMAN PER 5 UNITS: Performed by: NURSE PRACTITIONER

## 2022-03-30 PROCEDURE — 25010000002 AMIODARONE IN DEXTROSE 5% 150-4.21 MG/100ML-% SOLUTION: Performed by: NURSE PRACTITIONER

## 2022-03-30 PROCEDURE — 25010000002 FUROSEMIDE PER 20 MG: Performed by: THORACIC SURGERY (CARDIOTHORACIC VASCULAR SURGERY)

## 2022-03-30 PROCEDURE — 82962 GLUCOSE BLOOD TEST: CPT

## 2022-03-30 PROCEDURE — 93005 ELECTROCARDIOGRAM TRACING: CPT | Performed by: NURSE PRACTITIONER

## 2022-03-30 PROCEDURE — 71045 X-RAY EXAM CHEST 1 VIEW: CPT

## 2022-03-30 PROCEDURE — 63710000001 INSULIN REGULAR HUMAN PER 5 UNITS

## 2022-03-30 PROCEDURE — 93005 ELECTROCARDIOGRAM TRACING: CPT | Performed by: THORACIC SURGERY (CARDIOTHORACIC VASCULAR SURGERY)

## 2022-03-30 PROCEDURE — 97116 GAIT TRAINING THERAPY: CPT

## 2022-03-30 PROCEDURE — 25010000002 ENOXAPARIN PER 10 MG: Performed by: NURSE PRACTITIONER

## 2022-03-30 PROCEDURE — 25010000002 MAGNESIUM SULFATE 2 GM/50ML SOLUTION: Performed by: THORACIC SURGERY (CARDIOTHORACIC VASCULAR SURGERY)

## 2022-03-30 PROCEDURE — 85027 COMPLETE CBC AUTOMATED: CPT | Performed by: NURSE PRACTITIONER

## 2022-03-30 PROCEDURE — 25010000002 AMIODARONE IN DEXTROSE 5% 150-4.21 MG/100ML-% SOLUTION

## 2022-03-30 PROCEDURE — 25010000002 CEFAZOLIN PER 500 MG: Performed by: NURSE PRACTITIONER

## 2022-03-30 PROCEDURE — 93010 ELECTROCARDIOGRAM REPORT: CPT | Performed by: INTERNAL MEDICINE

## 2022-03-30 PROCEDURE — 99233 SBSQ HOSP IP/OBS HIGH 50: CPT | Performed by: INTERNAL MEDICINE

## 2022-03-30 PROCEDURE — 80048 BASIC METABOLIC PNL TOTAL CA: CPT | Performed by: NURSE PRACTITIONER

## 2022-03-30 PROCEDURE — 25010000002 AMIODARONE IN DEXTROSE 5% 360-4.14 MG/200ML-% SOLUTION: Performed by: NURSE PRACTITIONER

## 2022-03-30 PROCEDURE — 99024 POSTOP FOLLOW-UP VISIT: CPT

## 2022-03-30 PROCEDURE — 25010000002 AMIODARONE IN DEXTROSE 5% 360-4.14 MG/200ML-% SOLUTION

## 2022-03-30 RX ORDER — POTASSIUM CHLORIDE 750 MG/1
10 TABLET, FILM COATED, EXTENDED RELEASE ORAL 2 TIMES DAILY WITH MEALS
Status: DISCONTINUED | OUTPATIENT
Start: 2022-03-30 | End: 2022-04-02

## 2022-03-30 RX ORDER — FUROSEMIDE 40 MG/1
40 TABLET ORAL
Status: DISCONTINUED | OUTPATIENT
Start: 2022-03-30 | End: 2022-04-02

## 2022-03-30 RX ORDER — INSULIN LISPRO 100 [IU]/ML
0-9 INJECTION, SOLUTION INTRAVENOUS; SUBCUTANEOUS AS NEEDED
Status: DISCONTINUED | OUTPATIENT
Start: 2022-03-31 | End: 2022-04-03

## 2022-03-30 RX ORDER — FUROSEMIDE 10 MG/ML
40 INJECTION INTRAMUSCULAR; INTRAVENOUS ONCE
Status: COMPLETED | OUTPATIENT
Start: 2022-03-30 | End: 2022-03-30

## 2022-03-30 RX ORDER — ISOPROPYL ALCOHOL 700 MG/ML
1 CLOTH TOPICAL DAILY
Qty: 100 EACH | Refills: 2 | OUTPATIENT
Start: 2022-03-30

## 2022-03-30 RX ORDER — INSULIN LISPRO 100 [IU]/ML
0-9 INJECTION, SOLUTION INTRAVENOUS; SUBCUTANEOUS
Status: DISCONTINUED | OUTPATIENT
Start: 2022-03-31 | End: 2022-04-03

## 2022-03-30 RX ORDER — AMIODARONE HYDROCHLORIDE 200 MG/1
200 TABLET ORAL 2 TIMES DAILY WITH MEALS
Status: DISCONTINUED | OUTPATIENT
Start: 2022-03-30 | End: 2022-03-30

## 2022-03-30 RX ORDER — FUROSEMIDE 10 MG/ML
INJECTION INTRAMUSCULAR; INTRAVENOUS
Status: DISPENSED
Start: 2022-03-30 | End: 2022-03-31

## 2022-03-30 RX ORDER — LANCETS
1 EACH MISCELLANEOUS DAILY
Qty: 100 EACH | Refills: 2 | OUTPATIENT
Start: 2022-03-30

## 2022-03-30 RX ORDER — MAGNESIUM SULFATE HEPTAHYDRATE 40 MG/ML
2 INJECTION, SOLUTION INTRAVENOUS ONCE
Status: COMPLETED | OUTPATIENT
Start: 2022-03-30 | End: 2022-03-30

## 2022-03-30 RX ORDER — TAMSULOSIN HYDROCHLORIDE 0.4 MG/1
0.4 CAPSULE ORAL NIGHTLY
Status: DISCONTINUED | OUTPATIENT
Start: 2022-03-30 | End: 2022-04-04 | Stop reason: HOSPADM

## 2022-03-30 RX ORDER — FUROSEMIDE 10 MG/ML
20 INJECTION INTRAMUSCULAR; INTRAVENOUS ONCE
Status: COMPLETED | OUTPATIENT
Start: 2022-03-30 | End: 2022-03-30

## 2022-03-30 RX ORDER — BLOOD SUGAR DIAGNOSTIC
1 STRIP MISCELLANEOUS DAILY
Qty: 100 EACH | Refills: 2 | OUTPATIENT
Start: 2022-03-30

## 2022-03-30 RX ADMIN — WATER 2 G: 1000 INJECTION, SOLUTION INTRAVENOUS at 01:36

## 2022-03-30 RX ADMIN — MAGNESIUM SULFATE HEPTAHYDRATE 2 G: 2 INJECTION, SOLUTION INTRAVENOUS at 09:51

## 2022-03-30 RX ADMIN — Medication 100 MG: at 09:50

## 2022-03-30 RX ADMIN — AMIODARONE HYDROCHLORIDE 0.5 MG/MIN: 1.8 INJECTION, SOLUTION INTRAVENOUS at 18:07

## 2022-03-30 RX ADMIN — CHLORHEXIDINE GLUCONATE 15 ML: 1.2 SOLUTION ORAL at 09:49

## 2022-03-30 RX ADMIN — POLYETHYLENE GLYCOL 3350 17 G: 17 POWDER, FOR SOLUTION ORAL at 21:04

## 2022-03-30 RX ADMIN — THERA TABS 1 TABLET: TAB at 09:50

## 2022-03-30 RX ADMIN — GUAIFENESIN 1200 MG: 600 TABLET, EXTENDED RELEASE ORAL at 09:50

## 2022-03-30 RX ADMIN — FUROSEMIDE 20 MG: 10 INJECTION, SOLUTION INTRAMUSCULAR; INTRAVENOUS at 18:47

## 2022-03-30 RX ADMIN — POTASSIUM CHLORIDE 20 MEQ: 1500 TABLET, EXTENDED RELEASE ORAL at 08:57

## 2022-03-30 RX ADMIN — AMIODARONE HYDROCHLORIDE 1 MG/MIN: 1.8 INJECTION, SOLUTION INTRAVENOUS at 09:48

## 2022-03-30 RX ADMIN — AMIODARONE HYDROCHLORIDE 150 MG: 1.5 INJECTION, SOLUTION INTRAVENOUS at 04:16

## 2022-03-30 RX ADMIN — PANTOPRAZOLE SODIUM 40 MG: 40 TABLET, DELAYED RELEASE ORAL at 06:03

## 2022-03-30 RX ADMIN — ENOXAPARIN SODIUM 40 MG: 40 INJECTION SUBCUTANEOUS at 16:46

## 2022-03-30 RX ADMIN — ATORVASTATIN CALCIUM 40 MG: 40 TABLET, FILM COATED ORAL at 21:02

## 2022-03-30 RX ADMIN — FUROSEMIDE 40 MG: 40 TABLET ORAL at 16:59

## 2022-03-30 RX ADMIN — SENNOSIDES AND DOCUSATE SODIUM 2 TABLET: 50; 8.6 TABLET ORAL at 09:49

## 2022-03-30 RX ADMIN — FUROSEMIDE 40 MG: 10 INJECTION, SOLUTION INTRAMUSCULAR; INTRAVENOUS at 00:14

## 2022-03-30 RX ADMIN — MUPIROCIN 1 APPLICATION: 20 OINTMENT TOPICAL at 09:49

## 2022-03-30 RX ADMIN — SENNOSIDES AND DOCUSATE SODIUM 2 TABLET: 50; 8.6 TABLET ORAL at 21:04

## 2022-03-30 RX ADMIN — POTASSIUM CHLORIDE 10 MEQ: 750 TABLET, EXTENDED RELEASE ORAL at 16:58

## 2022-03-30 RX ADMIN — CHLORHEXIDINE GLUCONATE 15 ML: 1.2 SOLUTION ORAL at 21:00

## 2022-03-30 RX ADMIN — AMIODARONE HYDROCHLORIDE 150 MG: 1.5 INJECTION, SOLUTION INTRAVENOUS at 09:47

## 2022-03-30 RX ADMIN — FOLIC ACID 1 MG: 1 TABLET ORAL at 09:49

## 2022-03-30 RX ADMIN — HYDROCODONE BITARTRATE AND ACETAMINOPHEN 1 TABLET: 5; 325 TABLET ORAL at 09:50

## 2022-03-30 RX ADMIN — POTASSIUM CHLORIDE 20 MEQ: 1500 TABLET, EXTENDED RELEASE ORAL at 00:17

## 2022-03-30 RX ADMIN — AMIODARONE HYDROCHLORIDE 1 MG/MIN: 1.8 INJECTION, SOLUTION INTRAVENOUS at 04:28

## 2022-03-30 RX ADMIN — FUROSEMIDE 40 MG: 40 TABLET ORAL at 09:49

## 2022-03-30 RX ADMIN — SODIUM CHLORIDE 4.5 UNITS/HR: 9 INJECTION, SOLUTION INTRAVENOUS at 04:25

## 2022-03-30 RX ADMIN — POLYETHYLENE GLYCOL 3350 17 G: 17 POWDER, FOR SOLUTION ORAL at 09:49

## 2022-03-30 RX ADMIN — SODIUM CHLORIDE 7.2 UNITS/HR: 9 INJECTION, SOLUTION INTRAVENOUS at 23:49

## 2022-03-30 RX ADMIN — SODIUM CHLORIDE 7.2 UNITS/HR: 9 INJECTION, SOLUTION INTRAVENOUS at 22:30

## 2022-03-30 RX ADMIN — ASPIRIN 81 MG: 81 TABLET, COATED ORAL at 09:50

## 2022-03-30 RX ADMIN — GUAIFENESIN 1200 MG: 600 TABLET, EXTENDED RELEASE ORAL at 21:03

## 2022-03-30 RX ADMIN — MUPIROCIN 1 APPLICATION: 20 OINTMENT TOPICAL at 21:00

## 2022-03-31 LAB
ANION GAP SERPL CALCULATED.3IONS-SCNC: 15 MMOL/L (ref 5–15)
BUN SERPL-MCNC: 16 MG/DL (ref 8–23)
BUN/CREAT SERPL: 15.4 (ref 7–25)
CALCIUM SPEC-SCNC: 8.8 MG/DL (ref 8.6–10.5)
CHLORIDE SERPL-SCNC: 95 MMOL/L (ref 98–107)
CO2 SERPL-SCNC: 26 MMOL/L (ref 22–29)
CREAT SERPL-MCNC: 1.04 MG/DL (ref 0.76–1.27)
DEPRECATED RDW RBC AUTO: 45.5 FL (ref 37–54)
EGFRCR SERPLBLD CKD-EPI 2021: 77.2 ML/MIN/1.73
ERYTHROCYTE [DISTWIDTH] IN BLOOD BY AUTOMATED COUNT: 14.7 % (ref 12.3–15.4)
GLUCOSE BLDC GLUCOMTR-MCNC: 105 MG/DL (ref 70–105)
GLUCOSE BLDC GLUCOMTR-MCNC: 111 MG/DL (ref 70–105)
GLUCOSE BLDC GLUCOMTR-MCNC: 170 MG/DL (ref 70–105)
GLUCOSE BLDC GLUCOMTR-MCNC: 172 MG/DL (ref 70–105)
GLUCOSE BLDC GLUCOMTR-MCNC: 220 MG/DL (ref 70–105)
GLUCOSE BLDC GLUCOMTR-MCNC: 272 MG/DL (ref 70–105)
GLUCOSE BLDC GLUCOMTR-MCNC: 304 MG/DL (ref 70–105)
GLUCOSE SERPL-MCNC: 118 MG/DL (ref 65–99)
HCT VFR BLD AUTO: 31.8 % (ref 37.5–51)
HGB BLD-MCNC: 10.9 G/DL (ref 13–17.7)
MCH RBC QN AUTO: 30.4 PG (ref 26.6–33)
MCHC RBC AUTO-ENTMCNC: 34.2 G/DL (ref 31.5–35.7)
MCV RBC AUTO: 89 FL (ref 79–97)
PLATELET # BLD AUTO: 123 10*3/MM3 (ref 140–450)
PMV BLD AUTO: 9.6 FL (ref 6–12)
POTASSIUM SERPL-SCNC: 3.5 MMOL/L (ref 3.5–5.2)
POTASSIUM SERPL-SCNC: 3.8 MMOL/L (ref 3.5–5.2)
RBC # BLD AUTO: 3.57 10*6/MM3 (ref 4.14–5.8)
SODIUM SERPL-SCNC: 136 MMOL/L (ref 136–145)
WBC NRBC COR # BLD: 9.9 10*3/MM3 (ref 3.4–10.8)

## 2022-03-31 PROCEDURE — 99232 SBSQ HOSP IP/OBS MODERATE 35: CPT | Performed by: INTERNAL MEDICINE

## 2022-03-31 PROCEDURE — 84132 ASSAY OF SERUM POTASSIUM: CPT | Performed by: THORACIC SURGERY (CARDIOTHORACIC VASCULAR SURGERY)

## 2022-03-31 PROCEDURE — 63710000001 INSULIN LISPRO (HUMAN) PER 5 UNITS: Performed by: NURSE PRACTITIONER

## 2022-03-31 PROCEDURE — 93005 ELECTROCARDIOGRAM TRACING: CPT

## 2022-03-31 PROCEDURE — 97535 SELF CARE MNGMENT TRAINING: CPT

## 2022-03-31 PROCEDURE — 99024 POSTOP FOLLOW-UP VISIT: CPT | Performed by: NURSE PRACTITIONER

## 2022-03-31 PROCEDURE — 93010 ELECTROCARDIOGRAM REPORT: CPT | Performed by: INTERNAL MEDICINE

## 2022-03-31 PROCEDURE — 85027 COMPLETE CBC AUTOMATED: CPT | Performed by: NURSE PRACTITIONER

## 2022-03-31 PROCEDURE — 82962 GLUCOSE BLOOD TEST: CPT

## 2022-03-31 PROCEDURE — 25010000002 MAGNESIUM SULFATE 2 GM/50ML SOLUTION: Performed by: THORACIC SURGERY (CARDIOTHORACIC VASCULAR SURGERY)

## 2022-03-31 PROCEDURE — 80048 BASIC METABOLIC PNL TOTAL CA: CPT | Performed by: NURSE PRACTITIONER

## 2022-03-31 PROCEDURE — 25010000002 AMIODARONE IN DEXTROSE 5% 360-4.14 MG/200ML-% SOLUTION: Performed by: NURSE PRACTITIONER

## 2022-03-31 PROCEDURE — 94762 N-INVAS EAR/PLS OXIMTRY CONT: CPT

## 2022-03-31 PROCEDURE — 97530 THERAPEUTIC ACTIVITIES: CPT

## 2022-03-31 PROCEDURE — 25010000002 ENOXAPARIN PER 10 MG: Performed by: NURSE PRACTITIONER

## 2022-03-31 RX ORDER — LORAZEPAM 0.5 MG/1
0.25 TABLET ORAL EVERY 12 HOURS SCHEDULED
Status: DISCONTINUED | OUTPATIENT
Start: 2022-03-31 | End: 2022-04-04 | Stop reason: HOSPADM

## 2022-03-31 RX ORDER — AMIODARONE HYDROCHLORIDE 200 MG/1
400 TABLET ORAL 2 TIMES DAILY WITH MEALS
Status: DISCONTINUED | OUTPATIENT
Start: 2022-03-31 | End: 2022-04-02

## 2022-03-31 RX ORDER — MAGNESIUM SULFATE HEPTAHYDRATE 40 MG/ML
2 INJECTION, SOLUTION INTRAVENOUS ONCE
Status: COMPLETED | OUTPATIENT
Start: 2022-03-31 | End: 2022-03-31

## 2022-03-31 RX ADMIN — POTASSIUM CHLORIDE 10 MEQ: 750 TABLET, EXTENDED RELEASE ORAL at 17:17

## 2022-03-31 RX ADMIN — POTASSIUM CHLORIDE 20 MEQ: 1500 TABLET, EXTENDED RELEASE ORAL at 10:48

## 2022-03-31 RX ADMIN — MUPIROCIN 1 APPLICATION: 20 OINTMENT TOPICAL at 20:30

## 2022-03-31 RX ADMIN — POLYETHYLENE GLYCOL 3350 17 G: 17 POWDER, FOR SOLUTION ORAL at 20:30

## 2022-03-31 RX ADMIN — SENNOSIDES AND DOCUSATE SODIUM 2 TABLET: 50; 8.6 TABLET ORAL at 08:25

## 2022-03-31 RX ADMIN — ENOXAPARIN SODIUM 40 MG: 40 INJECTION SUBCUTANEOUS at 17:04

## 2022-03-31 RX ADMIN — LORAZEPAM 0.25 MG: 0.5 TABLET ORAL at 11:32

## 2022-03-31 RX ADMIN — TAMSULOSIN HYDROCHLORIDE 0.4 MG: 0.4 CAPSULE ORAL at 20:30

## 2022-03-31 RX ADMIN — PANTOPRAZOLE SODIUM 40 MG: 40 TABLET, DELAYED RELEASE ORAL at 06:14

## 2022-03-31 RX ADMIN — AMIODARONE HYDROCHLORIDE 0.5 MG/MIN: 1.8 INJECTION, SOLUTION INTRAVENOUS at 06:19

## 2022-03-31 RX ADMIN — AMIODARONE HYDROCHLORIDE 400 MG: 200 TABLET ORAL at 11:32

## 2022-03-31 RX ADMIN — ASPIRIN 81 MG: 81 TABLET, COATED ORAL at 08:21

## 2022-03-31 RX ADMIN — GUAIFENESIN 1200 MG: 600 TABLET, EXTENDED RELEASE ORAL at 20:30

## 2022-03-31 RX ADMIN — FUROSEMIDE 40 MG: 40 TABLET ORAL at 17:06

## 2022-03-31 RX ADMIN — AMIODARONE HYDROCHLORIDE 400 MG: 200 TABLET ORAL at 17:05

## 2022-03-31 RX ADMIN — FUROSEMIDE 40 MG: 40 TABLET ORAL at 08:22

## 2022-03-31 RX ADMIN — CHLORHEXIDINE GLUCONATE 15 ML: 1.2 SOLUTION ORAL at 08:21

## 2022-03-31 RX ADMIN — POLYETHYLENE GLYCOL 3350 17 G: 17 POWDER, FOR SOLUTION ORAL at 08:21

## 2022-03-31 RX ADMIN — INSULIN LISPRO 4 UNITS: 100 INJECTION, SOLUTION INTRAVENOUS; SUBCUTANEOUS at 20:42

## 2022-03-31 RX ADMIN — CHLORHEXIDINE GLUCONATE 15 ML: 1.2 SOLUTION ORAL at 20:30

## 2022-03-31 RX ADMIN — ATORVASTATIN CALCIUM 40 MG: 40 TABLET, FILM COATED ORAL at 20:30

## 2022-03-31 RX ADMIN — LORAZEPAM 0.25 MG: 0.5 TABLET ORAL at 20:30

## 2022-03-31 RX ADMIN — INSULIN LISPRO 6 UNITS: 100 INJECTION, SOLUTION INTRAVENOUS; SUBCUTANEOUS at 17:16

## 2022-03-31 RX ADMIN — SENNOSIDES AND DOCUSATE SODIUM 2 TABLET: 50; 8.6 TABLET ORAL at 20:30

## 2022-03-31 RX ADMIN — FOLIC ACID 1 MG: 1 TABLET ORAL at 08:22

## 2022-03-31 RX ADMIN — POTASSIUM CHLORIDE 20 MEQ: 1500 TABLET, EXTENDED RELEASE ORAL at 09:04

## 2022-03-31 RX ADMIN — GUAIFENESIN 1200 MG: 600 TABLET, EXTENDED RELEASE ORAL at 08:22

## 2022-03-31 RX ADMIN — MAGNESIUM SULFATE HEPTAHYDRATE 2 G: 2 INJECTION, SOLUTION INTRAVENOUS at 08:20

## 2022-03-31 RX ADMIN — THERA TABS 1 TABLET: TAB at 08:22

## 2022-03-31 RX ADMIN — MUPIROCIN 1 APPLICATION: 20 OINTMENT TOPICAL at 08:21

## 2022-03-31 RX ADMIN — Medication 100 MG: at 08:25

## 2022-03-31 RX ADMIN — TAMSULOSIN HYDROCHLORIDE 0.4 MG: 0.4 CAPSULE ORAL at 00:52

## 2022-04-01 LAB
ANION GAP SERPL CALCULATED.3IONS-SCNC: 12 MMOL/L (ref 5–15)
BUN SERPL-MCNC: 15 MG/DL (ref 8–23)
BUN/CREAT SERPL: 16.5 (ref 7–25)
CALCIUM SPEC-SCNC: 8.3 MG/DL (ref 8.6–10.5)
CHLORIDE SERPL-SCNC: 95 MMOL/L (ref 98–107)
CO2 SERPL-SCNC: 28 MMOL/L (ref 22–29)
CREAT SERPL-MCNC: 0.91 MG/DL (ref 0.76–1.27)
DEPRECATED RDW RBC AUTO: 44.6 FL (ref 37–54)
EGFRCR SERPLBLD CKD-EPI 2021: 90.7 ML/MIN/1.73
ERYTHROCYTE [DISTWIDTH] IN BLOOD BY AUTOMATED COUNT: 14.3 % (ref 12.3–15.4)
GLUCOSE BLDC GLUCOMTR-MCNC: 195 MG/DL (ref 70–105)
GLUCOSE BLDC GLUCOMTR-MCNC: 241 MG/DL (ref 70–105)
GLUCOSE BLDC GLUCOMTR-MCNC: 253 MG/DL (ref 70–105)
GLUCOSE BLDC GLUCOMTR-MCNC: 283 MG/DL (ref 70–105)
GLUCOSE SERPL-MCNC: 208 MG/DL (ref 65–99)
HCT VFR BLD AUTO: 27.9 % (ref 37.5–51)
HGB BLD-MCNC: 9.6 G/DL (ref 13–17.7)
MCH RBC QN AUTO: 30.5 PG (ref 26.6–33)
MCHC RBC AUTO-ENTMCNC: 34.3 G/DL (ref 31.5–35.7)
MCV RBC AUTO: 89.1 FL (ref 79–97)
PLATELET # BLD AUTO: 132 10*3/MM3 (ref 140–450)
PMV BLD AUTO: 8.8 FL (ref 6–12)
POTASSIUM SERPL-SCNC: 3.5 MMOL/L (ref 3.5–5.2)
POTASSIUM SERPL-SCNC: 3.9 MMOL/L (ref 3.5–5.2)
QT INTERVAL: 382 MS
RBC # BLD AUTO: 3.13 10*6/MM3 (ref 4.14–5.8)
SODIUM SERPL-SCNC: 135 MMOL/L (ref 136–145)
WBC NRBC COR # BLD: 6.5 10*3/MM3 (ref 3.4–10.8)

## 2022-04-01 PROCEDURE — 93005 ELECTROCARDIOGRAM TRACING: CPT | Performed by: NURSE PRACTITIONER

## 2022-04-01 PROCEDURE — 25010000002 ENOXAPARIN PER 10 MG: Performed by: NURSE PRACTITIONER

## 2022-04-01 PROCEDURE — 84132 ASSAY OF SERUM POTASSIUM: CPT | Performed by: THORACIC SURGERY (CARDIOTHORACIC VASCULAR SURGERY)

## 2022-04-01 PROCEDURE — 85027 COMPLETE CBC AUTOMATED: CPT | Performed by: NURSE PRACTITIONER

## 2022-04-01 PROCEDURE — 97110 THERAPEUTIC EXERCISES: CPT

## 2022-04-01 PROCEDURE — 97116 GAIT TRAINING THERAPY: CPT

## 2022-04-01 PROCEDURE — 99024 POSTOP FOLLOW-UP VISIT: CPT | Performed by: NURSE PRACTITIONER

## 2022-04-01 PROCEDURE — 63710000001 INSULIN LISPRO (HUMAN) PER 5 UNITS: Performed by: NURSE PRACTITIONER

## 2022-04-01 PROCEDURE — 94762 N-INVAS EAR/PLS OXIMTRY CONT: CPT

## 2022-04-01 PROCEDURE — 25010000002 CALCIUM GLUCONATE-NACL 1-0.675 GM/50ML-% SOLUTION: Performed by: THORACIC SURGERY (CARDIOTHORACIC VASCULAR SURGERY)

## 2022-04-01 PROCEDURE — 99232 SBSQ HOSP IP/OBS MODERATE 35: CPT | Performed by: INTERNAL MEDICINE

## 2022-04-01 PROCEDURE — 93010 ELECTROCARDIOGRAM REPORT: CPT | Performed by: INTERNAL MEDICINE

## 2022-04-01 PROCEDURE — 25010000002 MAGNESIUM SULFATE 2 GM/50ML SOLUTION: Performed by: THORACIC SURGERY (CARDIOTHORACIC VASCULAR SURGERY)

## 2022-04-01 PROCEDURE — 80048 BASIC METABOLIC PNL TOTAL CA: CPT | Performed by: NURSE PRACTITIONER

## 2022-04-01 PROCEDURE — 82962 GLUCOSE BLOOD TEST: CPT

## 2022-04-01 PROCEDURE — 97530 THERAPEUTIC ACTIVITIES: CPT

## 2022-04-01 RX ORDER — PIOGLITAZONEHYDROCHLORIDE 45 MG/1
45 TABLET ORAL DAILY
Status: DISCONTINUED | OUTPATIENT
Start: 2022-04-01 | End: 2022-04-04 | Stop reason: HOSPADM

## 2022-04-01 RX ORDER — CALCIUM GLUCONATE 20 MG/ML
1 INJECTION, SOLUTION INTRAVENOUS ONCE
Status: COMPLETED | OUTPATIENT
Start: 2022-04-01 | End: 2022-04-01

## 2022-04-01 RX ORDER — CLOPIDOGREL BISULFATE 75 MG/1
75 TABLET ORAL DAILY
Status: DISCONTINUED | OUTPATIENT
Start: 2022-04-02 | End: 2022-04-04 | Stop reason: HOSPADM

## 2022-04-01 RX ORDER — MAGNESIUM SULFATE HEPTAHYDRATE 40 MG/ML
2 INJECTION, SOLUTION INTRAVENOUS ONCE
Status: COMPLETED | OUTPATIENT
Start: 2022-04-01 | End: 2022-04-01

## 2022-04-01 RX ADMIN — HYDROCODONE BITARTRATE AND ACETAMINOPHEN 1 TABLET: 5; 325 TABLET ORAL at 17:38

## 2022-04-01 RX ADMIN — CHLORHEXIDINE GLUCONATE 15 ML: 1.2 SOLUTION ORAL at 20:37

## 2022-04-01 RX ADMIN — ENOXAPARIN SODIUM 40 MG: 40 INJECTION SUBCUTANEOUS at 15:35

## 2022-04-01 RX ADMIN — MUPIROCIN 1 APPLICATION: 20 OINTMENT TOPICAL at 20:40

## 2022-04-01 RX ADMIN — INSULIN LISPRO 4 UNITS: 100 INJECTION, SOLUTION INTRAVENOUS; SUBCUTANEOUS at 21:19

## 2022-04-01 RX ADMIN — AMIODARONE HYDROCHLORIDE 400 MG: 200 TABLET ORAL at 17:38

## 2022-04-01 RX ADMIN — LORAZEPAM 0.25 MG: 0.5 TABLET ORAL at 20:38

## 2022-04-01 RX ADMIN — MAGNESIUM SULFATE HEPTAHYDRATE 2 G: 2 INJECTION, SOLUTION INTRAVENOUS at 10:09

## 2022-04-01 RX ADMIN — GUAIFENESIN 1200 MG: 600 TABLET, EXTENDED RELEASE ORAL at 09:09

## 2022-04-01 RX ADMIN — PIOGLITAZONE 45 MG: 45 TABLET ORAL at 18:21

## 2022-04-01 RX ADMIN — LORAZEPAM 0.25 MG: 0.5 TABLET ORAL at 09:06

## 2022-04-01 RX ADMIN — INSULIN LISPRO 2 UNITS: 100 INJECTION, SOLUTION INTRAVENOUS; SUBCUTANEOUS at 09:12

## 2022-04-01 RX ADMIN — SENNOSIDES AND DOCUSATE SODIUM 2 TABLET: 50; 8.6 TABLET ORAL at 09:10

## 2022-04-01 RX ADMIN — THERA TABS 1 TABLET: TAB at 09:09

## 2022-04-01 RX ADMIN — CALCIUM GLUCONATE 1 G: 20 INJECTION, SOLUTION INTRAVENOUS at 10:12

## 2022-04-01 RX ADMIN — PANTOPRAZOLE SODIUM 40 MG: 40 TABLET, DELAYED RELEASE ORAL at 05:47

## 2022-04-01 RX ADMIN — POTASSIUM CHLORIDE 20 MEQ: 1500 TABLET, EXTENDED RELEASE ORAL at 12:28

## 2022-04-01 RX ADMIN — METFORMIN HYDROCHLORIDE 1000 MG: 500 TABLET ORAL at 17:38

## 2022-04-01 RX ADMIN — FUROSEMIDE 40 MG: 40 TABLET ORAL at 09:09

## 2022-04-01 RX ADMIN — INSULIN LISPRO 6 UNITS: 100 INJECTION, SOLUTION INTRAVENOUS; SUBCUTANEOUS at 12:27

## 2022-04-01 RX ADMIN — Medication 100 MG: at 09:10

## 2022-04-01 RX ADMIN — POTASSIUM CHLORIDE 20 MEQ: 1500 TABLET, EXTENDED RELEASE ORAL at 09:16

## 2022-04-01 RX ADMIN — ATORVASTATIN CALCIUM 40 MG: 40 TABLET, FILM COATED ORAL at 20:37

## 2022-04-01 RX ADMIN — POLYETHYLENE GLYCOL 3350 17 G: 17 POWDER, FOR SOLUTION ORAL at 20:38

## 2022-04-01 RX ADMIN — POTASSIUM CHLORIDE 10 MEQ: 750 TABLET, EXTENDED RELEASE ORAL at 09:09

## 2022-04-01 RX ADMIN — POLYETHYLENE GLYCOL 3350 17 G: 17 POWDER, FOR SOLUTION ORAL at 09:10

## 2022-04-01 RX ADMIN — INSULIN LISPRO 6 UNITS: 100 INJECTION, SOLUTION INTRAVENOUS; SUBCUTANEOUS at 17:39

## 2022-04-01 RX ADMIN — ACETAMINOPHEN 650 MG: 325 TABLET ORAL at 21:38

## 2022-04-01 RX ADMIN — TAMSULOSIN HYDROCHLORIDE 0.4 MG: 0.4 CAPSULE ORAL at 20:38

## 2022-04-01 RX ADMIN — AMIODARONE HYDROCHLORIDE 400 MG: 200 TABLET ORAL at 09:07

## 2022-04-01 RX ADMIN — FUROSEMIDE 40 MG: 40 TABLET ORAL at 17:38

## 2022-04-01 RX ADMIN — SENNOSIDES AND DOCUSATE SODIUM 2 TABLET: 50; 8.6 TABLET ORAL at 20:38

## 2022-04-01 RX ADMIN — MUPIROCIN 1 APPLICATION: 20 OINTMENT TOPICAL at 09:10

## 2022-04-01 RX ADMIN — ASPIRIN 81 MG: 81 TABLET, COATED ORAL at 09:09

## 2022-04-01 RX ADMIN — GUAIFENESIN 1200 MG: 600 TABLET, EXTENDED RELEASE ORAL at 20:38

## 2022-04-01 RX ADMIN — POTASSIUM CHLORIDE 10 MEQ: 750 TABLET, EXTENDED RELEASE ORAL at 18:21

## 2022-04-01 RX ADMIN — ACETAMINOPHEN 650 MG: 325 TABLET ORAL at 09:08

## 2022-04-01 RX ADMIN — FOLIC ACID 1 MG: 1 TABLET ORAL at 09:09

## 2022-04-01 NOTE — THERAPY TREATMENT NOTE
Subjective: Pt agreeable to therapeutic plan of care.  Cognition: oriented to Person, Place and Time    Objective:     Bed Mobility: N/A or Not attempted.  Functional Transfers: Mod-A  Functional Ambulation: CGA    Lower Body Dressing: Max-A  ADL Position: supported sitting and supported standing    Pain: 3 VAS  Education: Provided education on importance of mobility and skilled verbal / tactile cueing throughout intervention.     Assessment: Anurag Pickard presents with ADL impairments below baseline abilities which indicate the need for continued skilled intervention while inpatient. Tolerating session today without incident. Will continue to follow and progress as tolerated.     Plan/Recommendations:   Pt would benefit from Inpatient Rehabilitation placement at discharge from facility.   Pt desires Inpatient Rehabilitation placement at discharge. Pt cooperative; agreeable to therapeutic recommendations and plan of care.     Modified [place]: N/A = No pre-op stroke/TIA    Post-Tx Position: Up in Chair  PPE: gloves, surgical mask, eyewear protection

## 2022-04-01 NOTE — PROGRESS NOTES
S/P POD# 4 tissue AVR/ endarterectomy of RCA ostium--Aleksandr  EF 55-60% (PAULO)    Subjective:  Reports he is feeling better    No events overnight, he used CPAP  Plans for rehab now        Intake/Output Summary (Last 24 hours) at 4/1/2022 0858  Last data filed at 4/1/2022 0310  Gross per 24 hour   Intake 180 ml   Output 3125 ml   Net -2945 ml     Temp:  [98.1 °F (36.7 °C)-98.7 °F (37.1 °C)] 98.2 °F (36.8 °C)  Heart Rate:  [64-80] 77  Resp:  [15-18] 18  BP: (109-156)/(53-78) 126/65      Results from last 7 days   Lab Units 04/01/22  0531 03/31/22  0503 03/30/22  0535 03/29/22  0320 03/28/22  1244 03/28/22  1225   WBC 10*3/mm3 6.50 9.90   < > 11.40*  --  7.90   HEMOGLOBIN g/dL 9.6* 10.9*   < > 11.0*  --  11.3*   HEMOGLOBIN, POC   --   --   --   --    < >  --    HEMATOCRIT % 27.9* 31.8*   < > 31.6*  --  32.2*   HEMATOCRIT POC   --   --   --   --    < >  --    PLATELETS 10*3/mm3 132* 123*   < > 120*  --  95*   INR   --   --   --  0.96  --  1.09    < > = values in this interval not displayed.     Results from last 7 days   Lab Units 04/01/22  0531 03/30/22  0644 03/29/22  0320 03/28/22  1714 03/28/22  1225   CREATININE mg/dL 0.91   < > 0.99   < > 1.01   POTASSIUM mmol/L 3.5   < > 4.1   < > 3.2*   SODIUM mmol/L 135*   < > 139   < > 139   MAGNESIUM mg/dL  --   --  1.8  --   --    PHOSPHORUS mg/dL  --   --   --   --  1.6*    < > = values in this interval not displayed.       Physical Exam:  Neuro intact, nad, oriented this morning, just returned to bed  Tele:  SR 60s  Diminished bases, 96% RA  Sternotomy healing well  Benign abd, no BM, +flatus  No edema    Assessment/Plan:  Principal Problem:    Aortic valve stenosis  Active Problems:    Nonrheumatic aortic valve stenosis    Severe AS, EF 55-60% (PAULO)--s/p tissue AVR/ endarterectomy of RCA ostium (Aleksandr)  HTN--soft BP, no bb yet  HLD--statin  DM, type 2--preop a1c 8, insulin drip to SSI  YANA with moderate pulm HTN--CPAP compliant  COPD  ETOH abuse--reports last drink 2 days  before adm, 6 oz bourbon daily  Obesity, stage 2--BMI 38  Expected ABLA, postop--watch closely    POD# 4.  Doing well.  On asa/statin, no bb d/t soft bp.  Transition to oral amio.  Low dose Ativan added scheduled BID.  Nocturnal oximetry obtained--pt needs O2 piped thru machine currently.  Add Plavix for endarterectomy of RCA ostium.  DC wires today. Pt is very impulsive on getting up.  Sternal precautions re-iterated to pt.    Routine care--as above  D/w pt/nsg, wife, care mmgt, Dr. Brandon  Anticipate rehab at discharge--BONY Serrano, APRN  4/1/2022  08:58 EDT

## 2022-04-01 NOTE — THERAPY TREATMENT NOTE
Subjective: Pt agreeable to therapeutic plan of care.    Objective:   Bed mobility - Mod-A  Transfers - Mod-A with cues to use heart hugger  Ambulation - 150 feet Min-A and with rolling walker with cues for safe walker use and slow damian with decreased heel strike BLE  AROM BLE 2 x 10 reps in sitting    Vitals  Start- HR 79, /55 (75), sats on room air 96%  End- HR81, /53 (72), sats on room air 94%    Cardiac Rehab Initiated  Level 3: AROM Exercises. Ambulation with any level of assistance up to 150 feet.     Sitting tolerance: >10min and supported  Standing tolerance: 5-10min and supported    Precautions:   Mid-sternal incision; avoid scapular retraction and depression.   Cardiovascular impairment post-sx; encourage energy conservation strategies.    Therapeutic Exercises: 10 reps UE and LE AROM in seated position.     MET level equivalent: 1.4-2.0 (Self care ADLs in sitting / slow ambulation in room, light intensity activities)    Pain: 1 VAS  Education: Provided education on importance of mobility and skilled verbal / tactile cueing throughout intervention.     Assessment: Anurag Pickard still has unsteadiness when up on feet, LE weakness, and high risk for falls thus he is a good candidate for in-pt rehab. . He  presents with functional mobility impairments which indicate the need for skilled intervention. Tolerating session today without incident. Will continue to follow and progress as tolerated.     Plan/Recommendations:   Pt would benefit from Inpatient Rehabilitation placement at discharge from facility and requires no DME at discharge.   Pt desires Inpatient Rehabilitation placement at discharge. Pt cooperative; agreeable to therapeutic recommendations and plan of care.     Basic Mobility 6-click:  Rollin = Total, A lot = 2, A little = 3; 4 = None  Supine>Sit:   1 = Total, A lot = 2, A little = 3; 4 = None   Sit>Stand with arms:  1 = Total, A lot = 2, A little = 3; 4 =  None  Bed>Chair:   1 = Total, A lot = 2, A little = 3; 4 = None  Ambulate in room:  1 = Total, A lot = 2, A little = 3; 4 = None  3-5 Steps with railin = Total, A lot = 2, A little = 3; 4 = None  Score: 15    Modified [place]: N/A = No pre-op stroke/TIA    Post-Tx Position: Up in Chair, Alarms activated and Call light and personal items within reach  PPE: gloves, surgical mask, eyewear protection

## 2022-04-01 NOTE — PROGRESS NOTES
Cardiology Progress  Note      Patient Care Team:  Jorden Whipple PA-C as PCP - General (Physician Assistant)  Paolo Garcia MD as Consulting Physician (Cardiology)  Juan Castellon DO as Consulting Physician (Cardiology)    PATIENT IDENTIFICATION  Name: Anurag Pickard  Age: 71 y.o.  Sex: male  :  1950  MRN: [id number]                 Cardiology assessment and plan       Impressions:  Severe aortic stenosis status post successful surgical bioprosthetic aortic valve replacement  Hypertension  Hypertensive heart disease  Hyperlipidemia  Osteoarthritis  Diabetes mellitus  Obesity  Antiplatelet therapy     Status post successful surgical AVR  Patient is currently on vent sedated  Clinically hemodynamically stable  Paced rhythm  PA pressures are normal  Prior medical records reviewed  Continue current medical management  Continue postsurgical care  Renal function is stable with a creatinine of 0.9  Hemoglobin is stable at 11  Hemodynamics are stable  Vital signs are stable with a T-max of 98 heart rate of 70 respiratory 14 blood pressure is 128/66  Labs and medications reviewed  Continue current medical therapy with aspirin 81 mg p.o. once a day Lipitor 40 mg p.o. once a day  Continue Lovenox for DVT prophylaxis  Continue current medical management  Ambulate as tolerated    2022  Patient is in atrial fibrillation with controlled ventricular response currently on IV amiodarone hopefully convert to sinus rhythm  Off all the drips  Ambulating without any further problems  Hemodynamics are stable  Denies any new cardiac symptoms  Severe aortic valve stenosis status post aortic valve replacement and endarterectomy of the right coronary artery postop day 2  Patient is clinically hemodynamically stable  Agree with switching to p.o. Lasix  Consider antiplatelet therapy at the time of discharge  Hemoglobin is stable at 10.2  Renal function stable at 1.0  Sodium is low  at 132  Twelve-lead EKG shows atrial fibrillation with nonspecific ST-T changes with a heart rate of 101  Patient vital signs T-max is 98.7 heart rate is 97 respiratory rate is 22 blood pressure is 107/59  Current medications include IV amiodarone/aspirin 81 mg p.o. once a day Lipitor 40 mg p.o. once a day Lasix 40 mg p.o. twice daily Lovenox 40 mg once a day  Continue current medical management  Consider starting patient on p.o. beta-blockers if the blood pressure tolerates  Further recommendations based on patient course  Discussed with patient at bedside  Chest wall surgical site looks good  Bilateral air entry is good except for decreased breath sounds at the bases  Abdomen is soft nontender bowels are positive  Complaining of some shortness of breath and chest wall pain otherwise denies any new complaints    March 31, 2022    Converted to sinus rhythm currently in sinus rhythm  Patient amiodarone we will switch from IV to p.o.  Denies any new complaints  Sitting up in chair with no discomfort  Postop day #3 status post tissue AVR and RCA endarterectomy  Blood pressure stable intermittent hypotension  Beta-blockers were not started secondary to low blood pressures  Denies any dizziness or syncope  Continue current medical therapy  Continue supportive care  Discussed with patient and family at bedside  Vital signs T-max is 98.3 pulse is 68 respirations are 18 blood pressure is 144/76 sats are 97%  Sodium is 136 potassium 3.5 chloride is 95 creatinine is 1.0 hemoglobin is 10.9  Current medications include aspirin 81 mg p.o. once a day amiodarone 40 mg p.o. twice daily Lipitor 40 mg p.o. once a day Lasix 40 mg p.o. twice a day  Lovenox 40 mg p.o. once a day  Discussed with the patient family at bedside  Likely discharge home in the next 48 hours    4/1/2022  Patient is alert and awake sitting up in chair  Denies any new cardiac symptoms  Ambulating without any further problems  Denies any symptoms of dizziness  shortness of breath  Compliant with medical therapy  Vital signs are stable with a T-max of 97.4 heart rate is 70 blood pressure is 120 zero 6/60 and respiratory rate is 18 and sats are 97%  Still not on beta-blocker secondary to systolic blood pressures being between 110-120  Currently on amiodarone and maintaining sinus rhythm  Aspirin 81 mg p.o. once a day  Amiodarone 400 mg twice a day  Lipitor 40 mg p.o. at nighttime  Lasix 40 mg p.o. twice daily  Lovenox 40 mg subcu once a day  Consider decreasing the dose of amiodarone to 200mg twice a day are once a day at the time of discharge  Need for close monitoring and follow-up as an outpatient reviewed and discussed with patient  Postop day 4 clinically symptomatically stable  Likely discharge home today or tomorrow             Impressions:  No angiographic evidence for significant epicardial occlusive disease  Severe aortic stenosis  Moderate pulmonary hypertension     Interpretation Summary     · Left ventricular ejection fraction appears to be 56 - 60%. Left ventricular systolic function is normal.  · Moderate aortic valve regurgitation is present.  · Severe aortic valve stenosis is present.  · Aortic valve maximum pressure gradient is 72 mmHg. Aortic valve mean pressure gradient is 44.6 mmHg.  · Saline test results are negative.  · Lipomatous hypertrophy of the interatrial septum present.      Chart and labs reviewed.  History and exam findings are verified with above changes noted.  Assessment and plan notated by APC after being formulated by attending consultant.  Note that greater than 50% of the time spent in care of the patient was provided by attending consultant.          REASON FOR FOLLOW-UP:  Severe aortic valve stenosis status post successful surgical aortic valve replacement-bioprosthetic        SUBJECTIVE    Patient seen and examined, chart and labs reviewed.  Patient up in alfred with PT, now sitting in chair and appears comfortable.  Maintaining sinus  "rhythm.  He denies any nausea, shortness of breath.  Pain controlled with pain medication.  No events overnight.    REVIEW OF SYSTEMS:  Pertinent items are noted in HPI, all other systems reviewed and negative    OBJECTIVE   Potassium 3.5  Hemoglobin 10.9    ASSESSMENT  Severe aortic stenosis  Hypertension  Hypertensive heart disease  Hyperlipidemia  Osteoarthritis  Diabetes mellitus  Obesity  Antiplatelet therapy          RECOMMENDATIONS  Continue CTS postop protocol  Continue CV supportive care  Monitor rhythm closely  Activity as tolerated  Incentive spirometer  CTS note reviewed: On aspirin/statin.  BB on hold due to soft blood pressures.  Transitioning to oral amiodarone.  Nocturnal oximetry tonight with CPAP if patient agreeable.  He is currently not using home CPAP due to difficulty with set up.  Plans for discharge home with Plavix for endarterectomy of RCA ostium.          Vital Signs  Visit Vitals  /55   Pulse 78   Temp 98.2 °F (36.8 °C) (Oral)   Resp 18   Ht 180.3 cm (71\")   Wt 125 kg (276 lb 0.3 oz)   SpO2 95%   BMI 38.50 kg/m²     Oxygen Therapy  SpO2: 95 %  Pulse Oximetry Type: Continuous  Device (Oxygen Therapy): nasal cannula  Flow (L/min): 2  Oxygen Concentration (%): 40  Flowsheet Rows    Flowsheet Row First Filed Value   Admission Height 180.3 cm (71\") Documented at 03/28/2022 0540   Admission Weight 125 kg (274 lb 9.6 oz) Documented at 03/28/2022 0540        Intake & Output (last 3 days)       03/29 0701  03/30 0700 03/30 0701  03/31 0700 03/31 0701  04/01 0700 04/01 0701  04/02 0700    P.O. 120 1380 360     I.V. (mL/kg) 464 (3.7) 1197 (9.7) 60 (0.5)     Blood        IV Piggyback        Total Intake(mL/kg) 584 (4.6) 2577 (20.8) 420 (3.4)     Urine (mL/kg/hr) 2740 (0.9) 2175 (0.7) 3375 (1.1)     Blood        Chest Tube 140       Total Output 2880 2175 3375     Net -2296 +402 -2955             Urine Unmeasured Occurrence   1 x         Lines, Drains & Airways     Active LDAs     Name Placement " "date Placement time Site Days    Peripheral IV 03/28/22 0639 Posterior;Right Hand 03/28/22  0639  Hand  1    Peripheral IV 03/29/22 0820 Posterior;Right Forearm 03/29/22  0820  Forearm  less than 1    Urethral Catheter Temperature probe 16 Fr. 03/28/22  0715  -- 1    Y Chest Tube 1 and 2 1 Mediastinal 28 Fr. 2 Mediastinal 28 Fr. 03/28/22  1016  -- 1    Arterial Line 03/28/22 Left Radial 03/28/22  0700  created via procedure documentation  Radial  1    Pacer Wires 03/28/22  --  Atrial and Ventricular  1                       /55   Pulse 78   Temp 98.2 °F (36.8 °C) (Oral)   Resp 18   Ht 180.3 cm (71\")   Wt 125 kg (276 lb 0.3 oz)   SpO2 95%   BMI 38.50 kg/m²   Intake/Output last 3 shifts:  I/O last 3 completed shifts:  In: 806 [P.O.:360; I.V.:446]  Out: 4590 [Urine:4590]  Intake/Output this shift:  No intake/output data recorded.    PHYSICAL EXAM:    General: Well-developed, well-nourished 71-year-old male who is alert, cooperative, no distress, appears stated age  Head:  Normocephalic, atraumatic, mucous membranes moist  Eyes:  Conjunctiva/corneas clear, EOM's intact     Neck:  Supple,  no adenopathy; no JVD or bruit  Lungs: Clear to auscultation bilaterally, no wheezes rhonchi rales are noted  Chest wall: Dressing CDI, chest tubes noted.  Heart::  Regular rate and rhythm, S1 and S2 normal, no murmur, rub or gallop  Abdomen: Soft, non-tender, nondistended bowel sounds active  Extremities: No cyanosis, clubbing, or edema   Pulses: 2+ and symmetric all extremities  Skin:  No rashes or lesions  Neuro/psych: A&O x3. CN II through XII are grossly intact with appropriate affect      Scheduled Meds:      amiodarone, 400 mg, Oral, BID With Meals  aspirin, 81 mg, Oral, Daily  atorvastatin, 40 mg, Oral, Nightly  chlorhexidine, 15 mL, Mouth/Throat, Q12H  enoxaparin, 40 mg, Subcutaneous, Daily  folic acid, 1 mg, Oral, Daily  furosemide, 40 mg, Oral, BID  guaiFENesin, 1,200 mg, Oral, Q12H  insulin lispro, 0-9 Units, " Subcutaneous, 4x Daily With Meals & Nightly  LORazepam, 0.25 mg, Oral, Q12H  multivitamin, 1 tablet, Oral, Daily  mupirocin, 1 application, Each Nare, BID  pantoprazole, 40 mg, Oral, Q AM  polyethylene glycol, 17 g, Oral, BID  potassium chloride, 10 mEq, Oral, BID With Meals  senna-docusate sodium, 2 tablet, Oral, BID  tamsulosin, 0.4 mg, Oral, Nightly  thiamine, 100 mg, Oral, Daily        Continuous Infusions:         PRN Meds:    •  acetaminophen **OR** acetaminophen **OR** acetaminophen  •  dextrose  •  dextrose  •  glucagon (human recombinant)  •  HYDROcodone-acetaminophen  •  insulin lispro **AND** insulin lispro  •  [DISCONTINUED] Morphine **AND** naloxone  •  ondansetron  •  potassium chloride **OR** potassium chloride  •  sodium chloride        Results Review:     I reviewed the patient's new clinical results.    CBC    Results from last 7 days   Lab Units 04/01/22  0531 03/31/22  0503 03/30/22  0535 03/29/22  0320 03/28/22  2227 03/28/22  1824 03/28/22  1517 03/28/22  1244 03/28/22  1225   WBC 10*3/mm3 6.50 9.90 11.40* 11.40*  --   --   --   --  7.90   HEMOGLOBIN g/dL 9.6* 10.9* 10.2* 11.0*  --   --   --   --  11.3*   HEMOGLOBIN, POC g/dL  --   --   --   --  11.4* 11.6* 11.5*   < >  --    PLATELETS 10*3/mm3 132* 123* 100* 120*  --   --   --   --  95*    < > = values in this interval not displayed.     Cr Clearance Estimated Creatinine Clearance: 101.7 mL/min (by C-G formula based on SCr of 0.91 mg/dL).  Coag   Results from last 7 days   Lab Units 03/29/22  0320 03/28/22  1225   INR  0.96 1.09   APTT seconds 28.7 33.8*     HbA1C   Lab Results   Component Value Date    HGBA1C 8.0 (H) 03/25/2022    HGBA1C 7.5 (H) 08/10/2021    HGBA1C 8.4 (H) 04/05/2021     Blood Glucose   Glucose   Date/Time Value Ref Range Status   04/01/2022 0742 195 (H) 70 - 105 mg/dL Final     Comment:     Serial Number: [id number]:  [id number]   03/31/2022 2036 220 (H) 70 - 105 mg/dL Final     Comment:     Serial Number:  [id number]:  [id number]   03/31/2022 1709 272 (H) 70 - 105 mg/dL Final     Comment:     Serial Number: [id number]:  [id number]   03/31/2022 1515 304 (H) 70 - 105 mg/dL Final     Comment:     Serial Number: [id number]:  [id number]   03/31/2022 1130 172 (H) 70 - 105 mg/dL Final     Comment:     Serial Number: [id number]:  [id number]   03/31/2022 1018 170 (H) 70 - 105 mg/dL Final     Comment:     Serial Number: [id number]:  [id number]   03/31/2022 0836 105 70 - 105 mg/dL Final     Comment:     Serial Number: [id number]:  [id number]   03/31/2022 0403 111 (H) 70 - 105 mg/dL Final     Comment:     Serial Number: [id number]:  [id number]     Infection     CMP   Results from last 7 days   Lab Units 04/01/22  0531 03/31/22  1431 03/31/22  0503 03/30/22  0644 03/29/22  0320 03/28/22  2218 03/28/22  1714 03/28/22  1225   SODIUM mmol/L 135*  --  136 132* 139 141 141 139   POTASSIUM mmol/L 3.5 3.8 3.5 3.7 4.1 3.6 4.1 3.2*   CHLORIDE mmol/L 95*  --  95* 95* 102 103 104 102   CO2 mmol/L 28.0  --  26.0 23.0 23.0 22.0 24.0 24.0   BUN mg/dL 15  --  16 14 11 12 13 11   CREATININE mg/dL 0.91  --  1.04 1.04 0.99 0.97 0.92 1.01   GLUCOSE mg/dL 208*  --  118* 139* 134* 164* 172* 98   ALBUMIN g/dL  --   --   --   --   --  4.50  --  4.50   BILIRUBIN mg/dL  --   --   --   --   --  0.4  --   --    ALK PHOS U/L  --   --   --   --   --  39  --   --    AST (SGOT) U/L  --   --   --   --   --  44*  --   --    ALT (SGPT) U/L  --   --   --   --   --  12  --   --      ABG    Results from last 7 days   Lab Units 03/28/22  2227 03/28/22  1824 03/28/22  1517 03/28/22  1344 03/28/22  1244 03/28/22  1038 03/28/22  0951   PH, ARTERIAL pH units 7.343* 7.369 7.374 7.387 7.420 7.420 7.410   PCO2, ARTERIAL mm Hg 45.9 42.5 38.4 38.7 37.9 47.6* 49.3*   PO2 ART mm Hg 99.5 118.3* 116.7* 115.9* 178.8* 423.0* 371.0*   O2 SATURATION ART % 97.3 98.5* 98.5* 98.5* 99.6* 100.0* 100.0*   BASE EXCESS ART mmol/L -1.0* -0.8* -2.5*  -1.6* 0.2 7.0* 7.0*     UA        MIGUEL  No results found for: POCMETH, POCAMPHET, POCBARBITUR, POCBENZO, POCCOCAINE, POCOPIATES, POCOXYCODO, POCPHENCYC, POCPROPOXY, POCTHC, POCTRICYC  Lysis Labs   Results from last 7 days   Lab Units 04/01/22  0531 03/31/22  0503 03/30/22  0644 03/30/22  0535 03/29/22  0320 03/28/22  2227 03/28/22  2218 03/28/22  1824 03/28/22  1714 03/28/22  1517 03/28/22  1244 03/28/22  1225   INR   --   --   --   --  0.96  --   --   --   --   --   --  1.09   APTT seconds  --   --   --   --  28.7  --   --   --   --   --   --  33.8*   HEMOGLOBIN g/dL 9.6* 10.9*  --  10.2* 11.0*  --   --   --   --   --   --  11.3*   HEMOGLOBIN, POC g/dL  --   --   --   --   --  11.4*  --  11.6*  --  11.5*   < >  --    PLATELETS 10*3/mm3 132* 123*  --  100* 120*  --   --   --   --   --   --  95*   CREATININE mg/dL 0.91 1.04 1.04  --  0.99  --  0.97  --  0.92  --   --  1.01    < > = values in this interval not displayed.     Radiology(recent) No radiology results for the last day            Xrays, labs reviewed personally by physician.    ECG/EMG Results (most recent)     Procedure Component Value Units Date/Time    ECG 12 Lead [[id number]] Collected: 03/29/22 0413     Updated: 03/29/22 0414     QT Interval 423 ms     Narrative:      HEART RATE= 58  bpm  RR Interval= 1040  ms  MD Interval= 157  ms  P Horizontal Axis= 0  deg  P Front Axis= 11  deg  QRSD Interval= 124  ms  QT Interval= 423  ms  QRS Axis= 21  deg  T Wave Axis= -12  deg  - ABNORMAL ECG -  Failure to sense and/or capture (?magnet)  Sinus bradycardia  Nonspecific intraventricular conduction delay  When compared with ECG of 25-Mar-2022 10:35:54,  Significant change in rhythm  Electronically Signed By:   Date and Time of Study: 2022-03-29 04:13:18    ECG 12 Lead [[id number]] Collected: 03/30/22 0530     Updated: 03/30/22 0530     QT Interval 382 ms     Narrative:      HEART RATE= 101  bpm  RR Interval= 593  ms  MD Interval=   ms  P Horizontal Axis=   deg  P  Front Axis=   deg  QRSD Interval= 102  ms  QT Interval= 382  ms  QRS Axis= 34  deg  T Wave Axis= -74  deg  - ABNORMAL ECG -  Atrial fibrillation  Nonspecific T abnormalities, inferior leads  Electronically Signed By:   Date and Time of Study: 2022-03-30 05:30:02    ECG 12 Lead [[id number]] Collected: 03/31/22 0346     Updated: 03/31/22 0347     QT Interval 476 ms     Narrative:      HEART RATE= 72  bpm  RR Interval= 864  ms  MI Interval= 161  ms  P Horizontal Axis= 52  deg  P Front Axis= 57  deg  QRSD Interval= 108  ms  QT Interval= 476  ms  QRS Axis= 49  deg  T Wave Axis= -33  deg  - ABNORMAL ECG -  Sinus rhythm  Atrial premature complex  Prolonged QT interval  Electronically Signed By:   Date and Time of Study: 2022-03-31 03:46:47    ECG 12 Lead [[id number]] Collected: 04/01/22 0303     Updated: 04/01/22 0305     QT Interval 485 ms     Narrative:      HEART RATE= 78  bpm  RR Interval= 780  ms  MI Interval= 185  ms  P Horizontal Axis= -50  deg  P Front Axis= 68  deg  QRSD Interval= 107  ms  QT Interval= 485  ms  QRS Axis= 35  deg  T Wave Axis= 27  deg  - ABNORMAL ECG -  Sinus rhythm  Atrial premature complex  Prolonged QT interval  Electronically Signed By:   Date and Time of Study: 2022-04-01 03:03:10            Medication Review:   I have reviewed the patient's current medication list  Scheduled Meds:amiodarone, 400 mg, Oral, BID With Meals  aspirin, 81 mg, Oral, Daily  atorvastatin, 40 mg, Oral, Nightly  chlorhexidine, 15 mL, Mouth/Throat, Q12H  enoxaparin, 40 mg, Subcutaneous, Daily  folic acid, 1 mg, Oral, Daily  furosemide, 40 mg, Oral, BID  guaiFENesin, 1,200 mg, Oral, Q12H  insulin lispro, 0-9 Units, Subcutaneous, 4x Daily With Meals & Nightly  LORazepam, 0.25 mg, Oral, Q12H  multivitamin, 1 tablet, Oral, Daily  mupirocin, 1 application, Each Nare, BID  pantoprazole, 40 mg, Oral, Q AM  polyethylene glycol, 17 g, Oral, BID  potassium chloride, 10 mEq, Oral, BID With Meals  senna-docusate sodium, 2 tablet,  "Oral, BID  tamsulosin, 0.4 mg, Oral, Nightly  thiamine, 100 mg, Oral, Daily      Continuous Infusions:   PRN Meds:.•  acetaminophen **OR** acetaminophen **OR** acetaminophen  •  dextrose  •  dextrose  •  glucagon (human recombinant)  •  HYDROcodone-acetaminophen  •  insulin lispro **AND** insulin lispro  •  [DISCONTINUED] Morphine **AND** naloxone  •  ondansetron  •  potassium chloride **OR** potassium chloride  •  sodium chloride    Imaging:  Imaging Results (Last 72 Hours)     Procedure Component Value Units Date/Time    XR Chest 1 View [[id number]] Collected: 03/30/22 0749     Updated: 03/30/22 0752    Narrative:      EXAM: XR CHEST 1 VW-     DATE OF EXAM: 3/30/2022 4:45 AM     INDICATION: Post-Op Heart Surgery; I35.0-Nonrheumatic aortic (valve)  stenosis.       COMPARISONS: 3/29/2022      FINDINGS:     Right IJ catheter has been removed. No pneumothorax. Small amount of  left perihilar opacity. No evidence of pleural effusion. Mediastinal  shadows are unchanged..       Impression:         Small amount of unchanged left perihilar airspace opacity most likely  atelectasis. No evidence of acute cardiopulmonary process otherwise.     Electronically Signed By-Sergey Haas On:3/30/2022 7:50 AM  This report was finalized on [id number] by  Sergey Haas .            SERAFIN Smith  04/01/22  11:05 EDT       EMR Dragon/Transcription:   \"Dictated utilizing Dragon dictation\".       Electronically signed by SERAFIN Smith, 04/01/22, 11:05 AM EDT.    "

## 2022-04-02 LAB
ANION GAP SERPL CALCULATED.3IONS-SCNC: 12 MMOL/L (ref 5–15)
ANION GAP SERPL CALCULATED.3IONS-SCNC: 17 MMOL/L (ref 5–15)
BUN SERPL-MCNC: 16 MG/DL (ref 8–23)
BUN SERPL-MCNC: 16 MG/DL (ref 8–23)
BUN/CREAT SERPL: 13.8 (ref 7–25)
BUN/CREAT SERPL: 16.7 (ref 7–25)
CALCIUM SPEC-SCNC: 8.4 MG/DL (ref 8.6–10.5)
CALCIUM SPEC-SCNC: 9.4 MG/DL (ref 8.6–10.5)
CHLORIDE SERPL-SCNC: 91 MMOL/L (ref 98–107)
CHLORIDE SERPL-SCNC: 95 MMOL/L (ref 98–107)
CO2 SERPL-SCNC: 25 MMOL/L (ref 22–29)
CO2 SERPL-SCNC: 28 MMOL/L (ref 22–29)
CREAT SERPL-MCNC: 0.96 MG/DL (ref 0.76–1.27)
CREAT SERPL-MCNC: 1.16 MG/DL (ref 0.76–1.27)
DEPRECATED RDW RBC AUTO: 43.3 FL (ref 37–54)
EGFRCR SERPLBLD CKD-EPI 2021: 67.8 ML/MIN/1.73
EGFRCR SERPLBLD CKD-EPI 2021: 85 ML/MIN/1.73
ERYTHROCYTE [DISTWIDTH] IN BLOOD BY AUTOMATED COUNT: 14.3 % (ref 12.3–15.4)
GLUCOSE BLDC GLUCOMTR-MCNC: 159 MG/DL (ref 70–105)
GLUCOSE BLDC GLUCOMTR-MCNC: 172 MG/DL (ref 70–105)
GLUCOSE BLDC GLUCOMTR-MCNC: 181 MG/DL (ref 70–105)
GLUCOSE BLDC GLUCOMTR-MCNC: 254 MG/DL (ref 70–105)
GLUCOSE SERPL-MCNC: 190 MG/DL (ref 65–99)
GLUCOSE SERPL-MCNC: 249 MG/DL (ref 65–99)
HCT VFR BLD AUTO: 27.9 % (ref 37.5–51)
HGB BLD-MCNC: 9.7 G/DL (ref 13–17.7)
MAGNESIUM SERPL-MCNC: 1.4 MG/DL (ref 1.6–2.4)
MCH RBC QN AUTO: 30.9 PG (ref 26.6–33)
MCHC RBC AUTO-ENTMCNC: 35 G/DL (ref 31.5–35.7)
MCV RBC AUTO: 88.2 FL (ref 79–97)
PLATELET # BLD AUTO: 159 10*3/MM3 (ref 140–450)
PMV BLD AUTO: 8.4 FL (ref 6–12)
POTASSIUM SERPL-SCNC: 3.4 MMOL/L (ref 3.5–5.2)
POTASSIUM SERPL-SCNC: 3.9 MMOL/L (ref 3.5–5.2)
POTASSIUM SERPL-SCNC: 4 MMOL/L (ref 3.5–5.2)
QT INTERVAL: 423 MS
QT INTERVAL: 476 MS
QT INTERVAL: 485 MS
QT INTERVAL: 515 MS
RBC # BLD AUTO: 3.16 10*6/MM3 (ref 4.14–5.8)
SODIUM SERPL-SCNC: 133 MMOL/L (ref 136–145)
SODIUM SERPL-SCNC: 135 MMOL/L (ref 136–145)
WBC NRBC COR # BLD: 6.2 10*3/MM3 (ref 3.4–10.8)

## 2022-04-02 PROCEDURE — 83735 ASSAY OF MAGNESIUM: CPT | Performed by: THORACIC SURGERY (CARDIOTHORACIC VASCULAR SURGERY)

## 2022-04-02 PROCEDURE — 80048 BASIC METABOLIC PNL TOTAL CA: CPT | Performed by: THORACIC SURGERY (CARDIOTHORACIC VASCULAR SURGERY)

## 2022-04-02 PROCEDURE — 25010000002 AMIODARONE IN DEXTROSE 5% 150-4.21 MG/100ML-% SOLUTION: Performed by: THORACIC SURGERY (CARDIOTHORACIC VASCULAR SURGERY)

## 2022-04-02 PROCEDURE — 80048 BASIC METABOLIC PNL TOTAL CA: CPT | Performed by: NURSE PRACTITIONER

## 2022-04-02 PROCEDURE — 25010000002 MAGNESIUM SULFATE 2 GM/50ML SOLUTION: Performed by: THORACIC SURGERY (CARDIOTHORACIC VASCULAR SURGERY)

## 2022-04-02 PROCEDURE — 82962 GLUCOSE BLOOD TEST: CPT

## 2022-04-02 PROCEDURE — 63710000001 INSULIN LISPRO (HUMAN) PER 5 UNITS: Performed by: NURSE PRACTITIONER

## 2022-04-02 PROCEDURE — 97116 GAIT TRAINING THERAPY: CPT

## 2022-04-02 PROCEDURE — 25010000002 CALCIUM GLUCONATE-NACL 1-0.675 GM/50ML-% SOLUTION: Performed by: THORACIC SURGERY (CARDIOTHORACIC VASCULAR SURGERY)

## 2022-04-02 PROCEDURE — 85027 COMPLETE CBC AUTOMATED: CPT | Performed by: NURSE PRACTITIONER

## 2022-04-02 PROCEDURE — 99024 POSTOP FOLLOW-UP VISIT: CPT | Performed by: THORACIC SURGERY (CARDIOTHORACIC VASCULAR SURGERY)

## 2022-04-02 PROCEDURE — 93010 ELECTROCARDIOGRAM REPORT: CPT | Performed by: INTERNAL MEDICINE

## 2022-04-02 PROCEDURE — 25010000002 MAGNESIUM SULFATE IN D5W 1G/100ML (PREMIX) 1-5 GM/100ML-% SOLUTION: Performed by: THORACIC SURGERY (CARDIOTHORACIC VASCULAR SURGERY)

## 2022-04-02 PROCEDURE — 93005 ELECTROCARDIOGRAM TRACING: CPT

## 2022-04-02 PROCEDURE — 25010000002 AMIODARONE IN DEXTROSE 5% 360-4.14 MG/200ML-% SOLUTION: Performed by: THORACIC SURGERY (CARDIOTHORACIC VASCULAR SURGERY)

## 2022-04-02 PROCEDURE — 99232 SBSQ HOSP IP/OBS MODERATE 35: CPT | Performed by: INTERNAL MEDICINE

## 2022-04-02 PROCEDURE — 84132 ASSAY OF SERUM POTASSIUM: CPT | Performed by: THORACIC SURGERY (CARDIOTHORACIC VASCULAR SURGERY)

## 2022-04-02 RX ORDER — POTASSIUM CHLORIDE 20 MEQ/1
20 TABLET, EXTENDED RELEASE ORAL DAILY
Status: DISCONTINUED | OUTPATIENT
Start: 2022-04-03 | End: 2022-04-04 | Stop reason: HOSPADM

## 2022-04-02 RX ORDER — CALCIUM GLUCONATE 20 MG/ML
1 INJECTION, SOLUTION INTRAVENOUS ONCE
Status: COMPLETED | OUTPATIENT
Start: 2022-04-02 | End: 2022-04-02

## 2022-04-02 RX ORDER — FUROSEMIDE 40 MG/1
40 TABLET ORAL DAILY
Status: DISCONTINUED | OUTPATIENT
Start: 2022-04-03 | End: 2022-04-04 | Stop reason: HOSPADM

## 2022-04-02 RX ORDER — MAGNESIUM SULFATE 1 G/100ML
3 INJECTION INTRAVENOUS ONCE
Status: COMPLETED | OUTPATIENT
Start: 2022-04-02 | End: 2022-04-02

## 2022-04-02 RX ORDER — ACETAMINOPHEN 500 MG
500 TABLET ORAL EVERY 4 HOURS PRN
Status: DISCONTINUED | OUTPATIENT
Start: 2022-04-02 | End: 2022-04-02

## 2022-04-02 RX ORDER — MAGNESIUM SULFATE HEPTAHYDRATE 40 MG/ML
2 INJECTION, SOLUTION INTRAVENOUS ONCE
Status: COMPLETED | OUTPATIENT
Start: 2022-04-02 | End: 2022-04-02

## 2022-04-02 RX ADMIN — POLYETHYLENE GLYCOL 3350 17 G: 17 POWDER, FOR SOLUTION ORAL at 08:10

## 2022-04-02 RX ADMIN — LORAZEPAM 0.25 MG: 0.5 TABLET ORAL at 19:47

## 2022-04-02 RX ADMIN — CLOPIDOGREL BISULFATE 75 MG: 75 TABLET ORAL at 08:10

## 2022-04-02 RX ADMIN — SENNOSIDES AND DOCUSATE SODIUM 2 TABLET: 50; 8.6 TABLET ORAL at 08:19

## 2022-04-02 RX ADMIN — GUAIFENESIN 1200 MG: 600 TABLET, EXTENDED RELEASE ORAL at 19:47

## 2022-04-02 RX ADMIN — Medication 100 MG: at 08:11

## 2022-04-02 RX ADMIN — POTASSIUM CHLORIDE 20 MEQ: 1500 TABLET, EXTENDED RELEASE ORAL at 08:19

## 2022-04-02 RX ADMIN — AMIODARONE HYDROCHLORIDE 150 MG: 1.5 INJECTION, SOLUTION INTRAVENOUS at 19:48

## 2022-04-02 RX ADMIN — POTASSIUM CHLORIDE 10 MEQ: 750 TABLET, EXTENDED RELEASE ORAL at 08:10

## 2022-04-02 RX ADMIN — ASPIRIN 81 MG: 81 TABLET, COATED ORAL at 08:11

## 2022-04-02 RX ADMIN — AMIODARONE HYDROCHLORIDE 400 MG: 200 TABLET ORAL at 17:31

## 2022-04-02 RX ADMIN — MAGNESIUM SULFATE 3 G: 1 INJECTION INTRAVENOUS at 21:45

## 2022-04-02 RX ADMIN — AMIODARONE HYDROCHLORIDE 400 MG: 200 TABLET ORAL at 08:11

## 2022-04-02 RX ADMIN — MAGNESIUM SULFATE HEPTAHYDRATE 2 G: 2 INJECTION, SOLUTION INTRAVENOUS at 10:01

## 2022-04-02 RX ADMIN — GUAIFENESIN 1200 MG: 600 TABLET, EXTENDED RELEASE ORAL at 08:11

## 2022-04-02 RX ADMIN — POLYETHYLENE GLYCOL 3350 17 G: 17 POWDER, FOR SOLUTION ORAL at 19:46

## 2022-04-02 RX ADMIN — POTASSIUM CHLORIDE 20 MEQ: 1500 TABLET, EXTENDED RELEASE ORAL at 06:34

## 2022-04-02 RX ADMIN — INSULIN LISPRO 2 UNITS: 100 INJECTION, SOLUTION INTRAVENOUS; SUBCUTANEOUS at 17:31

## 2022-04-02 RX ADMIN — TAMSULOSIN HYDROCHLORIDE 0.4 MG: 0.4 CAPSULE ORAL at 19:47

## 2022-04-02 RX ADMIN — METFORMIN HYDROCHLORIDE 1000 MG: 500 TABLET ORAL at 17:31

## 2022-04-02 RX ADMIN — APIXABAN 5 MG: 5 TABLET, FILM COATED ORAL at 19:48

## 2022-04-02 RX ADMIN — MUPIROCIN 1 APPLICATION: 20 OINTMENT TOPICAL at 08:11

## 2022-04-02 RX ADMIN — LORAZEPAM 0.25 MG: 0.5 TABLET ORAL at 08:11

## 2022-04-02 RX ADMIN — APIXABAN 5 MG: 5 TABLET, FILM COATED ORAL at 10:02

## 2022-04-02 RX ADMIN — FOLIC ACID 1 MG: 1 TABLET ORAL at 08:11

## 2022-04-02 RX ADMIN — THERA TABS 1 TABLET: TAB at 08:10

## 2022-04-02 RX ADMIN — AMIODARONE HYDROCHLORIDE 1 MG/MIN: 1.8 INJECTION, SOLUTION INTRAVENOUS at 20:08

## 2022-04-02 RX ADMIN — METFORMIN HYDROCHLORIDE 1000 MG: 500 TABLET ORAL at 08:11

## 2022-04-02 RX ADMIN — PIOGLITAZONE 45 MG: 45 TABLET ORAL at 08:11

## 2022-04-02 RX ADMIN — CHLORHEXIDINE GLUCONATE 15 ML: 1.2 SOLUTION ORAL at 19:48

## 2022-04-02 RX ADMIN — SENNOSIDES AND DOCUSATE SODIUM 2 TABLET: 50; 8.6 TABLET ORAL at 19:47

## 2022-04-02 RX ADMIN — PANTOPRAZOLE SODIUM 40 MG: 40 TABLET, DELAYED RELEASE ORAL at 06:01

## 2022-04-02 RX ADMIN — INSULIN LISPRO 2 UNITS: 100 INJECTION, SOLUTION INTRAVENOUS; SUBCUTANEOUS at 08:12

## 2022-04-02 RX ADMIN — FUROSEMIDE 40 MG: 40 TABLET ORAL at 08:11

## 2022-04-02 RX ADMIN — CHLORHEXIDINE GLUCONATE 15 ML: 1.2 SOLUTION ORAL at 08:11

## 2022-04-02 RX ADMIN — ATORVASTATIN CALCIUM 40 MG: 40 TABLET, FILM COATED ORAL at 19:48

## 2022-04-02 RX ADMIN — INSULIN LISPRO 6 UNITS: 100 INJECTION, SOLUTION INTRAVENOUS; SUBCUTANEOUS at 20:21

## 2022-04-02 RX ADMIN — CALCIUM GLUCONATE 1 G: 20 INJECTION, SOLUTION INTRAVENOUS at 20:36

## 2022-04-02 RX ADMIN — INSULIN LISPRO 2 UNITS: 100 INJECTION, SOLUTION INTRAVENOUS; SUBCUTANEOUS at 13:18

## 2022-04-02 RX ADMIN — AMIODARONE HYDROCHLORIDE 150 MG: 1.5 INJECTION, SOLUTION INTRAVENOUS at 10:02

## 2022-04-02 NOTE — THERAPY TREATMENT NOTE
Subjective: Pt agreeable to therapeutic plan of care. Pt wanting to back to bed due to LBP    Objective:     Bed mobility - Mod-A sit>supine  Transfers - Mod-A and with rolling walker  Ambulation - 80 feet Min-A and with rolling walker    Pain: incisional pain and L LB pain which was relieved with some STM.  Education: Provided education on importance of mobility and skilled verbal / tactile cueing throughout intervention.     Assessment: Anurag Pickard presents with functional mobility impairments which indicate the need for skilled intervention. Tolerating session today without incident.   with amb and 106 at rest. Presents with lat sway, slowed damian, guarded. Would benefit from acute rehab at [place].Will continue to follow and progress as tolerated.     Plan/Recommendations:   Pt would benefit from acute Inpatient Rehabilitation placement at discharge from facility and requires no DME at discharge.   Pt desires acute Inpatient Rehabilitation placement at discharge. Pt cooperative; agreeable to therapeutic recommendations and plan of care.     Basic Mobility 6-click:  Rollin = Total, A lot = 2, A little = 3; 4 = None  Supine>Sit:   1 = Total, A lot = 2, A little = 3; 4 = None   Sit>Stand with arms:  1 = Total, A lot = 2, A little = 3; 4 = None  Bed>Chair:   1 = Total, A lot = 2, A little = 3; 4 = None  Ambulate in room:  1 = Total, A lot = 2, A little = 3; 4 = None  3-5 Steps with railin = Total, A lot = 2, A little = 3; 4 = None  Score: 15      Post-Tx Position: Supine with HOB Elevated, Alarms activated and Call light and personal items within reach  PPE: gloves, surgical mask, eyewear protection

## 2022-04-02 NOTE — PROGRESS NOTES
Reason for follow-up: Post aortic valve replacement, atrial fibrillation     Patient Care Team:  Jorden Whipple PA-C as PCP - General (Physician Assistant)  Paolo Garcia MD as Consulting Physician (Cardiology)  Juan Castellon DO as Consulting Physician (Cardiology)    Liliana Tigist Pickard is doing well today, this morning he was in sinus rhythm but he reverted back into A. fib with rapid ventricular rates.     ROS    Lisinopril and Glipizide    Scheduled Meds:amiodarone, 400 mg, Oral, BID With Meals  aspirin, 81 mg, Oral, Daily  atorvastatin, 40 mg, Oral, Nightly  chlorhexidine, 15 mL, Mouth/Throat, Q12H  clopidogrel, 75 mg, Oral, Daily  enoxaparin, 40 mg, Subcutaneous, Daily  folic acid, 1 mg, Oral, Daily  furosemide, 40 mg, Oral, BID  guaiFENesin, 1,200 mg, Oral, Q12H  insulin lispro, 0-9 Units, Subcutaneous, 4x Daily With Meals & Nightly  LORazepam, 0.25 mg, Oral, Q12H  metFORMIN, 1,000 mg, Oral, BID With Meals  multivitamin, 1 tablet, Oral, Daily  mupirocin, 1 application, Each Nare, BID  pantoprazole, 40 mg, Oral, Q AM  pioglitazone, 45 mg, Oral, Daily  polyethylene glycol, 17 g, Oral, BID  potassium chloride, 10 mEq, Oral, BID With Meals  senna-docusate sodium, 2 tablet, Oral, BID  tamsulosin, 0.4 mg, Oral, Nightly  thiamine, 100 mg, Oral, Daily      Continuous Infusions:   PRN Meds:.•  acetaminophen **OR** acetaminophen **OR** acetaminophen  •  dextrose  •  dextrose  •  glucagon (human recombinant)  •  HYDROcodone-acetaminophen  •  insulin lispro **AND** insulin lispro  •  [DISCONTINUED] Morphine **AND** naloxone  •  ondansetron  •  potassium chloride **OR** potassium chloride  •  sodium chloride      VITAL SIGNS  Vitals:    04/02/22 0500 04/02/22 0600 04/02/22 0700 04/02/22 0744   BP: 156/72 144/68 121/75 128/65   BP Location:    Right arm   Patient Position:    Sitting   Pulse: 67 80 71    Resp:    20   Temp:    97.8 °F (36.6 °C)   TempSrc:    Oral   SpO2: (!)  "89% 94% 95%    Weight:  124 kg (274 lb 6.4 oz)     Height:           Flowsheet Rows    Flowsheet Row First Filed Value   Admission Height 180.3 cm (71\") Documented at 03/28/2022 0540   Admission Weight 125 kg (274 lb 9.6 oz) Documented at 03/28/2022 0540             Physical Exam  VITALS REVIEWED    General:      well developed, in no acute distress.    Head:      normocephalic and atraumatic.    Eyes:      PERRL/EOM intact, conjunctiva and sclera clear with out nystagmus.    Neck:      no masses, thyromegaly,  trachea central with normal respiratory effort and PMI displaced laterally  Lungs:      Clear  Heart:      Irregular rhythm, tachycardic  Msk:      no deformity or scoliosis noted of thoracic or lumbar spine.    Pulses:      pulses normal in all 4 extremities.    Extremities:       No lower extremity edema  Neurologic:      no focal deficits.   alert oriented x3  Skin:      intact without lesions or rashes.    Psych:      alert and cooperative; normal mood and affect; normal attention span and concentration.          LAB RESULTS (LAST 7 DAYS)    CBC  Results from last 7 days   Lab Units 04/02/22  0453 04/01/22  0531 03/31/22  0503 03/30/22  0535 03/29/22  0320 03/28/22  2227 03/28/22  1824 03/28/22  1244 03/28/22  1225   WBC 10*3/mm3 6.20 6.50 9.90 11.40* 11.40*  --   --   --  7.90   RBC 10*6/mm3 3.16* 3.13* 3.57* 3.24* 3.52*  --   --   --  3.58*   HEMOGLOBIN g/dL 9.7* 9.6* 10.9* 10.2* 11.0*  --   --   --  11.3*   HEMOGLOBIN, POC g/dL  --   --   --   --   --  11.4* 11.6*   < >  --    HEMATOCRIT % 27.9* 27.9* 31.8* 29.7* 31.6*  --   --   --  32.2*   HEMATOCRIT POC %  --   --   --   --   --  33* 34*   < >  --    MCV fL 88.2 89.1 89.0 91.5 89.8  --   --   --  89.8   PLATELETS 10*3/mm3 159 132* 123* 100* 120*  --   --   --  95*    < > = values in this interval not displayed.       BMP  Results from last 7 days   Lab Units 04/02/22  0453 04/01/22  1740 04/01/22  0531 03/31/22  1431 03/31/22  0503 03/30/22  0644 " 03/29/22  0320 03/28/22 2218 03/28/22  1714 03/28/22  1225   SODIUM mmol/L 135*  --  135*  --  136 132* 139 141 141 139   POTASSIUM mmol/L 3.4* 3.9 3.5 3.8 3.5 3.7 4.1 3.6 4.1 3.2*   CHLORIDE mmol/L 95*  --  95*  --  95* 95* 102 103 104 102   CO2 mmol/L 28.0  --  28.0  --  26.0 23.0 23.0 22.0 24.0 24.0   BUN mg/dL 16  --  15  --  16 14 11 12 13 11   CREATININE mg/dL 0.96  --  0.91  --  1.04 1.04 0.99 0.97 0.92 1.01   GLUCOSE mg/dL 190*  --  208*  --  118* 139* 134* 164* 172* 98   MAGNESIUM mg/dL  --   --   --   --   --   --  1.8  --   --   --    PHOSPHORUS mg/dL  --   --   --   --   --   --   --   --   --  1.6*       CMP   Results from last 7 days   Lab Units 04/02/22  0453 04/01/22  1740 04/01/22  0531 03/31/22  1431 03/31/22  0503 03/30/22  0644 03/29/22 0320 03/28/22 2218 03/28/22 1714 03/28/22  1225   SODIUM mmol/L 135*  --  135*  --  136 132* 139 141 141 139   POTASSIUM mmol/L 3.4* 3.9 3.5 3.8 3.5 3.7 4.1 3.6 4.1 3.2*   CHLORIDE mmol/L 95*  --  95*  --  95* 95* 102 103 104 102   CO2 mmol/L 28.0  --  28.0  --  26.0 23.0 23.0 22.0 24.0 24.0   BUN mg/dL 16  --  15  --  16 14 11 12 13 11   CREATININE mg/dL 0.96  --  0.91  --  1.04 1.04 0.99 0.97 0.92 1.01   GLUCOSE mg/dL 190*  --  208*  --  118* 139* 134* 164* 172* 98   ALBUMIN g/dL  --   --   --   --   --   --   --  4.50  --  4.50   BILIRUBIN mg/dL  --   --   --   --   --   --   --  0.4  --   --    ALK PHOS U/L  --   --   --   --   --   --   --  39  --   --    AST (SGOT) U/L  --   --   --   --   --   --   --  44*  --   --    ALT (SGPT) U/L  --   --   --   --   --   --   --  12  --   --          BNP        TROPONIN        CoAg  Results from last 7 days   Lab Units 03/29/22  0320 03/28/22  1225   INR  0.96 1.09   APTT seconds 28.7 33.8*       Creatinine Clearance  Estimated Creatinine Clearance: 96 mL/min (by C-G formula based on SCr of 0.96 mg/dL).    ABG  Results from last 7 days   Lab Units 03/28/22  2227 03/28/22  1824 03/28/22  1517 03/28/22  1344  03/28/22  1244 03/28/22  1038 03/28/22  0951   PH, ARTERIAL pH units 7.343* 7.369 7.374 7.387 7.420 7.420 7.410   PCO2, ARTERIAL mm Hg 45.9 42.5 38.4 38.7 37.9 47.6* 49.3*   PO2 ART mm Hg 99.5 118.3* 116.7* 115.9* 178.8* 423.0* 371.0*   O2 SATURATION ART % 97.3 98.5* 98.5* 98.5* 99.6* 100.0* 100.0*   BASE EXCESS ART mmol/L -1.0* -0.8* -2.5* -1.6* 0.2 7.0* 7.0*         EKG    I personally reviewed the patient's EKG/Telemetry data: Atrial fibrillation      Assessment/Plan       Aortic valve stenosis    Nonrheumatic aortic valve stenosis      Anurag Pickard is a 71-year-old male patient who received a bioprosthetic aortic valve replacement on 3/28/2022 for severe aortic stenosis.  Postop the patient developed atrial fibrillation and was started on amiodarone p.o.  Today he switch back into A. fib with rapid ventricular rates.   Recommend resuming his beta-blockers, his blood pressure is okay.  If the rates are still elevated probably also give digoxin.  If he does not convert to sinus rhythm amiodarone might need to be switched to IV.    I discussed the patients findings and my recommendations with patient and agrees with the outlined plan.    Monalisa Duarte MD  04/02/22  09:02 EDT

## 2022-04-02 NOTE — PROGRESS NOTES
Status post AVR/right coronary artery endarterectomy, postop day #5    Had another episode of atrial fibrillation this morning.  He is now back in sinus rhythm.  Continue amiodarone.  I have started Eliquis.  Pulmonary toileting.  Mobilize.  Oral diet as tolerated.  Nonoliguric; cut back on Lasix as I believe this is depleting his potassium and causing his atrial fibrillation.  His potassium level was 3.4 this morning.  Afebrile.  Sternal incision appears to be good.    Eventual transfer to rehab tomorrow or Monday.

## 2022-04-03 LAB
ANION GAP SERPL CALCULATED.3IONS-SCNC: 12 MMOL/L (ref 5–15)
BUN SERPL-MCNC: 14 MG/DL (ref 8–23)
BUN/CREAT SERPL: 14 (ref 7–25)
CALCIUM SPEC-SCNC: 8.9 MG/DL (ref 8.6–10.5)
CHLORIDE SERPL-SCNC: 91 MMOL/L (ref 98–107)
CO2 SERPL-SCNC: 27 MMOL/L (ref 22–29)
CREAT SERPL-MCNC: 1 MG/DL (ref 0.76–1.27)
DEPRECATED RDW RBC AUTO: 45.1 FL (ref 37–54)
EGFRCR SERPLBLD CKD-EPI 2021: 81 ML/MIN/1.73
ERYTHROCYTE [DISTWIDTH] IN BLOOD BY AUTOMATED COUNT: 14.7 % (ref 12.3–15.4)
GLUCOSE BLDC GLUCOMTR-MCNC: 150 MG/DL (ref 70–105)
GLUCOSE BLDC GLUCOMTR-MCNC: 175 MG/DL (ref 70–105)
GLUCOSE BLDC GLUCOMTR-MCNC: 180 MG/DL (ref 70–105)
GLUCOSE BLDC GLUCOMTR-MCNC: 191 MG/DL (ref 70–105)
GLUCOSE SERPL-MCNC: 206 MG/DL (ref 65–99)
HCT VFR BLD AUTO: 28 % (ref 37.5–51)
HGB BLD-MCNC: 9.9 G/DL (ref 13–17.7)
MAGNESIUM SERPL-MCNC: 1.9 MG/DL (ref 1.6–2.4)
MCH RBC QN AUTO: 31.5 PG (ref 26.6–33)
MCHC RBC AUTO-ENTMCNC: 35.3 G/DL (ref 31.5–35.7)
MCV RBC AUTO: 89.2 FL (ref 79–97)
PLATELET # BLD AUTO: 202 10*3/MM3 (ref 140–450)
PMV BLD AUTO: 8.2 FL (ref 6–12)
POTASSIUM SERPL-SCNC: 4.3 MMOL/L (ref 3.5–5.2)
RBC # BLD AUTO: 3.14 10*6/MM3 (ref 4.14–5.8)
SODIUM SERPL-SCNC: 130 MMOL/L (ref 136–145)
WBC NRBC COR # BLD: 7.8 10*3/MM3 (ref 3.4–10.8)

## 2022-04-03 PROCEDURE — 83735 ASSAY OF MAGNESIUM: CPT | Performed by: THORACIC SURGERY (CARDIOTHORACIC VASCULAR SURGERY)

## 2022-04-03 PROCEDURE — 63710000001 INSULIN LISPRO (HUMAN) PER 5 UNITS: Performed by: THORACIC SURGERY (CARDIOTHORACIC VASCULAR SURGERY)

## 2022-04-03 PROCEDURE — 85027 COMPLETE CBC AUTOMATED: CPT | Performed by: NURSE PRACTITIONER

## 2022-04-03 PROCEDURE — 99024 POSTOP FOLLOW-UP VISIT: CPT | Performed by: THORACIC SURGERY (CARDIOTHORACIC VASCULAR SURGERY)

## 2022-04-03 PROCEDURE — 82962 GLUCOSE BLOOD TEST: CPT

## 2022-04-03 PROCEDURE — 25010000002 AMIODARONE IN DEXTROSE 5% 360-4.14 MG/200ML-% SOLUTION: Performed by: THORACIC SURGERY (CARDIOTHORACIC VASCULAR SURGERY)

## 2022-04-03 PROCEDURE — 93010 ELECTROCARDIOGRAM REPORT: CPT | Performed by: INTERNAL MEDICINE

## 2022-04-03 PROCEDURE — 80048 BASIC METABOLIC PNL TOTAL CA: CPT | Performed by: NURSE PRACTITIONER

## 2022-04-03 PROCEDURE — 99233 SBSQ HOSP IP/OBS HIGH 50: CPT | Performed by: INTERNAL MEDICINE

## 2022-04-03 RX ORDER — INSULIN LISPRO 100 [IU]/ML
0-14 INJECTION, SOLUTION INTRAVENOUS; SUBCUTANEOUS
Status: DISCONTINUED | OUTPATIENT
Start: 2022-04-03 | End: 2022-04-04 | Stop reason: HOSPADM

## 2022-04-03 RX ORDER — INSULIN LISPRO 100 [IU]/ML
0-14 INJECTION, SOLUTION INTRAVENOUS; SUBCUTANEOUS AS NEEDED
Status: DISCONTINUED | OUTPATIENT
Start: 2022-04-03 | End: 2022-04-04 | Stop reason: HOSPADM

## 2022-04-03 RX ORDER — AMIODARONE HYDROCHLORIDE 200 MG/1
200 TABLET ORAL 2 TIMES DAILY WITH MEALS
Status: DISCONTINUED | OUTPATIENT
Start: 2022-04-03 | End: 2022-04-04 | Stop reason: HOSPADM

## 2022-04-03 RX ORDER — BISACODYL 10 MG
10 SUPPOSITORY, RECTAL RECTAL DAILY
Status: DISCONTINUED | OUTPATIENT
Start: 2022-04-03 | End: 2022-04-04 | Stop reason: HOSPADM

## 2022-04-03 RX ADMIN — LORAZEPAM 0.25 MG: 0.5 TABLET ORAL at 20:07

## 2022-04-03 RX ADMIN — AMIODARONE HYDROCHLORIDE 200 MG: 200 TABLET ORAL at 09:02

## 2022-04-03 RX ADMIN — APIXABAN 5 MG: 5 TABLET, FILM COATED ORAL at 20:06

## 2022-04-03 RX ADMIN — POLYETHYLENE GLYCOL 3350 17 G: 17 POWDER, FOR SOLUTION ORAL at 20:07

## 2022-04-03 RX ADMIN — POTASSIUM CHLORIDE 20 MEQ: 20 TABLET, EXTENDED RELEASE ORAL at 08:55

## 2022-04-03 RX ADMIN — Medication 100 MG: at 08:55

## 2022-04-03 RX ADMIN — PIOGLITAZONE 45 MG: 45 TABLET ORAL at 08:55

## 2022-04-03 RX ADMIN — AMIODARONE HYDROCHLORIDE 0.5 MG/MIN: 1.8 INJECTION, SOLUTION INTRAVENOUS at 04:35

## 2022-04-03 RX ADMIN — METFORMIN HYDROCHLORIDE 1000 MG: 500 TABLET ORAL at 08:55

## 2022-04-03 RX ADMIN — POLYETHYLENE GLYCOL 3350 17 G: 17 POWDER, FOR SOLUTION ORAL at 08:56

## 2022-04-03 RX ADMIN — INSULIN LISPRO 3 UNITS: 100 INJECTION, SOLUTION INTRAVENOUS; SUBCUTANEOUS at 20:10

## 2022-04-03 RX ADMIN — CLOPIDOGREL BISULFATE 75 MG: 75 TABLET ORAL at 08:55

## 2022-04-03 RX ADMIN — GUAIFENESIN 1200 MG: 600 TABLET, EXTENDED RELEASE ORAL at 20:06

## 2022-04-03 RX ADMIN — FUROSEMIDE 40 MG: 40 TABLET ORAL at 08:55

## 2022-04-03 RX ADMIN — APIXABAN 5 MG: 5 TABLET, FILM COATED ORAL at 08:55

## 2022-04-03 RX ADMIN — LORAZEPAM 0.25 MG: 0.5 TABLET ORAL at 08:55

## 2022-04-03 RX ADMIN — HYDROCODONE BITARTRATE AND ACETAMINOPHEN 1 TABLET: 5; 325 TABLET ORAL at 04:31

## 2022-04-03 RX ADMIN — FOLIC ACID 1 MG: 1 TABLET ORAL at 08:55

## 2022-04-03 RX ADMIN — INSULIN LISPRO 3 UNITS: 100 INJECTION, SOLUTION INTRAVENOUS; SUBCUTANEOUS at 17:01

## 2022-04-03 RX ADMIN — PANTOPRAZOLE SODIUM 40 MG: 40 TABLET, DELAYED RELEASE ORAL at 05:36

## 2022-04-03 RX ADMIN — INSULIN LISPRO 3 UNITS: 100 INJECTION, SOLUTION INTRAVENOUS; SUBCUTANEOUS at 08:55

## 2022-04-03 RX ADMIN — CHLORHEXIDINE GLUCONATE 15 ML: 1.2 SOLUTION ORAL at 08:56

## 2022-04-03 RX ADMIN — SENNOSIDES AND DOCUSATE SODIUM 2 TABLET: 50; 8.6 TABLET ORAL at 08:55

## 2022-04-03 RX ADMIN — HYDROCODONE BITARTRATE AND ACETAMINOPHEN 1 TABLET: 5; 325 TABLET ORAL at 20:06

## 2022-04-03 RX ADMIN — AMIODARONE HYDROCHLORIDE 200 MG: 200 TABLET ORAL at 17:01

## 2022-04-03 RX ADMIN — SENNOSIDES AND DOCUSATE SODIUM 2 TABLET: 50; 8.6 TABLET ORAL at 20:07

## 2022-04-03 RX ADMIN — GUAIFENESIN 1200 MG: 600 TABLET, EXTENDED RELEASE ORAL at 08:55

## 2022-04-03 RX ADMIN — CHLORHEXIDINE GLUCONATE 15 ML: 1.2 SOLUTION ORAL at 20:06

## 2022-04-03 RX ADMIN — METFORMIN HYDROCHLORIDE 1000 MG: 500 TABLET ORAL at 17:01

## 2022-04-03 RX ADMIN — BISACODYL 10 MG: 10 SUPPOSITORY RECTAL at 17:01

## 2022-04-03 RX ADMIN — TAMSULOSIN HYDROCHLORIDE 0.4 MG: 0.4 CAPSULE ORAL at 20:06

## 2022-04-03 RX ADMIN — THERA TABS 1 TABLET: TAB at 08:55

## 2022-04-03 RX ADMIN — ATORVASTATIN CALCIUM 40 MG: 40 TABLET, FILM COATED ORAL at 20:06

## 2022-04-03 NOTE — PROGRESS NOTES
Status post AVR/right coronary artery endarterectomy, postop day #6.    Converted back into sinus rhythm of atrial fibrillation yesterday afternoon.  Transition to oral amiodarone.  Continue Eliquis.  His pressure has been borderline and therefore I do not think he would tolerate a beta-blocker.  Pulmonary toileting.  Mobilize.  Oral diet as tolerated.  Nonoliguric.  Continue low-dose Lasix.  Replete potassium with each Lasix dose.  Afebrile.  Sternal incision looks good.    Transfer to rehab tomorrow (I am told that bed is now available) if remains in sinus rhythm.

## 2022-04-03 NOTE — CASE MANAGEMENT/SOCIAL WORK
Continued Stay Note   [facility]     Patient Name: Anurag Pickard  MRN: [id number]  Today's Date: 4/3/2022    Admit Date: 3/28/2022     Discharge Plan     Row Name 04/03/22 0912       Plan    Plan DC plan: pending bed availability- no pasrr or precert needed.    Plan Comments Bed avail today at [facility], CM notified nursing and sent dr. Brandon a message.  Dr. Brandon's note then stated that possible transfer tomorrow if pt stays in sinus rhythm.  Hopeful to be able to transfer tomorrow pending bed availability.              Phone communication or documentation only - no physical contact with patient or family.        Ana Jacobo RN

## 2022-04-03 NOTE — PROGRESS NOTES
"    Reason for follow-up: Post aortic valve replacement, atrial fibrillation     Patient Care Team:  Jorden Whipple PA-C as PCP - General (Physician Assistant)  Paolo Garcia MD as Consulting Physician (Cardiology)  Juan Castellon DO as Consulting Physician (Cardiology)    Subjective .   Anurag Pickard is doing well today, back in sinus rhythm.     ROS    Lisinopril and Glipizide    Scheduled Meds:apixaban, 5 mg, Oral, Q12H  atorvastatin, 40 mg, Oral, Nightly  chlorhexidine, 15 mL, Mouth/Throat, Q12H  clopidogrel, 75 mg, Oral, Daily  folic acid, 1 mg, Oral, Daily  furosemide, 40 mg, Oral, Daily  guaiFENesin, 1,200 mg, Oral, Q12H  insulin lispro, 0-9 Units, Subcutaneous, 4x Daily With Meals & Nightly  LORazepam, 0.25 mg, Oral, Q12H  metFORMIN, 1,000 mg, Oral, BID With Meals  multivitamin, 1 tablet, Oral, Daily  pantoprazole, 40 mg, Oral, Q AM  pioglitazone, 45 mg, Oral, Daily  polyethylene glycol, 17 g, Oral, BID  potassium chloride, 20 mEq, Oral, Daily  senna-docusate sodium, 2 tablet, Oral, BID  tamsulosin, 0.4 mg, Oral, Nightly  thiamine, 100 mg, Oral, Daily      Continuous Infusions:amiodarone, 0.5 mg/min, Last Rate: 0.5 mg/min (04/03/22 0435)      PRN Meds:.dextrose  •  dextrose  •  glucagon (human recombinant)  •  HYDROcodone-acetaminophen  •  insulin lispro **AND** insulin lispro  •  [DISCONTINUED] Morphine **AND** naloxone  •  ondansetron  •  potassium chloride **OR** potassium chloride  •  sodium chloride      VITAL SIGNS  Vitals:    04/03/22 0315 04/03/22 0330 04/03/22 0345 04/03/22 0524   BP:    130/55   BP Location:    Right arm   Patient Position:    Sitting   Pulse: 73 68 67 64   Resp:    15   Temp:    98.1 °F (36.7 °C)   TempSrc:    Oral   SpO2: 95% 95% 93% 94%   Weight:    127 kg (279 lb 3.2 oz)   Height:           Flowsheet Rows    Flowsheet Row First Filed Value   Admission Height 180.3 cm (71\") Documented at 03/28/2022 0540   Admission Weight 125 kg (274 lb 9.6 oz) " Documented at 03/28/2022 0540             Physical Exam  VITALS REVIEWED    General:      well developed, in no acute distress.    Head:      normocephalic and atraumatic.    Eyes:      PERRL/EOM intact, conjunctiva and sclera clear with out nystagmus.    Neck:      no masses, thyromegaly,  trachea central with normal respiratory effort and PMI displaced laterally  Lungs:      Clear  Heart:      Regular rate and rhythm  Msk:      no deformity or scoliosis noted of thoracic or lumbar spine.    Pulses:      pulses normal in all 4 extremities.    Extremities:       No lower extremity edema  Neurologic:      no focal deficits.   alert oriented x3  Skin:      intact without lesions or rashes.    Psych:      alert and cooperative; normal mood and affect; normal attention span and concentration.          LAB RESULTS (LAST 7 DAYS)    CBC  Results from last 7 days   Lab Units 04/03/22  0618 04/02/22  0453 04/01/22  0531 03/31/22  0503 03/30/22  0535 03/29/22  0320 03/28/22  2227 03/28/22  1244 03/28/22  1225   WBC 10*3/mm3 7.80 6.20 6.50 9.90 11.40* 11.40*  --   --  7.90   RBC 10*6/mm3 3.14* 3.16* 3.13* 3.57* 3.24* 3.52*  --   --  3.58*   HEMOGLOBIN g/dL 9.9* 9.7* 9.6* 10.9* 10.2* 11.0*  --   --  11.3*   HEMOGLOBIN, POC g/dL  --   --   --   --   --   --  11.4*   < >  --    HEMATOCRIT % 28.0* 27.9* 27.9* 31.8* 29.7* 31.6*  --   --  32.2*   HEMATOCRIT POC %  --   --   --   --   --   --  33*   < >  --    MCV fL 89.2 88.2 89.1 89.0 91.5 89.8  --   --  89.8   PLATELETS 10*3/mm3 202 159 132* 123* 100* 120*  --   --  95*    < > = values in this interval not displayed.       BMP  Results from last 7 days   Lab Units 04/03/22 0618 04/02/22 2028 04/02/22  1848 04/02/22  1452 04/02/22  0453 04/01/22  1740 04/01/22  0531 03/31/22  1431 03/31/22  0503 03/30/22  0644 03/29/22  0320 03/28/22  1714 03/28/22  1225   SODIUM mmol/L 130* 133*  --   --  135*  --  135*  --  136 132* 139   < > 139   POTASSIUM mmol/L 4.3 3.9  --  4.0 3.4* 3.9 3.5  3.8 3.5 3.7 4.1   < > 3.2*   CHLORIDE mmol/L 91* 91*  --   --  95*  --  95*  --  95* 95* 102   < > 102   CO2 mmol/L 27.0 25.0  --   --  28.0  --  28.0  --  26.0 23.0 23.0   < > 24.0   BUN mg/dL 14 16  --   --  16  --  15  --  16 14 11   < > 11   CREATININE mg/dL 1.00 1.16  --   --  0.96  --  0.91  --  1.04 1.04 0.99   < > 1.01   GLUCOSE mg/dL 206* 249*  --   --  190*  --  208*  --  118* 139* 134*   < > 98   MAGNESIUM mg/dL  --   --  1.4*  --   --   --   --   --   --   --  1.8  --   --    PHOSPHORUS mg/dL  --   --   --   --   --   --   --   --   --   --   --   --  1.6*    < > = values in this interval not displayed.       CMP   Results from last 7 days   Lab Units 04/03/22  0618 04/02/22  2028 04/02/22  1452 04/02/22  0453 04/01/22  1740 04/01/22  0531 03/31/22  1431 03/31/22  0503 03/30/22  0644 03/29/22  0320 03/28/22  2218 03/28/22  1714 03/28/22  1225   SODIUM mmol/L 130* 133*  --  135*  --  135*  --  136 132* 139 141   < > 139   POTASSIUM mmol/L 4.3 3.9 4.0 3.4* 3.9 3.5 3.8 3.5 3.7 4.1 3.6   < > 3.2*   CHLORIDE mmol/L 91* 91*  --  95*  --  95*  --  95* 95* 102 103   < > 102   CO2 mmol/L 27.0 25.0  --  28.0  --  28.0  --  26.0 23.0 23.0 22.0   < > 24.0   BUN mg/dL 14 16  --  16  --  15  --  16 14 11 12   < > 11   CREATININE mg/dL 1.00 1.16  --  0.96  --  0.91  --  1.04 1.04 0.99 0.97   < > 1.01   GLUCOSE mg/dL 206* 249*  --  190*  --  208*  --  118* 139* 134* 164*   < > 98   ALBUMIN g/dL  --   --   --   --   --   --   --   --   --   --  4.50  --  4.50   BILIRUBIN mg/dL  --   --   --   --   --   --   --   --   --   --  0.4  --   --    ALK PHOS U/L  --   --   --   --   --   --   --   --   --   --  39  --   --    AST (SGOT) U/L  --   --   --   --   --   --   --   --   --   --  44*  --   --    ALT (SGPT) U/L  --   --   --   --   --   --   --   --   --   --  12  --   --     < > = values in this interval not displayed.         BNP        TROPONIN        CoAg  Results from last 7 days   Lab Units 03/29/22  9446  03/28/22  1225   INR  0.96 1.09   APTT seconds 28.7 33.8*       Creatinine Clearance  Estimated Creatinine Clearance: 93.3 mL/min (by C-G formula based on SCr of 1 mg/dL).    ABG  Results from last 7 days   Lab Units 03/28/22  2227 03/28/22  1824 03/28/22  1517 03/28/22  1344 03/28/22  1244 03/28/22  1038 03/28/22  0951   PH, ARTERIAL pH units 7.343* 7.369 7.374 7.387 7.420 7.420 7.410   PCO2, ARTERIAL mm Hg 45.9 42.5 38.4 38.7 37.9 47.6* 49.3*   PO2 ART mm Hg 99.5 118.3* 116.7* 115.9* 178.8* 423.0* 371.0*   O2 SATURATION ART % 97.3 98.5* 98.5* 98.5* 99.6* 100.0* 100.0*   BASE EXCESS ART mmol/L -1.0* -0.8* -2.5* -1.6* 0.2 7.0* 7.0*         EKG    I personally reviewed the patient's EKG/Telemetry data: Sinus rhythm      Assessment/Plan       Aortic valve stenosis    Nonrheumatic aortic valve stenosis      Anurag Pickard is a 71-year-old male patient who received a bioprosthetic aortic valve replacement on 3/28/2022 for severe aortic stenosis.  Postop patient developed atrial fibrillation and was started on p.o. amiodarone which was later transitioned to IV amiodarone for recurrence of A. fib.  Today he is back in sinus rhythm.  He is also on Eliquis for stroke prevention.  Recommend p.o. amiodarone upon discharge to be addressed during follow-up visits with Dr. Mcfarland.    I discussed the patients findings and my recommendations with patient and agrees with the outlined plan.    Monalisa Duarte MD  04/03/22  07:41 EDT

## 2022-04-04 VITALS
DIASTOLIC BLOOD PRESSURE: 45 MMHG | RESPIRATION RATE: 17 BRPM | SYSTOLIC BLOOD PRESSURE: 116 MMHG | OXYGEN SATURATION: 94 % | HEART RATE: 68 BPM | HEIGHT: 71 IN | WEIGHT: 275.57 LBS | BODY MASS INDEX: 38.58 KG/M2 | TEMPERATURE: 98.1 F

## 2022-04-04 LAB
ALBUMIN SERPL-MCNC: 3.3 G/DL (ref 3.5–5.2)
ALBUMIN/GLOB SERPL: 1.1 G/DL
ALP SERPL-CCNC: 57 U/L (ref 39–117)
ALT SERPL W P-5'-P-CCNC: 15 U/L (ref 1–41)
ANION GAP SERPL CALCULATED.3IONS-SCNC: 12 MMOL/L (ref 5–15)
AST SERPL-CCNC: 17 U/L (ref 1–40)
BILIRUB SERPL-MCNC: 0.7 MG/DL (ref 0–1.2)
BUN SERPL-MCNC: 13 MG/DL (ref 8–23)
BUN/CREAT SERPL: 13.5 (ref 7–25)
CALCIUM SPEC-SCNC: 9 MG/DL (ref 8.6–10.5)
CHLORIDE SERPL-SCNC: 95 MMOL/L (ref 98–107)
CO2 SERPL-SCNC: 27 MMOL/L (ref 22–29)
CREAT SERPL-MCNC: 0.96 MG/DL (ref 0.76–1.27)
DEPRECATED RDW RBC AUTO: 45.1 FL (ref 37–54)
EGFRCR SERPLBLD CKD-EPI 2021: 85 ML/MIN/1.73
ERYTHROCYTE [DISTWIDTH] IN BLOOD BY AUTOMATED COUNT: 14.7 % (ref 12.3–15.4)
GLOBULIN UR ELPH-MCNC: 3 GM/DL
GLUCOSE BLDC GLUCOMTR-MCNC: 154 MG/DL (ref 70–105)
GLUCOSE BLDC GLUCOMTR-MCNC: 161 MG/DL (ref 70–105)
GLUCOSE SERPL-MCNC: 144 MG/DL (ref 65–99)
HCT VFR BLD AUTO: 25.9 % (ref 37.5–51)
HGB BLD-MCNC: 9.3 G/DL (ref 13–17.7)
MAGNESIUM SERPL-MCNC: 1.5 MG/DL (ref 1.6–2.4)
MCH RBC QN AUTO: 32 PG (ref 26.6–33)
MCHC RBC AUTO-ENTMCNC: 35.9 G/DL (ref 31.5–35.7)
MCV RBC AUTO: 89.1 FL (ref 79–97)
PLATELET # BLD AUTO: 207 10*3/MM3 (ref 140–450)
PMV BLD AUTO: 8 FL (ref 6–12)
POTASSIUM SERPL-SCNC: 4 MMOL/L (ref 3.5–5.2)
PROT SERPL-MCNC: 6.3 G/DL (ref 6–8.5)
QT INTERVAL: 511 MS
QT INTERVAL: 519 MS
RBC # BLD AUTO: 2.91 10*6/MM3 (ref 4.14–5.8)
SODIUM SERPL-SCNC: 134 MMOL/L (ref 136–145)
WBC NRBC COR # BLD: 8.7 10*3/MM3 (ref 3.4–10.8)

## 2022-04-04 PROCEDURE — 97116 GAIT TRAINING THERAPY: CPT

## 2022-04-04 PROCEDURE — 80053 COMPREHEN METABOLIC PANEL: CPT | Performed by: THORACIC SURGERY (CARDIOTHORACIC VASCULAR SURGERY)

## 2022-04-04 PROCEDURE — 99232 SBSQ HOSP IP/OBS MODERATE 35: CPT | Performed by: INTERNAL MEDICINE

## 2022-04-04 PROCEDURE — 25010000002 MAGNESIUM SULFATE 2 GM/50ML SOLUTION

## 2022-04-04 PROCEDURE — 83735 ASSAY OF MAGNESIUM: CPT | Performed by: THORACIC SURGERY (CARDIOTHORACIC VASCULAR SURGERY)

## 2022-04-04 PROCEDURE — 93010 ELECTROCARDIOGRAM REPORT: CPT | Performed by: INTERNAL MEDICINE

## 2022-04-04 PROCEDURE — 93005 ELECTROCARDIOGRAM TRACING: CPT | Performed by: THORACIC SURGERY (CARDIOTHORACIC VASCULAR SURGERY)

## 2022-04-04 PROCEDURE — 99024 POSTOP FOLLOW-UP VISIT: CPT | Performed by: THORACIC SURGERY (CARDIOTHORACIC VASCULAR SURGERY)

## 2022-04-04 PROCEDURE — 63710000001 INSULIN LISPRO (HUMAN) PER 5 UNITS: Performed by: THORACIC SURGERY (CARDIOTHORACIC VASCULAR SURGERY)

## 2022-04-04 PROCEDURE — 97110 THERAPEUTIC EXERCISES: CPT

## 2022-04-04 PROCEDURE — 82962 GLUCOSE BLOOD TEST: CPT

## 2022-04-04 PROCEDURE — 85027 COMPLETE CBC AUTOMATED: CPT | Performed by: NURSE PRACTITIONER

## 2022-04-04 RX ORDER — AMIODARONE HYDROCHLORIDE 200 MG/1
200 TABLET ORAL DAILY
Qty: 30 TABLET | Refills: 0 | Status: SHIPPED | OUTPATIENT
Start: 2022-04-04 | End: 2022-05-04

## 2022-04-04 RX ORDER — CLOPIDOGREL BISULFATE 75 MG/1
75 TABLET ORAL DAILY
Qty: 30 TABLET | Refills: 2 | Status: SHIPPED | OUTPATIENT
Start: 2022-04-05 | End: 2022-05-12

## 2022-04-04 RX ORDER — POTASSIUM CHLORIDE 20 MEQ/1
20 TABLET, EXTENDED RELEASE ORAL DAILY
Qty: 30 TABLET | Refills: 0 | Status: SHIPPED | OUTPATIENT
Start: 2022-04-04 | End: 2022-04-21

## 2022-04-04 RX ORDER — HYDROCODONE BITARTRATE AND ACETAMINOPHEN 5; 325 MG/1; MG/1
1 TABLET ORAL EVERY 4 HOURS PRN
Qty: 42 TABLET | Refills: 0 | Status: SHIPPED | OUTPATIENT
Start: 2022-04-04 | End: 2022-04-11

## 2022-04-04 RX ORDER — FUROSEMIDE 40 MG/1
40 TABLET ORAL DAILY
Qty: 30 TABLET | Refills: 0 | Status: SHIPPED | OUTPATIENT
Start: 2022-04-05 | End: 2022-04-21

## 2022-04-04 RX ORDER — TAMSULOSIN HYDROCHLORIDE 0.4 MG/1
0.4 CAPSULE ORAL NIGHTLY
Qty: 30 CAPSULE | Refills: 0 | Status: SHIPPED | OUTPATIENT
Start: 2022-04-04 | End: 2022-05-04

## 2022-04-04 RX ORDER — MAGNESIUM SULFATE HEPTAHYDRATE 40 MG/ML
2 INJECTION, SOLUTION INTRAVENOUS ONCE
Status: COMPLETED | OUTPATIENT
Start: 2022-04-04 | End: 2022-04-04

## 2022-04-04 RX ADMIN — THERA TABS 1 TABLET: TAB at 08:05

## 2022-04-04 RX ADMIN — MAGNESIUM SULFATE HEPTAHYDRATE 2 G: 2 INJECTION, SOLUTION INTRAVENOUS at 09:10

## 2022-04-04 RX ADMIN — SENNOSIDES AND DOCUSATE SODIUM 2 TABLET: 50; 8.6 TABLET ORAL at 08:02

## 2022-04-04 RX ADMIN — AMIODARONE HYDROCHLORIDE 200 MG: 200 TABLET ORAL at 07:56

## 2022-04-04 RX ADMIN — Medication 100 MG: at 08:03

## 2022-04-04 RX ADMIN — INSULIN LISPRO 3 UNITS: 100 INJECTION, SOLUTION INTRAVENOUS; SUBCUTANEOUS at 07:55

## 2022-04-04 RX ADMIN — INSULIN LISPRO 3 UNITS: 100 INJECTION, SOLUTION INTRAVENOUS; SUBCUTANEOUS at 12:19

## 2022-04-04 RX ADMIN — FOLIC ACID 1 MG: 1 TABLET ORAL at 08:05

## 2022-04-04 RX ADMIN — PANTOPRAZOLE SODIUM 40 MG: 40 TABLET, DELAYED RELEASE ORAL at 05:42

## 2022-04-04 RX ADMIN — CHLORHEXIDINE GLUCONATE 15 ML: 1.2 SOLUTION ORAL at 08:00

## 2022-04-04 RX ADMIN — APIXABAN 5 MG: 5 TABLET, FILM COATED ORAL at 08:02

## 2022-04-04 RX ADMIN — GUAIFENESIN 1200 MG: 600 TABLET, EXTENDED RELEASE ORAL at 08:04

## 2022-04-04 RX ADMIN — LORAZEPAM 0.25 MG: 0.5 TABLET ORAL at 08:00

## 2022-04-04 RX ADMIN — FUROSEMIDE 40 MG: 40 TABLET ORAL at 08:04

## 2022-04-04 RX ADMIN — PIOGLITAZONE 45 MG: 45 TABLET ORAL at 08:00

## 2022-04-04 RX ADMIN — METFORMIN HYDROCHLORIDE 1000 MG: 500 TABLET ORAL at 07:56

## 2022-04-04 RX ADMIN — POTASSIUM CHLORIDE 20 MEQ: 20 TABLET, EXTENDED RELEASE ORAL at 08:03

## 2022-04-04 RX ADMIN — CLOPIDOGREL BISULFATE 75 MG: 75 TABLET ORAL at 08:03

## 2022-04-04 NOTE — THERAPY TREATMENT NOTE
Cardiac Rehab Initiated  Level 3: AROM Exercises. Ambulation with any level of assistance up to 150 feet.     Sitting tolerance: >10min and unsupported  Standing tolerance: 5-10min and supported    Precautions:   Mid-sternal incision; avoid scapular retraction and depression.   Cardiovascular impairment post-sx; encourage energy conservation strategies.    Therapeutic Exercises: 10 reps UE and LE AROM in seated position.     MET level equivalent: 2.0-3.0 (Unlimited sitting, ambulation on level ground <2mph, light housework)     Subjective: Pt agreeable to therapeutic plan of care.    Objective:     Bed mobility - N/A or Not attempted.  Transfers - Mod-A and VC for sternal precaution  Ambulation - 50 feetx2 CGA and with rolling walker     BP assessment:  - pre-intervention: 127/70  - post-intervention: 136/65    Pain: 4 VAS  Education: Provided education on importance of mobility and skilled verbal / tactile cueing throughout intervention.     Assessment: Anurag Pickard is s/p avr on 3/28/22. Tolerating session today without incident. Pt completed transfers with mod A and VC for sternal precaution. Pt ambulated 50ftx2 with RW and CGA and steppage gait and mild instability of the LLE present. Pt demonstrated poor RW management during transfer and required frequent VC for RW placement. Performed seated thera-ex and pt required intermittent resting breaks due to dyspnea and back pain. Will continue to follow and progress as tolerated.     Plan/Recommendations:   Pt would benefit from ACUTE Inpatient Rehabilitation placement at discharge from facility and requires no DME at discharge.   Pt desires ACUTE Inpatient Rehabilitation placement at discharge. Pt cooperative; agreeable to therapeutic recommendations and plan of care.     Basic Mobility 6-click:  Rollin = Total, A lot = 2, A little = 3; 4 = None  Supine>Sit:   1 = Total, A lot = 2, A little = 3; 4 = None   Sit>Stand with arms:  1 = Total, A lot = 2,  A little = 3; 4 = None  Bed>Chair:   1 = Total, A lot = 2, A little = 3; 4 = None  Ambulate in room:  1 = Total, A lot = 2, A little = 3; 4 = None  3-5 Steps with railin = Total, A lot = 2, A little = 3; 4 = None  Score: 15    Post-Tx Position: Up in Chair, Alarms activated and Call light and personal items within reach  PPE: gloves, surgical mask, eyewear protection

## 2022-04-04 NOTE — PROGRESS NOTES
" LOS: 7 days   Patient Care Team:  Jorden Whipple PA-C as PCP - General (Physician Assistant)  Paolo Garcia MD as Consulting Physician (Cardiology)  Juan Castellon DO as Consulting Physician (Cardiology)    Chief Complaint: post-op    Subjective     Subjective:  Symptoms:  Stable.  No shortness of breath or chest pain.    Diet:  Adequate intake.  No nausea or vomiting.    Activity level: Returning to normal.    Pain:  He reports no pain.        Objective     Vital Signs  Temp:  [98.4 °F (36.9 °C)-98.9 °F (37.2 °C)] 98.5 °F (36.9 °C)  Heart Rate:  [59-73] 64  Resp:  [17-21] 17  BP: (119-150)/(57-83) 119/57  Body mass index is 38.43 kg/m².    Intake/Output Summary (Last 24 hours) at 4/4/2022 0825  Last data filed at 4/4/2022 0600  Gross per 24 hour   Intake 265 ml   Output 425 ml   Net -160 ml     No intake/output data recorded.      Wt Readings from Last 3 Encounters:   04/04/22 125 kg (275 lb 9.2 oz)   03/25/22 127 kg (281 lb)   03/04/22 128 kg (283 lb)       Flowsheet Rows    Flowsheet Row First Filed Value   Admission Height 180.3 cm (71\") Documented at 03/28/2022 0540   Admission Weight 125 kg (274 lb 9.6 oz) Documented at 03/28/2022 0540          Objective:  General Appearance:  Comfortable, in no acute distress and well-appearing.    Vital signs: (most recent): Blood pressure 119/57, pulse 64, temperature 98.5 °F (36.9 °C), temperature source Oral, resp. rate 17, height 180.3 cm (71\"), weight 125 kg (275 lb 9.2 oz), SpO2 95 %.  Vital signs are normal.  No fever.    Output: Producing urine and producing stool.    Lungs:  Normal effort and normal respiratory rate.  Breath sounds clear to auscultation.    Heart: Normal rate.  Regular rhythm.  S1 normal and S2 normal.    Abdomen: Bowel sounds are normal.     Extremities: There is no dependent edema.    Neurological: Patient is alert and oriented to person, place and time.    Skin:  Warm and dry.  (Sternal incision clean, dry, and " intact without erythema or drainage )            Results Review:        Results from last 7 days   Lab Units 04/04/22  0517 04/03/22  0618 04/02/22  0453   WBC 10*3/mm3 8.70 7.80 6.20   HEMOGLOBIN g/dL 9.3* 9.9* 9.7*   HEMATOCRIT % 25.9* 28.0* 27.9*   PLATELETS 10*3/mm3 207 202 159         PT/INR:  No results found for: PROTIME/No results found for: INR    Results from last 7 days   Lab Units 04/04/22  0517 04/03/22  0618 04/02/22 2028   SODIUM mmol/L 134* 130* 133*   POTASSIUM mmol/L 4.0 4.3 3.9   CHLORIDE mmol/L 95* 91* 91*   CO2 mmol/L 27.0 27.0 25.0   BUN mg/dL 13 14 16   CREATININE mg/dL 0.96 1.00 1.16   GLUCOSE mg/dL 144* 206* 249*   CALCIUM mg/dL 9.0 8.9 9.4         Scheduled Meds:  amiodarone, 200 mg, Oral, BID With Meals  apixaban, 5 mg, Oral, Q12H  atorvastatin, 40 mg, Oral, Nightly  bisacodyl, 10 mg, Rectal, Daily  chlorhexidine, 15 mL, Mouth/Throat, Q12H  clopidogrel, 75 mg, Oral, Daily  folic acid, 1 mg, Oral, Daily  furosemide, 40 mg, Oral, Daily  guaiFENesin, 1,200 mg, Oral, Q12H  insulin lispro, 0-14 Units, Subcutaneous, 4x Daily With Meals & Nightly  LORazepam, 0.25 mg, Oral, Q12H  metFORMIN, 1,000 mg, Oral, BID With Meals  multivitamin, 1 tablet, Oral, Daily  pantoprazole, 40 mg, Oral, Q AM  pioglitazone, 45 mg, Oral, Daily  polyethylene glycol, 17 g, Oral, BID  potassium chloride, 20 mEq, Oral, Daily  senna-docusate sodium, 2 tablet, Oral, BID  tamsulosin, 0.4 mg, Oral, Nightly  thiamine, 100 mg, Oral, Daily        Infusions:         Assessment/Plan       Patient Active Problem List   Diagnosis Code   • Aortic valve stenosis I35.0   • Diabetes mellitus, type II (HCC) E11.9   • Gastroesophageal reflux disease K21.9   • Hyperlipidemia E78.5   • Hypogonadism [id number]   • Obesity E66.9   • Obstructive sleep apnea syndrome G47.33   • Osteoarthritis M19.90   • Pulmonary hypertension (HCC) I27.20   • Rosacea L71.9   • Vitamin D deficiency E55.9   • Memory loss R41.3   • Ataxia R27.0   • Alcohol abuse  F10.10   • Retinal disorder H35.9   • Presbyopia H52.4   • Nuclear senile cataract H25.10   • History of laser assisted in situ keratomileusis Z98.890   • Benign essential hypertension I10   • Medicare annual wellness visit, subsequent Z00.00   • Dyspnea on exertion R06.00   • Acute diastolic CHF (congestive heart failure) (HCC) I50.31   • Nonrheumatic aortic valve stenosis I35.0       Assessment & Plan     Severe AS, EF 55-60% (PAULO)--s/p tissue AVR/ endarterectomy of RCA ostium ([facility])  HTN--soft BP, no bb yet  HLD--statin  DM, type 2--preop a1c 8, insulin drip to SSI  YANA with moderate pulm HTN--CPAP compliant  COPD  ETOH abuse--reports last drink 2 days before adm, 6 oz bourbon daily  Obesity, stage 2--BMI 38  Expected ABLA, postop--watch closely    POD#7: Patient doing well and in SR on tele monitor, on oral amiodarone. Magnesium of 1.5 this morning, replete. Patient on Plavix for endartectomy of RCA ostium. Patient on Eliquis for atrial fibrillation. Anticipate rehab at discharge, referral pending. Continue to mobilize and continue routine care.     Alec Barth PA-C  04/04/22  08:25 EDT

## 2022-04-04 NOTE — CASE MANAGEMENT/SOCIAL WORK
Continued Stay Note  FABIAN Casey     Patient Name: Anurag Pickard  MRN: [id number]  Today's Date: 4/4/2022    Admit Date: 3/28/2022     Discharge Plan     Row Name 04/04/22 1643       Plan    Plan Accepted at \[facility]\"",  Bed ready today 04/04/2022    Patient/Family in Agreement with Plan yes    Plan Comments Received notification from liaison that patient has bed ready at \[facility]\"" at 2pm today. Family will transport patient to \[facility]\"" at discharge.                         Expected Discharge Date and Time     Expected Discharge Date Expected Discharge Time    Apr 4, 2022  4:00 PM            Suzanne Sánchez RN   Phone communication or documentation only - no physical contact with patient or family.

## 2022-04-04 NOTE — PROGRESS NOTES
Cardiology Progress  Note      Patient Care Team:  Jorden Whipple PA-C as PCP - General (Physician Assistant)  Paolo Garcia MD as Consulting Physician (Cardiology)  Juan Castellon DO as Consulting Physician (Cardiology)    PATIENT IDENTIFICATION  Name: Anurag Pickard  Age: 71 y.o.  Sex: male  :  1950  MRN: [id number]                 Cardiology assessment and plan       Impressions:  Severe aortic stenosis status post successful surgical bioprosthetic aortic valve replacement  Hypertension  Hypertensive heart disease  Hyperlipidemia  Osteoarthritis  Diabetes mellitus  Obesity  Antiplatelet therapy     Status post successful surgical AVR  Clinically hemodynamically stable  Maintaining sinus rhythm  PA pressures are normal  Prior medical records reviewed  Continue current medical management  Continue postsurgical care  Renal function is stable   Hemoglobin is stable   Hemodynamics are stable  Vital signs are stable with a T-max of 98 heart rate of 70 respiratory 14 blood pressure is 128/66  Labs and medications reviewed  Continue current medical therapy with aspirin 81 mg p.o. once a day Lipitor 40 mg p.o. once a day  Continue Lovenox for DVT prophylaxis  Continue current medical management  Ambulate as tolerated    2022  Patient is in atrial fibrillation with controlled ventricular response currently on IV amiodarone hopefully convert to sinus rhythm  Off all the drips  Ambulating without any further problems  Hemodynamics are stable  Denies any new cardiac symptoms  Severe aortic valve stenosis status post aortic valve replacement and endarterectomy of the right coronary artery postop day 2  Patient is clinically hemodynamically stable  Agree with switching to p.o. Lasix  Consider antiplatelet therapy at the time of discharge  Hemoglobin is stable at 10.2  Renal function stable at 1.0  Sodium is low at 132  Twelve-lead EKG shows atrial fibrillation with  nonspecific ST-T changes with a heart rate of 101  Patient vital signs T-max is 98.7 heart rate is 97 respiratory rate is 22 blood pressure is 107/59  Current medications include IV amiodarone/aspirin 81 mg p.o. once a day Lipitor 40 mg p.o. once a day Lasix 40 mg p.o. twice daily Lovenox 40 mg once a day  Continue current medical management  Consider starting patient on p.o. beta-blockers if the blood pressure tolerates  Further recommendations based on patient course  Discussed with patient at bedside  Chest wall surgical site looks good  Bilateral air entry is good except for decreased breath sounds at the bases  Abdomen is soft nontender bowels are positive  Complaining of some shortness of breath and chest wall pain otherwise denies any new complaints    March 31, 2022    Converted to sinus rhythm currently in sinus rhythm  Patient amiodarone we will switch from IV to p.o.  Denies any new complaints  Sitting up in chair with no discomfort  Postop day #3 status post tissue AVR and RCA endarterectomy  Blood pressure stable intermittent hypotension  Beta-blockers were not started secondary to low blood pressures  Denies any dizziness or syncope  Continue current medical therapy  Continue supportive care  Discussed with patient and family at bedside  Vital signs T-max is 98.3 pulse is 68 respirations are 18 blood pressure is 144/76 sats are 97%  Sodium is 136 potassium 3.5 chloride is 95 creatinine is 1.0 hemoglobin is 10.9  Current medications include aspirin 81 mg p.o. once a day amiodarone 40 mg p.o. twice daily Lipitor 40 mg p.o. once a day Lasix 40 mg p.o. twice a day  Lovenox 40 mg p.o. once a day  Discussed with the patient family at bedside  Likely discharge home in the next 48 hours    4/1/2022  Patient is alert and awake sitting up in chair  Denies any new cardiac symptoms  Ambulating without any further problems  Denies any symptoms of dizziness shortness of breath  Compliant with medical therapy  Vital  signs are stable with a T-max of 97.4 heart rate is 70 blood pressure is 120 zero 6/60 and respiratory rate is 18 and sats are 97%  Still not on beta-blocker secondary to systolic blood pressures being between 110-120  Currently on amiodarone and maintaining sinus rhythm  Aspirin 81 mg p.o. once a day  Amiodarone 400 mg twice a day  Lipitor 40 mg p.o. at nighttime  Lasix 40 mg p.o. twice daily  Lovenox 40 mg subcu once a day  Consider decreasing the dose of amiodarone to 200mg twice a day are once a day at the time of discharge  Need for close monitoring and follow-up as an outpatient reviewed and discussed with patient  Postop day 4 clinically symptomatically stable  Likely discharge home today or tomorrow    April 4, 2020  Postop day 7  Patient is maintaining sinus rhythm  Currently on home oral amiodarone  T-max is 98.1 pulse rate is 70 respiratory 16 blood pressure is 133/73 sats are 97%  Current medications include amiodarone 200 mg p.o. twice daily Lipitor 40 mg p.o. once a day Lasix 40 mg p.o. daily  He is on anticoagulation therapy with apixaban 5 mg p.o. twice daily and Plavix 75 mg p.o. once a day  Follow-up in office in 2 weeks after discharge  For close monitoring and follow-up reviewed and discussed with patient and family       Impressions:  No angiographic evidence for significant epicardial occlusive disease  Severe aortic stenosis  Moderate pulmonary hypertension     Interpretation Summary     · Left ventricular ejection fraction appears to be 56 - 60%. Left ventricular systolic function is normal.  · Moderate aortic valve regurgitation is present.  · Severe aortic valve stenosis is present.  · Aortic valve maximum pressure gradient is 72 mmHg. Aortic valve mean pressure gradient is 44.6 mmHg.  · Saline test results are negative.  · Lipomatous hypertrophy of the interatrial septum present.      Chart and labs reviewed.  History and exam findings are verified with above changes noted.  Assessment and  "plan notated by APC after being formulated by attending consultant.  Note that greater than 50% of the time spent in care of the patient was provided by attending consultant.          REASON FOR FOLLOW-UP:  Severe aortic valve stenosis status post successful surgical aortic valve replacement-bioprosthetic        SUBJECTIVE    Patient seen and examined, chart and labs reviewed.  Patient sitting up in bed and appears comfortable.  Maintaining sinus rhythm.  He denies any nausea, shortness of breath.  Pain controlled with pain medication.  No events overnight.    REVIEW OF SYSTEMS:  Pertinent items are noted in HPI, all other systems reviewed and negative    OBJECTIVE   Potassium 4  Hemoglobin 9.3    ASSESSMENT  Severe aortic stenosis  Hypertension  Hypertensive heart disease  Hyperlipidemia  Osteoarthritis  Diabetes mellitus  Obesity  Antiplatelet therapy          RECOMMENDATIONS  Continue CTS postop protocol  Continue CV supportive care  Maintaining sinus rhythm activity as tolerated  Incentive spirometer  CTS note reviewed: On aspirin/statin.  BB on hold due to soft blood pressures.  On oral amiodarone.  Plans to transition to rehab facility today if no contraindications  Follow-up our office after rehab        Vital Signs  Visit Vitals  /57   Pulse 64   Temp 98.5 °F (36.9 °C) (Oral)   Resp 17   Ht 180.3 cm (71\")   Wt 125 kg (275 lb 9.2 oz)   SpO2 95%   BMI 38.43 kg/m²     Oxygen Therapy  SpO2: 95 %  Pulse Oximetry Type: Intermittent  Device (Oxygen Therapy): room air  Flow (L/min): 2  Oxygen Concentration (%): 40  Flowsheet Rows    Flowsheet Row First Filed Value   Admission Height 180.3 cm (71\") Documented at 03/28/2022 0540   Admission Weight 125 kg (274 lb 9.6 oz) Documented at 03/28/2022 0540        Intake & Output (last 3 days)       04/01 0701  04/02 0700 04/02 0701  04/03 0700 04/03 0701  04/04 0700 04/04 0701  04/05 0700    P.O. 684 480      I.V. (mL/kg) 189 (1.5)  265 (2.1)     Total Intake(mL/kg) " "873 (7) 480 (3.8) 265 (2.1)     Urine (mL/kg/hr) 2175 (0.7) 675 (0.2) 425 (0.1)     Stool   0     Total Output 2175 675 425     Net -1302 -195 -160             Urine Unmeasured Occurrence 1 x 1 x 1 x     Stool Unmeasured Occurrence   2 x         Lines, Drains & Airways     Active LDAs     Name Placement date Placement time Site Days    Peripheral IV 03/28/22 0639 Posterior;Right Hand 03/28/22  0639  Hand  1    Peripheral IV 03/29/22 0820 Posterior;Right Forearm 03/29/22  0820  Forearm  less than 1    Urethral Catheter Temperature probe 16 Fr. 03/28/22  0715  -- 1    Y Chest Tube 1 and 2 1 Mediastinal 28 Fr. 2 Mediastinal 28 Fr. 03/28/22  1016  -- 1    Arterial Line 03/28/22 Left Radial 03/28/22  0700  created via procedure documentation  Radial  1    Pacer Wires 03/28/22  --  Atrial and Ventricular  1                       /57   Pulse 64   Temp 98.5 °F (36.9 °C) (Oral)   Resp 17   Ht 180.3 cm (71\")   Wt 125 kg (275 lb 9.2 oz)   SpO2 95%   BMI 38.43 kg/m²   Intake/Output last 3 shifts:  I/O last 3 completed shifts:  In: 265 [I.V.:265]  Out: 675 [Urine:675]  Intake/Output this shift:  No intake/output data recorded.    PHYSICAL EXAM:    General: Well-developed, well-nourished 71-year-old male who is alert, cooperative, no distress, appears stated age  Head:  Normocephalic, atraumatic, mucous membranes moist  Eyes:  Conjunctiva/corneas clear, EOM's intact     Neck:  Supple,  no adenopathy; no JVD or bruit  Lungs: Clear to auscultation bilaterally, no wheezes rhonchi rales are noted  Chest wall: Sternal wound CDI  Heart::  Regular rate and rhythm, S1 and S2 normal, no murmur, rub or gallop  Abdomen: Soft, non-tender, nondistended bowel sounds active  Extremities: No cyanosis, clubbing, or edema   Pulses: 2+ and symmetric all extremities  Skin:  No rashes or lesions  Neuro/psych: A&O x3. CN II through XII are grossly intact with appropriate affect      Scheduled Meds:      amiodarone, 200 mg, Oral, BID With " Meals  apixaban, 5 mg, Oral, Q12H  atorvastatin, 40 mg, Oral, Nightly  bisacodyl, 10 mg, Rectal, Daily  chlorhexidine, 15 mL, Mouth/Throat, Q12H  clopidogrel, 75 mg, Oral, Daily  folic acid, 1 mg, Oral, Daily  furosemide, 40 mg, Oral, Daily  guaiFENesin, 1,200 mg, Oral, Q12H  insulin lispro, 0-14 Units, Subcutaneous, 4x Daily With Meals & Nightly  LORazepam, 0.25 mg, Oral, Q12H  metFORMIN, 1,000 mg, Oral, BID With Meals  multivitamin, 1 tablet, Oral, Daily  pantoprazole, 40 mg, Oral, Q AM  pioglitazone, 45 mg, Oral, Daily  polyethylene glycol, 17 g, Oral, BID  potassium chloride, 20 mEq, Oral, Daily  senna-docusate sodium, 2 tablet, Oral, BID  tamsulosin, 0.4 mg, Oral, Nightly  thiamine, 100 mg, Oral, Daily        Continuous Infusions:         PRN Meds:    dextrose  •  dextrose  •  glucagon (human recombinant)  •  HYDROcodone-acetaminophen  •  insulin lispro **AND** insulin lispro  •  [DISCONTINUED] Morphine **AND** naloxone  •  ondansetron  •  potassium chloride **OR** potassium chloride  •  sodium chloride        Results Review:     I reviewed the patient's new clinical results.    CBC    Results from last 7 days   Lab Units 04/04/22  0517 04/03/22  0618 04/02/22  0453 04/01/22  0531 03/31/22  0503 03/30/22  0535 03/29/22  0320   WBC 10*3/mm3 8.70 7.80 6.20 6.50 9.90 11.40* 11.40*   HEMOGLOBIN g/dL 9.3* 9.9* 9.7* 9.6* 10.9* 10.2* 11.0*   PLATELETS 10*3/mm3 207 202 159 132* 123* 100* 120*     Cr Clearance Estimated Creatinine Clearance: 96.4 mL/min (by C-G formula based on SCr of 0.96 mg/dL).  Coag   Results from last 7 days   Lab Units 03/29/22  0320 03/28/22  1225   INR  0.96 1.09   APTT seconds 28.7 33.8*     HbA1C   Lab Results   Component Value Date    HGBA1C 8.0 (H) 03/25/2022    HGBA1C 7.5 (H) 08/10/2021    HGBA1C 8.4 (H) 04/05/2021     Blood Glucose   Glucose   Date/Time Value Ref Range Status   04/04/2022 0737 154 (H) 70 - 105 mg/dL Final     Comment:     Serial Number: [id number]:  [id number]    04/03/2022 2006 150 (H) 70 - 105 mg/dL Final     Comment:     Serial Number: [id number]:  [id number]   04/03/2022 1631 175 (H) 70 - 105 mg/dL Final     Comment:     Serial Number: [id number]:  [id number]   04/03/2022 1230 180 (H) 70 - 105 mg/dL Final     Comment:     Serial Number: [id number]:  [id number]   04/03/2022 0734 191 (H) 70 - 105 mg/dL Final     Comment:     Serial Number: [id number]:  [id number]   04/02/2022 2006 254 (H) 70 - 105 mg/dL Final     Comment:     Serial Number: [id number]:  [id number]   04/02/2022 1528 159 (H) 70 - 105 mg/dL Final     Comment:     Serial Number: [id number]:  [id number]   04/02/2022 1134 181 (H) 70 - 105 mg/dL Final     Comment:     Serial Number: [id number]:  [id number]     Infection     CMP   Results from last 7 days   Lab Units 04/04/22  0517 04/03/22  0618 04/02/22  2028 04/02/22  1452 04/02/22  0453 04/01/22  1740 04/01/22  0531 03/31/22  1431 03/31/22  0503 03/30/22  0644 03/29/22  0320 03/28/22  2218 03/28/22  1714 03/28/22  1225   SODIUM mmol/L 134* 130* 133*  --  135*  --  135*  --  136 132*   < > 141   < > 139   POTASSIUM mmol/L 4.0 4.3 3.9 4.0 3.4* 3.9 3.5   < > 3.5 3.7   < > 3.6   < > 3.2*   CHLORIDE mmol/L 95* 91* 91*  --  95*  --  95*  --  95* 95*   < > 103   < > 102   CO2 mmol/L 27.0 27.0 25.0  --  28.0  --  28.0  --  26.0 23.0   < > 22.0   < > 24.0   BUN mg/dL 13 14 16  --  16  --  15  --  16 14   < > 12   < > 11   CREATININE mg/dL 0.96 1.00 1.16  --  0.96  --  0.91  --  1.04 1.04   < > 0.97   < > 1.01   GLUCOSE mg/dL 144* 206* 249*  --  190*  --  208*  --  118* 139*   < > 164*   < > 98   ALBUMIN g/dL 3.30*  --   --   --   --   --   --   --   --   --   --  4.50  --  4.50   BILIRUBIN mg/dL 0.7  --   --   --   --   --   --   --   --   --   --  0.4  --   --    ALK PHOS U/L 57  --   --   --   --   --   --   --   --   --   --  39  --   --    AST (SGOT) U/L 17  --   --   --   --   --   --   --   --   --   --  44*  --   --     ALT (SGPT) U/L 15  --   --   --   --   --   --   --   --   --   --  12  --   --     < > = values in this interval not displayed.     ABG    Results from last 7 days   Lab Units 03/28/22  2227 03/28/22  1824 03/28/22  1517 03/28/22  1344 03/28/22  1244 03/28/22  1038 03/28/22  0951   PH, ARTERIAL pH units 7.343* 7.369 7.374 7.387 7.420 7.420 7.410   PCO2, ARTERIAL mm Hg 45.9 42.5 38.4 38.7 37.9 47.6* 49.3*   PO2 ART mm Hg 99.5 118.3* 116.7* 115.9* 178.8* 423.0* 371.0*   O2 SATURATION ART % 97.3 98.5* 98.5* 98.5* 99.6* 100.0* 100.0*   BASE EXCESS ART mmol/L -1.0* -0.8* -2.5* -1.6* 0.2 7.0* 7.0*     UA        MIGUEL  No results found for: POCMETH, POCAMPHET, POCBARBITUR, POCBENZO, POCCOCAINE, POCOPIATES, POCOXYCODO, POCPHENCYC, POCPROPOXY, POCTHC, POCTRICYC  Lysis Labs   Results from last 7 days   Lab Units 04/04/22  0517 04/03/22  0618 04/02/22 2028 04/02/22  0453 04/01/22  0531 03/31/22  0503 03/30/22  0644 03/30/22  0535 03/29/22  0320 03/28/22  1244 03/28/22  1225   INR   --   --   --   --   --   --   --   --  0.96  --  1.09   APTT seconds  --   --   --   --   --   --   --   --  28.7  --  33.8*   HEMOGLOBIN g/dL 9.3* 9.9*  --  9.7* 9.6* 10.9*  --  10.2* 11.0*  --  11.3*   HEMOGLOBIN, POC   --   --   --   --   --   --   --   --   --    < >  --    PLATELETS 10*3/mm3 207 202  --  159 132* 123*  --  100* 120*  --  95*   CREATININE mg/dL 0.96 1.00 1.16 0.96 0.91 1.04 1.04  --  0.99   < > 1.01    < > = values in this interval not displayed.     Radiology(recent) No radiology results for the last day            Xrays, labs reviewed personally by physician.    ECG/EMG Results (most recent)     Procedure Component Value Units Date/Time    ECG 12 Lead [[id number]] Collected: 03/30/22 0530     Updated: 04/01/22 1638     QT Interval 382 ms     Narrative:      HEART RATE= 101  bpm  RR Interval= 593  ms  AR Interval=   ms  P Horizontal Axis=   deg  P Front Axis=   deg  QRSD Interval= 102  ms  QT Interval= 382  ms  QRS Axis= 34   deg  T Wave Axis= -74  deg  - ABNORMAL ECG -  Atrial fibrillation  Nonspecific T abnormalities, inferior leads  When compared with ECG of 29-Mar-2022 4:13:18,  Significant change in rhythm  Significant repolarization change  Electronically Signed By: Panda Barton ([facility]) 01-Apr-2022 16:37:45  Date and Time of Study: 2022-03-30 05:30:02    ECG 12 Lead [[id number]] Collected: 03/31/22 0346     Updated: 04/02/22 0656     QT Interval 476 ms     Narrative:      HEART RATE= 72  bpm  RR Interval= 864  ms  DE Interval= 161  ms  P Horizontal Axis= 52  deg  P Front Axis= 57  deg  QRSD Interval= 108  ms  QT Interval= 476  ms  QRS Axis= 49  deg  T Wave Axis= -33  deg  - ABNORMAL ECG -  Sinus rhythm  Atrial premature complex  Prolonged QT interval  When compared with ECG of 30-Mar-2022 5:30:02,  Significant change in rhythm: previously atrial fibrillation  Electronically Signed By: Echo Sharma ([facility]) 02-Apr-2022 06:56:15  Date and Time of Study: 2022-03-31 03:46:47    ECG 12 Lead [[id number]] Collected: 04/02/22 0402     Updated: 04/02/22 1734     QT Interval 515 ms     Narrative:      HEART RATE= 73  bpm  RR Interval= 872  ms  DE Interval= 175  ms  P Horizontal Axis= 38  deg  P Front Axis= 36  deg  QRSD Interval= 109  ms  QT Interval= 515  ms  QRS Axis= 32  deg  T Wave Axis= 25  deg  - ABNORMAL ECG -  Sinus rhythm  Multiple premature complexes, vent & supraven  Nonspecific T abnormalities, lateral leads  Prolonged QT interval  When compared with ECG of 01-Apr-2022 3:03:10,  No significant change  Electronically Signed By: Ganga Sylvester ([facility]) 02-Apr-2022 17:34:35  Date and Time of Study: 2022-04-02 04:02:19    ECG 12 Lead [[id number]] Collected: 04/01/22 0303     Updated: 04/02/22 1735     QT Interval 485 ms     Narrative:      HEART RATE= 78  bpm  RR Interval= 780  ms  DE Interval= 185  ms  P Horizontal Axis= -50  deg  P Front Axis= 68  deg  QRSD Interval= 107  ms  QT Interval= 485  ms  QRS Axis= 35  deg  T Wave Axis= 27  deg  -  ABNORMAL ECG -  Sinus rhythm  Atrial premature complex  Prolonged QT interval  When compared with ECG of 31-Mar-2022 3:46:47,  Nonspecific significant change  Electronically Signed By: Ganga Sylvester ([facility]) 02-Apr-2022 17:34:52  Date and Time of Study: 2022-04-01 03:03:10    ECG 12 Lead [[id number]] Collected: 03/29/22 0413     Updated: 04/02/22 1735     QT Interval 423 ms     Narrative:      HEART RATE= 58  bpm  RR Interval= 1040  ms  MN Interval= 157  ms  P Horizontal Axis= 0  deg  P Front Axis= 11  deg  QRSD Interval= 124  ms  QT Interval= 423  ms  QRS Axis= 21  deg  T Wave Axis= -12  deg  - ABNORMAL ECG -  Failure to sense and/or capture (?magnet)  Sinus bradycardia  Nonspecific intraventricular conduction delay  When compared with ECG of 25-Mar-2022 10:35:54,  Significant change in rhythm  Electronically Signed By: Ganga Sylvester ([facility]) 02-Apr-2022 17:34:59  Date and Time of Study: 2022-03-29 04:13:18    ECG 12 Lead [[id number]] Collected: 04/03/22 0637     Updated: 04/03/22 0638     QT Interval 519 ms     Narrative:      HEART RATE= 63  bpm  RR Interval= 956  ms  MN Interval= 176  ms  P Horizontal Axis= -34  deg  P Front Axis= 35  deg  QRSD Interval= 116  ms  QT Interval= 519  ms  QRS Axis= 18  deg  T Wave Axis= 21  deg  - ABNORMAL ECG -  Sinus rhythm  Atrial premature complex  Nonspecific intraventricular conduction delay  Nonspecific T abnrm, anterolateral leads  Prolonged QT interval  Electronically Signed By:   Date and Time of Study: 2022-04-03 06:37:49    ECG 12 Lead [[id number]] Collected: 04/04/22 0418     Updated: 04/04/22 0627     QT Interval 511 ms     Narrative:      HEART RATE= 63  bpm  RR Interval= 956  ms  MN Interval= 168  ms  P Horizontal Axis= Invalid  deg  P Front Axis= 50  deg  QRSD Interval= 131  ms  QT Interval= 511  ms  QRS Axis= 43  deg  T Wave Axis= 34  deg  - ABNORMAL ECG -  Sinus rhythm  Nonspecific intraventricular conduction delay  Abnormal lateral Q waves  Repol abnrm suggests ischemia,  anterolateral  Prolonged QT interval  Baseline wander in lead(s) V1,V6  Electronically Signed By:   Date and Time of Study: 2022-04-04 04:18:09    ECG 12 Lead [[id number]] Collected: 03/30/22 1023     Updated: 04/04/22 0637     QT Interval 365 ms     Narrative:      HEART RATE= 89  bpm  RR Interval= 676  ms  FL Interval= Failed  ms  P Horizontal Axis= Invalid  deg  P Front Axis= Invalid  deg  QRSD Interval= 105  ms  QT Interval= 365  ms  QRS Axis= 30  deg  T Wave Axis= -82  deg  - ABNORMAL ECG -  Atrial fibrillation  Nonspecific T abnormalities, inferior leads  When compared with ECG of 30-Mar-2022 5:30:02,  No significant change  Electronically Signed By:   Date and Time of Study: 2022-03-30 10:23:50            Medication Review:   I have reviewed the patient's current medication list  Scheduled Meds:amiodarone, 200 mg, Oral, BID With Meals  apixaban, 5 mg, Oral, Q12H  atorvastatin, 40 mg, Oral, Nightly  bisacodyl, 10 mg, Rectal, Daily  chlorhexidine, 15 mL, Mouth/Throat, Q12H  clopidogrel, 75 mg, Oral, Daily  folic acid, 1 mg, Oral, Daily  furosemide, 40 mg, Oral, Daily  guaiFENesin, 1,200 mg, Oral, Q12H  insulin lispro, 0-14 Units, Subcutaneous, 4x Daily With Meals & Nightly  LORazepam, 0.25 mg, Oral, Q12H  metFORMIN, 1,000 mg, Oral, BID With Meals  multivitamin, 1 tablet, Oral, Daily  pantoprazole, 40 mg, Oral, Q AM  pioglitazone, 45 mg, Oral, Daily  polyethylene glycol, 17 g, Oral, BID  potassium chloride, 20 mEq, Oral, Daily  senna-docusate sodium, 2 tablet, Oral, BID  tamsulosin, 0.4 mg, Oral, Nightly  thiamine, 100 mg, Oral, Daily      Continuous Infusions:   PRN Meds:.dextrose  •  dextrose  •  glucagon (human recombinant)  •  HYDROcodone-acetaminophen  •  insulin lispro **AND** insulin lispro  •  [DISCONTINUED] Morphine **AND** naloxone  •  ondansetron  •  potassium chloride **OR** potassium chloride  •  sodium chloride    Imaging:  Imaging Results (Last 72 Hours)     ** No results found for the last 72  "hours. **            SERAFIN Smith  04/04/22  09:26 EDT       EMR Dragon/Transcription:   \"Dictated utilizing Dragon dictation\".       Electronically signed by SERAFIN Smith, 04/04/22, 9:26 AM EDT.    "

## 2022-04-04 NOTE — DISCHARGE SUMMARY
Date of Admission: 3/28/22  Date of Discharge:  4/4/2022    Discharge Diagnosis:   Severe AS, EF 55-60% (PAULO)--s/p tissue AVR/ endarterectomy of RCA ostium (Aleksandr)  HTN--soft BP, unable to initiate beta blocker due to low BP and low HR  HLD--statin  DM, type 2--preop a1c 8  YANA with moderate pulm HTN--CPAP compliant  COPD  ETOH abuse--reports last drink 2 days before adm, 6 oz bourbon daily  Obesity, stage 2--BMI 38  Expected ABLA, postop    Presenting Problem/History of Present Illness  Nonrheumatic aortic valve stenosis [I35.0]     Hospital Course  Patient is a 71 year old male with a PMH of diabetes mellitus II, hypertension, hyperlipidemia, CPAP compliant YANA, rosacea, GERD, B12 anemia who most previously saw Dr. Brandon in office on 1/10/2022 at which time it was decided that he would undergo open surgical procedure for aortic stenosis with tissue AVR. The patient had tooth extraction and other dental work completed in preparation for surgery, additionally had PFTs without significant deficit. Patient presented to [facility] on 3/25/22 without any changes to his health history since seeing Dr. Brandon in office. Patient was admitted on 3/28/22 and operation was performed on the same day. Patient underwent tissue aortic valve replacement and endarterectomy of RCA ostium with PAULO with Dr. Brandon, operation went well. After operation, patient was transferred to [facility] and was extubated later that night. Patient was gradually weaned off pressers. POD#1, patient was de-escalated, swan and central line discontinued, patient diuresed. POD#2, patient placed on amiodarone gtt due to a.fib, chest tubes and andres cath removed. Patient placed on PO lasix, potassium and magnesium were repleted, patient was weaned off oxygen. POD#3, patient transitioned to oral amiodarone, low dose ativan initiated. POD#4, Plavix initiated due to endartectomy of RCA ostium, epicardial wires were discontinued. POD#5, Patient had brief episode of a.fib again so  patient was placed on eliquis, lasix was decreased and potassium repleted. POD#6, potassium repleted. POD#7, magnesium repleted and patient was accepted to rehab facility. Throughout hospital course patient has been working with PT and OT with gradual improvement. Patients BP and HR have remained low so we were unable to initiate beta blocker. Patient has history of alcohol abuse (patient reported 6 oz bourbon daily), so patient was placed on thiamine, folic acid, and ativan. Patient did not experience withdrawal symptoms during hospital course. Overall, patient has been gradually progressing each day and will benefit from rehabilitation and further working with therapy. Appointment has been placed for patient to follow up in outpatient clinic in 2 weeks with Kristi and is listed below.      Procedures Performed  Procedure(s):  AORTIC VALVE REPAIR/REPLACEMENT       Consults:   Consults     Date and Time Order Name Status Description    3/28/2022 12:23 PM Inpatient Cardiology Consult            Pertinent Test Results:    Lab Results   Component Value Date    WBC 8.70 04/04/2022    HGB 9.3 (L) 04/04/2022    HCT 25.9 (L) 04/04/2022    MCV 89.1 04/04/2022     04/04/2022      Lab Results   Component Value Date    GLUCOSE 144 (H) 04/04/2022    CALCIUM 9.0 04/04/2022     (L) 04/04/2022    K 4.0 04/04/2022    CO2 27.0 04/04/2022    CL 95 (L) 04/04/2022    BUN 13 04/04/2022    CREATININE 0.96 04/04/2022    EGFRIFAFRI 96 05/20/2016    EGFRIFNONA 74 12/15/2021    BCR 13.5 04/04/2022    ANIONGAP 12.0 04/04/2022     Lab Results   Component Value Date    INR 0.96 03/29/2022    PROTIME 10.7 03/29/2022         Condition on Discharge: Stable     Vital Signs  Temp:  [98.1 °F (36.7 °C)-98.9 °F (37.2 °C)] 98.1 °F (36.7 °C)  Heart Rate:  [59-70] 70  Resp:  [17-18] 17  BP: (119-150)/(57-83) 133/73      Discharge Disposition  Rehab Facility or Unit (DC - External)    Discharge Medications     Discharge Medications      New  Medications      Instructions Start Date   amiodarone 200 MG tablet  Commonly known as: PACERONE   200 mg, Oral, Daily      apixaban 5 MG tablet tablet  Commonly known as: ELIQUIS   5 mg, Oral, Every 12 Hours Scheduled      clopidogrel 75 MG tablet  Commonly known as: PLAVIX   75 mg, Oral, Daily   Start Date: April 5, 2022     furosemide 40 MG tablet  Commonly known as: LASIX   40 mg, Oral, Daily   Start Date: April 5, 2022     HYDROcodone-acetaminophen 5-325 MG per tablet  Commonly known as: NORCO   1 tablet, Oral, Every 4 Hours PRN      tamsulosin 0.4 MG capsule 24 hr capsule  Commonly known as: FLOMAX   0.4 mg, Oral, Nightly         Changes to Medications      Instructions Start Date   metFORMIN 1000 MG tablet  Commonly known as: GLUCOPHAGE  What changed: additional instructions   1,000 mg, Oral, 2 Times Daily With Meals      potassium chloride 20 MEQ CR tablet  Commonly known as: K-DUR,KLOR-CON  What changed:   · medication strength  · when to take this   20 mEq, Oral, Daily         Continue These Medications      Instructions Start Date   atorvastatin 40 MG tablet  Commonly known as: LIPITOR   Take 1 tablet by mouth once daily      ezetimibe 10 MG tablet  Commonly known as: ZETIA   Take 1 tablet by mouth once daily      folic acid 1 MG tablet  Commonly known as: FOLVITE   1 mg, Oral, Daily      lansoprazole 30 MG capsule  Commonly known as: PREVACID   Take 1 capsule by mouth once daily      pioglitazone 45 MG tablet  Commonly known as: ACTOS   Take 1 tablet by mouth once daily      thiamine 100 MG tablet  tablet  Commonly known as: VITAMIN B-1   100 mg, Oral, Daily, LD 3/20         Stop These Medications    amLODIPine 5 MG tablet  Commonly known as: NORVASC     aspirin 325 MG tablet     Calcium-Magnesium-Vitamin D 500-250-200 MG-MG-UNIT tablet     chlorhexidine 0.12 % solution  Commonly known as: PERIDEX     ibuprofen 800 MG tablet  Commonly known as: ADVIL,MOTRIN     mupirocin 2 % ointment  Commonly known as:  BACTROBAN     naproxen sodium 220 MG tablet  Commonly known as: ALEVE            Discharge Diet: Heart healthy     Activity at Discharge:    1. No driving until seen in office and off narcotic pain medications.  2. Shower daily. Clean incisions with warm water and antibacterial soap only. Do not put any lotion or ointments on incisions.  3. Ambulate for 10 minutes at least 3 times a day.  4. No heavy lifting > 10lbs until seen in office.   5. Take all medications as prescribed.     Follow-up Appointments  Future Appointments   Date Time Provider Department Center   4/21/2022 10:15 AM Kristi Mann APRN [facility]   4/29/2022 10:30 AM Jorden Whipple PA-C [facility]   5/13/2022 10:15 AM Juan Castellon,  [facility]     Additional Instructions for the Follow-ups that You Need to Schedule     Call MD With Problems / Concerns   As directed      Instructions:  Call office at [phone] for any drainage, increased redness, or fever over 100.5    Order Comments: Instructions:  Call office at [phone] for any drainage, increased redness, or fever over 100.5          Discharge Follow-up with PCP   As directed       Currently Documented PCP:    Jorden Whipple PA-C    PCP Phone Number:    [phone]     Follow Up Details: in 1 week         Discharge Follow-up with Specialty: Cadiac surgery APRN/PA; 2 Weeks   As directed      Specialty: Cadiac surgery APRN/PA    Follow Up: 2 Weeks    Follow Up Details: Appt. made for 4/21/22 at 10:15         Discharge Follow-up with Specified Provider: Cardiologist; 2 Weeks   As directed      To: Cardiologist    Follow Up: 2 Weeks    Follow Up Details: call for appointment, bring all medication bottles to appointment               Test Results Pending at Discharge: None        Alec Barth PA-C  04/04/22  14:41 EDT  .

## 2022-04-04 NOTE — NURSING NOTE
Patient discharge teaching done with patient and spouse, RN removed IV and helped patient get dressed, patient stated he didn't have any questions, and patient was taken out via wheelchair. Report given to Mary at [facility].

## 2022-04-04 NOTE — CASE MANAGEMENT/SOCIAL WORK
Case Management Discharge Note      Final Note: [facility]    Provided Post Acute Provider List?: Yes  Post Acute Provider List: Home Health  Provided Post Acute Provider Quality & Resource List?: Yes  Post Acute Provider Quality and Resource List: Home Health  Delivered To: Patient  Method of Delivery: In person    Selected Continued Care - Discharged on 4/4/2022 Admission date: 3/28/2022 - Discharge disposition: Rehab Facility or Unit (DC - External)    Destination Coordination complete.    Service Provider Selected Services Address Phone Fax Patient Preferred    [facility]  Inpatient [facility] [address] [place] [id number] [phone]                       Transportation Services  Private: Car    Final Discharge Disposition Code: 62 - inpatient rehab facility

## 2022-04-04 NOTE — NURSING NOTE
"Called to MD Brandon regarding Mg level 1.5 asked if ok with this or want replacement received order \" I'm good with that\"  "

## 2022-04-07 LAB — QT INTERVAL: 365 MS

## 2022-04-11 ENCOUNTER — TRANSCRIBE ORDERS (OUTPATIENT)
Dept: HOME HEALTH SERVICES | Facility: HOME HEALTHCARE | Age: 72
End: 2022-04-11

## 2022-04-11 ENCOUNTER — TELEPHONE (OUTPATIENT)
Dept: CARDIOLOGY | Facility: CLINIC | Age: 72
End: 2022-04-11

## 2022-04-11 ENCOUNTER — TELEPHONE (OUTPATIENT)
Dept: CARDIAC SURGERY | Facility: CLINIC | Age: 72
End: 2022-04-11

## 2022-04-11 ENCOUNTER — HOME HEALTH ADMISSION (OUTPATIENT)
Dept: HOME HEALTH SERVICES | Facility: HOME HEALTHCARE | Age: 72
End: 2022-04-11

## 2022-04-11 DIAGNOSIS — I97.89: Primary | ICD-10-CM

## 2022-04-11 DIAGNOSIS — I35.0: Primary | ICD-10-CM

## 2022-04-11 NOTE — TELEPHONE ENCOUNTER
Spouse called to report that insurance will not cover eliquis. Notified SERAFIN Swann, and Dr. Brandon who ar ein agreement with changing to xarelto. Xarelto sent to pharmacy. Spouse also reports that pt is having frequent diarrhea, recommended imodium. PT c/o dizziness, suggested dehydration secondary to diarrhea. Spouse will encourage pt to increase fluid consumption. Appt with APRN on 4/21. Spouse will call with further questions or concerns.

## 2022-04-11 NOTE — TELEPHONE ENCOUNTER
----- Message from Juan Castellon DO sent at 4/10/2022  7:44 AM EDT -----  Regarding: RE: Follow up appt  He can see me as scheduled in May.  Just let them know that when surgery gives him the clearance, we would like to start him in cardiac rehab.  I can initiate that prior to their visit in May.  ----- Message -----  From: Jody Olivia MA  Sent: 4/8/2022   4:02 PM EDT  To: Juan Castellon DO  Subject: Follow up appt                                   Patients wife asking when he needs to be seen for a follow up? He is being released from rehab on 4/9/22.

## 2022-04-12 ENCOUNTER — HOME CARE VISIT (OUTPATIENT)
Dept: HOME HEALTH SERVICES | Facility: HOME HEALTHCARE | Age: 72
End: 2022-04-12

## 2022-04-12 VITALS
SYSTOLIC BLOOD PRESSURE: 128 MMHG | DIASTOLIC BLOOD PRESSURE: 74 MMHG | TEMPERATURE: 97.9 F | OXYGEN SATURATION: 95 % | HEART RATE: 72 BPM

## 2022-04-12 PROCEDURE — G0151 HHCP-SERV OF PT,EA 15 MIN: HCPCS

## 2022-04-12 NOTE — HOME HEALTH
Pt is a 70 yo male referred for PT  hospital and rehab stay sp AVR. Sternal incision is clean and dry. Pt using heart hugger for support.     Environment Lives with spouse in a single story house with one step to get in and out. House is clutter free. Pt uses a regular toilet with grab bars and a walk in shower with built in shower seat. Pt able to access different areas of house with use of wheeled walker for support. Spouse willing and able to assist patient.     Educated on contents of agency folder and booklet. Explained to patient/caregiver orientation book:   How to contact home health after hours, community resources, supervisor and 's name   and phone numbers, patient rights and responsibilities, home safety, disaster planning,   cost and insurance explanation,  how to report issues of complaints.   The patient 's insurance will cover 100% of cost if he is homebound, under the care of and MD and   requires the skill of a PT and with authorization.COVID 19 hand outs. Pt stated he understood.\    Plan for next visit progress HEP, transfer training balance and gait training as tolerated.

## 2022-04-12 NOTE — CASE COMMUNICATION
Per CMS guidelines home health is required to report drug to drug major interactions, on review of this patients medications the following ws noted.    Drug-Drug: rivaroxaban and clopidogrel   Use of rivaroxaban with clopidogrel may increase the risk of bleeding.    Drug-Drug: lansoprazole and clopidogrel   Use of lansoprazole may lead to reduced ability of clopidogrel to inhibit platelet aggregation and increase the risk of subsequent cardiovascular events.    Drug-Drug: Diclofenac Sodium and rivaroxaban   Use of rivaroxaban with Diclofenac Sodium may increase the risk of bleeding.    Drug-Drug: amiodarone and atorvastatin   Plasma concentrations and pharmacologic effects of atorvastatin may be increased by amiodarone    Thank you  Cortez MCLEANN  Clinical Manager [facility]  Office [phone]  Cell [phone]  Fax [phone]  [email or web address]

## 2022-04-15 ENCOUNTER — HOME CARE VISIT (OUTPATIENT)
Dept: HOME HEALTH SERVICES | Facility: HOME HEALTHCARE | Age: 72
End: 2022-04-15

## 2022-04-15 ENCOUNTER — TELEPHONE (OUTPATIENT)
Dept: FAMILY MEDICINE CLINIC | Facility: CLINIC | Age: 72
End: 2022-04-15

## 2022-04-15 PROCEDURE — G0180 MD CERTIFICATION HHA PATIENT: HCPCS | Performed by: PHYSICIAN ASSISTANT

## 2022-04-15 PROCEDURE — G0151 HHCP-SERV OF PT,EA 15 MIN: HCPCS

## 2022-04-15 NOTE — TELEPHONE ENCOUNTER
Caller: MATEUS PATTERSON    Relationship: Emergency Contact       Best call back number: [phone]    What medication are you requesting: ACCUCHECK GUIDE ME, TEST STRIPS    What are your current symptoms:     How long have you been experiencing symptoms:     Have you had these symptoms before:    [x] Yes  [] No    Have you been treated for these symptoms before:   [x] Yes  [] No    If a prescription is needed, what is your preferred pharmacy and phone number: [facility] [address] [place] [address] [phone] [facility] [phone]      Additional notes:

## 2022-04-17 VITALS
TEMPERATURE: 98.2 F | RESPIRATION RATE: 18 BRPM | SYSTOLIC BLOOD PRESSURE: 120 MMHG | DIASTOLIC BLOOD PRESSURE: 70 MMHG | HEART RATE: 70 BPM | OXYGEN SATURATION: 99 %

## 2022-04-18 RX ORDER — BLOOD SUGAR DIAGNOSTIC
STRIP MISCELLANEOUS
Qty: 200 EACH | Refills: 12 | Status: SHIPPED | OUTPATIENT
Start: 2022-04-18 | End: 2022-04-20 | Stop reason: SDUPTHER

## 2022-04-18 NOTE — HOME HEALTH
Patient tolerated session well and compliance with illustrated HEP and walking program with rolling walker emphasized to boost his endurance and improve gait skills. Patient/spouse stated good understanding.

## 2022-04-19 ENCOUNTER — HOME CARE VISIT (OUTPATIENT)
Dept: HOME HEALTH SERVICES | Facility: HOME HEALTHCARE | Age: 72
End: 2022-04-19

## 2022-04-19 PROCEDURE — G0151 HHCP-SERV OF PT,EA 15 MIN: HCPCS

## 2022-04-20 ENCOUNTER — TELEPHONE (OUTPATIENT)
Dept: FAMILY MEDICINE CLINIC | Facility: CLINIC | Age: 72
End: 2022-04-20

## 2022-04-20 RX ORDER — BLOOD SUGAR DIAGNOSTIC
STRIP MISCELLANEOUS
Qty: 200 EACH | Refills: 12 | Status: SHIPPED | OUTPATIENT
Start: 2022-04-20 | End: 2022-04-20 | Stop reason: SDUPTHER

## 2022-04-20 RX ORDER — BLOOD SUGAR DIAGNOSTIC
STRIP MISCELLANEOUS
Qty: 100 EACH | Refills: 12 | Status: SHIPPED | OUTPATIENT
Start: 2022-04-20

## 2022-04-20 NOTE — TELEPHONE ENCOUNTER
Pharmacy Name:  [facility]     Pharmacy representative name: JOEY    Pharmacy representative phone number: [phone]    What medication are you calling in regards to: ACCUCHECK TEST STRIPS    What question does the pharmacy have: INSULIN    Who is the provider that prescribed the medication: PILAR ROMERO    Additional notes:     JOEY WOULD LIKE TO KNOW IF FAX WAS RECEIVED TODAY REGARDING ACCU CHECK TEST STRIPS    INSURANCE WOULD ONLY  PAY FOR 1 TIME DAILY, BECAUSE [facility] IS NOT INSULIN DEPENDANT

## 2022-04-21 ENCOUNTER — OFFICE VISIT (OUTPATIENT)
Dept: CARDIAC SURGERY | Facility: CLINIC | Age: 72
End: 2022-04-21

## 2022-04-21 VITALS
HEIGHT: 71 IN | RESPIRATION RATE: 20 BRPM | OXYGEN SATURATION: 98 % | HEART RATE: 72 BPM | DIASTOLIC BLOOD PRESSURE: 69 MMHG | SYSTOLIC BLOOD PRESSURE: 105 MMHG | WEIGHT: 279 LBS | TEMPERATURE: 97.3 F | BODY MASS INDEX: 39.06 KG/M2

## 2022-04-21 VITALS
TEMPERATURE: 98.5 F | HEART RATE: 77 BPM | RESPIRATION RATE: 18 BRPM | OXYGEN SATURATION: 99 % | DIASTOLIC BLOOD PRESSURE: 58 MMHG | SYSTOLIC BLOOD PRESSURE: 110 MMHG

## 2022-04-21 DIAGNOSIS — Z95.2 S/P AVR (AORTIC VALVE REPLACEMENT): Primary | ICD-10-CM

## 2022-04-21 PROCEDURE — 99024 POSTOP FOLLOW-UP VISIT: CPT | Performed by: NURSE PRACTITIONER

## 2022-04-21 RX ORDER — FUROSEMIDE 40 MG/1
40 TABLET ORAL DAILY PRN
Qty: 30 TABLET | Refills: 0
Start: 2022-04-21 | End: 2022-04-29

## 2022-04-21 RX ORDER — POTASSIUM CHLORIDE 20 MEQ/1
20 TABLET, EXTENDED RELEASE ORAL DAILY PRN
Qty: 30 TABLET | Refills: 0
Start: 2022-04-21 | End: 2022-04-29

## 2022-04-21 NOTE — HOME HEALTH
Patient denied pain in sternal incision and compliance with home exercise/walking program between PT visits reinforced and patient/spouse stated good understanding. Good progress in transfer and gait skills.

## 2022-04-21 NOTE — PATIENT INSTRUCTIONS
Continue lifting restriction of 10 lbs until 6 weeks and 50 lbs until 12 weeks from the date of surgery, no excessive jarring motions or twisting motions until 12 weeks from the date of surgery     Prophylactic antibiotics before dental work and other surgeries lifelong with artificial valve, prosthesis, or grafting     Weigh daily.  Take Lasix (furosemide) for weight gain of 3 lbs/24 hours or 5 lbs/72 hours, take potassium pill with every lasix dose.

## 2022-04-22 ENCOUNTER — HOME CARE VISIT (OUTPATIENT)
Dept: HOME HEALTH SERVICES | Facility: HOME HEALTHCARE | Age: 72
End: 2022-04-22

## 2022-04-22 PROBLEM — Z95.2 S/P AVR (AORTIC VALVE REPLACEMENT): Status: ACTIVE | Noted: 2022-04-22

## 2022-04-22 PROCEDURE — G0157 HHC PT ASSISTANT EA 15: HCPCS

## 2022-04-22 NOTE — PROGRESS NOTES
"CARDIOVASCULAR SURGERY FOLLOW-UP PROGRESS NOTE  Chief Complaint: Postop follow-up        HPI:   Dear Jorden Martin PA-C and colleagues:    It was nice to see Anurag Pickard in follow up 3 weeks after surgery.  As you know, he is a 71 y.o. male with aortic valve stenosis, YANA, COPD, hyperlipidemia, EtOH abuse who underwent tissue AVR, endarterectomy of RCA ostium on 3/28/2022 [facility] He did well postoperatively and continues to do well.  He did have postoperative atrial fibrillation for which he continues to take Xarelto.  He comes in today complaining of dizziness with standing, I have instructed him to begin taking Lasix as needed for weight gain instead of daily.  His activity level has been fair.  From a surgical standpoint, the sternal incision is well approximated without erythema, edema, or drainage.  The sternum is stable to palpation, and the patient denies any popping or clicking with deep inspiration or coughing.      Physical Exam:         /69 (BP Location: Left arm, Patient Position: Sitting, Cuff Size: Adult)   Pulse 72   Temp 97.3 °F (36.3 °C)   Resp 20   Ht 180.3 cm (71\")   Wt 127 kg (279 lb)   SpO2 98%   BMI 38.91 kg/m²   Heart:  irregularly irregular rhythm  Lungs:  clear to auscultation bilaterally  Extremities:  no edema  Incision(s):  mid chest healing well, sternum stable    Assessment/Plan:     S/P AVR. Overall, he is doing well.    Post-op atrial fibrillation    No heavy lifting > 10 pounds for 3 more weeks  Keep incisions clean and dry  Follow-up as scheduled with cardiology  Follow-up as scheduled with PCP  Return to clinic in 3 week(s) with no new studies    Continue lifting restriction of 10 lbs until 6 weeks and 50 lbs until 12 weeks from the date of surgery, no excessive jarring motions or twisting motions until 12 weeks from the date of surgery    Return to clinic if any signs or symptoms of infection or sternal instability develop "       Thank you for allowing me to participate in the care of your patient.    Regards,  SERAFIN Marroquin    Current outpatient and discharge medications have been reconciled for the patient.  Reviewed by: SERAFIN Marroquin

## 2022-04-23 VITALS — DIASTOLIC BLOOD PRESSURE: 74 MMHG | SYSTOLIC BLOOD PRESSURE: 122 MMHG

## 2022-04-26 ENCOUNTER — HOME CARE VISIT (OUTPATIENT)
Dept: HOME HEALTH SERVICES | Facility: HOME HEALTHCARE | Age: 72
End: 2022-04-26

## 2022-04-26 PROCEDURE — G0157 HHC PT ASSISTANT EA 15: HCPCS

## 2022-04-27 VITALS
HEART RATE: 47 BPM | OXYGEN SATURATION: 99 % | TEMPERATURE: 97.5 F | DIASTOLIC BLOOD PRESSURE: 74 MMHG | SYSTOLIC BLOOD PRESSURE: 124 MMHG

## 2022-04-29 ENCOUNTER — LAB (OUTPATIENT)
Dept: FAMILY MEDICINE CLINIC | Facility: CLINIC | Age: 72
End: 2022-04-29

## 2022-04-29 ENCOUNTER — HOME CARE VISIT (OUTPATIENT)
Dept: HOME HEALTH SERVICES | Facility: HOME HEALTHCARE | Age: 72
End: 2022-04-29

## 2022-04-29 ENCOUNTER — OFFICE VISIT (OUTPATIENT)
Dept: FAMILY MEDICINE CLINIC | Facility: CLINIC | Age: 72
End: 2022-04-29

## 2022-04-29 VITALS
SYSTOLIC BLOOD PRESSURE: 90 MMHG | OXYGEN SATURATION: 99 % | BODY MASS INDEX: 36.51 KG/M2 | HEART RATE: 76 BPM | RESPIRATION RATE: 12 BRPM | WEIGHT: 260.8 LBS | HEIGHT: 71 IN | DIASTOLIC BLOOD PRESSURE: 58 MMHG

## 2022-04-29 DIAGNOSIS — L71.9 ROSACEA: ICD-10-CM

## 2022-04-29 DIAGNOSIS — Z00.00 MEDICARE ANNUAL WELLNESS VISIT, SUBSEQUENT: Primary | ICD-10-CM

## 2022-04-29 DIAGNOSIS — Z12.5 SCREENING PSA (PROSTATE SPECIFIC ANTIGEN): ICD-10-CM

## 2022-04-29 DIAGNOSIS — E11.65 TYPE 2 DIABETES MELLITUS WITH HYPERGLYCEMIA, WITHOUT LONG-TERM CURRENT USE OF INSULIN: ICD-10-CM

## 2022-04-29 DIAGNOSIS — I35.0 NONRHEUMATIC AORTIC VALVE STENOSIS: ICD-10-CM

## 2022-04-29 DIAGNOSIS — Z00.00 MEDICARE ANNUAL WELLNESS VISIT, SUBSEQUENT: ICD-10-CM

## 2022-04-29 LAB
ALBUMIN SERPL-MCNC: 4.2 G/DL (ref 3.5–5.2)
ALBUMIN UR-MCNC: 3.7 MG/DL
ALBUMIN/GLOB SERPL: 1.3 G/DL
ALP SERPL-CCNC: 74 U/L (ref 39–117)
ALT SERPL W P-5'-P-CCNC: 5 U/L (ref 1–41)
ANION GAP SERPL CALCULATED.3IONS-SCNC: 19.3 MMOL/L (ref 5–15)
AST SERPL-CCNC: 15 U/L (ref 1–40)
BASOPHILS # BLD AUTO: 0.09 10*3/MM3 (ref 0–0.2)
BASOPHILS NFR BLD AUTO: 1.1 % (ref 0–1.5)
BILIRUB SERPL-MCNC: 0.4 MG/DL (ref 0–1.2)
BUN SERPL-MCNC: 13 MG/DL (ref 8–23)
BUN/CREAT SERPL: 9.4 (ref 7–25)
CALCIUM SPEC-SCNC: 7.2 MG/DL (ref 8.6–10.5)
CHLORIDE SERPL-SCNC: 96 MMOL/L (ref 98–107)
CHOLEST SERPL-MCNC: 119 MG/DL (ref 0–200)
CO2 SERPL-SCNC: 21.7 MMOL/L (ref 22–29)
CREAT SERPL-MCNC: 1.38 MG/DL (ref 0.76–1.27)
DEPRECATED RDW RBC AUTO: 43 FL (ref 37–54)
EGFRCR SERPLBLD CKD-EPI 2021: 55 ML/MIN/1.73
EOSINOPHIL # BLD AUTO: 0.06 10*3/MM3 (ref 0–0.4)
EOSINOPHIL NFR BLD AUTO: 0.8 % (ref 0.3–6.2)
ERYTHROCYTE [DISTWIDTH] IN BLOOD BY AUTOMATED COUNT: 13.3 % (ref 12.3–15.4)
GLOBULIN UR ELPH-MCNC: 3.3 GM/DL
GLUCOSE SERPL-MCNC: 111 MG/DL (ref 65–99)
HBA1C MFR BLD: 7.5 % (ref 3.5–5.6)
HCT VFR BLD AUTO: 32.8 % (ref 37.5–51)
HDLC SERPL-MCNC: 27 MG/DL (ref 40–60)
HGB BLD-MCNC: 10.4 G/DL (ref 13–17.7)
IMM GRANULOCYTES # BLD AUTO: 0.03 10*3/MM3 (ref 0–0.05)
IMM GRANULOCYTES NFR BLD AUTO: 0.4 % (ref 0–0.5)
LDLC SERPL CALC-MCNC: 74 MG/DL (ref 0–100)
LDLC/HDLC SERPL: 2.7 {RATIO}
LYMPHOCYTES # BLD AUTO: 1.57 10*3/MM3 (ref 0.7–3.1)
LYMPHOCYTES NFR BLD AUTO: 19.7 % (ref 19.6–45.3)
MCH RBC QN AUTO: 28.3 PG (ref 26.6–33)
MCHC RBC AUTO-ENTMCNC: 31.7 G/DL (ref 31.5–35.7)
MCV RBC AUTO: 89.1 FL (ref 79–97)
MONOCYTES # BLD AUTO: 0.62 10*3/MM3 (ref 0.1–0.9)
MONOCYTES NFR BLD AUTO: 7.8 % (ref 5–12)
NEUTROPHILS NFR BLD AUTO: 5.61 10*3/MM3 (ref 1.7–7)
NEUTROPHILS NFR BLD AUTO: 70.2 % (ref 42.7–76)
NRBC BLD AUTO-RTO: 0 /100 WBC (ref 0–0.2)
PLATELET # BLD AUTO: 251 10*3/MM3 (ref 140–450)
PMV BLD AUTO: 10.9 FL (ref 6–12)
POTASSIUM SERPL-SCNC: 4 MMOL/L (ref 3.5–5.2)
PROT SERPL-MCNC: 7.5 G/DL (ref 6–8.5)
PSA SERPL-MCNC: 0.26 NG/ML (ref 0–4)
RBC # BLD AUTO: 3.68 10*6/MM3 (ref 4.14–5.8)
SODIUM SERPL-SCNC: 137 MMOL/L (ref 136–145)
TRIGL SERPL-MCNC: 96 MG/DL (ref 0–150)
VLDLC SERPL-MCNC: 18 MG/DL (ref 5–40)
WBC NRBC COR # BLD: 7.98 10*3/MM3 (ref 3.4–10.8)

## 2022-04-29 PROCEDURE — 85025 COMPLETE CBC W/AUTO DIFF WBC: CPT | Performed by: PHYSICIAN ASSISTANT

## 2022-04-29 PROCEDURE — 83036 HEMOGLOBIN GLYCOSYLATED A1C: CPT | Performed by: PHYSICIAN ASSISTANT

## 2022-04-29 PROCEDURE — 1170F FXNL STATUS ASSESSED: CPT | Performed by: PHYSICIAN ASSISTANT

## 2022-04-29 PROCEDURE — 80053 COMPREHEN METABOLIC PANEL: CPT | Performed by: PHYSICIAN ASSISTANT

## 2022-04-29 PROCEDURE — 99214 OFFICE O/P EST MOD 30 MIN: CPT | Performed by: PHYSICIAN ASSISTANT

## 2022-04-29 PROCEDURE — 82043 UR ALBUMIN QUANTITATIVE: CPT | Performed by: PHYSICIAN ASSISTANT

## 2022-04-29 PROCEDURE — 36415 COLL VENOUS BLD VENIPUNCTURE: CPT

## 2022-04-29 PROCEDURE — 1160F RVW MEDS BY RX/DR IN RCRD: CPT | Performed by: PHYSICIAN ASSISTANT

## 2022-04-29 PROCEDURE — G0439 PPPS, SUBSEQ VISIT: HCPCS | Performed by: PHYSICIAN ASSISTANT

## 2022-04-29 PROCEDURE — G0157 HHC PT ASSISTANT EA 15: HCPCS

## 2022-04-29 PROCEDURE — G0103 PSA SCREENING: HCPCS | Performed by: PHYSICIAN ASSISTANT

## 2022-04-29 PROCEDURE — 80061 LIPID PANEL: CPT | Performed by: PHYSICIAN ASSISTANT

## 2022-04-29 RX ORDER — METRONIDAZOLE 10 MG/G
GEL TOPICAL DAILY
Qty: 60 G | Refills: 3 | Status: SHIPPED | OUTPATIENT
Start: 2022-04-29

## 2022-04-29 NOTE — PROGRESS NOTES
The ABCs of the Annual Wellness Visit  Subsequent Medicare Wellness Visit    Chief Complaint   Patient presents with   • Medicare Wellness-subsequent      Subjective    History of Present Illness:  Anurag Pickard is a 71 y.o. male who presents for a Subsequent Medicare Wellness Visit. He had recent AVR with post operative atrial fibrillation. He has had lower blood pressures. He was placed on amiodarone and flomax after his surgery. He has had some dizziness associated with this. His blood sugars have been running pretty good around 100-120.     The following portions of the patient's history were reviewed and   updated as appropriate: allergies, current medications, past family history, past medical history, past social history, past surgical history and problem list.    Compared to one year ago, the patient feels his physical   health is better.    Compared to one year ago, the patient feels his mental   health is the same.    Recent Hospitalizations:  This patient has had a [facility] admission record on file within the last 365 days.    Current Medical Providers:  Patient Care Team:  Jorden Whipple PA-C as PCP - General (Physician Assistant)  Paolo Garcia MD as Consulting Physician (Cardiology)  Juan Castellon DO as Consulting Physician (Cardiology)    Outpatient Medications Prior to Visit   Medication Sig Dispense Refill   • Accu-Chek Guide test strip Use as instructed once daily   Dx e 11.9 100 each 12   • amiodarone (PACERONE) 200 MG tablet Take 1 tablet by mouth Daily for 30 days. 30 tablet 0   • atorvastatin (LIPITOR) 40 MG tablet Take 1 tablet by mouth once daily 90 tablet 0   • clopidogrel (PLAVIX) 75 MG tablet Take 1 tablet by mouth Daily for 90 days. 30 tablet 2   • Diclofenac Sodium (VOLTAREN) 1 % gel gel Apply 4 g topically to the appropriate area as directed 2 (Two) Times a Day.     • ezetimibe (ZETIA) 10 MG tablet Take 1 tablet by mouth once daily 90 tablet 0    • folic acid (FOLVITE) 1 MG tablet Take 1 tablet by mouth Daily. 90 tablet 1   • lansoprazole (PREVACID) 30 MG capsule Take 1 capsule by mouth once daily 90 capsule 0   • metFORMIN (GLUCOPHAGE) 1000 MG tablet Take 1 tablet by mouth 2 (Two) Times a Day With Meals for 90 days. 180 tablet 0   • pantoprazole (PROTONIX) 40 MG EC tablet Take 40 mg by mouth Daily.     • pioglitazone (ACTOS) 45 MG tablet Take 1 tablet by mouth once daily 90 tablet 0   • rivaroxaban (Xarelto) 20 MG tablet Take 1 tablet by mouth Daily. 30 tablet 3   • tamsulosin (FLOMAX) 0.4 MG capsule 24 hr capsule Take 1 capsule by mouth Every Night for 30 days. 30 capsule 0   • thiamine (VITAMIN B-1) 100 MG tablet  tablet Take 100 mg by mouth Daily. LD 3/20     • furosemide (LASIX) 40 MG tablet Take 1 tablet by mouth Daily As Needed (Weight gain of 3 lbs/24 hours or 5 lbs/72hours) for up to 30 days. 30 tablet 0   • HYDROcodone-acetaminophen (NORCO) 5-325 MG per tablet Take 1 tablet by mouth Every 4 (Four) Hours As Needed for Moderate Pain  or Severe Pain .     • potassium chloride (K-DUR,KLOR-CON) 20 MEQ CR tablet Take 1 tablet by mouth Daily As Needed (Take with each Lasix (furosemide) dose) for up to 30 days. Indications: Low Amount of Potassium in the Blood 30 tablet 0     No facility-administered medications prior to visit.       No opioid medication identified on active medication list. I have reviewed chart for other potential  high risk medication/s and harmful drug interactions in the elderly.          Aspirin is not on active medication list.  Aspirin use is not indicated based on review of current medical condition/s. Risk of harm outweighs potential benefits.  .    Patient Active Problem List   Diagnosis   • Aortic valve stenosis   • Diabetes mellitus, type II (HCC)   • Gastroesophageal reflux disease   • Hyperlipidemia   • Hypogonadism   • Obesity   • Obstructive sleep apnea syndrome   • Osteoarthritis   • Pulmonary hypertension (HCC)   •  "Rosacea   • Vitamin D deficiency   • Memory loss   • Ataxia   • Alcohol abuse   • Retinal disorder   • Presbyopia   • Nuclear senile cataract   • History of laser assisted in situ keratomileusis   • Benign essential hypertension   • Screening PSA (prostate specific antigen)   • Dyspnea on exertion   • Acute diastolic CHF (congestive heart failure) ([facility])   • Nonrheumatic aortic valve stenosis   • S/P AVR (aortic valve replacement)     Advance Care Planning  Advance Directive is on file.  ACP discussion was held with the patient during this visit. Patient has an advance directive in EMR which is still valid.           Objective    Vitals:    04/29/22 1031   BP: 90/58   Pulse: 76   Resp: 12   SpO2: 99%   Weight: 118 kg (260 lb 12.8 oz)   Height: 180.3 cm (71\")     BMI Readings from Last 1 Encounters:   04/29/22 36.37 kg/m²   BMI is above normal parameters. Recommendations include: exercise counseling and nutrition counseling    Does the patient have evidence of cognitive impairment? No    Physical Exam  Constitutional:       Appearance: He is well-developed.   HENT:      Head: Normocephalic and atraumatic.   Eyes:      Conjunctiva/sclera: Conjunctivae normal.      Pupils: Pupils are equal, round, and reactive to light.   Cardiovascular:      Rate and Rhythm: Normal rate and regular rhythm.      Heart sounds: No murmur heard.  Pulmonary:      Effort: Pulmonary effort is normal.      Breath sounds: Normal breath sounds.   Abdominal:      General: Bowel sounds are normal.      Palpations: Abdomen is soft.      Tenderness: There is no abdominal tenderness.   Musculoskeletal:         General: No deformity. Normal range of motion.      Cervical back: Normal range of motion and neck supple.   Lymphadenopathy:      Cervical: No cervical adenopathy.   Skin:     General: Skin is warm and dry.      Capillary Refill: Capillary refill takes less than 2 seconds.      Findings: No rash.   Neurological:      Mental Status: He is alert " and oriented to person, place, and time.      Cranial Nerves: No cranial nerve deficit.   Psychiatric:         Behavior: Behavior normal.         Thought Content: Thought content normal.         Judgment: Judgment normal.       Lab Results   Component Value Date    TRIG 96 03/25/2022    HDL 63 (H) 03/25/2022    LDL 59 03/25/2022    VLDL 18 03/25/2022    HGBA1C 8.0 (H) 03/25/2022            HEALTH RISK ASSESSMENT    Smoking Status:  Social History     Tobacco Use   Smoking Status Never Smoker   Smokeless Tobacco Never Used     Alcohol Consumption:  Social History     Substance and Sexual Activity   Alcohol Use Yes   • Alcohol/week: 14.0 standard drinks   • Types: 14 Shots of liquor per week    Comment: 2 bourbons every night     Fall Risk Screen:    BEATRIZADI Fall Risk Assessment was completed, and patient is at HIGH risk for falls. Assessment completed on:4/29/2022    Depression Screening:  PHQ-2/PHQ-9 Depression Screening 4/29/2022   Retired PHQ-9 Total Score -   Retired Total Score -   Little Interest or Pleasure in Doing Things 0-->not at all   Feeling Down, Depressed or Hopeless 0-->not at all   Trouble Falling or Staying Asleep, or Sleeping Too Much 0-->not at all   Feeling Tired or Having Little Energy 0-->not at all   Poor Appetite or Overeating 0-->not at all   Feeling Bad about Yourself - or that You are a Failure or Have Let Yourself or Your Family Down 0-->not at all   Trouble Concentrating on Things, Such as Reading the Newspaper or Watching Television 0-->not at all   Moving or Speaking So Slowly that Other People Could Have Noticed? Or the Opposite - Being So Fidgety 0-->not at all   Thoughts that You Would be Better Off Dead or of Hurting Yourself in Some Way 0-->not at all   PHQ-9: Brief Depression Severity Measure Score 0   If You Checked Off Any Problems, How Difficult Have These Problems Made It For You to Do Your Work, Take Care of Things at Home, or Get Along with Other People? not difficult at all        Health Habits and Functional and Cognitive Screening:  Functional & Cognitive Status 4/29/2022   Do you have difficulty preparing food and eating? No   Do you have difficulty bathing yourself, getting dressed or grooming yourself? No   Do you have difficulty using the toilet? No   Do you have difficulty moving around from place to place? No   Do you have trouble with steps or getting out of a bed or a chair? No   Current Diet Well Balanced Diet   Dental Exam Up to date   Eye Exam Not up to date   Exercise (times per week) -   Current Exercises Include Walking   Current Exercise Activities Include -   Do you need help using the phone?  No   Are you deaf or do you have serious difficulty hearing?  No   Do you need help with transportation? No   Do you need help shopping? No   Do you need help preparing meals?  No   Do you need help with housework?  No   Do you need help with laundry? No   Do you need help taking your medications? No   Do you need help managing money? No   Do you ever drive or ride in a car without wearing a seat belt? No   Have you felt unusual stress, anger or loneliness in the last month? No   Who do you live with? Spouse   If you need help, do you have trouble finding someone available to you? No   Have you been bothered in the last four weeks by sexual problems? -   Do you have difficulty concentrating, remembering or making decisions? No       Age-appropriate Screening Schedule:  Refer to the list below for future screening recommendations based on patient's age, sex and/or medical conditions. Orders for these recommended tests are listed in the plan section. The patient has been provided with a written plan.    Health Maintenance   Topic Date Due   • URINE MICROALBUMIN  Never done   • ZOSTER VACCINE (1 of 2) Never done   • DIABETIC EYE EXAM  Never done   • INFLUENZA VACCINE  08/01/2022   • DIABETIC FOOT EXAM  08/17/2022   • HEMOGLOBIN A1C  09/25/2022   • LIPID PANEL  03/25/2023   • TDAP/TD  VACCINES (2 - Td or Tdap) 12/02/2024              Assessment/Plan   CMS Preventative Services Quick Reference  Risk Factors Identified During Encounter  Alcohol Misuse  Cardiovascular Disease  Fall Risk-High or Moderate  The above risks/problems have been discussed with the patient.  Follow up actions/plans if indicated are seen below in the Assessment/Plan Section.  Pertinent information has been shared with the patient in the After Visit Summary.    Diagnoses and all orders for this visit:    1. Medicare annual wellness visit, subsequent (Primary)  -     Comprehensive metabolic panel; Future  -     CBC w AUTO Differential; Future  -     Lipid panel; Future    2. Type 2 diabetes mellitus with hyperglycemia, without long-term current use of insulin (HCC)  -     MicroAlbumin, Urine, Random - Urine, Clean Catch  -     Hemoglobin A1c; Future    3. Nonrheumatic aortic valve stenosis    4. Screening PSA (prostate specific antigen)  -     PSA SCREENING; Future    5. Rosacea    Other orders  -     metroNIDAZOLE (Metrogel) 1 % gel; Apply  topically to the appropriate area as directed Daily.  Dispense: 60 g; Refill: 3        Follow Up:   No follow-ups on file.     An After Visit Summary and PPPS were made available to the patient.          I spent 45 minutes caring for Anurag on this date of service. This time includes time spent by me in the following activities:preparing for the visit, reviewing tests, obtaining and/or reviewing a separately obtained history, performing a medically appropriate examination and/or evaluation , counseling and educating the patient/family/caregiver, ordering medications, tests, or procedures and documenting information in the medical record

## 2022-04-30 VITALS — SYSTOLIC BLOOD PRESSURE: 108 MMHG | OXYGEN SATURATION: 99 % | TEMPERATURE: 97.7 F | DIASTOLIC BLOOD PRESSURE: 70 MMHG

## 2022-05-03 ENCOUNTER — HOME CARE VISIT (OUTPATIENT)
Dept: HOME HEALTH SERVICES | Facility: HOME HEALTHCARE | Age: 72
End: 2022-05-03

## 2022-05-03 PROCEDURE — G0151 HHCP-SERV OF PT,EA 15 MIN: HCPCS

## 2022-05-04 VITALS
HEART RATE: 88 BPM | DIASTOLIC BLOOD PRESSURE: 68 MMHG | TEMPERATURE: 98 F | OXYGEN SATURATION: 99 % | RESPIRATION RATE: 18 BRPM | SYSTOLIC BLOOD PRESSURE: 132 MMHG

## 2022-05-04 NOTE — HOME HEALTH
Patient is independent and compliant with illustrated home exercise program. Functionally, he is also independent in all household transfers, including in/out of shower without safety compromise. Sternal surgical incision is well apposed and dry and Mr. Pickard is able to ambulate more than 300ft safely and independently without assistive device. All goals met and patient/spouse agreed with PT discharge.

## 2022-05-12 ENCOUNTER — OFFICE VISIT (OUTPATIENT)
Dept: CARDIAC SURGERY | Facility: CLINIC | Age: 72
End: 2022-05-12

## 2022-05-12 VITALS
OXYGEN SATURATION: 99 % | WEIGHT: 262.4 LBS | SYSTOLIC BLOOD PRESSURE: 129 MMHG | RESPIRATION RATE: 20 BRPM | BODY MASS INDEX: 36.73 KG/M2 | DIASTOLIC BLOOD PRESSURE: 76 MMHG | HEIGHT: 71 IN | TEMPERATURE: 97.5 F | HEART RATE: 41 BPM

## 2022-05-12 DIAGNOSIS — Z95.2 S/P AVR (AORTIC VALVE REPLACEMENT): Primary | ICD-10-CM

## 2022-05-12 PROCEDURE — 99024 POSTOP FOLLOW-UP VISIT: CPT | Performed by: NURSE PRACTITIONER

## 2022-05-12 NOTE — PROGRESS NOTES
"CARDIOVASCULAR SURGERY FOLLOW-UP PROGRESS NOTE  Chief Complaint: Postop follow-up        HPI:   Dear Jorden Martin PA-C and colleagues:    It was nice to see Anurag Pickard in follow up 6 weeks after surgery.  As you know, he is a 71 y.o. male aortic valve stenosis, YANA, COPD, hyperlipidemia, EtOH abuse who underwent tissue AVR, endarterectomy of RCA ostium on 3/28/2022 with Dr. Brandon at [facility]. He did well postoperatively and continues to do well.  He did have postoperative atrial fibrillation for which he continues to take Xarelto.  His activity level has been good.  He states that he has continued to have dizziness with positional changes especially from sitting to standing, I discontinued his diuretics last office visit, and his primary care discontinued his Flomax approximately 2 weeks ago.  His heart rate is quite slow in the office today from 40-50, he discontinued his amiodarone this week, he has follow-up with his cardiologist tomorrow, but I anticipate with the discontinuation of his amiodarone that his dizziness will improve.  From a surgical standpoint, the sternal incision is well approximated without erythema, edema, or drainage.  The sternum is stable to palpation, and the patient denies any popping or clicking with deep inspiration or coughing.      Physical Exam:         /76   Pulse (!) 41 Comment: Pt. stated runs on lower side recently  Temp 97.5 °F (36.4 °C)   Resp 20   Ht 180.3 cm (71\")   Wt 119 kg (262 lb 6.4 oz)   SpO2 99%   BMI 36.60 kg/m²   Heart:  regular rate and rhythm, S1, S2 normal, no murmur, click, rub or gallop  Lungs:  clear to auscultation bilaterally  Extremities:  no edema  Incision(s):  mid chest healing well, sternum stable    Assessment/Plan:     S/P AVR. Overall, he is doing well.    Post-op atrial fibrillation    Keep incisions clean and dry  OK to drive if not taking narcotic pain medicine  OK to begin cardiac rehab  Follow-up as " scheduled with cardiology  Follow-up as scheduled with PCP  Follow-up with CT surgery prn  Prophylactic antibiotics before dental work and other surgeries lifelong with artificial valve, prosthesis, or grafting    Continue lifting restriction of 10 lbs until 6 weeks and 50 lbs until 12 weeks from the date of surgery, no excessive jarring motions or twisting motions until 12 weeks from the date of surgery    Return to clinic if any signs or symptoms of infection or sternal instability develop       Thank you for allowing me to participate in the care of your patient.    Regards,  SERAFIN Marroquin

## 2022-05-13 ENCOUNTER — OFFICE VISIT (OUTPATIENT)
Dept: CARDIOLOGY | Facility: CLINIC | Age: 72
End: 2022-05-13

## 2022-05-13 ENCOUNTER — TELEPHONE (OUTPATIENT)
Dept: FAMILY MEDICINE CLINIC | Facility: CLINIC | Age: 72
End: 2022-05-13

## 2022-05-13 VITALS
DIASTOLIC BLOOD PRESSURE: 68 MMHG | OXYGEN SATURATION: 100 % | SYSTOLIC BLOOD PRESSURE: 130 MMHG | HEIGHT: 71 IN | HEART RATE: 60 BPM | WEIGHT: 262 LBS | BODY MASS INDEX: 36.68 KG/M2

## 2022-05-13 DIAGNOSIS — I25.10 CORONARY ARTERY DISEASE INVOLVING NATIVE CORONARY ARTERY OF NATIVE HEART WITHOUT ANGINA PECTORIS: ICD-10-CM

## 2022-05-13 DIAGNOSIS — I10 BENIGN ESSENTIAL HYPERTENSION: Primary | ICD-10-CM

## 2022-05-13 DIAGNOSIS — Z95.2 S/P AVR (AORTIC VALVE REPLACEMENT): ICD-10-CM

## 2022-05-13 DIAGNOSIS — E78.2 MIXED HYPERLIPIDEMIA: ICD-10-CM

## 2022-05-13 PROCEDURE — 99214 OFFICE O/P EST MOD 30 MIN: CPT | Performed by: INTERNAL MEDICINE

## 2022-05-13 PROCEDURE — 93000 ELECTROCARDIOGRAM COMPLETE: CPT | Performed by: INTERNAL MEDICINE

## 2022-05-13 NOTE — PROGRESS NOTES
Cardiology Office Visit      Encounter Date:  05/13/2022    Patient ID:   Anurag Pickard is a 71 y.o. male.    Reason For Followup:  Shortness of breath  Aortic stenosis    Brief Clinical History:  Dear Jorden Martin PA-C    I had the pleasure of seeing Anurag Pickard today. As you are well aware, this is a 71 y.o. male with no established history of ischemic heart disease.  He does have a history of aortic stenosis, hypertension, diabetes mellitus, hyperlipidemia, osteoarthritis, obesity, and antiplatelet therapy.  He presents today to establish care on his valvular heart disease.    Interval History:  He reports minimal sternal discomfort.  He has had some dizziness and lightheadedness and was taken off his Flomax and reports feeling much better since that.  He otherwise has been recovering well from aortic valve replacement.    As I am sure you are aware, the patient underwent bioprosthetic aortic valve replacement on March 28, 2022 by Dr. Brandon.  He additionally had endarterectomy of his RCA secondary to his significant plaque noted in that area.    He has been doing quite well since surgery.  He is eager to start cardiac rehab.  He did have postoperative atrial fibrillation briefly after surgery.  He was on amiodarone for a brief period of time however this has been discontinued.    Assessment & Plan    Impressions:  Severe aortic stenosis status post tissue AVR March 2022  Postoperative atrial fibrillation  Hypertension  Hypertensive heart disease  Hyperlipidemia  Osteoarthritis  Diabetes mellitus  Obesity  Antiplatelet therapy    Recommendations:  Continuation of his current cardiovascular regimen at the present time.     This includes antihypertensives, antiplatelet, statin, Zetia, and potassium.  Resume clopidogrel  Treadmill stress test  Phase 2 cardiac rehab  Follow-up in 3 months    Diagnoses and all orders for this visit:    1. Benign essential hypertension (Primary)  -     Treadmill  "Stress Test; Future  -     Cardiac Rehab Phase II; Future  -     ECG 12 Lead    2. S/P AVR (aortic valve replacement)  -     Treadmill Stress Test; Future  -     Cardiac Rehab Phase II; Future  -     ECG 12 Lead    3. Mixed hyperlipidemia  -     Treadmill Stress Test; Future  -     Cardiac Rehab Phase II; Future  -     ECG 12 Lead    4. Coronary artery disease involving native coronary artery of native heart without angina pectoris  -     Treadmill Stress Test; Future  -     Cardiac Rehab Phase II; Future  -     ECG 12 Lead    Other orders  -     rivaroxaban (Xarelto) 20 MG tablet; Take 1 tablet by mouth Daily.  Dispense: 90 tablet; Refill: 3          Objective:    Vitals:  Vitals:    05/13/22 1008   BP: 130/68   Pulse: 60   SpO2: 100%   Weight: 119 kg (262 lb)   Height: 180.3 cm (71\")     Body mass index is 36.54 kg/m².      Physical Exam:    General: Alert, cooperative, no distress, appears stated age  Head:  Normocephalic, atraumatic, mucous membranes moist  Eyes:  Conjunctiva/corneas clear, EOM's intact     Neck:  Supple,  no bruit    Lungs: Clear to auscultation bilaterally, no wheezes rhonchi rales are noted  Chest wall: Minimal tenderness  Heart::  Regular rate and rhythm, S1 and S2 normal, 1/6 systolic ejection murmur.  No rub or gallop  Abdomen: Soft, non-tender, nondistended bowel sounds active.  Obese  Extremities: No cyanosis, clubbing, or edema  Pulses: 2+ and symmetric all extremities  Skin:  No rashes or lesions  Neuro/psych: A&O x3. CN II through XII are grossly intact with appropriate affect      Allergies:  Allergies   Allergen Reactions   • Glipizide Other (See Comments)     Sugar too low   • Lisinopril Cough       Medication Review:     Current Outpatient Medications:   •  atorvastatin (LIPITOR) 40 MG tablet, Take 1 tablet by mouth once daily, Disp: 90 tablet, Rfl: 0  •  Diclofenac Sodium (VOLTAREN) 1 % gel gel, Apply 4 g topically to the appropriate area as directed 2 (Two) Times a Day., Disp: , " Rfl:   •  ezetimibe (ZETIA) 10 MG tablet, Take 1 tablet by mouth once daily, Disp: 90 tablet, Rfl: 0  •  lansoprazole (PREVACID) 30 MG capsule, Take 1 capsule by mouth once daily, Disp: 90 capsule, Rfl: 0  •  metFORMIN (GLUCOPHAGE) 1000 MG tablet, Take 1 tablet by mouth 2 (Two) Times a Day With Meals for 90 days., Disp: 180 tablet, Rfl: 0  •  metroNIDAZOLE (Metrogel) 1 % gel, Apply  topically to the appropriate area as directed Daily., Disp: 60 g, Rfl: 3  •  pioglitazone (ACTOS) 45 MG tablet, Take 1 tablet by mouth once daily, Disp: 90 tablet, Rfl: 0  •  rivaroxaban (Xarelto) 20 MG tablet, Take 1 tablet by mouth Daily., Disp: 90 tablet, Rfl: 3  •  thiamine (VITAMIN B-1) 100 MG tablet  tablet, Take 100 mg by mouth Daily. LD 3/20, Disp: , Rfl:   •  Accu-Chek Guide test strip, Use as instructed once daily   Dx e 11.9, Disp: 100 each, Rfl: 12    Family History:  Family History   Problem Relation Age of Onset   • Diabetes Mother    • Other Mother    • Other Father    • Heart attack Father    • Other Sister    • Diabetes Sister    • Diabetes Brother    • Other Other    • Diabetes Other        Past Medical History:  Past Medical History:   Diagnosis Date   • Acute diastolic CHF (congestive heart failure) (HCC) 01/03/2022   • Ankle edema    • Aortic stenosis     Moderate   • B12 deficiency    • Bell's palsy    • Bleeds easily ([facility])    • Chipped tooth     top front   • Diabetes mellitus ([facility])    • Dry skin    • Erectile dysfunction    • Falls     2 falls in last 3 months   • Fatigue    • Fragile skin    • GERD (gastroesophageal reflux disease)    • Heart murmur    • Hyperlipidemia    • Hypertension    • Hypokalemia    • Hypomagnesemia    • YANA (obstructive sleep apnea)     cpap bring dos   • Osteoarthritis    • Pulmonary HTN (HCC)    • Rosacea    • Vitamin D deficiency        Past Surgical History:  Past Surgical History:   Procedure Laterality Date   • AORTIC VALVE REPAIR/REPLACEMENT N/A 3/28/2022    Procedure: AORTIC VALVE  REPAIR/REPLACEMENT;  Surgeon: Dennis Brandon MD;  Location: [facility] CVOR;  Service: Cardiothoracic;  Laterality: N/A;  aortic valve replacement with 27mm [employer] magna ease   • CARDIAC CATHETERIZATION     • CARDIAC CATHETERIZATION N/A 12/17/2021    Procedure: Right and Left Heart Cath;  Surgeon: Juan Castellon DO;  Location: [facility] CATH INVASIVE LOCATION;  Service: Cardiology;  Laterality: N/A;   • COLONOSCOPY     • JOINT REPLACEMENT Bilateral 2021    knee   • TONSILLECTOMY     • TOTAL KNEE ARTHROPLASTY     • TOTAL KNEE ARTHROPLASTY Left 05/04/2021    Procedure: TOTAL KNEE ARTHROPLASTY;  Surgeon: Otf Redmond MD;  Location: [facility] MAIN OR;  Service: Orthopedics;  Laterality: Left;       Social History:  Social History     Socioeconomic History   • Marital status:    Tobacco Use   • Smoking status: Never Smoker   • Smokeless tobacco: Never Used   Vaping Use   • Vaping Use: Never used   Substance and Sexual Activity   • Alcohol use: Yes     Alcohol/week: 14.0 standard drinks     Types: 14 Shots of liquor per week     Comment: 2 bourbons every night   • Drug use: No   • Sexual activity: Defer       Review of Systems:  The following systems were reviewed as they relate to the cardiovascular system: Constitutional, Eyes, ENT, Cardiovascular, Respiratory, Gastrointestinal, Integumentary, Neurological, Psychiatric, Hematologic, Endocrine, Musculoskeletal, and Genitourinary. The pertinent cardiovascular findings are reported above with all other cardiovascular points within those systems being negative.    Diagnostic Study Review:     Current Electrocardiogram:    ECG 12 Lead    Date/Time: 5/13/2022 7:41 PM  Performed by: Juan Castellon DO  Authorized by: Juan Castellon DO   Comparison: not compared with previous ECG   Previous ECG: no previous ECG available  Comments: Normal sinus rhythm with a ventricular rate of 60 bpm.  Nonspecific repolarization  changes.  Prolonged QT and QTc intervals of 481 and 481 ms respectively.  Normal QRS axis.            Laboratory Data:  Lab Results   Component Value Date    GLUCOSE 130 (H) 05/16/2022    BUN 10 05/16/2022    CREATININE 1.02 05/16/2022    EGFRIFNONA 74 12/15/2021    EGFRIFAFRI 96 05/20/2016    BCR 9.8 05/16/2022    K 4.0 05/16/2022    CO2 24.4 05/16/2022    CALCIUM 8.0 (L) 05/16/2022    ALBUMIN 4.20 05/16/2022    LABIL2 1.5 06/14/2019    AST 17 05/16/2022    ALT 6 05/16/2022     Lab Results   Component Value Date    GLUCOSE 130 (H) 05/16/2022    CALCIUM 8.0 (L) 05/16/2022     05/16/2022    K 4.0 05/16/2022    CO2 24.4 05/16/2022     05/16/2022    BUN 10 05/16/2022    CREATININE 1.02 05/16/2022    EGFRIFAFRI 96 05/20/2016    EGFRIFNONA 74 12/15/2021    BCR 9.8 05/16/2022    ANIONGAP 14.6 05/16/2022     Lab Results   Component Value Date    WBC 7.98 04/29/2022    HGB 10.4 (L) 04/29/2022    HCT 32.8 (L) 04/29/2022    MCV 89.1 04/29/2022     04/29/2022     Lab Results   Component Value Date    CHOL 119 04/29/2022    TRIG 96 04/29/2022    HDL 27 (L) 04/29/2022    LDL 74 04/29/2022     Lab Results   Component Value Date    HGBA1C 7.5 (H) 04/29/2022     Lab Results   Component Value Date    INR 0.96 03/29/2022    INR 1.09 03/28/2022    INR <0.93 (L) 03/25/2022    PROTIME 10.7 03/29/2022    PROTIME 12.0 (H) 03/28/2022    PROTIME 10.3 03/25/2022       Most Recent Echo:  Results for orders placed in visit on 03/28/22    Emergent/Open-Heart Anesthesia PAULO    Narrative  Emergent/Open-Heart Anesthesia PAULO    Procedure Performed: Emergent/Open-Heart Anesthesia PAULO  Start Time:  End Time:      General Procedure Information  Location performed:  OR  Intubated  Bite block placed  Heart visualized  Probe Insertion:  Easy  Probe Type:  Biplane and multiplane  Modalities:  Color flow mapping, pulse wave Doppler and continuous wave Doppler    Echocardiographic and Doppler Measurements    Ventricles    Right  Ventricle:  Ejection Fraction 60%.  Left Ventricle:  Global Function normal.  Ejection Fraction 60%.                                Anesthesia Information  Performed Personally  Anesthesiologist:  Van Frost MD      Echocardiogram Comments:  PAULO placed easily with no resistance ,  Lubricated , bite guard used    Pre cpb  LV EF 60 no wma  RV nl SF  MV tr/mild MR  AV Ca++ mild mod AI ,  Mod AS YOSI 1.2cm2 by cont  tv TR TR  Suspicious for smal PFO by CFD      Post cpb  #27 av well seated no para / trans leak seen  No other change       Most Recent Stress Test:       Most Recent Cardiac Catheterization:   Results for orders placed during the hospital encounter of 12/17/21    Cardiac Catheterization/Vascular Study    Narrative  Cardiac Catheterization Operative Report    Anurag Pickard  [id number]  12/17/2021  @PCP@    He underwent cardiac catheterization.    Indications for the procedure include: Aortic stenosis.    Procedure Details:  The risks, benefits, complications, treatment options, and expected outcomes were discussed with the patient. The patient and/or family concurred with the proposed plan, giving informed consent.    After informed consent the patient was brought to the cath lab after appropriate IV hydration was begun and oral premedication was given. He was further sedated with fentanyl and midazolam. He was prepped and draped in the usual manner. Using the modified Seldinger access technique, a 6 Tunisian sheath was placed in the femoral artery and a 7 Tunisian sheath was placed in the femoral vein. A left and right heart catheterization with coronary arteriography was performed. Findings are discussed below.    After the procedure was completed, sedation was stopped and the sheaths and catheters were all removed. Hemostasis was achieved per established hospital protocols.    Conscious sedation:  Conscious sedation was performed according to protocol.  I supervised and directed an independent  trained observer with the assistance of monitoring the patient's level of consciousness and physiologic status throughout the procedure.  Intraoperative service time was 60 minutes.    Findings:    Hemodynamics  LVEDP: Unable to obtain  LV: Unable to obtain  Ao: 132/61  RA: 6  RV: 42/4  PA: 43/15  PAWP: 15  Cardiac output Diane: 6.84 (2.85)  Cardiac output thermal: 7.27 (3.03)  Left Main  no significant disease  RCA  luminal irregularities with no significant disease  LAD  luminal irregularities with no significant disease  Circ  luminal irregularities with no significant disease  SVG(s)  not applicable  SVITLANA  not applicable  LV  not imaged  Coronary Dominance  circumflex    Estimated Blood Loss:  Minimal    Specimens: None    Complications:  None; patient tolerated the procedure well.    Disposition: PACU - hemodynamically stable    Condition: stable    Impressions:  No angiographic evidence for significant epicardial occlusive disease  Severe aortic stenosis  Moderate pulmonary hypertension    Recommendations:  Structural heart disease referral for TAVR versus SAVR       NOTE: The following portions of the patient's note were reviewed, confirmed and/or updated this visit as appropriate: History of present illness/Interval history, physical examination, assessment & plan, allergies, current medications, past family history, past medical history, past social history, past surgical history and problem list.

## 2022-05-13 NOTE — TELEPHONE ENCOUNTER
----- Message from Nadiya Giron CMA sent at 5/13/2022  1:16 PM EDT -----  Can you schedule patient for lab work? Jonathan needs to repeat his lab and check his calcium and creatinine.  ----- Message -----  From: Jorden Whipple PA-C  Sent: 5/13/2022  12:46 PM EDT  To: Nadiya Giron CMA    Need to repeat cmp when he can come back in for calcium and creatitine.

## 2022-05-16 ENCOUNTER — LAB (OUTPATIENT)
Dept: FAMILY MEDICINE CLINIC | Facility: CLINIC | Age: 72
End: 2022-05-16

## 2022-05-16 ENCOUNTER — TELEPHONE (OUTPATIENT)
Dept: CARDIAC REHAB | Facility: HOSPITAL | Age: 72
End: 2022-05-16

## 2022-05-16 DIAGNOSIS — E83.52 SERUM CALCIUM ELEVATED: Primary | ICD-10-CM

## 2022-05-16 LAB
ALBUMIN SERPL-MCNC: 4.2 G/DL (ref 3.5–5.2)
ALBUMIN/GLOB SERPL: 1.5 G/DL
ALP SERPL-CCNC: 71 U/L (ref 39–117)
ALT SERPL W P-5'-P-CCNC: 6 U/L (ref 1–41)
ANION GAP SERPL CALCULATED.3IONS-SCNC: 14.6 MMOL/L (ref 5–15)
AST SERPL-CCNC: 17 U/L (ref 1–40)
BILIRUB SERPL-MCNC: 0.3 MG/DL (ref 0–1.2)
BUN SERPL-MCNC: 10 MG/DL (ref 8–23)
BUN/CREAT SERPL: 9.8 (ref 7–25)
CA-I BLD-MCNC: 4.1 MG/DL (ref 4.6–5.4)
CA-I SERPL ISE-MCNC: 1.02 MMOL/L (ref 1.15–1.35)
CALCIUM SPEC-SCNC: 8 MG/DL (ref 8.6–10.5)
CHLORIDE SERPL-SCNC: 101 MMOL/L (ref 98–107)
CO2 SERPL-SCNC: 24.4 MMOL/L (ref 22–29)
CREAT SERPL-MCNC: 1.02 MG/DL (ref 0.76–1.27)
EGFRCR SERPLBLD CKD-EPI 2021: 79.1 ML/MIN/1.73
GLOBULIN UR ELPH-MCNC: 2.8 GM/DL
GLUCOSE SERPL-MCNC: 130 MG/DL (ref 65–99)
POTASSIUM SERPL-SCNC: 4 MMOL/L (ref 3.5–5.2)
PROT SERPL-MCNC: 7 G/DL (ref 6–8.5)
SODIUM SERPL-SCNC: 140 MMOL/L (ref 136–145)

## 2022-05-16 PROCEDURE — 36415 COLL VENOUS BLD VENIPUNCTURE: CPT | Performed by: PHYSICIAN ASSISTANT

## 2022-05-16 PROCEDURE — 82330 ASSAY OF CALCIUM: CPT | Performed by: PHYSICIAN ASSISTANT

## 2022-05-16 PROCEDURE — 80053 COMPREHEN METABOLIC PANEL: CPT | Performed by: PHYSICIAN ASSISTANT

## 2022-05-16 NOTE — TELEPHONE ENCOUNTER
Pt called d/t recent AVR to see if interested in cardiac rehab.  Pt is interested. Will check benefits and return call.

## 2022-05-23 DIAGNOSIS — E83.51 HYPOCALCEMIA: Primary | ICD-10-CM

## 2022-05-24 ENCOUNTER — HOSPITAL ENCOUNTER (OUTPATIENT)
Dept: CARDIOLOGY | Facility: HOSPITAL | Age: 72
Discharge: HOME | End: 2022-05-24
Admitting: INTERNAL MEDICINE

## 2022-05-24 DIAGNOSIS — I25.10 CORONARY ARTERY DISEASE INVOLVING NATIVE CORONARY ARTERY OF NATIVE HEART WITHOUT ANGINA PECTORIS: ICD-10-CM

## 2022-05-24 DIAGNOSIS — I10 BENIGN ESSENTIAL HYPERTENSION: ICD-10-CM

## 2022-05-24 DIAGNOSIS — Z95.2 S/P AVR (AORTIC VALVE REPLACEMENT): ICD-10-CM

## 2022-05-24 DIAGNOSIS — E78.2 MIXED HYPERLIPIDEMIA: ICD-10-CM

## 2022-05-24 PROCEDURE — 93017 CV STRESS TEST TRACING ONLY: CPT

## 2022-05-24 PROCEDURE — 93018 CV STRESS TEST I&R ONLY: CPT | Performed by: INTERNAL MEDICINE

## 2022-05-24 PROCEDURE — 93016 CV STRESS TEST SUPVJ ONLY: CPT | Performed by: INTERNAL MEDICINE

## 2022-05-25 LAB
BH CV STRESS BP STAGE 1: NORMAL
BH CV STRESS DURATION MIN STAGE 1: 1
BH CV STRESS DURATION SEC STAGE 1: 44
BH CV STRESS GRADE STAGE 1: 10
BH CV STRESS HR STAGE 1: 80
BH CV STRESS METS STAGE 1: 4.6
BH CV STRESS PROTOCOL 1: NORMAL
BH CV STRESS RECOVERY BP: NORMAL MMHG
BH CV STRESS RECOVERY HR: 64 BPM
BH CV STRESS SPEED STAGE 1: 1.7
BH CV STRESS STAGE 1: 1
MAXIMAL PREDICTED HEART RATE: 150 BPM
PERCENT MAX PREDICTED HR: 53.33 %
STRESS BASELINE BP: NORMAL MMHG
STRESS BASELINE HR: 72 BPM
STRESS PERCENT HR: 63 %
STRESS POST ESTIMATED WORKLOAD: 4.6 METS
STRESS POST EXERCISE DUR MIN: 1 MIN
STRESS POST EXERCISE DUR SEC: 43 SEC
STRESS POST PEAK BP: NORMAL MMHG
STRESS POST PEAK HR: 80 BPM
STRESS TARGET HR: 128 BPM

## 2022-06-14 ENCOUNTER — OFFICE VISIT (OUTPATIENT)
Dept: CARDIAC REHAB | Facility: HOSPITAL | Age: 72
End: 2022-06-14

## 2022-06-14 DIAGNOSIS — Z95.2 S/P AVR (AORTIC VALVE REPLACEMENT): Primary | ICD-10-CM

## 2022-06-14 PROCEDURE — 93798 PHYS/QHP OP CAR RHAB W/ECG: CPT

## 2022-06-16 ENCOUNTER — TREATMENT (OUTPATIENT)
Dept: CARDIAC REHAB | Facility: HOSPITAL | Age: 72
End: 2022-06-16

## 2022-06-16 DIAGNOSIS — Z95.2 S/P AVR (AORTIC VALVE REPLACEMENT): Primary | ICD-10-CM

## 2022-06-16 PROCEDURE — 93798 PHYS/QHP OP CAR RHAB W/ECG: CPT

## 2022-06-21 ENCOUNTER — APPOINTMENT (OUTPATIENT)
Dept: CARDIAC REHAB | Facility: HOSPITAL | Age: 72
End: 2022-06-21

## 2022-06-22 ENCOUNTER — TELEPHONE (OUTPATIENT)
Dept: FAMILY MEDICINE CLINIC | Facility: CLINIC | Age: 72
End: 2022-06-22

## 2022-06-23 ENCOUNTER — TREATMENT (OUTPATIENT)
Dept: CARDIAC REHAB | Facility: HOSPITAL | Age: 72
End: 2022-06-23

## 2022-06-23 DIAGNOSIS — Z95.2 S/P AVR (AORTIC VALVE REPLACEMENT): Primary | ICD-10-CM

## 2022-06-23 PROCEDURE — 93798 PHYS/QHP OP CAR RHAB W/ECG: CPT

## 2022-06-28 ENCOUNTER — TREATMENT (OUTPATIENT)
Dept: CARDIAC REHAB | Facility: HOSPITAL | Age: 72
End: 2022-06-28

## 2022-06-28 DIAGNOSIS — Z95.2 S/P AVR (AORTIC VALVE REPLACEMENT): Primary | ICD-10-CM

## 2022-06-28 PROCEDURE — 93798 PHYS/QHP OP CAR RHAB W/ECG: CPT

## 2022-06-30 ENCOUNTER — TREATMENT (OUTPATIENT)
Dept: CARDIAC REHAB | Facility: HOSPITAL | Age: 72
End: 2022-06-30

## 2022-06-30 DIAGNOSIS — Z95.2 S/P AVR (AORTIC VALVE REPLACEMENT): Primary | ICD-10-CM

## 2022-06-30 PROCEDURE — 93798 PHYS/QHP OP CAR RHAB W/ECG: CPT

## 2022-07-05 ENCOUNTER — TREATMENT (OUTPATIENT)
Dept: CARDIAC REHAB | Facility: HOSPITAL | Age: 72
End: 2022-07-05

## 2022-07-05 DIAGNOSIS — Z95.2 S/P AVR (AORTIC VALVE REPLACEMENT): Primary | ICD-10-CM

## 2022-07-05 PROCEDURE — 93798 PHYS/QHP OP CAR RHAB W/ECG: CPT

## 2022-07-07 ENCOUNTER — TREATMENT (OUTPATIENT)
Dept: CARDIAC REHAB | Facility: HOSPITAL | Age: 72
End: 2022-07-07

## 2022-07-07 DIAGNOSIS — Z95.2 S/P AVR (AORTIC VALVE REPLACEMENT): Primary | ICD-10-CM

## 2022-07-07 PROCEDURE — 93798 PHYS/QHP OP CAR RHAB W/ECG: CPT

## 2022-07-12 ENCOUNTER — APPOINTMENT (OUTPATIENT)
Dept: CARDIAC REHAB | Facility: HOSPITAL | Age: 72
End: 2022-07-12

## 2022-07-14 ENCOUNTER — APPOINTMENT (OUTPATIENT)
Dept: CARDIAC REHAB | Facility: HOSPITAL | Age: 72
End: 2022-07-14

## 2022-07-14 ENCOUNTER — TREATMENT (OUTPATIENT)
Dept: CARDIAC REHAB | Facility: HOSPITAL | Age: 72
End: 2022-07-14

## 2022-07-14 DIAGNOSIS — Z95.2 S/P AVR (AORTIC VALVE REPLACEMENT): Primary | ICD-10-CM

## 2022-07-14 PROCEDURE — 93798 PHYS/QHP OP CAR RHAB W/ECG: CPT

## 2022-07-19 ENCOUNTER — DOCUMENTATION (OUTPATIENT)
Dept: CARDIAC REHAB | Facility: HOSPITAL | Age: 72
End: 2022-07-19

## 2022-07-19 ENCOUNTER — TREATMENT (OUTPATIENT)
Dept: CARDIAC REHAB | Facility: HOSPITAL | Age: 72
End: 2022-07-19

## 2022-07-19 DIAGNOSIS — Z95.2 S/P AVR (AORTIC VALVE REPLACEMENT): Primary | ICD-10-CM

## 2022-07-19 PROCEDURE — 93798 PHYS/QHP OP CAR RHAB W/ECG: CPT

## 2022-07-19 NOTE — PROGRESS NOTES
At cardiac rehab: Pt was noted to have gain of 4.5lbs since Thursday.  Pitting 3+ edema to both ankles.  Lungs clear. 02 sat 99%. /54.HR 62 and SR.  Re-instructed on low sodium diet. Notified Dr Castellon and ordered for furosemide 20 mg po qd, to restrict fluids and low sodium diet. Called and Instructed pt to  script at [employer] and teaching on fluids and sodium as above.  Verbalized understanding. Will re assess patient on Thursday in [facility].

## 2022-07-21 ENCOUNTER — TREATMENT (OUTPATIENT)
Dept: CARDIAC REHAB | Facility: HOSPITAL | Age: 72
End: 2022-07-21

## 2022-07-21 DIAGNOSIS — Z95.2 S/P AVR (AORTIC VALVE REPLACEMENT): Primary | ICD-10-CM

## 2022-07-21 PROCEDURE — 93798 PHYS/QHP OP CAR RHAB W/ECG: CPT

## 2022-07-25 ENCOUNTER — TREATMENT (OUTPATIENT)
Dept: CARDIAC REHAB | Facility: HOSPITAL | Age: 72
End: 2022-07-25

## 2022-07-25 DIAGNOSIS — Z95.2 S/P AVR (AORTIC VALVE REPLACEMENT): Primary | ICD-10-CM

## 2022-07-25 PROCEDURE — 93798 PHYS/QHP OP CAR RHAB W/ECG: CPT

## 2022-07-26 ENCOUNTER — APPOINTMENT (OUTPATIENT)
Dept: CARDIAC REHAB | Facility: HOSPITAL | Age: 72
End: 2022-07-26

## 2022-07-28 ENCOUNTER — TREATMENT (OUTPATIENT)
Dept: CARDIAC REHAB | Facility: HOSPITAL | Age: 72
End: 2022-07-28

## 2022-07-28 DIAGNOSIS — Z95.2 S/P AVR (AORTIC VALVE REPLACEMENT): Primary | ICD-10-CM

## 2022-07-28 PROCEDURE — 93798 PHYS/QHP OP CAR RHAB W/ECG: CPT

## 2022-07-28 RX ORDER — LANSOPRAZOLE 30 MG/1
30 CAPSULE, DELAYED RELEASE ORAL DAILY
Qty: 90 CAPSULE | Refills: 0 | Status: SHIPPED | OUTPATIENT
Start: 2022-07-28 | End: 2022-12-19

## 2022-08-02 ENCOUNTER — TREATMENT (OUTPATIENT)
Dept: CARDIAC REHAB | Facility: HOSPITAL | Age: 72
End: 2022-08-02

## 2022-08-02 DIAGNOSIS — Z95.2 S/P AVR (AORTIC VALVE REPLACEMENT): Primary | ICD-10-CM

## 2022-08-02 PROCEDURE — 93798 PHYS/QHP OP CAR RHAB W/ECG: CPT

## 2022-08-04 ENCOUNTER — TREATMENT (OUTPATIENT)
Dept: CARDIAC REHAB | Facility: HOSPITAL | Age: 72
End: 2022-08-04

## 2022-08-04 DIAGNOSIS — Z95.2 S/P AVR (AORTIC VALVE REPLACEMENT): Primary | ICD-10-CM

## 2022-08-04 PROCEDURE — 93798 PHYS/QHP OP CAR RHAB W/ECG: CPT

## 2022-08-09 ENCOUNTER — TREATMENT (OUTPATIENT)
Dept: CARDIAC REHAB | Facility: HOSPITAL | Age: 72
End: 2022-08-09

## 2022-08-09 DIAGNOSIS — Z95.2 S/P AVR (AORTIC VALVE REPLACEMENT): Primary | ICD-10-CM

## 2022-08-09 PROCEDURE — 93798 PHYS/QHP OP CAR RHAB W/ECG: CPT

## 2022-08-11 ENCOUNTER — TREATMENT (OUTPATIENT)
Dept: CARDIAC REHAB | Facility: HOSPITAL | Age: 72
End: 2022-08-11

## 2022-08-11 DIAGNOSIS — Z95.2 S/P AVR (AORTIC VALVE REPLACEMENT): Primary | ICD-10-CM

## 2022-08-11 PROCEDURE — 93798 PHYS/QHP OP CAR RHAB W/ECG: CPT

## 2022-08-15 ENCOUNTER — TREATMENT (OUTPATIENT)
Dept: CARDIAC REHAB | Facility: HOSPITAL | Age: 72
End: 2022-08-15

## 2022-08-15 DIAGNOSIS — Z95.2 S/P AVR (AORTIC VALVE REPLACEMENT): Primary | ICD-10-CM

## 2022-08-15 PROCEDURE — 93798 PHYS/QHP OP CAR RHAB W/ECG: CPT

## 2022-08-16 ENCOUNTER — APPOINTMENT (OUTPATIENT)
Dept: CARDIAC REHAB | Facility: HOSPITAL | Age: 72
End: 2022-08-16

## 2022-08-18 ENCOUNTER — TREATMENT (OUTPATIENT)
Dept: CARDIAC REHAB | Facility: HOSPITAL | Age: 72
End: 2022-08-18

## 2022-08-18 DIAGNOSIS — Z95.2 S/P AVR (AORTIC VALVE REPLACEMENT): Primary | ICD-10-CM

## 2022-08-18 PROCEDURE — 93798 PHYS/QHP OP CAR RHAB W/ECG: CPT

## 2022-08-23 ENCOUNTER — TREATMENT (OUTPATIENT)
Dept: CARDIAC REHAB | Facility: HOSPITAL | Age: 72
End: 2022-08-23

## 2022-08-23 DIAGNOSIS — Z95.2 S/P AVR (AORTIC VALVE REPLACEMENT): Primary | ICD-10-CM

## 2022-08-23 PROCEDURE — 93798 PHYS/QHP OP CAR RHAB W/ECG: CPT

## 2022-08-25 ENCOUNTER — TREATMENT (OUTPATIENT)
Dept: CARDIAC REHAB | Facility: HOSPITAL | Age: 72
End: 2022-08-25

## 2022-08-25 DIAGNOSIS — Z95.2 S/P AVR (AORTIC VALVE REPLACEMENT): Primary | ICD-10-CM

## 2022-08-25 PROCEDURE — 93798 PHYS/QHP OP CAR RHAB W/ECG: CPT

## 2022-08-30 ENCOUNTER — TREATMENT (OUTPATIENT)
Dept: CARDIAC REHAB | Facility: HOSPITAL | Age: 72
End: 2022-08-30

## 2022-08-30 DIAGNOSIS — Z95.2 S/P AVR (AORTIC VALVE REPLACEMENT): Primary | ICD-10-CM

## 2022-08-30 PROCEDURE — 93798 PHYS/QHP OP CAR RHAB W/ECG: CPT

## 2022-09-01 ENCOUNTER — OFFICE VISIT (OUTPATIENT)
Dept: CARDIOLOGY | Facility: CLINIC | Age: 72
End: 2022-09-01

## 2022-09-01 ENCOUNTER — APPOINTMENT (OUTPATIENT)
Dept: CARDIAC REHAB | Facility: HOSPITAL | Age: 72
End: 2022-09-01

## 2022-09-01 VITALS
WEIGHT: 251 LBS | SYSTOLIC BLOOD PRESSURE: 130 MMHG | HEART RATE: 66 BPM | BODY MASS INDEX: 35.14 KG/M2 | DIASTOLIC BLOOD PRESSURE: 72 MMHG | OXYGEN SATURATION: 96 % | HEIGHT: 71 IN

## 2022-09-01 DIAGNOSIS — I10 BENIGN ESSENTIAL HYPERTENSION: ICD-10-CM

## 2022-09-01 DIAGNOSIS — E78.2 MIXED HYPERLIPIDEMIA: ICD-10-CM

## 2022-09-01 DIAGNOSIS — I48.0 AF (PAROXYSMAL ATRIAL FIBRILLATION): ICD-10-CM

## 2022-09-01 DIAGNOSIS — I35.0 NONRHEUMATIC AORTIC VALVE STENOSIS: Primary | ICD-10-CM

## 2022-09-01 DIAGNOSIS — Z95.2 S/P AVR (AORTIC VALVE REPLACEMENT): ICD-10-CM

## 2022-09-01 DIAGNOSIS — I25.10 CORONARY ARTERY DISEASE INVOLVING NATIVE CORONARY ARTERY OF NATIVE HEART WITHOUT ANGINA PECTORIS: ICD-10-CM

## 2022-09-01 PROCEDURE — 99214 OFFICE O/P EST MOD 30 MIN: CPT | Performed by: INTERNAL MEDICINE

## 2022-09-01 RX ORDER — FUROSEMIDE 20 MG/1
20 TABLET ORAL DAILY
COMMUNITY
Start: 2022-08-16 | End: 2022-12-28 | Stop reason: SDUPTHER

## 2022-09-01 NOTE — PROGRESS NOTES
Cardiology Office Visit      Encounter Date:  09/01/2022    Patient ID:   Anurag Pickard is a 71 y.o. male.    Reason For Followup:  Shortness of breath  Aortic stenosis    Brief Clinical History:  Dear Jorden Martin PA-C    I had the pleasure of seeing Anurag Pickard today. As you are well aware, this is a 71 y.o. male with no established history of ischemic heart disease.  He does have a history of aortic stenosis, hypertension, diabetes mellitus, hyperlipidemia, osteoarthritis, obesity, and antiplatelet therapy.  He presents today to follow-up on the above conditions.    Interval History:  He denies any chest pain pressure heaviness or tightness.  He does report fatigue.  He denies any PND orthopnea.  He denies any syncope or near syncope.    He is accompanied by his wife today.  They report that they want to go to [place] with her grandchildren later on this fall.  They are interested to know what they can do in order to augment his cardiac rehab.  I have advised them to discuss with cardiac rehab his current progress and to work out a home plan as well so that they can meet their goals.  I personally spoke with cardiac rehab to arrange for them to meet after our office visit today and discussed these goals.    They additionally had questions regarding continued use of Xarelto.  He has no history of preoperative atrial fibrillation.  He had postoperative atrial fibrillation and has been on rivaroxaban ever since.  We agreed to move forward with an ambulatory ECG monitor in October to evaluate for any atrial fibrillation.  If no A. fib is noted it is reasonable to discontinue anticoagulation therapy as his A. fib was likely a result of surgical intervention.    As I am sure you are aware, the patient underwent bioprosthetic aortic valve replacement on March 28, 2022 by Dr. Brandon.  He additionally had endarterectomy of his RCA secondary to his significant plaque noted in that area.    Assessment  "& Plan    Impressions:  Severe aortic stenosis status post tissue AVR March 2022  Postoperative atrial fibrillation  Hypertension  Hypertensive heart disease  Hyperlipidemia  Osteoarthritis  Diabetes mellitus  Obesity  Antiplatelet therapy    Recommendations:  Continuation of his current cardiovascular regimen at the present time.     This includes antihypertensives, antiplatelet, statin, Zetia, and potassium.  Ambulatory ECG monitor in October 2022  Continue with phase 2 cardiac rehab  Discussed with cardiac rehab department exercises to augment progress with phase 2 cardiac rehab  Follow-up in 6 months    Diagnoses and all orders for this visit:    1. Nonrheumatic aortic valve stenosis (Primary)  -     [facility]; Future    2. Benign essential hypertension  -     [facility]; Future    3. Coronary artery disease involving native coronary artery of native heart without angina pectoris  -     [facility]; Future    4. Mixed hyperlipidemia  -     [facility]; Future    5. S/P AVR (aortic valve replacement)  -     [facility]; Future    6. AF (paroxysmal atrial fibrillation) (HCC)  -     [facility]; Future          Objective:    Vitals:  Vitals:    09/01/22 0914   BP: 130/72   Pulse: 66   SpO2: 96%   Weight: 114 kg (251 lb)   Height: 180.3 cm (71\")     Body mass index is 35.01 kg/m².      Physical Exam:    General: Alert, cooperative, no distress, appears stated age  Head:  Normocephalic, atraumatic, mucous membranes moist  Eyes:  Conjunctiva/corneas clear, EOM's intact     Neck:  Supple,  no bruit    Lungs: Clear to auscultation bilaterally, no wheezes rhonchi rales are noted  Chest wall: Minimal tenderness  Heart::  Regular rate and rhythm, S1 and S2 normal, 1/6 systolic ejection murmur.  No rub or gallop  Abdomen: Soft, non-tender, nondistended bowel sounds active.  " Obese  Extremities: No cyanosis, clubbing, or edema  Pulses: 2+ and symmetric all extremities  Skin:  No rashes or lesions  Neuro/psych: A&O x3. CN II through XII are grossly intact with appropriate affect      Allergies:  Allergies   Allergen Reactions   • Glipizide Other (See Comments)     Sugar too low   • Lisinopril Cough       Medication Review:     Current Outpatient Medications:   •  atorvastatin (LIPITOR) 40 MG tablet, Take 1 tablet by mouth once daily, Disp: 90 tablet, Rfl: 0  •  Diclofenac Sodium (VOLTAREN) 1 % gel gel, Apply 4 g topically to the appropriate area as directed 2 (Two) Times a Day., Disp: , Rfl:   •  ezetimibe (ZETIA) 10 MG tablet, Take 1 tablet by mouth once daily, Disp: 90 tablet, Rfl: 0  •  furosemide (LASIX) 20 MG tablet, Take 20 mg by mouth Daily., Disp: , Rfl:   •  lansoprazole (PREVACID) 30 MG capsule, Take 1 capsule by mouth Daily., Disp: 90 capsule, Rfl: 0  •  metFORMIN (GLUCOPHAGE) 1000 MG tablet, TAKE 1 TABLET BY MOUTH TWICE DAILY WITH MEALS, Disp: 180 tablet, Rfl: 0  •  metroNIDAZOLE (Metrogel) 1 % gel, Apply  topically to the appropriate area as directed Daily., Disp: 60 g, Rfl: 3  •  rivaroxaban (Xarelto) 20 MG tablet, Take 1 tablet by mouth Daily., Disp: 90 tablet, Rfl: 3  •  thiamine (VITAMIN B-1) 100 MG tablet  tablet, Take 100 mg by mouth Daily. LD 3/20, Disp: , Rfl:   •  Accu-Chek Guide test strip, Use as instructed once daily   Dx e 11.9, Disp: 100 each, Rfl: 12  •  pioglitazone (ACTOS) 45 MG tablet, Take 1 tablet by mouth once daily, Disp: 90 tablet, Rfl: 0    Family History:  Family History   Problem Relation Age of Onset   • Diabetes Mother    • Other Mother    • Other Father    • Heart attack Father    • Other Sister    • Diabetes Sister    • Diabetes Brother    • Other Other    • Diabetes Other        Past Medical History:  Past Medical History:   Diagnosis Date   • Acute diastolic CHF (congestive heart failure) ([facility]) 01/03/2022   • Ankle edema    • Aortic stenosis      Moderate   • B12 deficiency    • Bell's palsy    • Bleeds easily (HCC)    • Chipped tooth     top front   • Diabetes mellitus (HCC)    • Dry skin    • Erectile dysfunction    • Falls     2 falls in last 3 months   • Fatigue    • Fragile skin    • GERD (gastroesophageal reflux disease)    • Heart murmur    • Hyperlipidemia    • Hypertension    • Hypokalemia    • Hypomagnesemia    • YANA (obstructive sleep apnea)     cpap bring dos   • Osteoarthritis    • Pulmonary HTN (HCC)    • Rosacea    • Vitamin D deficiency        Past Surgical History:  Past Surgical History:   Procedure Laterality Date   • AORTIC VALVE REPAIR/REPLACEMENT N/A 3/28/2022    Procedure: AORTIC VALVE REPAIR/REPLACEMENT;  Surgeon: Dennis Brandon MD;  Location: [facility] CVOR;  Service: Cardiothoracic;  Laterality: N/A;  aortic valve replacement with 27mm [employer] magna ease   • CARDIAC CATHETERIZATION     • CARDIAC CATHETERIZATION N/A 12/17/2021    Procedure: Right and Left Heart Cath;  Surgeon: Juan Castellon DO;  Location: [facility] CATH INVASIVE LOCATION;  Service: Cardiology;  Laterality: N/A;   • COLONOSCOPY     • JOINT REPLACEMENT Bilateral 2021    knee   • TONSILLECTOMY     • TOTAL KNEE ARTHROPLASTY     • TOTAL KNEE ARTHROPLASTY Left 05/04/2021    Procedure: TOTAL KNEE ARTHROPLASTY;  Surgeon: Otf Redmond MD;  Location: [facility] MAIN OR;  Service: Orthopedics;  Laterality: Left;       Social History:  Social History     Socioeconomic History   • Marital status:    Tobacco Use   • Smoking status: Never Smoker   • Smokeless tobacco: Never Used   Vaping Use   • Vaping Use: Never used   Substance and Sexual Activity   • Alcohol use: Yes     Alcohol/week: 14.0 standard drinks     Types: 14 Shots of liquor per week     Comment: 2 bourbons every night   • Drug use: No   • Sexual activity: Defer       Review of Systems:  The following systems were reviewed as they relate to the cardiovascular system: Constitutional,  Eyes, ENT, Cardiovascular, Respiratory, Gastrointestinal, Integumentary, Neurological, Psychiatric, Hematologic, Endocrine, Musculoskeletal, and Genitourinary. The pertinent cardiovascular findings are reported above with all other cardiovascular points within those systems being negative.    Diagnostic Study Review:     Current Electrocardiogram:  Procedures no new EKG.  EKG dated 5/13/2022 demonstrates sinus rhythm with a ventricular rate of 60 bpm.    Laboratory Data:  Lab Results   Component Value Date    GLUCOSE 130 (H) 05/16/2022    BUN 10 05/16/2022    CREATININE 1.02 05/16/2022    EGFRIFNONA 74 12/15/2021    EGFRIFAFRI 96 05/20/2016    BCR 9.8 05/16/2022    K 4.0 05/16/2022    CO2 24.4 05/16/2022    CALCIUM 8.0 (L) 05/16/2022    ALBUMIN 4.20 05/16/2022    LABIL2 1.5 06/14/2019    AST 17 05/16/2022    ALT 6 05/16/2022     Lab Results   Component Value Date    GLUCOSE 130 (H) 05/16/2022    CALCIUM 8.0 (L) 05/16/2022     05/16/2022    K 4.0 05/16/2022    CO2 24.4 05/16/2022     05/16/2022    BUN 10 05/16/2022    CREATININE 1.02 05/16/2022    EGFRIFAFRI 96 05/20/2016    EGFRIFNONA 74 12/15/2021    BCR 9.8 05/16/2022    ANIONGAP 14.6 05/16/2022     Lab Results   Component Value Date    WBC 7.98 04/29/2022    HGB 10.4 (L) 04/29/2022    HCT 32.8 (L) 04/29/2022    MCV 89.1 04/29/2022     04/29/2022     Lab Results   Component Value Date    CHOL 119 04/29/2022    TRIG 96 04/29/2022    HDL 27 (L) 04/29/2022    LDL 74 04/29/2022     Lab Results   Component Value Date    HGBA1C 7.5 (H) 04/29/2022     Lab Results   Component Value Date    INR 0.96 03/29/2022    INR 1.09 03/28/2022    INR <0.93 (L) 03/25/2022    PROTIME 10.7 03/29/2022    PROTIME 12.0 (H) 03/28/2022    PROTIME 10.3 03/25/2022       Most Recent Echo:  Results for orders placed in visit on 03/28/22    Emergent/Open-Heart Anesthesia PAULO    Narrative  Emergent/Open-Heart Anesthesia PAULO    Procedure Performed: Emergent/Open-Heart Anesthesia  PAULO  Start Time:  End Time:      General Procedure Information  Location performed:  OR  Intubated  Bite block placed  Heart visualized  Probe Insertion:  Easy  Probe Type:  Biplane and multiplane  Modalities:  Color flow mapping, pulse wave Doppler and continuous wave Doppler    Echocardiographic and Doppler Measurements    Ventricles    Right Ventricle:  Ejection Fraction 60%.  Left Ventricle:  Global Function normal.  Ejection Fraction 60%.                                Anesthesia Information  Performed Personally  Anesthesiologist:  Van Frost MD      Echocardiogram Comments:  PAULO placed easily with no resistance ,  Lubricated , bite guard used    Pre cpb  LV EF 60 no wma  RV nl SF  MV tr/mild MR  AV Ca++ mild mod AI ,  Mod AS YOSI 1.2cm2 by cont  tv TR TR  Suspicious for smal PFO by CFD      Post cpb  #27 av well seated no para / trans leak seen  No other change       Most Recent Stress Test:  Results for orders placed during the hospital encounter of 05/24/22    Treadmill Stress Test    Interpretation Summary  · Arrhythmias were not significant during stress.  · Equivocal ECG evidence of myocardial ischemia.  · Indeterminate clinical evidence of myocardial ischemia.  · Findings consistent with an indeterminate ECG stress test.  · Patient appropriate to begin cardiac rehab       Most Recent Cardiac Catheterization:   Results for orders placed during the hospital encounter of 12/17/21    Cardiac Catheterization/Vascular Study    Narrative  Cardiac Catheterization Operative Report    Anurag Pickard  [id number]  12/17/2021  @PCP@    He underwent cardiac catheterization.    Indications for the procedure include: Aortic stenosis.    Procedure Details:  The risks, benefits, complications, treatment options, and expected outcomes were discussed with the patient. The patient and/or family concurred with the proposed plan, giving informed consent.    After informed consent the patient was brought to the cath  lab after appropriate IV hydration was begun and oral premedication was given. He was further sedated with fentanyl and midazolam. He was prepped and draped in the usual manner. Using the modified Seldinger access technique, a 6 Mauritian sheath was placed in the femoral artery and a 7 Mauritian sheath was placed in the femoral vein. A left and right heart catheterization with coronary arteriography was performed. Findings are discussed below.    After the procedure was completed, sedation was stopped and the sheaths and catheters were all removed. Hemostasis was achieved per established hospital protocols.    Conscious sedation:  Conscious sedation was performed according to protocol.  I supervised and directed an independent trained observer with the assistance of monitoring the patient's level of consciousness and physiologic status throughout the procedure.  Intraoperative service time was 60 minutes.    Findings:    Hemodynamics  LVEDP: Unable to obtain  LV: Unable to obtain  Ao: 132/61  RA: 6  RV: 42/4  PA: 43/15  PAWP: 15  Cardiac output Diane: 6.84 (2.85)  Cardiac output thermal: 7.27 (3.03)  Left Main  no significant disease  RCA  luminal irregularities with no significant disease  LAD  luminal irregularities with no significant disease  Circ  luminal irregularities with no significant disease  SVG(s)  not applicable  SVITLANA  not applicable  LV  not imaged  Coronary Dominance  circumflex    Estimated Blood Loss:  Minimal    Specimens: None    Complications:  None; patient tolerated the procedure well.    Disposition: PACU - hemodynamically stable    Condition: stable    Impressions:  No angiographic evidence for significant epicardial occlusive disease  Severe aortic stenosis  Moderate pulmonary hypertension    Recommendations:  Structural heart disease referral for TAVR versus SAVR       NOTE: The following portions of the patient's note were reviewed, confirmed and/or updated this visit as appropriate: History of  present illness/Interval history, physical examination, assessment & plan, allergies, current medications, past family history, past medical history, past social history, past surgical history and problem list.

## 2022-09-06 ENCOUNTER — TREATMENT (OUTPATIENT)
Dept: CARDIAC REHAB | Facility: HOSPITAL | Age: 72
End: 2022-09-06

## 2022-09-06 DIAGNOSIS — Z95.2 S/P AVR (AORTIC VALVE REPLACEMENT): Primary | ICD-10-CM

## 2022-09-06 PROCEDURE — 93798 PHYS/QHP OP CAR RHAB W/ECG: CPT

## 2022-09-08 ENCOUNTER — TREATMENT (OUTPATIENT)
Dept: CARDIAC REHAB | Facility: HOSPITAL | Age: 72
End: 2022-09-08

## 2022-09-08 DIAGNOSIS — Z95.2 S/P AVR (AORTIC VALVE REPLACEMENT): Primary | ICD-10-CM

## 2022-09-08 PROCEDURE — 93798 PHYS/QHP OP CAR RHAB W/ECG: CPT

## 2022-09-13 ENCOUNTER — APPOINTMENT (OUTPATIENT)
Dept: CARDIAC REHAB | Facility: HOSPITAL | Age: 72
End: 2022-09-13

## 2022-09-15 ENCOUNTER — TREATMENT (OUTPATIENT)
Dept: CARDIAC REHAB | Facility: HOSPITAL | Age: 72
End: 2022-09-15

## 2022-09-15 DIAGNOSIS — Z95.2 S/P AVR (AORTIC VALVE REPLACEMENT): Primary | ICD-10-CM

## 2022-09-15 PROCEDURE — 93798 PHYS/QHP OP CAR RHAB W/ECG: CPT

## 2022-09-19 RX ORDER — LANOLIN ALCOHOL/MO/W.PET/CERES
CREAM (GRAM) TOPICAL
Qty: 90 TABLET | Refills: 0 | Status: SHIPPED | OUTPATIENT
Start: 2022-09-19 | End: 2022-11-01

## 2022-09-20 ENCOUNTER — TREATMENT (OUTPATIENT)
Dept: CARDIAC REHAB | Facility: HOSPITAL | Age: 72
End: 2022-09-20

## 2022-09-20 DIAGNOSIS — Z95.2 S/P AVR (AORTIC VALVE REPLACEMENT): Primary | ICD-10-CM

## 2022-09-20 PROCEDURE — 93798 PHYS/QHP OP CAR RHAB W/ECG: CPT

## 2022-09-20 NOTE — PROGRESS NOTES
Pt came into cardiac rehab.   While on treadmill pt had an accelerated rate with no symptoms. Had pt sit and rest.  Telemetry strips given to Dr Knox to review. Ordered EKG.  EKG performed.  Dr Knox Reviewed.  Wants to see pt in office this week.  Discussed this with patient.  Appointment to be scheduled by  for Thursday 9/22/22 at 1:30pm.  Pt will not be exercising rehab until seen by Dr Knox. Pt verbalized understanding.

## 2022-09-22 ENCOUNTER — APPOINTMENT (OUTPATIENT)
Dept: CARDIAC REHAB | Facility: HOSPITAL | Age: 72
End: 2022-09-22

## 2022-09-22 ENCOUNTER — OFFICE VISIT (OUTPATIENT)
Dept: CARDIOLOGY | Facility: CLINIC | Age: 72
End: 2022-09-22

## 2022-09-22 VITALS
DIASTOLIC BLOOD PRESSURE: 79 MMHG | HEIGHT: 71 IN | OXYGEN SATURATION: 99 % | RESPIRATION RATE: 18 BRPM | SYSTOLIC BLOOD PRESSURE: 139 MMHG | HEART RATE: 75 BPM | WEIGHT: 250 LBS | BODY MASS INDEX: 35 KG/M2

## 2022-09-22 DIAGNOSIS — I10 BENIGN ESSENTIAL HYPERTENSION: ICD-10-CM

## 2022-09-22 DIAGNOSIS — Z95.2 S/P AVR (AORTIC VALVE REPLACEMENT): ICD-10-CM

## 2022-09-22 DIAGNOSIS — E11.65 TYPE 2 DIABETES MELLITUS WITH HYPERGLYCEMIA, WITHOUT LONG-TERM CURRENT USE OF INSULIN: ICD-10-CM

## 2022-09-22 DIAGNOSIS — I35.0 NONRHEUMATIC AORTIC VALVE STENOSIS: Primary | ICD-10-CM

## 2022-09-22 DIAGNOSIS — E78.2 MIXED HYPERLIPIDEMIA: ICD-10-CM

## 2022-09-22 DIAGNOSIS — I25.10 CORONARY ARTERY DISEASE INVOLVING NATIVE CORONARY ARTERY OF NATIVE HEART WITHOUT ANGINA PECTORIS: ICD-10-CM

## 2022-09-22 DIAGNOSIS — I50.31 ACUTE DIASTOLIC CHF (CONGESTIVE HEART FAILURE): ICD-10-CM

## 2022-09-22 PROCEDURE — 99214 OFFICE O/P EST MOD 30 MIN: CPT | Performed by: INTERNAL MEDICINE

## 2022-09-22 RX ORDER — METOPROLOL SUCCINATE 25 MG/1
12.5 TABLET, EXTENDED RELEASE ORAL DAILY
Qty: 45 TABLET | Refills: 3 | Status: SHIPPED | OUTPATIENT
Start: 2022-09-22

## 2022-09-22 NOTE — PROGRESS NOTES
Cardiology Office Visit      Encounter Date:  09/22/2022    Patient ID:   Anurag Pickard is a 71 y.o. male.    Reason For Followup:  Shortness of breath  Aortic stenosis  Atrial fibrillation with rapid antler response  Brief Clinical History:  Dear Jorden Martin PA-C     I had the pleasure of seeing Anurag Pickard today. As you are well aware, this is a 71 y.o. male with no established history of ischemic heart disease.  He does have a history of aortic stenosis, hypertension, diabetes mellitus, hyperlipidemia, osteoarthritis, obesity, and antiplatelet therapy.  He presents today to follow-up on the above conditions.     Interval History:  He denies any chest pain pressure heaviness or tightness.  He does report fatigue.  He denies any PND orthopnea.  He denies any syncope or near syncope.     He is accompanied by his wife today.  They report that they want to go to [place] with her grandchildren later on this fall.  They are interested to know what they can do in order to augment his cardiac rehab.  I have advised them to discuss with cardiac rehab his current progress and to work out a home plan as well so that they can meet their goals.  I personally spoke with cardiac rehab to arrange for them to meet after our office visit today and discussed these goals.     They additionally had questions regarding continued use of Xarelto.  He has no history of preoperative atrial fibrillation.  He had postoperative atrial fibrillation and has been on rivaroxaban ever since.  We agreed to move forward with an ambulatory ECG monitor in October to evaluate for any atrial fibrillation.  If no A. fib is noted it is reasonable to discontinue anticoagulation therapy as his A. fib was likely a result of surgical intervention.     As I am sure you are aware, the patient underwent bioprosthetic aortic valve replacement on March 28, 2022 by Dr. Brandon.  He additionally had endarterectomy of his RCA secondary to his  "significant plaque noted in that area.     Patient noted to have some source of atrial fibrillation with rapid response requiring the urgent visit  Assessment & Plan     Impressions:  Severe aortic stenosis status post tissue AVR March 2022  Postoperative atrial fibrillation  Hypertension  Hypertensive heart disease  Hyperlipidemia  Osteoarthritis  Diabetes mellitus  Obesity  Antiplatelet therapy  Proximal atrial fibrillation with rapid response    Recommendations:  Continuation of his current cardiovascular regimen at the present time.     This includes antihypertensives, antiplatelet, statin, Zetia, and potassium.  Ambulatory ECG monitor in October 2022  Continue with phase 2 cardiac rehab  Continue anticoagulation therapy with Xarelto  Resume cardiac rehab  Start patient on Toprol-XL 12.5 mg p.o. once a day  Sent Holter monitor to further evaluate atrial fibrillation burden and rate control  Follow-up in 3 months     Diagnoses and all orders for this visit:     1. Nonrheumatic aortic valve stenosis (Primary)  -     [facility]; Future     2. Benign essential hypertension  -     [facility] Tele[facility]; Future     3. Coronary artery disease involving native coronary artery of native heart without angina pectoris  -     [facility] Tele[facility]; Future     4. Mixed hyperlipidemia  -     [facility] Tele[facility]; Future     5. S/P AVR (aortic valve replacement)  -     [facility] Tele[facility]; Future     6. AF (paroxysmal atrial fibrillation) (HCC)  -     [facility] Tele[facility]; Future    Objective:    Vitals:  Vitals:    09/22/22 1330   BP: 139/79   BP Location: Left arm   Patient Position: Sitting   Cuff Size: Large Adult   Pulse: 75   Resp: 18   SpO2: 99%   Weight: 113 kg (250 lb)   Height: 180.3 cm (71\")       Physical Exam:    General: Alert, cooperative, no distress, appears stated age  Head:  Normocephalic, atraumatic, mucous " membranes moist  Eyes:  Conjunctiva/corneas clear, EOM's intact     Neck:  Supple,  no adenopathy;      Lungs: Clear to auscultation bilaterally, no wheezes rhonchi rales are noted  Chest wall: No tenderness  Heart::  Regular rate and rhythm, S1 and S2 normal, no murmur, rub or gallop  Abdomen: Soft, non-tender, nondistended bowel sounds active  Extremities: No cyanosis, clubbing, or edema  Pulses: 2+ and symmetric all extremities  Skin:  No rashes or lesions  Neuro/psych: A&O x3. CN II through XII are grossly intact with appropriate affect      Allergies:  Allergies   Allergen Reactions   • Glipizide Other (See Comments)     Sugar too low   • Lisinopril Cough       Medication Review:     Current Outpatient Medications:   •  Accu-Chek Guide test strip, Use as instructed once daily   Dx e 11.9, Disp: 100 each, Rfl: 12  •  atorvastatin (LIPITOR) 40 MG tablet, Take 1 tablet by mouth once daily, Disp: 90 tablet, Rfl: 0  •  Diclofenac Sodium (VOLTAREN) 1 % gel gel, Apply 4 g topically to the appropriate area as directed 2 (Two) Times a Day., Disp: , Rfl:   •  ezetimibe (ZETIA) 10 MG tablet, Take 1 tablet by mouth once daily, Disp: 90 tablet, Rfl: 0  •  furosemide (LASIX) 20 MG tablet, Take 20 mg by mouth Daily., Disp: , Rfl:   •  lansoprazole (PREVACID) 30 MG capsule, Take 1 capsule by mouth Daily., Disp: 90 capsule, Rfl: 0  •  metFORMIN (GLUCOPHAGE) 1000 MG tablet, TAKE 1 TABLET BY MOUTH TWICE DAILY WITH MEALS, Disp: 180 tablet, Rfl: 0  •  metroNIDAZOLE (Metrogel) 1 % gel, Apply  topically to the appropriate area as directed Daily., Disp: 60 g, Rfl: 3  •  pioglitazone (ACTOS) 45 MG tablet, Take 1 tablet by mouth once daily, Disp: 90 tablet, Rfl: 0  •  rivaroxaban (Xarelto) 20 MG tablet, Take 1 tablet by mouth Daily., Disp: 90 tablet, Rfl: 3  •  thiamine 100 MG tablet, Take 1 tablet by mouth once daily, Disp: 90 tablet, Rfl: 0  •  metoprolol succinate XL (TOPROL-XL) 25 MG 24 hr tablet, Take 0.5 tablets by mouth Daily.,  Disp: 45 tablet, Rfl: 3    Family History:  Family History   Problem Relation Age of Onset   • Diabetes Mother    • Other Mother    • Other Father    • Heart attack Father    • Other Sister    • Diabetes Sister    • Diabetes Brother    • Other Other    • Diabetes Other        Past Medical History:  Past Medical History:   Diagnosis Date   • Acute diastolic CHF (congestive heart failure) ([facility]) 01/03/2022   • Ankle edema    • Aortic stenosis     Moderate   • B12 deficiency    • Bell's palsy    • Bleeds easily ([facility])    • Chipped tooth     top front   • Diabetes mellitus ([facility])    • Dry skin    • Erectile dysfunction    • Falls     2 falls in last 3 months   • Fatigue    • Fragile skin    • GERD (gastroesophageal reflux disease)    • Heart murmur    • Hyperlipidemia    • Hypertension    • Hypokalemia    • Hypomagnesemia    • YANA (obstructive sleep apnea)     cpap bring dos   • Osteoarthritis    • Pulmonary HTN ([facility])    • Rosacea    • Vitamin D deficiency        Past surgical History:  Past Surgical History:   Procedure Laterality Date   • AORTIC VALVE REPAIR/REPLACEMENT N/A 3/28/2022    Procedure: AORTIC VALVE REPAIR/REPLACEMENT;  Surgeon: Dennis Brandon MD;  Location: [facility] CVOR;  Service: Cardiothoracic;  Laterality: N/A;  aortic valve replacement with 27mm [employer] magna ease   • CARDIAC CATHETERIZATION     • CARDIAC CATHETERIZATION N/A 12/17/2021    Procedure: Right and Left Heart Cath;  Surgeon: Juan Castellon DO;  Location: [facility] CATH INVASIVE LOCATION;  Service: Cardiology;  Laterality: N/A;   • COLONOSCOPY     • JOINT REPLACEMENT Bilateral 2021    knee   • TONSILLECTOMY     • TOTAL KNEE ARTHROPLASTY     • TOTAL KNEE ARTHROPLASTY Left 05/04/2021    Procedure: TOTAL KNEE ARTHROPLASTY;  Surgeon: Otf Redmond MD;  Location: [facility] MAIN OR;  Service: Orthopedics;  Laterality: Left;       Social History:  Social History     Socioeconomic History   • Marital status:    Tobacco  Use   • Smoking status: Never Smoker   • Smokeless tobacco: Never Used   Vaping Use   • Vaping Use: Never used   Substance and Sexual Activity   • Alcohol use: Yes     Alcohol/week: 14.0 standard drinks     Types: 14 Shots of liquor per week     Comment: 2 bourbons every night   • Drug use: No   • Sexual activity: Defer       Review of Systems:  The following systems were reviewed as they relate to the cardiovascular system: Constitutional, Eyes, ENT, Cardiovascular, Respiratory, Gastrointestinal, Integumentary, Neurological, Psychiatric, Hematologic, Endocrine, Musculoskeletal, and Genitourinary. The pertinent cardiovascular findings are reported above with all other cardiovascular points within those systems being negative.    Diagnostic Study Review:     Current Electrocardiogram:  Procedures      NOTE: The following portions of the patient's history were reviewed and updated this visit as appropriate: allergies, current medications, past family history, past medical history, past social history, past surgical history and problem list.

## 2022-09-27 ENCOUNTER — TREATMENT (OUTPATIENT)
Dept: CARDIAC REHAB | Facility: HOSPITAL | Age: 72
End: 2022-09-27

## 2022-09-27 DIAGNOSIS — Z95.2 S/P AVR (AORTIC VALVE REPLACEMENT): Primary | ICD-10-CM

## 2022-09-27 PROCEDURE — 93798 PHYS/QHP OP CAR RHAB W/ECG: CPT

## 2022-09-29 ENCOUNTER — TREATMENT (OUTPATIENT)
Dept: CARDIAC REHAB | Facility: HOSPITAL | Age: 72
End: 2022-09-29

## 2022-09-29 DIAGNOSIS — Z95.2 S/P AVR (AORTIC VALVE REPLACEMENT): Primary | ICD-10-CM

## 2022-09-29 PROCEDURE — 93798 PHYS/QHP OP CAR RHAB W/ECG: CPT

## 2022-10-03 ENCOUNTER — HOSPITAL ENCOUNTER (OUTPATIENT)
Dept: CARDIOLOGY | Facility: HOSPITAL | Age: 72
Discharge: HOME | End: 2022-10-03

## 2022-10-03 DIAGNOSIS — I35.0 NONRHEUMATIC AORTIC VALVE STENOSIS: ICD-10-CM

## 2022-10-03 DIAGNOSIS — I25.10 CORONARY ARTERY DISEASE INVOLVING NATIVE CORONARY ARTERY OF NATIVE HEART WITHOUT ANGINA PECTORIS: ICD-10-CM

## 2022-10-03 DIAGNOSIS — Z95.2 S/P AVR (AORTIC VALVE REPLACEMENT): ICD-10-CM

## 2022-10-03 DIAGNOSIS — I10 BENIGN ESSENTIAL HYPERTENSION: ICD-10-CM

## 2022-10-03 DIAGNOSIS — E78.2 MIXED HYPERLIPIDEMIA: ICD-10-CM

## 2022-10-03 DIAGNOSIS — I48.0 AF (PAROXYSMAL ATRIAL FIBRILLATION): ICD-10-CM

## 2022-10-04 ENCOUNTER — TREATMENT (OUTPATIENT)
Dept: CARDIAC REHAB | Facility: HOSPITAL | Age: 72
End: 2022-10-04

## 2022-10-04 DIAGNOSIS — Z95.2 S/P AVR (AORTIC VALVE REPLACEMENT): Primary | ICD-10-CM

## 2022-10-04 PROCEDURE — 93798 PHYS/QHP OP CAR RHAB W/ECG: CPT

## 2022-10-11 ENCOUNTER — TREATMENT (OUTPATIENT)
Dept: CARDIAC REHAB | Facility: HOSPITAL | Age: 72
End: 2022-10-11

## 2022-10-11 DIAGNOSIS — Z95.2 S/P AVR (AORTIC VALVE REPLACEMENT): Primary | ICD-10-CM

## 2022-10-11 PROCEDURE — 93798 PHYS/QHP OP CAR RHAB W/ECG: CPT

## 2022-10-18 ENCOUNTER — TREATMENT (OUTPATIENT)
Dept: CARDIAC REHAB | Facility: HOSPITAL | Age: 72
End: 2022-10-18

## 2022-10-18 DIAGNOSIS — Z95.2 S/P AVR (AORTIC VALVE REPLACEMENT): Primary | ICD-10-CM

## 2022-10-18 PROCEDURE — 93798 PHYS/QHP OP CAR RHAB W/ECG: CPT

## 2022-10-20 ENCOUNTER — TREATMENT (OUTPATIENT)
Dept: CARDIAC REHAB | Facility: HOSPITAL | Age: 72
End: 2022-10-20

## 2022-10-20 DIAGNOSIS — Z95.2 S/P AVR (AORTIC VALVE REPLACEMENT): Primary | ICD-10-CM

## 2022-10-20 PROCEDURE — 93798 PHYS/QHP OP CAR RHAB W/ECG: CPT

## 2022-10-25 ENCOUNTER — TREATMENT (OUTPATIENT)
Dept: CARDIAC REHAB | Facility: HOSPITAL | Age: 72
End: 2022-10-25

## 2022-10-25 DIAGNOSIS — Z95.2 S/P AVR (AORTIC VALVE REPLACEMENT): Primary | ICD-10-CM

## 2022-10-25 PROCEDURE — 93798 PHYS/QHP OP CAR RHAB W/ECG: CPT

## 2022-10-27 ENCOUNTER — TREATMENT (OUTPATIENT)
Dept: CARDIAC REHAB | Facility: HOSPITAL | Age: 72
End: 2022-10-27

## 2022-10-27 DIAGNOSIS — Z95.2 S/P AVR (AORTIC VALVE REPLACEMENT): Primary | ICD-10-CM

## 2022-10-27 PROCEDURE — 93798 PHYS/QHP OP CAR RHAB W/ECG: CPT

## 2022-10-31 LAB
MAXIMAL PREDICTED HEART RATE: 150 BPM
STRESS TARGET HR: 128 BPM

## 2022-10-31 PROCEDURE — 93228 REMOTE 30 DAY ECG REV/REPORT: CPT | Performed by: INTERNAL MEDICINE

## 2022-11-01 ENCOUNTER — OFFICE VISIT (OUTPATIENT)
Dept: FAMILY MEDICINE CLINIC | Facility: CLINIC | Age: 72
End: 2022-11-01

## 2022-11-01 ENCOUNTER — LAB (OUTPATIENT)
Dept: FAMILY MEDICINE CLINIC | Facility: CLINIC | Age: 72
End: 2022-11-01

## 2022-11-01 VITALS
WEIGHT: 249.6 LBS | TEMPERATURE: 97.1 F | OXYGEN SATURATION: 100 % | BODY MASS INDEX: 34.94 KG/M2 | HEIGHT: 71 IN | SYSTOLIC BLOOD PRESSURE: 144 MMHG | RESPIRATION RATE: 16 BRPM | DIASTOLIC BLOOD PRESSURE: 81 MMHG | HEART RATE: 65 BPM

## 2022-11-01 DIAGNOSIS — E11.9 TYPE 2 DIABETES MELLITUS WITHOUT COMPLICATION, WITHOUT LONG-TERM CURRENT USE OF INSULIN: ICD-10-CM

## 2022-11-01 DIAGNOSIS — R41.3 MEMORY LOSS: ICD-10-CM

## 2022-11-01 DIAGNOSIS — R41.3 MEMORY LOSS: Primary | ICD-10-CM

## 2022-11-01 DIAGNOSIS — E83.51 HYPOCALCEMIA: ICD-10-CM

## 2022-11-01 LAB
25(OH)D3 SERPL-MCNC: 15.4 NG/ML (ref 30–100)
ALBUMIN SERPL-MCNC: 4.3 G/DL (ref 3.5–5.2)
ALBUMIN/GLOB SERPL: 1.1 G/DL
ALP SERPL-CCNC: 82 U/L (ref 39–117)
ALT SERPL W P-5'-P-CCNC: 9 U/L (ref 1–41)
ANION GAP SERPL CALCULATED.3IONS-SCNC: 17.5 MMOL/L (ref 5–15)
AST SERPL-CCNC: 20 U/L (ref 1–40)
BASOPHILS # BLD AUTO: 0.06 10*3/MM3 (ref 0–0.2)
BASOPHILS NFR BLD AUTO: 0.7 % (ref 0–1.5)
BILIRUB SERPL-MCNC: 0.6 MG/DL (ref 0–1.2)
BILIRUB UR QL STRIP: NEGATIVE
BUN SERPL-MCNC: 12 MG/DL (ref 8–23)
BUN/CREAT SERPL: 10.1 (ref 7–25)
CALCIUM SPEC-SCNC: 9.7 MG/DL (ref 8.6–10.5)
CHLORIDE SERPL-SCNC: 92 MMOL/L (ref 98–107)
CLARITY UR: CLEAR
CO2 SERPL-SCNC: 29.5 MMOL/L (ref 22–29)
COLOR UR: YELLOW
CREAT SERPL-MCNC: 1.19 MG/DL (ref 0.76–1.27)
DEPRECATED RDW RBC AUTO: 57.2 FL (ref 37–54)
EGFRCR SERPLBLD CKD-EPI 2021: 65.7 ML/MIN/1.73
EOSINOPHIL # BLD AUTO: 0.06 10*3/MM3 (ref 0–0.4)
EOSINOPHIL NFR BLD AUTO: 0.7 % (ref 0.3–6.2)
ERYTHROCYTE [DISTWIDTH] IN BLOOD BY AUTOMATED COUNT: 17.9 % (ref 12.3–15.4)
FOLATE SERPL-MCNC: 4.06 NG/ML (ref 4.78–24.2)
GLOBULIN UR ELPH-MCNC: 3.8 GM/DL
GLUCOSE SERPL-MCNC: 121 MG/DL (ref 65–99)
GLUCOSE UR STRIP-MCNC: NEGATIVE MG/DL
HBA1C MFR BLD: 7.3 % (ref 3.5–5.6)
HCT VFR BLD AUTO: 38 % (ref 37.5–51)
HGB BLD-MCNC: 12.6 G/DL (ref 13–17.7)
HGB UR QL STRIP.AUTO: NEGATIVE
IMM GRANULOCYTES # BLD AUTO: 0.04 10*3/MM3 (ref 0–0.05)
IMM GRANULOCYTES NFR BLD AUTO: 0.5 % (ref 0–0.5)
KETONES UR QL STRIP: NEGATIVE
LEUKOCYTE ESTERASE UR QL STRIP.AUTO: NEGATIVE
LYMPHOCYTES # BLD AUTO: 1.92 10*3/MM3 (ref 0.7–3.1)
LYMPHOCYTES NFR BLD AUTO: 21.7 % (ref 19.6–45.3)
MAGNESIUM SERPL-MCNC: 0.9 MG/DL (ref 1.6–2.4)
MCH RBC QN AUTO: 29.5 PG (ref 26.6–33)
MCHC RBC AUTO-ENTMCNC: 33.2 G/DL (ref 31.5–35.7)
MCV RBC AUTO: 89 FL (ref 79–97)
MONOCYTES # BLD AUTO: 0.78 10*3/MM3 (ref 0.1–0.9)
MONOCYTES NFR BLD AUTO: 8.8 % (ref 5–12)
NEUTROPHILS NFR BLD AUTO: 5.98 10*3/MM3 (ref 1.7–7)
NEUTROPHILS NFR BLD AUTO: 67.6 % (ref 42.7–76)
NITRITE UR QL STRIP: NEGATIVE
NRBC BLD AUTO-RTO: 0 /100 WBC (ref 0–0.2)
PH UR STRIP.AUTO: 5.5 [PH] (ref 5–8)
PHOSPHATE SERPL-MCNC: 3.7 MG/DL (ref 2.5–4.5)
PLATELET # BLD AUTO: 213 10*3/MM3 (ref 140–450)
PMV BLD AUTO: 11.2 FL (ref 6–12)
POTASSIUM SERPL-SCNC: 3.5 MMOL/L (ref 3.5–5.2)
PROT SERPL-MCNC: 8.1 G/DL (ref 6–8.5)
PROT UR QL STRIP: NEGATIVE
PTH-INTACT SERPL-MCNC: 34.4 PG/ML (ref 15–65)
RBC # BLD AUTO: 4.27 10*6/MM3 (ref 4.14–5.8)
SODIUM SERPL-SCNC: 139 MMOL/L (ref 136–145)
SP GR UR STRIP: 1.01 (ref 1–1.03)
TSH SERPL DL<=0.05 MIU/L-ACNC: 3.47 UIU/ML (ref 0.27–4.2)
UROBILINOGEN UR QL STRIP: NORMAL
VIT B12 BLD-MCNC: 369 PG/ML (ref 211–946)
WBC NRBC COR # BLD: 8.84 10*3/MM3 (ref 3.4–10.8)

## 2022-11-01 PROCEDURE — 83036 HEMOGLOBIN GLYCOSYLATED A1C: CPT | Performed by: PHYSICIAN ASSISTANT

## 2022-11-01 PROCEDURE — 82306 VITAMIN D 25 HYDROXY: CPT | Performed by: PHYSICIAN ASSISTANT

## 2022-11-01 PROCEDURE — 83735 ASSAY OF MAGNESIUM: CPT | Performed by: PHYSICIAN ASSISTANT

## 2022-11-01 PROCEDURE — G0008 ADMIN INFLUENZA VIRUS VAC: HCPCS | Performed by: PHYSICIAN ASSISTANT

## 2022-11-01 PROCEDURE — 82607 VITAMIN B-12: CPT | Performed by: PHYSICIAN ASSISTANT

## 2022-11-01 PROCEDURE — 84443 ASSAY THYROID STIM HORMONE: CPT | Performed by: PHYSICIAN ASSISTANT

## 2022-11-01 PROCEDURE — 85025 COMPLETE CBC W/AUTO DIFF WBC: CPT | Performed by: PHYSICIAN ASSISTANT

## 2022-11-01 PROCEDURE — 82746 ASSAY OF FOLIC ACID SERUM: CPT | Performed by: PHYSICIAN ASSISTANT

## 2022-11-01 PROCEDURE — 81003 URINALYSIS AUTO W/O SCOPE: CPT | Performed by: PHYSICIAN ASSISTANT

## 2022-11-01 PROCEDURE — 80053 COMPREHEN METABOLIC PANEL: CPT | Performed by: PHYSICIAN ASSISTANT

## 2022-11-01 PROCEDURE — 84100 ASSAY OF PHOSPHORUS: CPT | Performed by: PHYSICIAN ASSISTANT

## 2022-11-01 PROCEDURE — 99214 OFFICE O/P EST MOD 30 MIN: CPT | Performed by: PHYSICIAN ASSISTANT

## 2022-11-01 PROCEDURE — 83970 ASSAY OF PARATHORMONE: CPT | Performed by: PHYSICIAN ASSISTANT

## 2022-11-01 PROCEDURE — 90662 IIV NO PRSV INCREASED AG IM: CPT | Performed by: PHYSICIAN ASSISTANT

## 2022-11-01 PROCEDURE — 36415 COLL VENOUS BLD VENIPUNCTURE: CPT

## 2022-11-01 NOTE — PROGRESS NOTES
"Answers for HPI/ROS submitted by the patient on 10/30/2022  What is the primary reason for your visit?: Physical    Subjective   Anurag Pickard is a 71 y.o. male.     Chief Complaint   Patient presents with   • Medication follow up       /81   Pulse 65   Temp 97.1 °F (36.2 °C) (Infrared)   Resp 16   Ht 180.3 cm (70.98\")   Wt 113 kg (249 lb 9.6 oz)   SpO2 100%   BMI 34.83 kg/m²     BP Readings from Last 3 Encounters:   11/01/22 144/81   09/22/22 139/79   09/01/22 130/72       Wt Readings from Last 3 Encounters:   11/01/22 113 kg (249 lb 9.6 oz)   09/22/22 113 kg (250 lb)   09/01/22 114 kg (251 lb)       HPI Presents to the clinic for follow up on AS s/p aortic valve replacement, dm, htn, and memory issues. He is on xarelto for paroxysmal afib after surgery. He had a holter and this just resulted without any signs of afib. He has had worsening memory issues per his wife. He is having trouble with directions which he normally would not have issues with. She is concerned about him forgetting to each lunch throughout the day. He does have a neurologist but doesn't see them until march.     The following portions of the patient's history were reviewed and updated as appropriate: allergies, current medications, past family history, past medical history, past social history, past surgical history and problem list.    Review of Systems    Objective   Physical Exam  Constitutional:       Appearance: He is well-developed.   HENT:      Head: Normocephalic and atraumatic.   Eyes:      Conjunctiva/sclera: Conjunctivae normal.      Pupils: Pupils are equal, round, and reactive to light.   Cardiovascular:      Rate and Rhythm: Normal rate and regular rhythm.      Heart sounds: No murmur heard.  Pulmonary:      Effort: Pulmonary effort is normal.      Breath sounds: Normal breath sounds.   Abdominal:      General: Bowel sounds are normal.      Palpations: Abdomen is soft.   Musculoskeletal:         General: No " deformity. Normal range of motion.      Cervical back: Normal range of motion and neck supple.   Lymphadenopathy:      Cervical: No cervical adenopathy.   Skin:     General: Skin is warm and dry.      Findings: No rash.   Neurological:      Mental Status: He is alert and oriented to person, place, and time.      Cranial Nerves: No cranial nerve deficit.   Psychiatric:         Behavior: Behavior normal.         Thought Content: Thought content normal.         Judgment: Judgment normal.           Diagnoses and all orders for this visit:    1. Memory loss (Primary)  -     Vitamin B12; Future  -     Folate; Future  -     Comprehensive metabolic panel; Future  -     CBC w AUTO Differential; Future  -     TSH Rfx On Abnormal To Free T4; Future  -     MRI Brain With & Without Contrast; Future    2. Type 2 diabetes mellitus without complication, without long-term current use of insulin (HCC)  -     Hemoglobin A1c; Future    Other orders  -     Fluzone High-Dose 65+yrs ([id number])    check labs today. Still drinking which is a big cause of his issues. We will likely need to pursue neurology follow up.     Return in about 4 months (around 3/1/2023), or if symptoms worsen or fail to improve, for Recheck.

## 2022-11-03 RX ORDER — MAGNESIUM OXIDE 400 MG/1
400 TABLET ORAL DAILY
Qty: 30 TABLET | Refills: 5 | Status: SHIPPED | OUTPATIENT
Start: 2022-11-03

## 2022-11-03 RX ORDER — FOLIC ACID 1 MG/1
1 TABLET ORAL DAILY
Qty: 90 TABLET | Refills: 3 | Status: SHIPPED | OUTPATIENT
Start: 2022-11-03 | End: 2023-10-29

## 2022-11-05 ENCOUNTER — HOSPITAL ENCOUNTER (OUTPATIENT)
Dept: MRI IMAGING | Facility: HOSPITAL | Age: 72
Discharge: HOME | End: 2022-11-05
Admitting: PHYSICIAN ASSISTANT

## 2022-11-05 DIAGNOSIS — R41.3 MEMORY LOSS: ICD-10-CM

## 2022-11-05 LAB
CREAT BLDA-MCNC: 1.1 MG/DL (ref 0.6–1.3)
EGFRCR SERPLBLD CKD-EPI 2021: 72.2 ML/MIN/1.73

## 2022-11-05 PROCEDURE — 82565 ASSAY OF CREATININE: CPT

## 2022-11-05 PROCEDURE — 70553 MRI BRAIN STEM W/O & W/DYE: CPT

## 2022-11-05 PROCEDURE — A9579 GAD-BASE MR CONTRAST NOS,1ML: HCPCS | Performed by: PHYSICIAN ASSISTANT

## 2022-11-05 PROCEDURE — 25010000002 GADOTERIDOL PER 1 ML: Performed by: PHYSICIAN ASSISTANT

## 2022-11-05 RX ADMIN — GADOTERIDOL 20 ML: 279.3 INJECTION, SOLUTION INTRAVENOUS at 11:17

## 2022-12-08 NOTE — PROGRESS NOTES
Cardiology Office Visit      Encounter Date:  12/09/2022    Patient ID:   Anurag Pickard is a 72 y.o. male.    Reason For Followup:  Shortness of breath  Aortic stenosis    Brief Clinical History:  Dear Jorden Martin PA-C    I had the pleasure of seeing Anurag Pickard today. As you are well aware, this is a 72 y.o. male with no established history of ischemic heart disease.  He does have a history of aortic stenosis, hypertension, diabetes mellitus, hyperlipidemia, osteoarthritis, obesity, and antiplatelet therapy.  He presents today to follow-up on the above conditions.    Interval History:  He denies any chest pain pressure heaviness or tightness.  He does report fatigue.  He denies any PND orthopnea.  He denies any syncope or near syncope.    He is accompanied by his wife today who supplies supplemental historical information.  He did finish cardiac rehab.  He is not continue to exercise.  We discussed phase 3 cardiac rehab and they are interested and we will send a referral.    He had an ambulatory ECG monitor performed in October 2022.  No evidence of atrial fibrillation was noted.  He has since discontinued Xarelto.    Assessment & Plan    Impressions:  Severe aortic stenosis status post tissue AVR March 2022  History of postoperative atrial fibrillation     Ambulatory ECG monitor October 2022 with no evidence of atrial fibrillation  Hypertension  Hypertensive heart disease  Hyperlipidemia  Osteoarthritis  Diabetes mellitus  Obesity  Antiplatelet therapy    Recommendations:  Continuation of his current cardiovascular regimen at the present time.     This includes antihypertensives, antiplatelet, statin, Zetia, and potassium.  Phase 3 cardiac rehab  Follow-up in 6 months    Diagnoses and all orders for this visit:    1. Coronary artery disease involving native coronary artery of native heart without angina pectoris (Primary)  -     Cardiac Rehab Phase III; Future  -     ECG 12 Lead    2.  "Nonrheumatic aortic valve stenosis  -     Cardiac Rehab Phase III; Future  -     ECG 12 Lead    3. Benign essential hypertension  -     Cardiac Rehab Phase III; Future  -     ECG 12 Lead    4. S/P AVR (aortic valve replacement)  -     Cardiac Rehab Phase III; Future  -     ECG 12 Lead          Objective:    Vitals:  Vitals:    12/09/22 0845   BP: 109/69   Pulse: 54   SpO2: 96%   Weight: 114 kg (252 lb)   Height: 177.8 cm (70\")     Body mass index is 36.16 kg/m².      Physical Exam:    General: Alert, cooperative, no distress, appears stated age  Head:  Normocephalic, atraumatic, mucous membranes moist  Eyes:  Conjunctiva/corneas clear, EOM's intact     Neck:  Supple,  no bruit    Lungs: Clear to auscultation bilaterally, no wheezes rhonchi rales are noted  Chest wall: Minimal tenderness  Heart::  Regular rate and rhythm, S1 and S2 normal, 1/6 systolic ejection murmur.  No rub or gallop  Abdomen: Soft, non-tender, nondistended bowel sounds active.  Obese  Extremities: No cyanosis, clubbing, or edema  Pulses: 2+ and symmetric all extremities  Skin:  No rashes or lesions  Neuro/psych: A&O x3. CN II through XII are grossly intact with appropriate affect      Allergies:  Allergies   Allergen Reactions   • Glipizide Other (See Comments)     Sugar too low   • Lisinopril Cough       Medication Review:     Current Outpatient Medications:   •  atorvastatin (LIPITOR) 40 MG tablet, Take 1 tablet by mouth once daily, Disp: 90 tablet, Rfl: 0  •  Diclofenac Sodium (VOLTAREN) 1 % gel gel, Apply 4 g topically to the appropriate area as directed 2 (Two) Times a Day., Disp: , Rfl:   •  ezetimibe (ZETIA) 10 MG tablet, Take 1 tablet by mouth once daily, Disp: 90 tablet, Rfl: 0  •  folic acid (FOLVITE) 1 MG tablet, Take 1 tablet by mouth Daily for 360 days., Disp: 90 tablet, Rfl: 3  •  furosemide (LASIX) 20 MG tablet, Take 20 mg by mouth Daily., Disp: , Rfl:   •  lansoprazole (PREVACID) 30 MG capsule, Take 1 capsule by mouth Daily., " Disp: 90 capsule, Rfl: 0  •  magnesium oxide (MAG-OX) 400 MG tablet, Take 1 tablet by mouth Daily., Disp: 30 tablet, Rfl: 5  •  metFORMIN (GLUCOPHAGE) 1000 MG tablet, TAKE 1 TABLET BY MOUTH TWICE DAILY WITH MEALS, Disp: 180 tablet, Rfl: 0  •  metoprolol succinate XL (TOPROL-XL) 25 MG 24 hr tablet, Take 0.5 tablets by mouth Daily., Disp: 45 tablet, Rfl: 3  •  metroNIDAZOLE (Metrogel) 1 % gel, Apply  topically to the appropriate area as directed Daily., Disp: 60 g, Rfl: 3  •  pioglitazone (ACTOS) 45 MG tablet, Take 1 tablet by mouth once daily, Disp: 90 tablet, Rfl: 0  •  thiamine (VITAMIN B-1) 100 MG tablet  tablet, Take 1 tablet by mouth Daily., Disp: , Rfl:   •  Accu-Chek Guide test strip, Use as instructed once daily   Dx e 11.9, Disp: 100 each, Rfl: 12    Family History:  Family History   Problem Relation Age of Onset   • Diabetes Mother    • Other Mother    • Other Father    • Heart attack Father    • Other Sister    • Diabetes Sister    • Diabetes Brother    • Other Other    • Diabetes Other        Past Medical History:  Past Medical History:   Diagnosis Date   • Acute diastolic CHF (congestive heart failure) (HCC) 01/03/2022   • Ankle edema    • Aortic stenosis     Moderate   • B12 deficiency    • Bell's palsy    • Bleeds easily ([facility])    • Chipped tooth     top front   • Diabetes mellitus (HCC)    • Dry skin    • Erectile dysfunction    • Falls     2 falls in last 3 months   • Fatigue    • Fragile skin    • GERD (gastroesophageal reflux disease)    • Heart murmur    • Hyperlipidemia    • Hypertension    • Hypokalemia    • Hypomagnesemia    • YANA (obstructive sleep apnea)     cpap bring dos   • Osteoarthritis    • Pulmonary HTN (HCC)    • Rosacea    • Vitamin D deficiency        Past Surgical History:  Past Surgical History:   Procedure Laterality Date   • AORTIC VALVE REPAIR/REPLACEMENT N/A 3/28/2022    Procedure: AORTIC VALVE REPAIR/REPLACEMENT;  Surgeon: Dennis Brandon MD;  Location: [facility];   Service: Cardiothoracic;  Laterality: N/A;  aortic valve replacement with 27mm [employer] magna ease   • CARDIAC CATHETERIZATION     • CARDIAC CATHETERIZATION N/A 12/17/2021    Procedure: Right and Left Heart Cath;  Surgeon: Juan Castellon DO;  Location: [facility] CATH INVASIVE LOCATION;  Service: Cardiology;  Laterality: N/A;   • COLONOSCOPY     • JOINT REPLACEMENT Bilateral 2021    knee   • TONSILLECTOMY     • TOTAL KNEE ARTHROPLASTY     • TOTAL KNEE ARTHROPLASTY Left 05/04/2021    Procedure: TOTAL KNEE ARTHROPLASTY;  Surgeon: Otf Redmond MD;  Location: [facility] MAIN OR;  Service: Orthopedics;  Laterality: Left;       Social History:  Social History     Socioeconomic History   • Marital status:    Tobacco Use   • Smoking status: Former     Types: Cigarettes   • Smokeless tobacco: Never   Vaping Use   • Vaping Use: Never used   Substance and Sexual Activity   • Alcohol use: Yes     Alcohol/week: 14.0 standard drinks     Types: 14 Shots of liquor per week     Comment: 2 bourbons every night   • Drug use: No   • Sexual activity: Defer       Review of Systems:  The following systems were reviewed as they relate to the cardiovascular system: Constitutional, Eyes, ENT, Cardiovascular, Respiratory, Gastrointestinal, Integumentary, Neurological, Psychiatric, Hematologic, Endocrine, Musculoskeletal, and Genitourinary. The pertinent cardiovascular findings are reported above with all other cardiovascular points within those systems being negative.    Diagnostic Study Review:     Current Electrocardiogram:    ECG 12 Lead    Date/Time: 12/9/2022 5:33 PM  Performed by: Juan Castellon DO  Authorized by: Juan Castellon DO   Comparison: not compared with previous ECG   Previous ECG: no previous ECG available  Comments: Sinus bradycardia with a ventricular rate of 58 bpm.  Atrial premature complex.  Low voltage in the precordial leads.  Nonspecific repolarization changes.   Prolonged QT and QTc intervals of 472 and 4 and 64 ms respectively.  Normal QRS axis.            Laboratory Data:  Lab Results   Component Value Date    GLUCOSE 121 (H) 11/01/2022    BUN 12 11/01/2022    CREATININE 1.10 11/05/2022    EGFRIFNONA 74 12/15/2021    EGFRIFAFRI 96 05/20/2016    BCR 10.1 11/01/2022    K 3.5 11/01/2022    CO2 29.5 (H) 11/01/2022    CALCIUM 9.7 11/01/2022    ALBUMIN 4.30 11/01/2022    LABIL2 1.5 06/14/2019    AST 20 11/01/2022    ALT 9 11/01/2022     Lab Results   Component Value Date    GLUCOSE 121 (H) 11/01/2022    CALCIUM 9.7 11/01/2022     11/01/2022    K 3.5 11/01/2022    CO2 29.5 (H) 11/01/2022    CL 92 (L) 11/01/2022    BUN 12 11/01/2022    CREATININE 1.10 11/05/2022    EGFRIFAFRI 96 05/20/2016    EGFRIFNONA 74 12/15/2021    BCR 10.1 11/01/2022    ANIONGAP 17.5 (H) 11/01/2022     Lab Results   Component Value Date    WBC 8.84 11/01/2022    HGB 12.6 (L) 11/01/2022    HCT 38.0 11/01/2022    MCV 89.0 11/01/2022     11/01/2022     Lab Results   Component Value Date    CHOL 119 04/29/2022    TRIG 96 04/29/2022    HDL 27 (L) 04/29/2022    LDL 74 04/29/2022     Lab Results   Component Value Date    HGBA1C 7.3 (H) 11/01/2022     Lab Results   Component Value Date    INR 0.96 03/29/2022    INR 1.09 03/28/2022    INR <0.93 (L) 03/25/2022    PROTIME 10.7 03/29/2022    PROTIME 12.0 (H) 03/28/2022    PROTIME 10.3 03/25/2022       Most Recent Echo:  Results for orders placed in visit on 03/28/22    Emergent/Open-Heart Anesthesia PAULO    Narrative  Emergent/Open-Heart Anesthesia PAULO    Procedure Performed: Emergent/Open-Heart Anesthesia PAULO  Start Time:  End Time:      General Procedure Information  Location performed:  OR  Intubated  Bite block placed  Heart visualized  Probe Insertion:  Easy  Probe Type:  Biplane and multiplane  Modalities:  Color flow mapping, pulse wave Doppler and continuous wave Doppler    Echocardiographic and Doppler Measurements    Ventricles    Right  Ventricle:  Ejection Fraction 60%.  Left Ventricle:  Global Function normal.  Ejection Fraction 60%.                                Anesthesia Information  Performed Personally  Anesthesiologist:  Van Frost MD      Echocardiogram Comments:  PAULO placed easily with no resistance ,  Lubricated , bite guard used    Pre cpb  LV EF 60 no wma  RV nl SF  MV tr/mild MR  AV Ca++ mild mod AI ,  Mod AS YOSI 1.2cm2 by cont  tv TR TR  Suspicious for smal PFO by CFD      Post cpb  #27 av well seated no para / trans leak seen  No other change       Most Recent Stress Test:  Results for orders placed during the hospital encounter of 05/24/22    Treadmill Stress Test    Interpretation Summary  · Arrhythmias were not significant during stress.  · Equivocal ECG evidence of myocardial ischemia.  · Indeterminate clinical evidence of myocardial ischemia.  · Findings consistent with an indeterminate ECG stress test.  · Patient appropriate to begin cardiac rehab       Most Recent Cardiac Catheterization:   Results for orders placed during the hospital encounter of 12/17/21    Cardiac Catheterization/Vascular Study    Narrative  Cardiac Catheterization Operative Report    Anurag Pickard  [id number]  12/17/2021  @PCP@    He underwent cardiac catheterization.    Indications for the procedure include: Aortic stenosis.    Procedure Details:  The risks, benefits, complications, treatment options, and expected outcomes were discussed with the patient. The patient and/or family concurred with the proposed plan, giving informed consent.    After informed consent the patient was brought to the cath lab after appropriate IV hydration was begun and oral premedication was given. He was further sedated with fentanyl and midazolam. He was prepped and draped in the usual manner. Using the modified Seldinger access technique, a 6 [place] sheath was placed in the femoral artery and a 7 [place] sheath was placed in the femoral vein. A left and right  heart catheterization with coronary arteriography was performed. Findings are discussed below.    After the procedure was completed, sedation was stopped and the sheaths and catheters were all removed. Hemostasis was achieved per established hospital protocols.    Conscious sedation:  Conscious sedation was performed according to protocol.  I supervised and directed an independent trained observer with the assistance of monitoring the patient's level of consciousness and physiologic status throughout the procedure.  Intraoperative service time was 60 minutes.    Findings:    Hemodynamics  LVEDP: Unable to obtain  LV: Unable to obtain  Ao: 132/61  RA: 6  RV: 42/4  PA: 43/15  PAWP: 15  Cardiac output Diane: 6.84 (2.85)  Cardiac output thermal: 7.27 (3.03)  Left Main  no significant disease  RCA  luminal irregularities with no significant disease  LAD  luminal irregularities with no significant disease  Circ  luminal irregularities with no significant disease  SVG(s)  not applicable  SVITLANA  not applicable  LV  not imaged  Coronary Dominance  circumflex    Estimated Blood Loss:  Minimal    Specimens: None    Complications:  None; patient tolerated the procedure well.    Disposition: PACU - hemodynamically stable    Condition: stable    Impressions:  No angiographic evidence for significant epicardial occlusive disease  Severe aortic stenosis  Moderate pulmonary hypertension    Recommendations:  Structural heart disease referral for TAVR versus SAVR       NOTE: The following portions of the patient's note were reviewed, confirmed and/or updated this visit as appropriate: History of present illness/Interval history, physical examination, assessment & plan, allergies, current medications, past family history, past medical history, past social history, past surgical history and problem list.

## 2022-12-09 ENCOUNTER — OFFICE VISIT (OUTPATIENT)
Dept: CARDIOLOGY | Facility: CLINIC | Age: 72
End: 2022-12-09

## 2022-12-09 VITALS
HEART RATE: 54 BPM | SYSTOLIC BLOOD PRESSURE: 109 MMHG | DIASTOLIC BLOOD PRESSURE: 69 MMHG | HEIGHT: 70 IN | BODY MASS INDEX: 36.08 KG/M2 | WEIGHT: 252 LBS | OXYGEN SATURATION: 96 %

## 2022-12-09 DIAGNOSIS — I25.10 CORONARY ARTERY DISEASE INVOLVING NATIVE CORONARY ARTERY OF NATIVE HEART WITHOUT ANGINA PECTORIS: Primary | ICD-10-CM

## 2022-12-09 DIAGNOSIS — I10 BENIGN ESSENTIAL HYPERTENSION: ICD-10-CM

## 2022-12-09 DIAGNOSIS — Z95.2 S/P AVR (AORTIC VALVE REPLACEMENT): ICD-10-CM

## 2022-12-09 DIAGNOSIS — I35.0 NONRHEUMATIC AORTIC VALVE STENOSIS: ICD-10-CM

## 2022-12-09 PROCEDURE — 99214 OFFICE O/P EST MOD 30 MIN: CPT | Performed by: INTERNAL MEDICINE

## 2022-12-09 PROCEDURE — 93000 ELECTROCARDIOGRAM COMPLETE: CPT | Performed by: INTERNAL MEDICINE

## 2022-12-13 ENCOUNTER — TELEPHONE (OUTPATIENT)
Dept: CARDIAC REHAB | Facility: HOSPITAL | Age: 72
End: 2022-12-13

## 2022-12-13 NOTE — TELEPHONE ENCOUNTER
Called pt to discuss Cardiac Rehab phase 3.  Pt wanted to think about it.  Will call when he decides what he wants to do.

## 2022-12-19 RX ORDER — LANSOPRAZOLE 30 MG/1
CAPSULE, DELAYED RELEASE ORAL
Qty: 90 CAPSULE | Refills: 0 | Status: SHIPPED | OUTPATIENT
Start: 2022-12-19 | End: 2023-03-27

## 2022-12-28 RX ORDER — FUROSEMIDE 20 MG/1
20 TABLET ORAL DAILY
Qty: 30 TABLET | Refills: 2 | Status: SHIPPED | OUTPATIENT
Start: 2022-12-28

## 2023-01-23 RX ORDER — PIOGLITAZONEHYDROCHLORIDE 45 MG/1
TABLET ORAL
Qty: 90 TABLET | Refills: 0 | Status: SHIPPED | OUTPATIENT
Start: 2023-01-23

## 2023-01-23 RX ORDER — ATORVASTATIN CALCIUM 40 MG/1
TABLET, FILM COATED ORAL
Qty: 90 TABLET | Refills: 0 | Status: SHIPPED | OUTPATIENT
Start: 2023-01-23

## 2023-02-06 RX ORDER — LANOLIN ALCOHOL/MO/W.PET/CERES
CREAM (GRAM) TOPICAL
Qty: 90 TABLET | Refills: 0 | Status: SHIPPED | OUTPATIENT
Start: 2023-02-06 | End: 2023-02-21

## 2023-02-21 RX ORDER — EZETIMIBE 10 MG/1
TABLET ORAL
Qty: 90 TABLET | Refills: 0 | Status: SHIPPED | OUTPATIENT
Start: 2023-02-21

## 2023-02-21 RX ORDER — LANOLIN ALCOHOL/MO/W.PET/CERES
CREAM (GRAM) TOPICAL
Qty: 90 TABLET | Refills: 0 | Status: SHIPPED | OUTPATIENT
Start: 2023-02-21

## 2023-03-27 RX ORDER — LANSOPRAZOLE 30 MG/1
CAPSULE, DELAYED RELEASE ORAL
Qty: 90 CAPSULE | Refills: 0 | Status: SHIPPED | OUTPATIENT
Start: 2023-03-27

## 2023-04-13 NOTE — PLAN OF CARE
Assessment: Anurag Pickard presents with functional mobility impairments which indicate the need for skilled intervention. Tolerating session today without incident.   with amb and 106 at rest. Presents with lat sway, slowed damian, guarded. Would benefit from acute rehab at [facility].Will continue to follow and progress as tolerated.     
Bed mobility - Mod-A  Transfers - Mod-A with cues to use heart hugger  Ambulation - 150 feet Min-A and with rolling walker with cues for safe walker use and slow damian with decreased heel strike BLE  AROM BLE 2 x 10 reps in sitting    Vitals  Start- HR 79, /55 (75), sats on room air 96%  End- HR81, /53 (72), sats on room air 94%    Assessment: Anurag Pickard still has unsteadiness when up on feet, LE weakness, and high risk for falls thus he is a good candidate for in-pt rehab. . He  presents with functional mobility impairments which indicate the need for skilled intervention. Tolerating session today without incident. Will continue to follow and progress as tolerated.     Plan/Recommendations:   Pt would benefit from Inpatient Rehabilitation placement at discharge from facility and requires no DME at discharge.   Pt desires Inpatient Rehabilitation placement at discharge. Pt cooperative; agreeable to therapeutic recommendations and plan of care.                
Goal Outcome Evaluation:      Patient did well over shift and was discharged, no adverse events.           
Goal Outcome Evaluation:    Anurag Pickard is s/p avr on 3/28/22. Tolerating session today without incident. Pt completed transfers with mod A and VC for sternal precaution. Pt ambulated 50ftx2 with RW and CGA and steppage gait and mild instability of the LLE present. Pt demonstrated poor RW management during transfer and required frequent VC for RW placement. Performed seated thera-ex and pt required intermittent resting breaks due to dyspnea and back pain. Will continue to follow and progress as tolerated.                 
Goal Outcome Evaluation:  Plan of Care Reviewed With: patient              
Goal Outcome Evaluation:  Plan of Care Reviewed With: patient     Pt. Demonstrates improved functional mobility this date w/ tammy ambulatory transfer CGA for safety + rolling walker support, progress limited secondary to continued mod-max A required for sit to stand transfer. Recommend acute IP rehab at d/c.   
general

## 2023-04-17 RX ORDER — FUROSEMIDE 20 MG/1
TABLET ORAL
Qty: 30 TABLET | Refills: 3 | Status: SHIPPED | OUTPATIENT
Start: 2023-04-17

## 2023-05-02 RX ORDER — PIOGLITAZONEHYDROCHLORIDE 45 MG/1
TABLET ORAL
Qty: 90 TABLET | Refills: 0 | Status: SHIPPED | OUTPATIENT
Start: 2023-05-02

## 2023-05-02 RX ORDER — ATORVASTATIN CALCIUM 40 MG/1
TABLET, FILM COATED ORAL
Qty: 90 TABLET | Refills: 0 | Status: SHIPPED | OUTPATIENT
Start: 2023-05-02

## 2023-05-31 RX ORDER — EZETIMIBE 10 MG/1
TABLET ORAL
Qty: 90 TABLET | Refills: 0 | Status: SHIPPED | OUTPATIENT
Start: 2023-05-31

## 2023-06-09 ENCOUNTER — OFFICE VISIT (OUTPATIENT)
Dept: CARDIOLOGY | Facility: CLINIC | Age: 73
End: 2023-06-09
Payer: MEDICARE

## 2023-06-09 VITALS
HEIGHT: 70 IN | HEART RATE: 72 BPM | OXYGEN SATURATION: 97 % | WEIGHT: 243 LBS | RESPIRATION RATE: 18 BRPM | DIASTOLIC BLOOD PRESSURE: 67 MMHG | BODY MASS INDEX: 34.79 KG/M2 | SYSTOLIC BLOOD PRESSURE: 109 MMHG

## 2023-06-09 DIAGNOSIS — I25.10 CORONARY ARTERY DISEASE INVOLVING NATIVE CORONARY ARTERY OF NATIVE HEART WITHOUT ANGINA PECTORIS: ICD-10-CM

## 2023-06-09 DIAGNOSIS — I35.0 NONRHEUMATIC AORTIC VALVE STENOSIS: ICD-10-CM

## 2023-06-09 DIAGNOSIS — I10 BENIGN ESSENTIAL HYPERTENSION: ICD-10-CM

## 2023-06-09 DIAGNOSIS — R00.1 BRADYCARDIA, SINUS: Primary | ICD-10-CM

## 2023-06-09 DIAGNOSIS — Z95.2 S/P AVR (AORTIC VALVE REPLACEMENT): ICD-10-CM

## 2023-06-09 PROCEDURE — 3074F SYST BP LT 130 MM HG: CPT | Performed by: INTERNAL MEDICINE

## 2023-06-09 PROCEDURE — 1160F RVW MEDS BY RX/DR IN RCRD: CPT | Performed by: INTERNAL MEDICINE

## 2023-06-09 PROCEDURE — 93000 ELECTROCARDIOGRAM COMPLETE: CPT | Performed by: INTERNAL MEDICINE

## 2023-06-09 PROCEDURE — 1159F MED LIST DOCD IN RCRD: CPT | Performed by: INTERNAL MEDICINE

## 2023-06-09 PROCEDURE — 3078F DIAST BP <80 MM HG: CPT | Performed by: INTERNAL MEDICINE

## 2023-06-09 PROCEDURE — 99214 OFFICE O/P EST MOD 30 MIN: CPT | Performed by: INTERNAL MEDICINE

## 2023-06-09 RX ORDER — DOXYCYCLINE HYCLATE 100 MG/1
100 CAPSULE ORAL DAILY
COMMUNITY
Start: 2023-05-11

## 2023-06-09 RX ORDER — UBIDECARENONE 75 MG
50 CAPSULE ORAL DAILY
COMMUNITY

## 2023-06-09 RX ORDER — DOXYCYCLINE HYCLATE 100 MG/1
TABLET, DELAYED RELEASE ORAL
COMMUNITY
Start: 2023-03-15

## 2023-06-09 NOTE — PROGRESS NOTES
Cardiology Office Visit      Encounter Date:  06/09/2023    Patient ID:   Anurag Pickard is a 72 y.o. male.    Reason For Followup:  Shortness of breath  Aortic stenosis    Brief Clinical History:  Dear Jorden Martin PA-C    I had the pleasure of seeing Anurag Pickard today. As you are well aware, this is a 72 y.o. male with no established history of ischemic heart disease.  He does have a history of aortic stenosis, hypertension, diabetes mellitus, hyperlipidemia, osteoarthritis, obesity, and antiplatelet therapy.  He presents today to follow-up on the above conditions.    Interval History:  He denies any chest pain pressure heaviness or tightness.  He does report fatigue.  He denies any PND orthopnea.  He denies any syncope or near syncope.    He is accompanied by his wife today who supplies supplemental historical information.  His wife reports that his cognitive impairment continues to decline.  He has been diagnosed with either Alzheimer's or frontoparietal dementia.      He had an ambulatory ECG monitor performed in October 2022.  No evidence of atrial fibrillation was noted.  He has since discontinued Xarelto.    I was able to review his most recent labs.  His creatinine is up a little bit but is not out of the range of creatinines.    His neurologist wants to start Aricept.  We will get a 4-week mobile cardiac telemetry after starting Aricept.    Assessment & Plan    Impressions:  Severe aortic stenosis status post tissue AVR March 2022  History of postoperative atrial fibrillation     Ambulatory ECG monitor October 2022 with no evidence of atrial fibrillation  Hypertension  Hypertensive heart disease  Hyperlipidemia  Osteoarthritis  Diabetes mellitus  Obesity  Antiplatelet therapy  Dementia    Recommendations:  Continuation of his current cardiovascular regimen at the present time.     This includes antihypertensives, antiplatelet, statin, Zetia, and potassium.  4-week mobile cardiac  "telemetry after starting Aricept  Follow-up in 3 months    Diagnoses and all orders for this visit:    1. Bradycardia, sinus (Primary)  -     Mobile Cardiac Outpatient Telemetry; Future  -     ECG 12 Lead    2. Nonrheumatic aortic valve stenosis  -     ECG 12 Lead    3. Benign essential hypertension  -     ECG 12 Lead    4. S/P AVR (aortic valve replacement)  -     ECG 12 Lead    5. Coronary artery disease involving native coronary artery of native heart without angina pectoris  -     ECG 12 Lead          Objective:    Vitals:  Vitals:    06/09/23 0948   BP: 109/67   BP Location: Left arm   Patient Position: Sitting   Cuff Size: Large Adult   Pulse: 72   Resp: 18   SpO2: 97%   Weight: 110 kg (243 lb)   Height: 177.8 cm (70\")     Body mass index is 34.87 kg/m².      Physical Exam:    General: Alert, cooperative, no distress, appears stated age  Head:  Normocephalic, atraumatic, mucous membranes moist  Eyes:  Conjunctiva/corneas clear, EOM's intact     Neck:  Supple,  no bruit    Lungs: Clear to auscultation bilaterally, no wheezes rhonchi rales are noted  Chest wall: Minimal tenderness  Heart::  Regular rate and rhythm, S1 and S2 normal, 1/6 systolic ejection murmur.  No rub or gallop  Abdomen: Soft, non-tender, nondistended bowel sounds active.  Obese  Extremities: No cyanosis, clubbing, or edema  Pulses: 2+ and symmetric all extremities  Skin:  No rashes or lesions  Neuro/psych: A&O x3. CN II through XII are grossly intact with appropriate affect      Allergies:  Allergies   Allergen Reactions    Glipizide Other (See Comments)     Sugar too low    Lisinopril Cough       Medication Review:     Current Outpatient Medications:     Accu-Chek Guide test strip, Use as instructed once daily   Dx e 11.9, Disp: 100 each, Rfl: 12    atorvastatin (LIPITOR) 40 MG tablet, Take 1 tablet by mouth once daily, Disp: 90 tablet, Rfl: 0    Diclofenac Sodium (VOLTAREN) 1 % gel gel, Apply 4 g topically to the appropriate area as " directed 2 (Two) Times a Day., Disp: , Rfl:     doxycycline (DORYX) 100 MG enteric coated tablet, , Disp: , Rfl:     doxycycline (VIBRAMYCIN) 100 MG capsule, Take 1 capsule by mouth Daily. with food, Disp: , Rfl:     ezetimibe (ZETIA) 10 MG tablet, Take 1 tablet by mouth once daily, Disp: 90 tablet, Rfl: 0    folic acid (FOLVITE) 1 MG tablet, Take 1 tablet by mouth Daily for 360 days., Disp: 90 tablet, Rfl: 3    furosemide (LASIX) 20 MG tablet, Take 1 tablet by mouth once daily, Disp: 30 tablet, Rfl: 3    lansoprazole (PREVACID) 30 MG capsule, Take 1 capsule by mouth once daily, Disp: 90 capsule, Rfl: 0    magnesium oxide (MAG-OX) 400 MG tablet, Take 1 tablet by mouth Daily., Disp: 30 tablet, Rfl: 5    metFORMIN (GLUCOPHAGE) 1000 MG tablet, TAKE 1 TABLET BY MOUTH TWICE DAILY WITH MEALS, Disp: 180 tablet, Rfl: 0    metoprolol succinate XL (TOPROL-XL) 25 MG 24 hr tablet, Take 0.5 tablets by mouth Daily., Disp: 45 tablet, Rfl: 3    pioglitazone (ACTOS) 45 MG tablet, Take 1 tablet by mouth once daily, Disp: 90 tablet, Rfl: 0    thiamine (VITAMIN B1) 100 MG tablet, Take 1 tablet by mouth once daily, Disp: 90 tablet, Rfl: 0    vitamin B-12 (CYANOCOBALAMIN) 100 MCG tablet, Take 50 mcg by mouth Daily., Disp: , Rfl:     Family History:  Family History   Problem Relation Age of Onset    Diabetes Mother     Other Mother     Other Father     Heart attack Father     Other Sister     Diabetes Sister     Heart disease Sister     Diabetes Brother     Other Other     Diabetes Other        Past Medical History:  Past Medical History:   Diagnosis Date    Acute diastolic CHF (congestive heart failure) 01/03/2022    Ankle edema     Aortic stenosis     Moderate    Aortic valve replaced     B12 deficiency     Bell's palsy     Bleeds easily     Chipped tooth     top front    Diabetes mellitus     Dry skin     Erectile dysfunction     Falls     2 falls in last 3 months    Fatigue     Fragile skin     GERD (gastroesophageal reflux disease)      Heart murmur     Hyperlipidemia     Hypertension     Hypokalemia     Hypomagnesemia     YANA (obstructive sleep apnea)     cpap bring dos    Osteoarthritis     Pulmonary HTN     Rosacea     Vitamin D deficiency        Past Surgical History:  Past Surgical History:   Procedure Laterality Date    AORTIC VALVE REPAIR/REPLACEMENT N/A 03/28/2022    Procedure: AORTIC VALVE REPAIR/REPLACEMENT;  Surgeon: Dennis Brandon MD;  Location: [facility] CVOR;  Service: Cardiothoracic;  Laterality: N/A;  aortic valve replacement with 27mm [employer] magna ease    CARDIAC CATHETERIZATION      CARDIAC CATHETERIZATION N/A 12/17/2021    Procedure: Right and Left Heart Cath;  Surgeon: Juan Castellon DO;  Location: [facility] CATH INVASIVE LOCATION;  Service: Cardiology;  Laterality: N/A;    COLONOSCOPY      JOINT REPLACEMENT Bilateral 2021    knee    TONSILLECTOMY      TOTAL KNEE ARTHROPLASTY      TOTAL KNEE ARTHROPLASTY Left 05/04/2021    Procedure: TOTAL KNEE ARTHROPLASTY;  Surgeon: Otf Redmond MD;  Location: [facility] MAIN OR;  Service: Orthopedics;  Laterality: Left;       Social History:  Social History     Socioeconomic History    Marital status:    Tobacco Use    Smoking status: Former     Types: Cigarettes    Smokeless tobacco: Never   Vaping Use    Vaping Use: Never used   Substance and Sexual Activity    Alcohol use: Yes     Alcohol/week: 14.0 standard drinks     Types: 14 Drinks containing 0.5 oz of alcohol per week     Comment: 2 bourbons every night    Drug use: No    Sexual activity: Defer       Review of Systems:  The following systems were reviewed as they relate to the cardiovascular system: Constitutional, Eyes, ENT, Cardiovascular, Respiratory, Gastrointestinal, Integumentary, Neurological, Psychiatric, Hematologic, Endocrine, Musculoskeletal, and Genitourinary. The pertinent cardiovascular findings are reported above with all other cardiovascular points within those systems being  negative.    Diagnostic Study Review:     Current Electrocardiogram:    ECG 12 Lead    Date/Time: 6/9/2023 6:00 PM  Performed by: Juan Castellon DO  Authorized by: Juan Castellon DO   Comparison: not compared with previous ECG   Previous ECG: no previous ECG available  Comments: Normal sinus rhythm with a ventricular rate of 72 beats per.  Multiple atrial premature complex and bigeminal pattern.  The voltage in the precordial leads.  Nonspecific repolarization changes.  Prolonged QT and QTc intervals of 456 and 500 ms respectively.  Normal QRS axis.        Laboratory Data:  Lab Results   Component Value Date    GLUCOSE 121 (H) 11/01/2022    BUN 12 11/01/2022    CREATININE 1.10 11/05/2022    EGFRIFNONA 74 12/15/2021    EGFRIFAFRI 96 05/20/2016    BCR 10.1 11/01/2022    K 3.5 11/01/2022    CO2 29.5 (H) 11/01/2022    CALCIUM 9.7 11/01/2022    ALBUMIN 4.30 11/01/2022    LABIL2 1.5 06/14/2019    AST 20 11/01/2022    ALT 9 11/01/2022     Lab Results   Component Value Date    GLUCOSE 121 (H) 11/01/2022    CALCIUM 9.7 11/01/2022     11/01/2022    K 3.5 11/01/2022    CO2 29.5 (H) 11/01/2022    CL 92 (L) 11/01/2022    BUN 12 11/01/2022    CREATININE 1.10 11/05/2022    EGFRIFAFRI 96 05/20/2016    EGFRIFNONA 74 12/15/2021    BCR 10.1 11/01/2022    ANIONGAP 17.5 (H) 11/01/2022     Lab Results   Component Value Date    WBC 8.84 11/01/2022    HGB 12.6 (L) 11/01/2022    HCT 38.0 11/01/2022    MCV 89.0 11/01/2022     11/01/2022     Lab Results   Component Value Date    CHOL 119 04/29/2022    TRIG 96 04/29/2022    HDL 27 (L) 04/29/2022    LDL 74 04/29/2022     Lab Results   Component Value Date    HGBA1C 7.3 (H) 11/01/2022     Lab Results   Component Value Date    INR 0.96 03/29/2022    INR 1.09 03/28/2022    INR <0.93 (L) 03/25/2022    PROTIME 10.7 03/29/2022    PROTIME 12.0 (H) 03/28/2022    PROTIME 10.3 03/25/2022       Most Recent Echo:  Results for orders placed in visit on  03/28/22    Emergent/Open-Heart Anesthesia PAULO    Narrative  Emergent/Open-Heart Anesthesia PAULO    Procedure Performed: Emergent/Open-Heart Anesthesia PAULO  Start Time:  End Time:      General Procedure Information  Location performed:  OR  Intubated  Bite block placed  Heart visualized  Probe Insertion:  Easy  Probe Type:  Biplane and multiplane  Modalities:  Color flow mapping, pulse wave Doppler and continuous wave Doppler    Echocardiographic and Doppler Measurements    Ventricles    Right Ventricle:  Ejection Fraction 60%.  Left Ventricle:  Global Function normal.  Ejection Fraction 60%.                                Anesthesia Information  Performed Personally  Anesthesiologist:  Van Frost MD      Echocardiogram Comments:  PAULO placed easily with no resistance ,  Lubricated , bite guard used    Pre cpb  LV EF 60 no wma  RV nl SF  MV tr/mild MR  AV Ca++ mild mod AI ,  Mod AS YOSI 1.2cm2 by cont  tv TR TR  Suspicious for smal PFO by CFD      Post cpb  #27 av well seated no para / trans leak seen  No other change       Most Recent Stress Test:  Results for orders placed during the hospital encounter of 05/24/22    Treadmill Stress Test    Interpretation Summary  · Arrhythmias were not significant during stress.  · Equivocal ECG evidence of myocardial ischemia.  · Indeterminate clinical evidence of myocardial ischemia.  · Findings consistent with an indeterminate ECG stress test.  · Patient appropriate to begin cardiac rehab       Most Recent Cardiac Catheterization:   Results for orders placed during the hospital encounter of 12/17/21    Cardiac Catheterization/Vascular Study    Narrative  Cardiac Catheterization Operative Report    Anurag Pickard  [id number]  12/17/2021  @PCP@    He underwent cardiac catheterization.    Indications for the procedure include: Aortic stenosis.    Procedure Details:  The risks, benefits, complications, treatment options, and expected outcomes were discussed with the  patient. The patient and/or family concurred with the proposed plan, giving informed consent.    After informed consent the patient was brought to the cath lab after appropriate IV hydration was begun and oral premedication was given. He was further sedated with fentanyl and midazolam. He was prepped and draped in the usual manner. Using the modified Seldinger access technique, a 6 Togolese sheath was placed in the femoral artery and a 7 Togolese sheath was placed in the femoral vein. A left and right heart catheterization with coronary arteriography was performed. Findings are discussed below.    After the procedure was completed, sedation was stopped and the sheaths and catheters were all removed. Hemostasis was achieved per established hospital protocols.    Conscious sedation:  Conscious sedation was performed according to protocol.  I supervised and directed an independent trained observer with the assistance of monitoring the patient's level of consciousness and physiologic status throughout the procedure.  Intraoperative service time was 60 minutes.    Findings:    Hemodynamics  LVEDP: Unable to obtain  LV: Unable to obtain  Ao: 132/61  RA: 6  RV: 42/4  PA: 43/15  PAWP: 15  Cardiac output Diane: 6.84 (2.85)  Cardiac output thermal: 7.27 (3.03)  Left Main  no significant disease  RCA  luminal irregularities with no significant disease  LAD  luminal irregularities with no significant disease  Circ  luminal irregularities with no significant disease  SVG(s)  not applicable  SVITLANA  not applicable  LV  not imaged  Coronary Dominance  circumflex    Estimated Blood Loss:  Minimal    Specimens: None    Complications:  None; patient tolerated the procedure well.    Disposition: PACU - hemodynamically stable    Condition: stable    Impressions:  No angiographic evidence for significant epicardial occlusive disease  Severe aortic stenosis  Moderate pulmonary hypertension    Recommendations:  Structural heart disease referral  for TAVR versus SAVR       NOTE: The following portions of the patient's note were reviewed, confirmed and/or updated this visit as appropriate: History of present illness/Interval history, physical examination, assessment & plan, allergies, current medications, past family history, past medical history, past social history, past surgical history and problem list.

## 2023-06-15 ENCOUNTER — HOSPITAL ENCOUNTER (OUTPATIENT)
Dept: CARDIOLOGY | Facility: HOSPITAL | Age: 73
Discharge: HOME | End: 2023-06-15
Payer: MEDICARE

## 2023-06-15 DIAGNOSIS — R00.1 BRADYCARDIA, SINUS: ICD-10-CM

## 2023-08-01 RX ORDER — ATORVASTATIN CALCIUM 40 MG/1
TABLET, FILM COATED ORAL
Qty: 90 TABLET | Refills: 0 | Status: SHIPPED | OUTPATIENT
Start: 2023-08-01 | End: 2023-08-04

## 2023-08-01 RX ORDER — PIOGLITAZONEHYDROCHLORIDE 45 MG/1
TABLET ORAL
Qty: 90 TABLET | Refills: 0 | Status: SHIPPED | OUTPATIENT
Start: 2023-08-01 | End: 2023-08-04

## 2023-08-04 RX ORDER — PIOGLITAZONEHYDROCHLORIDE 45 MG/1
TABLET ORAL
Qty: 90 TABLET | Refills: 0 | Status: SHIPPED | OUTPATIENT
Start: 2023-08-04

## 2023-08-04 RX ORDER — ATORVASTATIN CALCIUM 40 MG/1
TABLET, FILM COATED ORAL
Qty: 90 TABLET | Refills: 0 | Status: SHIPPED | OUTPATIENT
Start: 2023-08-04

## 2023-08-14 RX ORDER — FUROSEMIDE 20 MG/1
TABLET ORAL
Qty: 30 TABLET | Refills: 3 | Status: SHIPPED | OUTPATIENT
Start: 2023-08-14

## 2023-09-15 ENCOUNTER — HOSPITAL ENCOUNTER (INPATIENT)
Facility: HOSPITAL | Age: 73
LOS: 3 days | Discharge: NURSING, REHAB OR LONG-TERM CARE | DRG: 554 | End: 2023-09-20
Attending: EMERGENCY MEDICINE
Payer: MEDICARE

## 2023-09-15 ENCOUNTER — APPOINTMENT (OUTPATIENT)
Dept: GENERAL RADIOLOGY | Facility: HOSPITAL | Age: 73
DRG: 554 | End: 2023-09-15
Payer: MEDICARE

## 2023-09-15 DIAGNOSIS — M25.561 PAIN AND SWELLING OF RIGHT KNEE: Primary | ICD-10-CM

## 2023-09-15 DIAGNOSIS — M25.461 PAIN AND SWELLING OF RIGHT KNEE: Primary | ICD-10-CM

## 2023-09-15 DIAGNOSIS — M25.561 ACUTE PAIN OF RIGHT KNEE: ICD-10-CM
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
## 2023-09-15 DIAGNOSIS — M00.9 PYOGENIC ARTHRITIS OF RIGHT KNEE JOINT, DUE TO UNSPECIFIED ORGANISM: ICD-10-CM

## 2023-09-15 PROBLEM — G30.9 ALZHEIMER'S DEMENTIA: Chronic | Status: ACTIVE | Noted: 2023-09-15

## 2023-09-15 PROBLEM — F02.80 ALZHEIMER'S DEMENTIA: Chronic | Status: ACTIVE | Noted: 2023-09-15

## 2023-09-15 LAB
ANION GAP SERPL CALCULATED.3IONS-SCNC: 12 MMOL/L (ref 5–15)
ANION GAP SERPL CALCULATED.3IONS-SCNC: 14 MMOL/L (ref 5–15)
APPEARANCE FLD: ABNORMAL
BASOPHILS # BLD AUTO: 0.1 10*3/MM3 (ref 0–0.2)
BASOPHILS # BLD AUTO: 0.1 10*3/MM3 (ref 0–0.2)
BASOPHILS NFR BLD AUTO: 0.6 % (ref 0–1.5)
BASOPHILS NFR BLD AUTO: 0.7 % (ref 0–1.5)
BUN SERPL-MCNC: 12 MG/DL (ref 8–23)
BUN SERPL-MCNC: 9 MG/DL (ref 8–23)
BUN/CREAT SERPL: 11.9 (ref 7–25)
BUN/CREAT SERPL: 9.2 (ref 7–25)
CALCIUM SPEC-SCNC: 5.9 MG/DL (ref 8.6–10.5)
CALCIUM SPEC-SCNC: 6.4 MG/DL (ref 8.6–10.5)
CHLORIDE SERPL-SCNC: 102 MMOL/L (ref 98–107)
CHLORIDE SERPL-SCNC: 104 MMOL/L (ref 98–107)
CO2 SERPL-SCNC: 23 MMOL/L (ref 22–29)
CO2 SERPL-SCNC: 24 MMOL/L (ref 22–29)
COLOR FLD: YELLOW
CREAT SERPL-MCNC: 0.98 MG/DL (ref 0.76–1.27)
CREAT SERPL-MCNC: 1.01 MG/DL (ref 0.76–1.27)
CRP SERPL-MCNC: 12.13 MG/DL (ref 0–0.5)
CRYSTALS FLD MICRO: NORMAL
D-LACTATE SERPL-SCNC: 1.1 MMOL/L (ref 0.5–2)
DEPRECATED RDW RBC AUTO: 45.1 FL (ref 37–54)
DEPRECATED RDW RBC AUTO: 46.8 FL (ref 37–54)
EGFRCR SERPLBLD CKD-EPI 2021: 79.5 ML/MIN/1.73
EGFRCR SERPLBLD CKD-EPI 2021: 82.4 ML/MIN/1.73
EOSINOPHIL # BLD AUTO: 0 10*3/MM3 (ref 0–0.4)
EOSINOPHIL # BLD AUTO: 0 10*3/MM3 (ref 0–0.4)
EOSINOPHIL NFR BLD AUTO: 0.2 % (ref 0.3–6.2)
EOSINOPHIL NFR BLD AUTO: 0.3 % (ref 0.3–6.2)
ERYTHROCYTE [DISTWIDTH] IN BLOOD BY AUTOMATED COUNT: 14.1 % (ref 12.3–15.4)
ERYTHROCYTE [DISTWIDTH] IN BLOOD BY AUTOMATED COUNT: 14.7 % (ref 12.3–15.4)
ERYTHROCYTE [SEDIMENTATION RATE] IN BLOOD: 57 MM/HR (ref 0–20)
GIANT PLATELETS: NORMAL
GLUCOSE BLDC GLUCOMTR-MCNC: 178 MG/DL (ref 70–105)
GLUCOSE SERPL-MCNC: 150 MG/DL (ref 65–99)
GLUCOSE SERPL-MCNC: 229 MG/DL (ref 65–99)
HBA1C MFR BLD: 6.8 % (ref 4.8–5.6)
HCT VFR BLD AUTO: 27.8 % (ref 37.5–51)
HCT VFR BLD AUTO: 29.1 % (ref 37.5–51)
HGB BLD-MCNC: 9.2 G/DL (ref 13–17.7)
HGB BLD-MCNC: 9.6 G/DL (ref 13–17.7)
LARGE PLATELETS: NORMAL
LYMPHOCYTES # BLD AUTO: 1.3 10*3/MM3 (ref 0.7–3.1)
LYMPHOCYTES # BLD AUTO: 1.4 10*3/MM3 (ref 0.7–3.1)
LYMPHOCYTES NFR BLD AUTO: 12.5 % (ref 19.6–45.3)
LYMPHOCYTES NFR BLD AUTO: 9.7 % (ref 19.6–45.3)
MAGNESIUM SERPL-MCNC: 0.5 MG/DL (ref 1.6–2.4)
MCH RBC QN AUTO: 30.2 PG (ref 26.6–33)
MCH RBC QN AUTO: 30.6 PG (ref 26.6–33)
MCHC RBC AUTO-ENTMCNC: 32.8 G/DL (ref 31.5–35.7)
MCHC RBC AUTO-ENTMCNC: 33.3 G/DL (ref 31.5–35.7)
MCV RBC AUTO: 91.9 FL (ref 79–97)
MCV RBC AUTO: 92.1 FL (ref 79–97)
MONOCYTES # BLD AUTO: 0.7 10*3/MM3 (ref 0.1–0.9)
MONOCYTES # BLD AUTO: 1.3 10*3/MM3 (ref 0.1–0.9)
MONOCYTES NFR BLD AUTO: 6.4 % (ref 5–12)
MONOCYTES NFR BLD AUTO: 8.4 % (ref 5–12)
MONOCYTES NFR FLD: 6 %
NEUTROPHILS NFR BLD AUTO: 12 10*3/MM3 (ref 1.7–7)
NEUTROPHILS NFR BLD AUTO: 8.6 10*3/MM3 (ref 1.7–7)
NEUTROPHILS NFR BLD AUTO: 80.2 % (ref 42.7–76)
NEUTROPHILS NFR BLD AUTO: 81 % (ref 42.7–76)
NEUTROPHILS NFR FLD MANUAL: 94 %
NRBC BLD AUTO-RTO: 0 /100 WBC (ref 0–0.2)
NRBC BLD AUTO-RTO: 0.1 /100 WBC (ref 0–0.2)
NT-PROBNP SERPL-MCNC: 5125 PG/ML (ref 0–900)
NUC CELL # FLD: ABNORMAL /MM3
PLATELET # BLD AUTO: 220 10*3/MM3 (ref 140–450)
PLATELET # BLD AUTO: 223 10*3/MM3 (ref 140–450)
PMV BLD AUTO: 10.4 FL (ref 6–12)
PMV BLD AUTO: 11.4 FL (ref 6–12)
POTASSIUM SERPL-SCNC: 3.1 MMOL/L (ref 3.5–5.2)
POTASSIUM SERPL-SCNC: 3.2 MMOL/L (ref 3.5–5.2)
RBC # BLD AUTO: 3.02 10*6/MM3 (ref 4.14–5.8)
RBC # BLD AUTO: 3.16 10*6/MM3 (ref 4.14–5.8)
RBC MORPH BLD: NORMAL
SMALL PLATELETS BLD QL SMEAR: ADEQUATE
SODIUM SERPL-SCNC: 138 MMOL/L (ref 136–145)
SODIUM SERPL-SCNC: 141 MMOL/L (ref 136–145)
URATE SERPL-MCNC: 5.5 MG/DL (ref 3.4–7)
WBC MORPH BLD: NORMAL
WBC NRBC COR # BLD: 10.7 10*3/MM3 (ref 3.4–10.8)
WBC NRBC COR # BLD: 14.9 10*3/MM3 (ref 3.4–10.8)

## 2023-09-15 PROCEDURE — 84157 ASSAY OF PROTEIN OTHER: CPT | Performed by: PHYSICIAN ASSISTANT

## 2023-09-15 PROCEDURE — 73564 X-RAY EXAM KNEE 4 OR MORE: CPT

## 2023-09-15 PROCEDURE — 85007 BL SMEAR W/DIFF WBC COUNT: CPT | Performed by: PHYSICIAN ASSISTANT

## 2023-09-15 PROCEDURE — 87040 BLOOD CULTURE FOR BACTERIA: CPT | Performed by: PHYSICIAN ASSISTANT

## 2023-09-15 PROCEDURE — 89051 BODY FLUID CELL COUNT: CPT | Performed by: PHYSICIAN ASSISTANT

## 2023-09-15 PROCEDURE — 80048 BASIC METABOLIC PNL TOTAL CA: CPT | Performed by: INTERNAL MEDICINE

## 2023-09-15 PROCEDURE — 85652 RBC SED RATE AUTOMATED: CPT | Performed by: PHYSICIAN ASSISTANT

## 2023-09-15 PROCEDURE — 0 CEFEPIME PER 500 MG: Performed by: PHYSICIAN ASSISTANT

## 2023-09-15 PROCEDURE — 63710000001 INSULIN LISPRO (HUMAN) PER 5 UNITS

## 2023-09-15 PROCEDURE — 84550 ASSAY OF BLOOD/URIC ACID: CPT | Performed by: PHYSICIAN ASSISTANT

## 2023-09-15 PROCEDURE — 82948 REAGENT STRIP/BLOOD GLUCOSE: CPT

## 2023-09-15 PROCEDURE — 80048 BASIC METABOLIC PNL TOTAL CA: CPT | Performed by: PHYSICIAN ASSISTANT

## 2023-09-15 PROCEDURE — 93005 ELECTROCARDIOGRAM TRACING: CPT

## 2023-09-15 PROCEDURE — 25010000002 VANCOMYCIN HCL IN NACL 1.75-0.9 GM/500ML-% SOLUTION: Performed by: PHYSICIAN ASSISTANT

## 2023-09-15 PROCEDURE — 85025 COMPLETE CBC W/AUTO DIFF WBC: CPT | Performed by: PHYSICIAN ASSISTANT

## 2023-09-15 PROCEDURE — 25010000002 MORPHINE PER 10 MG: Performed by: PHYSICIAN ASSISTANT

## 2023-09-15 PROCEDURE — 89060 EXAM SYNOVIAL FLUID CRYSTALS: CPT | Performed by: PHYSICIAN ASSISTANT

## 2023-09-15 PROCEDURE — 25010000002 MAGNESIUM SULFATE 2 GM/50ML SOLUTION: Performed by: INTERNAL MEDICINE

## 2023-09-15 PROCEDURE — 83735 ASSAY OF MAGNESIUM: CPT

## 2023-09-15 PROCEDURE — 71045 X-RAY EXAM CHEST 1 VIEW: CPT

## 2023-09-15 PROCEDURE — 86140 C-REACTIVE PROTEIN: CPT | Performed by: PHYSICIAN ASSISTANT

## 2023-09-15 PROCEDURE — 93010 ELECTROCARDIOGRAM REPORT: CPT | Performed by: INTERNAL MEDICINE

## 2023-09-15 PROCEDURE — G0378 HOSPITAL OBSERVATION PER HR: HCPCS

## 2023-09-15 PROCEDURE — 83036 HEMOGLOBIN GLYCOSYLATED A1C: CPT

## 2023-09-15 PROCEDURE — 87070 CULTURE OTHR SPECIMN AEROBIC: CPT | Performed by: PHYSICIAN ASSISTANT

## 2023-09-15 PROCEDURE — 36415 COLL VENOUS BLD VENIPUNCTURE: CPT | Performed by: INTERNAL MEDICINE

## 2023-09-15 PROCEDURE — 82945 GLUCOSE OTHER FLUID: CPT | Performed by: PHYSICIAN ASSISTANT

## 2023-09-15 PROCEDURE — 99285 EMERGENCY DEPT VISIT HI MDM: CPT

## 2023-09-15 PROCEDURE — 85025 COMPLETE CBC W/AUTO DIFF WBC: CPT | Performed by: INTERNAL MEDICINE

## 2023-09-15 PROCEDURE — 87205 SMEAR GRAM STAIN: CPT | Performed by: PHYSICIAN ASSISTANT

## 2023-09-15 PROCEDURE — 83880 ASSAY OF NATRIURETIC PEPTIDE: CPT | Performed by: PHYSICIAN ASSISTANT

## 2023-09-15 PROCEDURE — 83605 ASSAY OF LACTIC ACID: CPT

## 2023-09-15 RX ORDER — MAGNESIUM SULFATE HEPTAHYDRATE 40 MG/ML
2 INJECTION, SOLUTION INTRAVENOUS
Status: COMPLETED | OUTPATIENT
Start: 2023-09-15 | End: 2023-09-16

## 2023-09-15 RX ORDER — NICOTINE POLACRILEX 4 MG
15 LOZENGE BUCCAL
Status: DISCONTINUED | OUTPATIENT
Start: 2023-09-15 | End: 2023-09-20 | Stop reason: HOSPADM

## 2023-09-15 RX ORDER — SODIUM CHLORIDE 9 MG/ML
40 INJECTION, SOLUTION INTRAVENOUS AS NEEDED
Status: DISCONTINUED | OUTPATIENT
Start: 2023-09-15 | End: 2023-09-20 | Stop reason: HOSPADM

## 2023-09-15 RX ORDER — POLYETHYLENE GLYCOL 3350 17 G/17G
17 POWDER, FOR SOLUTION ORAL DAILY PRN
Status: DISCONTINUED | OUTPATIENT
Start: 2023-09-15 | End: 2023-09-20 | Stop reason: HOSPADM

## 2023-09-15 RX ORDER — MAGNESIUM SULFATE HEPTAHYDRATE 40 MG/ML
2 INJECTION, SOLUTION INTRAVENOUS
Status: COMPLETED | OUTPATIENT
Start: 2023-09-15 | End: 2023-09-15

## 2023-09-15 RX ORDER — SODIUM CHLORIDE 0.9 % (FLUSH) 0.9 %
10 SYRINGE (ML) INJECTION EVERY 12 HOURS SCHEDULED
Status: DISCONTINUED | OUTPATIENT
Start: 2023-09-15 | End: 2023-09-20 | Stop reason: HOSPADM

## 2023-09-15 RX ORDER — NITROGLYCERIN 0.4 MG/1
0.4 TABLET SUBLINGUAL
Status: DISCONTINUED | OUTPATIENT
Start: 2023-09-15 | End: 2023-09-20 | Stop reason: HOSPADM

## 2023-09-15 RX ORDER — CHOLECALCIFEROL (VITAMIN D3) 125 MCG
5 CAPSULE ORAL NIGHTLY PRN
Status: DISCONTINUED | OUTPATIENT
Start: 2023-09-15 | End: 2023-09-20 | Stop reason: HOSPADM

## 2023-09-15 RX ORDER — METOPROLOL SUCCINATE 25 MG/1
25 TABLET, EXTENDED RELEASE ORAL DAILY
COMMUNITY

## 2023-09-15 RX ORDER — ACETAMINOPHEN 500 MG
1000 TABLET ORAL ONCE
Status: COMPLETED | OUTPATIENT
Start: 2023-09-15 | End: 2023-09-15

## 2023-09-15 RX ORDER — ATORVASTATIN CALCIUM 40 MG/1
40 TABLET, FILM COATED ORAL DAILY
Status: DISCONTINUED | OUTPATIENT
Start: 2023-09-15 | End: 2023-09-20 | Stop reason: HOSPADM

## 2023-09-15 RX ORDER — ONDANSETRON 4 MG/1
4 TABLET, FILM COATED ORAL 3 TIMES DAILY PRN
COMMUNITY

## 2023-09-15 RX ORDER — VANCOMYCIN 1.75 GRAM/500 ML IN 0.9 % SODIUM CHLORIDE INTRAVENOUS
1750 EVERY 24 HOURS
Status: DISCONTINUED | OUTPATIENT
Start: 2023-09-16 | End: 2023-09-17

## 2023-09-15 RX ORDER — ACETAMINOPHEN 500 MG
500 TABLET ORAL EVERY 4 HOURS PRN
Status: DISCONTINUED | OUTPATIENT
Start: 2023-09-15 | End: 2023-09-20 | Stop reason: HOSPADM

## 2023-09-15 RX ORDER — BISACODYL 5 MG/1
5 TABLET, DELAYED RELEASE ORAL DAILY PRN
Status: DISCONTINUED | OUTPATIENT
Start: 2023-09-15 | End: 2023-09-20 | Stop reason: HOSPADM

## 2023-09-15 RX ORDER — LANOLIN ALCOHOL/MO/W.PET/CERES
1000 CREAM (GRAM) TOPICAL DAILY
COMMUNITY

## 2023-09-15 RX ORDER — ACETAMINOPHEN 325 MG/1
650 TABLET ORAL EVERY 4 HOURS PRN
COMMUNITY

## 2023-09-15 RX ORDER — MULTIPLE VITAMINS W/ MINERALS TAB 9MG-400MCG
1 TAB ORAL DAILY
COMMUNITY

## 2023-09-15 RX ORDER — INSULIN LISPRO 100 [IU]/ML
2-9 INJECTION, SOLUTION INTRAVENOUS; SUBCUTANEOUS
Status: DISCONTINUED | OUTPATIENT
Start: 2023-09-15 | End: 2023-09-20 | Stop reason: HOSPADM

## 2023-09-15 RX ORDER — FOLIC ACID 1 MG/1
1 TABLET ORAL DAILY
Status: DISCONTINUED | OUTPATIENT
Start: 2023-09-15 | End: 2023-09-20 | Stop reason: HOSPADM

## 2023-09-15 RX ORDER — SODIUM CHLORIDE 0.9 % (FLUSH) 0.9 %
10 SYRINGE (ML) INJECTION AS NEEDED
Status: DISCONTINUED | OUTPATIENT
Start: 2023-09-15 | End: 2023-09-20 | Stop reason: HOSPADM

## 2023-09-15 RX ORDER — IBUPROFEN 600 MG/1
1 TABLET ORAL
Status: DISCONTINUED | OUTPATIENT
Start: 2023-09-15 | End: 2023-09-20 | Stop reason: HOSPADM

## 2023-09-15 RX ORDER — LANOLIN ALCOHOL/MO/W.PET/CERES
1000 CREAM (GRAM) TOPICAL DAILY
Status: DISCONTINUED | OUTPATIENT
Start: 2023-09-15 | End: 2023-09-20 | Stop reason: HOSPADM

## 2023-09-15 RX ORDER — BISACODYL 10 MG
10 SUPPOSITORY, RECTAL RECTAL DAILY PRN
Status: DISCONTINUED | OUTPATIENT
Start: 2023-09-15 | End: 2023-09-20 | Stop reason: HOSPADM

## 2023-09-15 RX ORDER — LIDOCAINE HYDROCHLORIDE 10 MG/ML
10 INJECTION, SOLUTION INFILTRATION; PERINEURAL ONCE
Status: DISCONTINUED | OUTPATIENT
Start: 2023-09-15 | End: 2023-09-20 | Stop reason: HOSPADM

## 2023-09-15 RX ORDER — METOPROLOL SUCCINATE 25 MG/1
25 TABLET, EXTENDED RELEASE ORAL DAILY
Status: DISCONTINUED | OUTPATIENT
Start: 2023-09-15 | End: 2023-09-20 | Stop reason: HOSPADM

## 2023-09-15 RX ORDER — METOPROLOL TARTRATE 5 MG/5ML
5 INJECTION INTRAVENOUS EVERY 4 HOURS PRN
Status: DISCONTINUED | OUTPATIENT
Start: 2023-09-15 | End: 2023-09-20 | Stop reason: HOSPADM

## 2023-09-15 RX ORDER — VANCOMYCIN 1.75 GRAM/500 ML IN 0.9 % SODIUM CHLORIDE INTRAVENOUS
20 ONCE
Status: COMPLETED | OUTPATIENT
Start: 2023-09-15 | End: 2023-09-15

## 2023-09-15 RX ORDER — DEXTROSE MONOHYDRATE 25 G/50ML
25 INJECTION, SOLUTION INTRAVENOUS
Status: DISCONTINUED | OUTPATIENT
Start: 2023-09-15 | End: 2023-09-20 | Stop reason: HOSPADM

## 2023-09-15 RX ORDER — DOCUSATE SODIUM 100 MG/1
100 CAPSULE, LIQUID FILLED ORAL NIGHTLY PRN
COMMUNITY

## 2023-09-15 RX ORDER — PANTOPRAZOLE SODIUM 40 MG/1
40 TABLET, DELAYED RELEASE ORAL
Status: DISCONTINUED | OUTPATIENT
Start: 2023-09-15 | End: 2023-09-20 | Stop reason: HOSPADM

## 2023-09-15 RX ADMIN — MAGNESIUM SULFATE HEPTAHYDRATE 2 G: 2 INJECTION, SOLUTION INTRAVENOUS at 22:57

## 2023-09-15 RX ADMIN — Medication 5 MG: at 22:57

## 2023-09-15 RX ADMIN — METOPROLOL SUCCINATE 25 MG: 25 TABLET, FILM COATED, EXTENDED RELEASE ORAL at 20:31

## 2023-09-15 RX ADMIN — MORPHINE SULFATE 2 MG: 4 INJECTION INTRAVENOUS at 12:59

## 2023-09-15 RX ADMIN — FOLIC ACID 1 MG: 1 TABLET ORAL at 20:31

## 2023-09-15 RX ADMIN — Medication 1750 MG: at 17:33

## 2023-09-15 RX ADMIN — CEFEPIME 2000 MG: 2 INJECTION, POWDER, FOR SOLUTION INTRAVENOUS at 16:58

## 2023-09-15 RX ADMIN — ATORVASTATIN CALCIUM 40 MG: 40 TABLET, FILM COATED ORAL at 20:31

## 2023-09-15 RX ADMIN — MAGNESIUM SULFATE HEPTAHYDRATE 2 G: 2 INJECTION, SOLUTION INTRAVENOUS at 21:46

## 2023-09-15 RX ADMIN — CYANOCOBALAMIN TAB 1000 MCG 1000 MCG: 1000 TAB at 20:31

## 2023-09-15 RX ADMIN — ACETAMINOPHEN 1000 MG: 500 TABLET, FILM COATED ORAL at 11:54

## 2023-09-15 RX ADMIN — PANTOPRAZOLE SODIUM 40 MG: 40 TABLET, DELAYED RELEASE ORAL at 20:31

## 2023-09-15 RX ADMIN — Medication 10 ML: at 20:32

## 2023-09-15 NOTE — ED NOTES
Nursing report ED to floor  Anurag Pickard  72 y.o.  male    HPI:   Chief Complaint   Patient presents with    Knee Pain       Admitting doctor:   Todd Brandon MD    Admitting diagnosis:   The primary encounter diagnosis was Pain and swelling of right knee. Diagnoses of Pyogenic arthritis of right knee joint, due to unspecified organism and Fall, initial encounter were also pertinent to this visit.    Code status:   Current Code Status       Date Active Code Status Order ID Comments User Context       Prior            Allergies:   Glipizide and Lisinopril    Isolation:  No active isolations     Fall Risk:  Fall Risk Assessment was completed, and patient is at high risk for falls.   Predictive Model Details         12 (Low) Factor Value    Calculated 9/15/2023 14:08 Age 72    Risk of Fall Model Musculoskeletal Assessment WDL     Active Peripheral IV Present     Imaging order in this encounter Present     Respiratory Rate 18     Skin Assessment WDL     Calcium 6.4 mg/dL     Magnesium not on file     Financial Class Medicare     Drug Use No     Tobacco Use Quit     Ernie Scale not on file     Diastolic BP 73     Number of Distinct Medication Classes administered 2     Peripheral Vascular Assessment WDL     Clinically Relevant Sex Not Female     Gastrointestinal Assessment WDL     Number of administrations of Analgesic Narcotics 1     Albumin not on file     Cardiac Assessment WDL     Total Bilirubin not on file     Days after Admission 0.151     Potassium 3.2 mmol/L     Chloride 104 mmol/L     ALT not on file     Creatinine 0.98 mg/dL         Weight:       09/15/23  1029   Weight: 110 kg (243 lb)       Intake and Output  No intake or output data in the 24 hours ending 09/15/23 1728    Diet:        Most recent vitals:   Vitals:    09/15/23 1620 09/15/23 1719 09/15/23 1720 09/15/23 1721   BP: 141/68 124/67     Pulse: 95  97    Resp: 16 17     Temp:  98 °F (36.7 °C)     TempSrc:  Oral     SpO2: 99%   99%   Weight:        Height:           Active LDAs/IV Access:   Lines, Drains & Airways       Active LDAs       Name Placement date Placement time Site Days    Peripheral IV 09/15/23 1259 Anterior;Distal;Right Forearm 09/15/23  1259  Forearm  less than 1                    Skin Condition:   Skin Assessments (last day)       None             Labs (abnormal labs have a star):   Labs Reviewed   SEDIMENTATION RATE - Abnormal; Notable for the following components:       Result Value    Sed Rate 57 (*)     All other components within normal limits   C-REACTIVE PROTEIN - Abnormal; Notable for the following components:    C-Reactive Protein 12.13 (*)     All other components within normal limits   BNP (IN-HOUSE) - Abnormal; Notable for the following components:    proBNP 5,125.0 (*)     All other components within normal limits    Narrative:     Among patients with dyspnea, NT-proBNP is highly sensitive for the detection of acute congestive heart failure. In addition NT-proBNP of <300 pg/ml effectively rules out acute congestive heart failure with 99% negative predictive value.     BASIC METABOLIC PANEL - Abnormal; Notable for the following components:    Glucose 150 (*)     Potassium 3.2 (*)     Calcium 6.4 (*)     All other components within normal limits    Narrative:     GFR Normal >60  Chronic Kidney Disease <60  Kidney Failure <15    The GFR formula is only valid for adults with stable renal function between ages 18 and 70.   CBC WITH AUTO DIFFERENTIAL - Abnormal; Notable for the following components:    WBC 14.90 (*)     RBC 3.02 (*)     Hemoglobin 9.2 (*)     Hematocrit 27.8 (*)     Neutrophil % 81.0 (*)     Lymphocyte % 9.7 (*)     Neutrophils, Absolute 12.00 (*)     Monocytes, Absolute 1.30 (*)     All other components within normal limits   BODY FLUID CELL COUNT - Abnormal; Notable for the following components:    Appearance, Fluid Cloudy (*)     All other components within normal limits    Narrative:     No reference range  established. Physician to interpret results with clinical findings.   URIC ACID - Normal   BODY FLUID CULTURE   BLOOD CULTURE   BLOOD CULTURE   SCAN SLIDE   BODY FLUID CELL COUNT WITH DIFFERENTIAL    Narrative:     The following orders were created for panel order Body Fluid Cell Count With Differential - Body Fluid, Knee, Right.  Procedure                               Abnormality         Status                     ---------                               -----------         ------                     Body fluid cell count - ...[[id number]]  Abnormal            Final result               Body fluid differential ...[[id number]]                      Final result                 Please view results for these tests on the individual orders.   CRYSTAL EXAM   BODY FLUID DIFFERENTIAL    Narrative:     Concentrated Smear by Cytocentrifuge Prep.   GLUCOSE, BODY FLUID   PROTEIN, BODY FLUID   CBC AND DIFFERENTIAL    Narrative:     The following orders were created for panel order CBC & Differential.  Procedure                               Abnormality         Status                     ---------                               -----------         ------                     CBC Auto Differential[[id number]]        Abnormal            Final result               Scan Slide[[id number]]                                       Final result                 Please view results for these tests on the individual orders.       LOC: Person    Telemetry:  Med/Surg    Cardiac Monitoring Ordered: no    EKG:   No orders to display       Medications Given in the ED:   Medications   sodium chloride 0.9 % flush 10 mL (has no administration in time range)   lidocaine (XYLOCAINE) 1 % injection 10 mL (has no administration in time range)   vancomycin IVPB 1750 mg in 0.9% Sodium Chloride (premix) 500 mL (has no administration in time range)   acetaminophen (TYLENOL) tablet 1,000 mg (1,000 mg Oral Given 9/15/23 1154)   morphine injection 2 mg (2 mg  Intravenous Given 9/15/23 1259)   cefepime 2 gm IVPB in 100 ml NS (MBP) (2,000 mg Intravenous New Bag 9/15/23 5054)       Imaging results:  XR Knee 4+ View Right    Result Date: 9/15/2023  Impression: Knee effusion. Normal-appearing knee arthroplasty. Electronically Signed: Jeannie Crump MD  9/15/2023 11:39 AM EDT  Workstation ID: [id number]     Social issues:   Social History     Socioeconomic History    Marital status:    Tobacco Use    Smoking status: Former     Types: Cigarettes    Smokeless tobacco: Never   Vaping Use    Vaping Use: Never used   Substance and Sexual Activity    Alcohol use: Yes     Alcohol/week: 14.0 standard drinks     Types: 14 Drinks containing 0.5 oz of alcohol per week     Comment: 2 bourbons every night    Drug use: No    Sexual activity: Defer       NIH Stroke Scale:  Interval: (not recorded)  1a. Level of Consciousness: (not recorded)  1b. LOC Questions: (not recorded)  1c. LOC Commands: (not recorded)  2. Best Gaze: (not recorded)  3. Visual: (not recorded)  4. Facial Palsy: (not recorded)  5a. Motor Arm, Left: (not recorded)  5b. Motor Arm, Right: (not recorded)  6a. Motor Leg, Left: (not recorded)  6b. Motor Leg, Right: (not recorded)  7. Limb Ataxia: (not recorded)  8. Sensory: (not recorded)  9. Best Language: (not recorded)  10. Dysarthria: (not recorded)  11. Extinction and Inattention (formerly Neglect): (not recorded)    Total (NIH Stroke Scale): (not recorded)     Additional notable assessment information: Patient's wife mentioned that he has pulled IV lines out and required a sitter during an admission at [facility].      Nursing report ED to floor:  MARY Funez RN   09/15/23 17:28 EDT

## 2023-09-15 NOTE — ED PROVIDER NOTES
"Subjective   History of Present Illness  Chief Complaint: Right knee swelling    Patient is a 72-year-old white male with history of bilateral TKA, CHF presents with right knee swelling that began 2 days ago.  Patient has history of dementia and is a poor historian, patient's wife at bedside states that he had fallen 2 days ago at the nursing home and she thinks he landed on his knee.  She states that the nursing facility got an x-ray today but it was \"inconclusive\".  Patient states he only has pain in the right knee, he has had difficulty walking and his wife at bedside states that he has been unable to bear weight or walk due to the pain.  He rates the pain an 8/10 in severity.  He states he has limited range of motion secondary to the pain the pain does not radiate.  He denies fever, chills, nausea, vomiting, diarrhea, cough, chest pain, shortness of breath, history of blood clots, recent surgery or travel.  Denies numbness or tingling or weakness in lower extremities.  Patient's wife states that the swelling in his bilateral lower extremities seems a little worse than his normal but states she is not sure if he takes Lasix.    PCP: None    History provided by:  Patient    Review of Systems   Constitutional:  Negative for chills and fever.   HENT:  Negative for sore throat and trouble swallowing.    Eyes: Negative.    Respiratory:  Negative for shortness of breath and wheezing.    Cardiovascular:  Positive for leg swelling. Negative for chest pain.   Gastrointestinal:  Negative for abdominal pain, diarrhea, nausea and vomiting.   Endocrine: Negative.    Genitourinary:  Negative for dysuria.   Musculoskeletal:  Positive for arthralgias and joint swelling.   Skin:  Negative for rash.   Allergic/Immunologic: Negative.    Neurological:  Negative for headaches.   Psychiatric/Behavioral:  Negative for behavioral problems.    All other systems reviewed and are negative.    Past Medical History:   Diagnosis Date    Acute " diastolic CHF (congestive heart failure) 01/03/2022    Ankle edema     Aortic stenosis     Moderate    Aortic valve replaced     B12 deficiency     Bell's palsy     Bleeds easily     Chipped tooth     top front    Diabetes mellitus     Dry skin     Erectile dysfunction     Falls     2 falls in last 3 months    Fatigue     Fragile skin     GERD (gastroesophageal reflux disease)     Heart murmur     Hyperlipidemia     Hypertension     Hypokalemia     Hypomagnesemia     YANA (obstructive sleep apnea)     cpap bring dos    Osteoarthritis     Pulmonary HTN     Rosacea     Vitamin D deficiency        Allergies   Allergen Reactions    Glipizide Other (See Comments)     Sugar too low    Lisinopril Cough       Past Surgical History:   Procedure Laterality Date    AORTIC VALVE REPAIR/REPLACEMENT N/A 03/28/2022    Procedure: AORTIC VALVE REPAIR/REPLACEMENT;  Surgeon: Dennis Brandon MD;  Location: [facility] CVOR;  Service: Cardiothoracic;  Laterality: N/A;  aortic valve replacement with 27mm kulakrni [employer] magna ease    CARDIAC CATHETERIZATION      CARDIAC CATHETERIZATION N/A 12/17/2021    Procedure: Right and Left Heart Cath;  Surgeon: Juan Castellon DO;  Location: [facility] CATH INVASIVE LOCATION;  Service: Cardiology;  Laterality: N/A;    COLONOSCOPY      JOINT REPLACEMENT Bilateral 2021    knee    TONSILLECTOMY      TOTAL KNEE ARTHROPLASTY      TOTAL KNEE ARTHROPLASTY Left 05/04/2021    Procedure: TOTAL KNEE ARTHROPLASTY;  Surgeon: Otf Redmond MD;  Location: [facility] MAIN OR;  Service: Orthopedics;  Laterality: Left;       Family History   Problem Relation Age of Onset    Diabetes Mother     Other Mother     Other Father     Heart attack Father     Other Sister     Diabetes Sister     Heart disease Sister     Diabetes Brother     Other Other     Diabetes Other        Social History     Socioeconomic History    Marital status:    Tobacco Use    Smoking status: Never    Smokeless tobacco: Never    Vaping Use    Vaping Use: Never used   Substance and Sexual Activity    Alcohol use: Yes     Alcohol/week: 14.0 standard drinks     Types: 14 Drinks containing 0.5 oz of alcohol per week     Comment: 2 bourbons every night    Drug use: No    Sexual activity: Defer           Objective   Physical Exam  Vitals and nursing note reviewed.   Constitutional:       Appearance: Normal appearance. He is obese.   HENT:      Head: Normocephalic and atraumatic.   Eyes:      Extraocular Movements: Extraocular movements intact.      Pupils: Pupils are equal, round, and reactive to light.   Cardiovascular:      Rate and Rhythm: Normal rate and regular rhythm.      Pulses: Normal pulses.      Heart sounds: Normal heart sounds. No murmur heard.  Pulmonary:      Effort: Pulmonary effort is normal.      Breath sounds: Normal breath sounds.   Abdominal:      General: Abdomen is flat.      Tenderness: There is no abdominal tenderness.   Musculoskeletal:         General: Swelling present.      Right lower leg: Edema present.      Left lower leg: Edema present.      Comments: Bilateral lower extremity pitting edema 2+  Pulses diminished secondary to swelling but present    Moderate swelling to the right knee compared to the left but no overlying erythema.  There is some warmth to the area but no evidence of cellulitis wound drainage or bleeding    Surgical scars from bilateral TKA   Skin:     General: Skin is warm.      Capillary Refill: Capillary refill takes less than 2 seconds.      Findings: No bruising or erythema.   Neurological:      General: No focal deficit present.      Mental Status: He is alert. Mental status is at baseline.   Psychiatric:         Mood and Affect: Mood normal.         Behavior: Behavior normal.       Arthocentesis    Date/Time: 9/15/2023 9:56 PM  Performed by: Anna Dinero PA  Authorized by: Todd Brandon MD     Consent:     Consent obtained:  Verbal    Consent given by:  Patient and parent    Risks  "discussed:  Bleeding and infection  Universal protocol:     Imaging studies available: yes      Immediately prior to procedure, a time out was called: yes      Patient identity confirmed:  Verbally with patient  Location:     Location:  Knee    Knee:  R knee  Anesthesia:     Anesthesia method:  Local infiltration    Local anesthetic:  Lidocaine 1% w/o epi  Procedure details:     Preparation: Patient was prepped and draped in usual sterile fashion      Needle gauge:  20 G    Ultrasound guidance: no      Approach:  Lateral    Aspirate amount:  30 mL    Aspirate characteristics:  Serous    Steroid injected: no      Specimen collected: yes    Post-procedure details:     Dressing:  Sterile dressing    Procedure completion:  Tolerated           ED Course  ED Course as of 09/15/23 2159   Fri Sep 15, 2023   1405 I spoke with ER attending, Dr. Mcclellan regarding lab work and imaging results.  Recommended knee joint aspiration at this time for further evaluation []   1613 Discussed results with Dr. Mcclellan, patient be started on cefepime and vancomycin admitted for Ortho consultation []   1635 Waiting for hospitalist phone call []   1658 I spoke with CONCEPCIÓN Velásquez who agreed accept patient for hospitalist admission []      ED Course User Index  [] Anna Dinero PA    /73 (BP Location: Left arm, Patient Position: Lying)   Pulse 92   Temp 99 °F (37.2 °C) (Oral)   Resp 18   Ht 177.8 cm (70\")   Wt 115 kg (252 lb 13.9 oz)   SpO2 96%   BMI 36.28 kg/m²   Labs Reviewed   SEDIMENTATION RATE - Abnormal; Notable for the following components:       Result Value    Sed Rate 57 (*)     All other components within normal limits   C-REACTIVE PROTEIN - Abnormal; Notable for the following components:    C-Reactive Protein 12.13 (*)     All other components within normal limits   BNP (IN-HOUSE) - Abnormal; Notable for the following components:    proBNP 5,125.0 (*)     All other components within normal limits    " Narrative:     Among patients with dyspnea, NT-proBNP is highly sensitive for the detection of acute congestive heart failure. In addition NT-proBNP of <300 pg/ml effectively rules out acute congestive heart failure with 99% negative predictive value.     BASIC METABOLIC PANEL - Abnormal; Notable for the following components:    Glucose 150 (*)     Potassium 3.2 (*)     Calcium 6.4 (*)     All other components within normal limits    Narrative:     GFR Normal >60  Chronic Kidney Disease <60  Kidney Failure <15    The GFR formula is only valid for adults with stable renal function between ages 18 and 70.   CBC WITH AUTO DIFFERENTIAL - Abnormal; Notable for the following components:    WBC 14.90 (*)     RBC 3.02 (*)     Hemoglobin 9.2 (*)     Hematocrit 27.8 (*)     Neutrophil % 81.0 (*)     Lymphocyte % 9.7 (*)     Neutrophils, Absolute 12.00 (*)     Monocytes, Absolute 1.30 (*)     All other components within normal limits   BODY FLUID CELL COUNT - Abnormal; Notable for the following components:    Appearance, Fluid Cloudy (*)     All other components within normal limits    Narrative:     No reference range established. Physician to interpret results with clinical findings.   HEMOGLOBIN A1C - Abnormal; Notable for the following components:    Hemoglobin A1C 6.80 (*)     All other components within normal limits   MAGNESIUM - Abnormal; Notable for the following components:    Magnesium 0.5 (*)     All other components within normal limits   POCT GLUCOSE FINGERSTICK - Abnormal; Notable for the following components:    Glucose 178 (*)     All other components within normal limits   URIC ACID - Normal   LACTIC ACID, PLASMA - Normal   BODY FLUID CULTURE   BLOOD CULTURE   BLOOD CULTURE   SCAN SLIDE   BODY FLUID CELL COUNT WITH DIFFERENTIAL    Narrative:     The following orders were created for panel order Body Fluid Cell Count With Differential - Body Fluid, Knee, Right.  Procedure                                Abnormality         Status                     ---------                               -----------         ------                     Body fluid cell count - ...[[id number]]  Abnormal            Final result               Body fluid differential ...[[id number]]                      Final result                 Please view results for these tests on the individual orders.   CRYSTAL EXAM   BODY FLUID DIFFERENTIAL    Narrative:     Concentrated Smear by Cytocentrifuge Prep.   GLUCOSE, BODY FLUID   PROTEIN, BODY FLUID   POCT GLUCOSE FINGERSTICK   POCT GLUCOSE FINGERSTICK   POCT GLUCOSE FINGERSTICK   POCT GLUCOSE FINGERSTICK   CBC AND DIFFERENTIAL    Narrative:     The following orders were created for panel order CBC & Differential.  Procedure                               Abnormality         Status                     ---------                               -----------         ------                     CBC Auto Differential[[id number]]        Abnormal            Final result               Scan Slide[[id number]]                                       Final result                 Please view results for these tests on the individual orders.     Medications   sodium chloride 0.9 % flush 10 mL (has no administration in time range)   lidocaine (XYLOCAINE) 1 % injection 10 mL (has no administration in time range)   sodium chloride 0.9 % flush 10 mL (10 mL Intravenous Given 9/15/23 2032)   sodium chloride 0.9 % flush 10 mL (has no administration in time range)   sodium chloride 0.9 % infusion 40 mL (has no administration in time range)   polyethylene glycol (MIRALAX) packet 17 g (has no administration in time range)     And   bisacodyl (DULCOLAX) EC tablet 5 mg (has no administration in time range)     And   bisacodyl (DULCOLAX) suppository 10 mg (has no administration in time range)   nitroglycerin (NITROSTAT) SL tablet 0.4 mg (has no administration in time range)   acetaminophen (TYLENOL) tablet 500 mg (has no administration in  time range)   atorvastatin (LIPITOR) tablet 40 mg (40 mg Oral Given 9/15/23 2031)   folic acid (FOLVITE) tablet 1 mg (1 mg Oral Given 9/15/23 2031)   pantoprazole (PROTONIX) EC tablet 40 mg (40 mg Oral Given 9/15/23 2031)   metoprolol succinate XL (TOPROL-XL) 24 hr tablet 25 mg (25 mg Oral Given 9/15/23 2031)   vitamin B-12 (CYANOCOBALAMIN) tablet 1,000 mcg (1,000 mcg Oral Given 9/15/23 2031)   dextrose (GLUTOSE) oral gel 15 g (has no administration in time range)   dextrose (D50W) (25 g/50 mL) IV injection 25 g (has no administration in time range)   glucagon (GLUCAGEN) injection 1 mg (has no administration in time range)   insulin lispro (HUMALOG/ADMELOG) injection 2-9 Units (has no administration in time range)   Potassium Replacement - Follow Nurse / BPA Driven Protocol (has no administration in time range)   Magnesium Standard Dose Replacement - Follow Nurse / BPA Driven Protocol (has no administration in time range)   Calcium Replacement - Follow Nurse / BPA Driven Protocol (has no administration in time range)   Pharmacy to Dose Cefepime (has no administration in time range)   Pharmacy to dose vancomycin (has no administration in time range)   cefepime 2 gm IVPB in 100 ml NS (MBP) (has no administration in time range)   vancomycin IVPB 1750 mg in 0.9% Sodium Chloride (premix) 500 mL (has no administration in time range)   magnesium sulfate 2g/50 mL (PREMIX) infusion (2 g Intravenous New Bag 9/15/23 2146)   magnesium sulfate 2g/50 mL (PREMIX) infusion (has no administration in time range)   acetaminophen (TYLENOL) tablet 1,000 mg (1,000 mg Oral Given 9/15/23 1154)   morphine injection 2 mg (2 mg Intravenous Given 9/15/23 1259)   cefepime 2 gm IVPB in 100 ml NS (MBP) (0 mg Intravenous Stopped 9/15/23 1733)   vancomycin IVPB 1750 mg in 0.9% Sodium Chloride (premix) 500 mL (0 mg Intravenous Stopped 9/15/23 1900)     XR Knee 4+ View Right    Result Date: 9/15/2023  Impression: Knee effusion. Normal-appearing knee  arthroplasty. Electronically Signed: Jeannie Crump MD  9/15/2023 11:39 AM EDT  Workstation ID: [id number]    XR Chest 1 View    Result Date: 9/15/2023  Impression: Cardiomegaly without overt pulm edema or focal consolidation. Electronically Signed: Ramakrishna Betancur MD  9/15/2023 7:08 PM EDT  Workstation ID: [id number]                                          Medical Decision Making  Differential Dx (Includes but not limited to): Joint effusion, septic arthritis, hemarthrosis, gout  Medical Records Reviewed: No recent admissions  Labs: On my interpretation, CBC with mild leukocytosis 14,000.  Elevated sed rate, CRP.  BMP glucose 150, potassium 3.2.  Imaging: On my interpretation, large suprapatellar joint effusion, no obvious fracture  Telemetry: N/A  Testing considered but not ordered: CT head patient denies headache or head injury  Nature of Complaint: Acute  Admission vs Discharge: Admission  Discussion: While in the ED IV was placed and labs were obtained appropriate PPE was worn during exam and throughout all encounters with the patient.  Patient had the above evaluation.  Patient given low-dose morphine and Zofran for pain and nausea.  Extremity shows large suprapatellar joint effusion without obvious fracture.  Imaging significant for mild leukocytosis 14,000, elevated sed rate, CRP.  Normal uric acid.  After discussion with ER attending, Dr. Mcclellan, joint aspiration was performed and specimen sent to the lab.  Cell count elevated 32,000, this is suspicious for possible septic arthritis, swelling could also be due to trauma.  Given patient's history of TKA, elevated cell count patient will be admitted for Ortho consult and IV antibiotics.  Patient started on cefepime and vancomycin.  I spoke with CONCEPCIÓN Velásquez who agreed to accept patient for hospitalist admission.    Problems Addressed:  Fall, initial encounter: acute illness or injury  Pain and swelling of right knee: acute illness or injury  Pyogenic  arthritis of right knee joint, due to unspecified organism: acute illness or injury    Amount and/or Complexity of Data Reviewed  Labs: ordered. Decision-making details documented in ED Course.  Radiology: ordered. Decision-making details documented in ED Course.    Risk  OTC drugs.  Prescription drug management.  Parenteral controlled substances.  Decision regarding hospitalization.        Final diagnoses:   Pain and swelling of right knee   Pyogenic arthritis of right knee joint, due to unspecified organism   Fall, initial encounter       ED Disposition  ED Disposition       ED Disposition   Decision to Admit    Condition   --    Comment   Level of Care: Telemetry [5]   Diagnosis: Right knee pain [[id number]]   Admitting Physician: YURIDIA MCKOY [[id number]]   Attending Physician: YURIDIA MCKOY [[id number]]                 No follow-up provider specified.       Medication List      No changes were made to your prescriptions during this visit.            Anna Dinero PA  09/15/23 [id number]

## 2023-09-15 NOTE — Clinical Note
Level of Care: Med/Surg [1]   Admitting Physician: YURIDIA MCKOY [[id number]]   Attending Physician: YURIDIA MCKOY [[id number]]

## 2023-09-16 ENCOUNTER — DOCUMENTATION (OUTPATIENT)
Dept: ORTHOPEDIC SURGERY | Facility: CLINIC | Age: 73
End: 2023-09-16
Payer: MEDICARE

## 2023-09-16 LAB
ANION GAP SERPL CALCULATED.3IONS-SCNC: 11 MMOL/L (ref 5–15)
BUN SERPL-MCNC: 11 MG/DL (ref 8–23)
BUN/CREAT SERPL: 11.7 (ref 7–25)
CALCIUM SPEC-SCNC: 7.1 MG/DL (ref 8.6–10.5)
CHLORIDE SERPL-SCNC: 107 MMOL/L (ref 98–107)
CO2 SERPL-SCNC: 25 MMOL/L (ref 22–29)
CREAT SERPL-MCNC: 0.94 MG/DL (ref 0.76–1.27)
EGFRCR SERPLBLD CKD-EPI 2021: 86.7 ML/MIN/1.73
GLUCOSE BLDC GLUCOMTR-MCNC: 136 MG/DL (ref 70–105)
GLUCOSE BLDC GLUCOMTR-MCNC: 137 MG/DL (ref 70–105)
GLUCOSE BLDC GLUCOMTR-MCNC: 165 MG/DL (ref 70–105)
GLUCOSE BLDC GLUCOMTR-MCNC: 178 MG/DL (ref 70–105)
GLUCOSE FLD-MCNC: 68 MG/DL
GLUCOSE SERPL-MCNC: 199 MG/DL (ref 65–99)
POTASSIUM SERPL-SCNC: 3.6 MMOL/L (ref 3.5–5.2)
PROT FLD-MCNC: 3.6 G/DL
SODIUM SERPL-SCNC: 143 MMOL/L (ref 136–145)

## 2023-09-16 PROCEDURE — 25010000002 CALCIUM GLUCONATE-NACL 1-0.675 GM/50ML-% SOLUTION: Performed by: INTERNAL MEDICINE

## 2023-09-16 PROCEDURE — 25010000002 MAGNESIUM SULFATE 2 GM/50ML SOLUTION: Performed by: INTERNAL MEDICINE

## 2023-09-16 PROCEDURE — 25010000002 VANCOMYCIN HCL IN NACL 1.75-0.9 GM/500ML-% SOLUTION

## 2023-09-16 PROCEDURE — 80048 BASIC METABOLIC PNL TOTAL CA: CPT | Performed by: INTERNAL MEDICINE

## 2023-09-16 PROCEDURE — 25010000002 MORPHINE PER 10 MG: Performed by: INTERNAL MEDICINE

## 2023-09-16 PROCEDURE — G0378 HOSPITAL OBSERVATION PER HR: HCPCS

## 2023-09-16 PROCEDURE — 97162 PT EVAL MOD COMPLEX 30 MIN: CPT

## 2023-09-16 PROCEDURE — 82948 REAGENT STRIP/BLOOD GLUCOSE: CPT

## 2023-09-16 PROCEDURE — 63710000001 INSULIN LISPRO (HUMAN) PER 5 UNITS

## 2023-09-16 PROCEDURE — 0 CEFEPIME PER 500 MG

## 2023-09-16 PROCEDURE — 25010000002 CALCIUM GLUCONATE 2-0.675 GM/100ML-% SOLUTION: Performed by: INTERNAL MEDICINE

## 2023-09-16 RX ORDER — POTASSIUM CHLORIDE 20 MEQ/1
40 TABLET, EXTENDED RELEASE ORAL EVERY 4 HOURS
Status: COMPLETED | OUTPATIENT
Start: 2023-09-16 | End: 2023-09-16

## 2023-09-16 RX ORDER — NAPROXEN 250 MG/1
500 TABLET ORAL 2 TIMES DAILY WITH MEALS
Status: DISCONTINUED | OUTPATIENT
Start: 2023-09-16 | End: 2023-09-20 | Stop reason: HOSPADM

## 2023-09-16 RX ORDER — CALCIUM GLUCONATE 20 MG/ML
1000 INJECTION, SOLUTION INTRAVENOUS ONCE
Status: COMPLETED | OUTPATIENT
Start: 2023-09-16 | End: 2023-09-16

## 2023-09-16 RX ORDER — CALCIUM GLUCONATE 20 MG/ML
2 INJECTION, SOLUTION INTRAVENOUS
Status: COMPLETED | OUTPATIENT
Start: 2023-09-16 | End: 2023-09-16

## 2023-09-16 RX ORDER — MORPHINE SULFATE 2 MG/ML
2 INJECTION, SOLUTION INTRAMUSCULAR; INTRAVENOUS EVERY 4 HOURS PRN
Status: DISCONTINUED | OUTPATIENT
Start: 2023-09-16 | End: 2023-09-20 | Stop reason: HOSPADM

## 2023-09-16 RX ADMIN — MAGNESIUM SULFATE HEPTAHYDRATE 2 G: 2 INJECTION, SOLUTION INTRAVENOUS at 02:11

## 2023-09-16 RX ADMIN — POTASSIUM CHLORIDE 40 MEQ: 1500 TABLET, EXTENDED RELEASE ORAL at 02:11

## 2023-09-16 RX ADMIN — MAGNESIUM SULFATE HEPTAHYDRATE 2 G: 2 INJECTION, SOLUTION INTRAVENOUS at 04:31

## 2023-09-16 RX ADMIN — Medication 1750 MG: at 09:15

## 2023-09-16 RX ADMIN — NAPROXEN 500 MG: 250 TABLET ORAL at 12:52

## 2023-09-16 RX ADMIN — POTASSIUM CHLORIDE 40 MEQ: 1500 TABLET, EXTENDED RELEASE ORAL at 06:00

## 2023-09-16 RX ADMIN — CYANOCOBALAMIN TAB 1000 MCG 1000 MCG: 1000 TAB at 08:32

## 2023-09-16 RX ADMIN — METOPROLOL SUCCINATE 25 MG: 25 TABLET, FILM COATED, EXTENDED RELEASE ORAL at 08:33

## 2023-09-16 RX ADMIN — CEFEPIME 2000 MG: 2 INJECTION, POWDER, FOR SOLUTION INTRAVENOUS at 18:42

## 2023-09-16 RX ADMIN — FOLIC ACID 1 MG: 1 TABLET ORAL at 08:32

## 2023-09-16 RX ADMIN — POTASSIUM CHLORIDE 40 MEQ: 1500 TABLET, EXTENDED RELEASE ORAL at 08:33

## 2023-09-16 RX ADMIN — CEFEPIME 2000 MG: 2 INJECTION, POWDER, FOR SOLUTION INTRAVENOUS at 02:11

## 2023-09-16 RX ADMIN — ATORVASTATIN CALCIUM 40 MG: 40 TABLET, FILM COATED ORAL at 08:32

## 2023-09-16 RX ADMIN — CALCIUM GLUCONATE 2 G: 20 INJECTION, SOLUTION INTRAVENOUS at 08:31

## 2023-09-16 RX ADMIN — PANTOPRAZOLE SODIUM 40 MG: 40 TABLET, DELAYED RELEASE ORAL at 08:33

## 2023-09-16 RX ADMIN — CALCIUM GLUCONATE 2 G: 20 INJECTION, SOLUTION INTRAVENOUS at 10:26

## 2023-09-16 RX ADMIN — CALCIUM GLUCONATE 2 G: 20 INJECTION, SOLUTION INTRAVENOUS at 12:52

## 2023-09-16 RX ADMIN — INSULIN LISPRO 2 UNITS: 100 INJECTION, SOLUTION INTRAVENOUS; SUBCUTANEOUS at 18:41

## 2023-09-16 RX ADMIN — POTASSIUM CHLORIDE 40 MEQ: 1500 TABLET, EXTENDED RELEASE ORAL at 18:42

## 2023-09-16 RX ADMIN — INSULIN LISPRO 2 UNITS: 100 INJECTION, SOLUTION INTRAVENOUS; SUBCUTANEOUS at 08:31

## 2023-09-16 RX ADMIN — CEFEPIME 2000 MG: 2 INJECTION, POWDER, FOR SOLUTION INTRAVENOUS at 08:32

## 2023-09-16 RX ADMIN — MORPHINE SULFATE 2 MG: 2 INJECTION, SOLUTION INTRAMUSCULAR; INTRAVENOUS at 12:00

## 2023-09-16 RX ADMIN — CALCIUM GLUCONATE 1000 MG: 20 INJECTION, SOLUTION INTRAVENOUS at 07:00

## 2023-09-16 RX ADMIN — PANTOPRAZOLE SODIUM 40 MG: 40 TABLET, DELAYED RELEASE ORAL at 18:42

## 2023-09-16 RX ADMIN — Medication 5 MG: at 21:06

## 2023-09-16 RX ADMIN — Medication 10 ML: at 21:06

## 2023-09-16 RX ADMIN — POTASSIUM CHLORIDE 40 MEQ: 1500 TABLET, EXTENDED RELEASE ORAL at 21:06

## 2023-09-16 RX ADMIN — MAGNESIUM SULFATE HEPTAHYDRATE 2 G: 2 INJECTION, SOLUTION INTRAVENOUS at 00:34

## 2023-09-16 NOTE — THERAPY EVALUATION
Patient Name: Anurag Pickard  : 1950    MRN: [id number]                              Today's Date: 2023       Admit Date: 9/15/2023    Visit Dx:     ICD-10-CM ICD-9-CM   1. Pain and swelling of right knee  M25.561 719.46    M25.461 719.06   2. Pyogenic arthritis of right knee joint, due to unspecified organism  M00.9 711.06   3. Fall, initial encounter  W19.XXXA E888.9     Patient Active Problem List   Diagnosis    Aortic valve stenosis    Diabetes mellitus, type II    Gastroesophageal reflux disease    Hyperlipidemia    Hypogonadism    Obesity    Obstructive sleep apnea syndrome    Osteoarthritis    Pulmonary hypertension    Rosacea    Vitamin D deficiency    Memory loss    Ataxia    Alcohol abuse    Retinal disorder    Presbyopia    Nuclear senile cataract    History of laser assisted in situ keratomileusis    Benign essential hypertension    Screening PSA (prostate specific antigen)    Dyspnea on exertion    Acute diastolic CHF (congestive heart failure)    Nonrheumatic aortic valve stenosis    S/P AVR (aortic valve replacement)    Coronary artery disease involving native coronary artery of native heart without angina pectoris    Right knee pain    Alzheimer's dementia     Past Medical History:   Diagnosis Date    Acute diastolic CHF (congestive heart failure) 2022    Ankle edema     Aortic stenosis     Moderate    Aortic valve replaced     B12 deficiency     Bell's palsy     Bleeds easily     Chipped tooth     top front    Diabetes mellitus     Dry skin     Erectile dysfunction     Falls     2 falls in last 3 months    Fatigue     Fragile skin     GERD (gastroesophageal reflux disease)     Heart murmur     Hyperlipidemia     Hypertension     Hypokalemia     Hypomagnesemia     YANA (obstructive sleep apnea)     cpap bring dos    Osteoarthritis     Pulmonary HTN     Rosacea     Vitamin D deficiency      Past Surgical History:   Procedure Laterality Date    AORTIC VALVE REPAIR/REPLACEMENT N/A  SUBJECTIVE:  Patient is a 61 year old 61 year old female who presents for diabetes follow up.    Diabetes  A1C:   Hemoglobin A1C (%)   Date Value   07/12/2021 14.7 (H)      Microalbumin: No results found for: YVES DAVILA  Checks BG 1 x per day (FBG) 347,  318, 273, 297, 416, 499, 382, 390, 401, 222    Denies hypoglycemic events   Diet:   B: 2 boiled eggs, avocado toast (1), watermelon or grapes.   L: left overs from dinner. Okra, spinach veggies.Chicken.  D: veggies, tuna, salmon. Occasionally brown rice. Potatoes.  Occasionally drinks OJ. Drinks carbonated water and sugar free tea. Stopped drinking pop.   Snacks: Enjoys cheese and crackers, peanut butter. Stopped buying snacks such as cookies, chips.  Had appointment with nutritionist- \"I liked her a lot but it was hard to get a follow up appt\"  Activity: walks around a lot at work.  Last Ophtho exam: no previous  Denies paresthesias, bladder/bowel incontinence   DM Meds:   Metformin 1000mg BID, glipizide 10mg BID. Insurance didn't cover pioglitazone 30mg BID only daily so she hasnt started this medication. Not taking insulin. Does not want to inject her self.   Statin: atorvastatin 40 mg   ACE/ARB (if needed): lisinopril 5mg    Health Maintenance  - Vaccines: s/p 1 dose of Pfizer and second dose on 08/13/2021.  - Pap (21-65): 08/2018 pap with cotest NEG  - Mammogram (50-74): 05/2021- [facility]IGN  - Colonoscopy:  No previous. Reports brought in sample for FOBT this year but did not receive results.    Review of Systems  Constitutional: Negative for appetite change, chills, fever and unexpected weight change.   HENT: Negative for congestion.  Respiratory: Negative for cough, chest tightness and shortness of breath.    Cardiovascular: Negative for chest pain, palpitations and leg swelling.   Gastrointestinal: Negative for abdominal pain, blood in stool, constipation, diarrhea, nausea and vomiting.   Genitourinary: Negative for difficulty  03/28/2022    Procedure: AORTIC VALVE REPAIR/REPLACEMENT;  Surgeon: Dennis Brandon MD;  Location: [facility] CVOR;  Service: Cardiothoracic;  Laterality: N/A;  aortic valve replacement with 27mm k[employer]i [employer] magna ease    CARDIAC CATHETERIZATION      CARDIAC CATHETERIZATION N/A 12/17/2021    Procedure: Right and Left Heart Cath;  Surgeon: Juan Castellon DO;  Location: [facility] CATH INVASIVE LOCATION;  Service: Cardiology;  Laterality: N/A;    COLONOSCOPY      JOINT REPLACEMENT Bilateral 2021    knee    TONSILLECTOMY      TOTAL KNEE ARTHROPLASTY      TOTAL KNEE ARTHROPLASTY Left 05/04/2021    Procedure: TOTAL KNEE ARTHROPLASTY;  Surgeon: Otf Redmond MD;  Location: [facility] MAIN OR;  Service: Orthopedics;  Laterality: Left;      General Information       Row Name 09/16/23 1645          Physical Therapy Time and Intention    Document Type evaluation  -     Mode of Treatment physical therapy  -       Row Name 09/16/23 1645          General Information    Patient Profile Reviewed yes  -HC     Prior Level of Function --  difficult to determine due to pt's confusion  -     Existing Precautions/Restrictions fall  -     Barriers to Rehab previous functional deficit;cognitive status  -       Row Name 09/16/23 1645          Living Environment    People in Home spouse  -       Row Name 09/16/23 1645          Cognition    Orientation Status (Cognition) oriented to;person;place  -       Row Name 09/16/23 1645          Safety Issues, Functional Mobility    Safety Issues Affecting Function (Mobility) judgment;insight into deficits/self-awareness;safety precaution awareness  -HC     Impairments Affecting Function (Mobility) balance;cognition;postural/trunk control;range of motion (ROM);strength;pain;endurance/activity tolerance  -               User Key  (r) = Recorded By, (t) = Taken By, (c) = Cosigned By      Initials Name Provider Type    HC Radha Morrow PT Physical Therapist          urinating.  Musculoskeletal: Negative for gait problem and myalgias.   Neurological: Negative for dizziness light-headedness and headaches.   Psychiatric/Behavioral: Negative for dysphoric mood and sleep disturbance. The patient is not nervous/anxious.      OBJECTIVE:  Visit Vitals  /60 (BP Location: LUE - Left upper extremity, Patient Position: Sitting)   Pulse 94   Temp 97.8 °F (36.6 °C) (Oral)   Resp 18   Ht 5' 5\" (1.651 m)   Wt 111 kg (244 lb 11.4 oz)   SpO2 100%   BMI 40.72 kg/m²     Physical Exam  General: Calm, cooperative.  Cardiac: S1, S2, RRR. No murmurs. No LE edema.  Lung: no acute respiratory distress. Clear breath sounds.  No wheezing or crackles.  Abdomen: soft, non tender to palpation. Normoactive BS. Non distended. No guarding or rigidity.   Skin: no rashes or lesions. Skin is not diaphoretic.  Neuro: A&Ox3. Normal gait.     Foot Exam: thick opaque nails. Skin dry. No ulcers or lesions. Monofilament 6/6 areas sensed. PT, DP 2+ bilaterally.    ASSESSMENT/PLAN:  Yolanda Grant is a 61 year old female who presents for follow up on uncontrolled diabetes.     Problem List Items Addressed This Visit        Endocrine and Metabolic    Type 2 diabetes mellitus, uncontrolled (CMS/[facility]) - Primary     Encouraged patient to re-establish with nutritionist  Exercise 3 days per week- will increase insulin sensitivity  Continue metformin 1000 mg BID  Start glipizide 10mg BID, Actos 30 mg daily- prescriptions sent to pharmacy  Ophtho referral provided- recommended yearly eye exams  Diabetic foot exam performed  Will plan to increase Actos at next visit  Bring glucometer to next visit         Relevant Medications    pioglitazone (ACTOS) 30 MG tablet    Other Relevant Orders    SERVICE TO OPHTHALMOLOGY       Health Encounters    Healthcare maintenance     FOBT provided  Pt refusing colonoscopy- no previous colonoscopy  Mammogram 5/2021- BENIGN  Due for shingles  Has received 1 dose of Pfizer and due to have            Mobility       [facility] Name 09/16/23 1646          Bed Mobility    Bed Mobility supine-sit  -     Supine-Sit [place] (Bed Mobility) 2 person assist;moderate assist (50% patient effort)  -[facility] Name 09/16/23 1646          Bed-Chair Transfer    Bed-Chair [place] (Transfers) 2 person assist;moderate assist (50% patient effort)  -[facility] Name 09/16/23 1646          Sit-Stand Transfer    Sit-Stand [place] (Transfers) 2 person assist;moderate assist (50% patient effort)  -HC       Row Name 09/16/23 1646          Gait/Stairs (Locomotion)    [place] Level (Gait) not tested  -[facility] Name 09/16/23 1646          Mobility    Extremity Weight-bearing Status right lower extremity  -     Right Lower Extremity (Weight-bearing Status) weight-bearing as tolerated (WBAT)  -               User Key  (r) = Recorded By, (t) = Taken By, (c) = Cosigned By      Initials Name Provider Type    HC Radha Morrow, PT Physical Therapist                   Obj/Interventions       [facility] Name 09/16/23 1646          Range of Motion Comprehensive    Comment, General Range of Motion right knee limited ROM due to pain (lacking ~10 degrees from neutral knee extension to 80 degrees flexion), right ankle WFL, left LE WFL  -[facility] 09/16/23 1646          Strength Comprehensive (MMT)    Comment, General Manual Muscle Testing (MMT) Assessment right knee 3-/5, left LE appears WFL  -HC       Row Name 09/16/23 1646          Balance    Balance Assessment sitting static balance;sitting dynamic balance;standing static balance;standing dynamic balance  -     Static Sitting Balance contact guard  -     Dynamic Sitting Balance minimal assist  -     Static Standing Balance moderate assist;2-person assist  -     Dynamic Standing Balance moderate assist;2-person assist  -               User Key  (r) = Recorded By, (t) = Taken By, (c) = Cosigned By      Initials Name Provider Type    Radha Thomas  second dose in 2 weeks         Relevant Orders    OCCULT BLOOD - FIT          -Goal HbA1c is 7% without frequent lows  -Reviewed appropriate med administration technique  -Check home BG (FBG, 2h PP, bedtime) as discussed, goal BG is   -Call if BS is <70 or >300  -Bring log and glucometer to each visit   -Discussed diet modifications and encouraged 150 min of moderate intensity exercise per week  -Discussed treatment for hypoglycemia with glucose tabs and/or 4oz of juice and wait 15 min to recheck BG and to repeat until BS>80  -Counseled patient on long term disease complications including but not limited to blindness, neuropathy, PVD/PAD, CAD and heart disease, MI/Death, renal insufficiency and/or renal failure  -Relevant labs reviewed with patient    Return in about 3 weeks (around 8/20/2021) for Diabetes.     Prior to discharge all questions were answered and patient expressed understanding of the plan of care.    Plan of care discussed with attending physician, Dr. Devonte Campuzano MD             N, PT Physical Therapist                   Goals/Plan       [facility] Name 09/16/23 1652          Bed Mobility Goal 1 (PT)    Activity/Assistive Device (Bed Mobility Goal 1, PT) bed mobility activities, all  -HC     [place] Level/Cues Needed (Bed Mobility Goal 1, PT) minimum assist (75% or more patient effort)  -HC     Time Frame (Bed Mobility Goal 1, PT) 2 weeks  -[facility] Name 09/16/23 1652          Transfer Goal 1 (PT)    Activity/Assistive Device (Transfer Goal 1, PT) transfers, all  -HC     [place] Level/Cues Needed (Transfer Goal 1, PT) minimum assist (75% or more patient effort)  -HC     Time Frame (Transfer Goal 1, PT) 2 weeks  -[facility] Name 09/16/23 1652          Gait Training Goal 1 (PT)    Activity/Assistive Device (Gait Training Goal 1, PT) gait (walking locomotion);assistive device use  -HC     [place] Level (Gait Training Goal 1, PT) minimum assist (75% or more patient effort)  -HC     Distance (Gait Training Goal 1, PT) 25 ft  -HC     Time Frame (Gait Training Goal 1, PT) 2 weeks  -[facility] Name 09/16/23 1652          Therapy Assessment/Plan (PT)    Planned Therapy Interventions (PT) balance training;bed mobility training;gait training;neuromuscular re-education;stair training;strengthening;patient/family education;transfer training;postural re-education;home exercise program;ROM (range of motion)  -               User Key  (r) = Recorded By, (t) = Taken By, (c) = Cosigned By      Initials Name Provider Type     Radha Morrow N, PT Physical Therapist                   Clinical Impression       [facility] Name 09/16/23 1647          Pain    Additional Documentation Pain Scale: FACES Pre/Post-Treatment (Group)  -[facility] Name 09/16/23 1647          Pain Scale: FACES Pre/Post-Treatment    Pain: FACES Scale, Pretreatment 6-->hurts even more  -     Posttreatment Pain Rating 6-->hurts even more  -     Pre/Posttreatment Pain Comment right knee with mobility and weightbearing  -        Row Name 09/16/23 1647          Plan of Care Review    Outcome Evaluation Pt is 73 yo male admitted from rehab for right knee swelling.  Xray right knee showing knee effusion.  Pt s/p joint aspiration.  Ortho following.  Per RN, ortho stating pt with WBAT orders.  PMH:  b/l TKA, CHF, DM, YANA, HTN.  Pt presents with confusion and poor ability to state recent events or information on rehab stay.  Per RN, pt has been undergoing rehab.  Pt alert and oriented to person and place only.  Pt requiring modAx2 for bed mobility and transfers.  Pt exhibiting poor ability to weightbear on right LE due to pain with pivot transfer to chair.  Not safe to attempt ambulation at this time due to weakness, pain, and level of assist needed with transfers.  Attempt ambulation when appropriate.  Pt not safe to return home with wife at current level of function.  Plan return to SNF rehab to address deficits.  -       Row Name 09/16/23 1647          Therapy Assessment/Plan (PT)    Patient/Family Therapy Goals Statement (PT) decrease pain  -     Rehab Potential (PT) fair, will monitor progress closely  -     Criteria for Skilled Interventions Met (PT) yes;skilled treatment is necessary  -     Therapy Frequency (PT) 5 times/wk  -     Predicted Duration of Therapy Intervention (PT) until d/c  -       Row Name 09/16/23 1647          Positioning and Restraints    Pre-Treatment Position in bed  -     Post Treatment Position chair  -HC     In Chair notified nsg;call light within reach;encouraged to call for assist;exit alarm on  -               User Key  (r) = Recorded By, (t) = Taken By, (c) = Cosigned By      Initials Name Provider Type     Radha Morrow, PT Physical Therapist                   Outcome Measures       Row Name 09/16/23 1652 09/16/23 0855       How much help from another person do you currently need...    Turning from your back to your side while in flat bed without using bedrails? 2  -HC 2  -EY     Moving from lying on back to sitting on the side of a flat bed without bedrails? 2  -HC 2  -EY    Moving to and from a bed to a chair (including a wheelchair)? 2  -HC 2  -EY    Standing up from a chair using your arms (e.g., wheelchair, bedside chair)? 2  -HC 2  -EY    Climbing 3-5 steps with a railing? 1  -HC 2  -EY    To walk in hospital room? 1  -HC 2  -EY    AM-PAC 6 Clicks Score (PT) 10  - 12  -EY    Highest level of mobility 4 --> Transferred to chair/commode  -HC 4 --> Transferred to chair/commode  -EY      Row Name 09/16/23 1652          Functional Assessment    Outcome Measure Options AM-PAC 6 Clicks Basic Mobility (PT)  -               User Key  (r) = Recorded By, (t) = Taken By, (c) = Cosigned By      Initials Name Provider Type     Radha Morrow, PT Physical Therapist    Dee Dee Jose RN Registered Nurse                                 Physical Therapy Education       Title: PT OT SLP Therapies (In Progress)       Topic: Physical Therapy (In Progress)       Point: Mobility training (In Progress)       Learning Progress Summary             Patient Acceptance, E, NR by  at 9/16/2023 1652                         Point: Precautions (In Progress)       Learning Progress Summary             Patient Acceptance, E, NR by  at 9/16/2023 1652                                         User Key       Initials Effective Dates Name Provider Type [facility] 06/16/21 -  Radha Morrow, PT Physical Therapist PT                  PT Recommendation and Plan  Planned Therapy Interventions (PT): balance training, bed mobility training, gait training, neuromuscular re-education, stair training, strengthening, patient/family education, transfer training, postural re-education, home exercise program, ROM (range of motion)  Outcome Evaluation: Pt is 71 yo male admitted from rehab for right knee swelling.  Xray right knee showing knee effusion.  Pt s/p joint aspiration.  Ortho following.  Per RN, ortho  stating pt with WBAT orders.  PMH:  b/l TKA, CHF, DM, YANA, HTN.  Pt presents with confusion and poor ability to state recent events or information on rehab stay.  Per RN, pt has been undergoing rehab.  Pt alert and oriented to person and place only.  Pt requiring modAx2 for bed mobility and transfers.  Pt exhibiting poor ability to weightbear on right LE due to pain with pivot transfer to chair.  Not safe to attempt ambulation at this time due to weakness, pain, and level of assist needed with transfers.  Attempt ambulation when appropriate.  Pt not safe to return home with wife at current level of function.  Plan return to SNF rehab to address deficits.     Time Calculation:   PT Evaluation Complexity  History, PT Evaluation Complexity: 3 or more personal factors and/or comorbidities  Examination of Body Systems (PT Eval Complexity): total of 3 or more elements  Clinical Presentation (PT Evaluation Complexity): evolving  Clinical Decision Making (PT Evaluation Complexity): moderate complexity  Overall Complexity (PT Evaluation Complexity): moderate complexity     PT Charges       Row Name 09/16/23 1652             Time Calculation    Start Time 1510  -      Stop Time 1525  -      Time Calculation (min) 15 min  -      PT Received On 09/16/23  -      PT - Next Appointment 09/18/23  -      PT Goal Re-Cert Due Date 09/30/23  -         Time Calculation- PT    Total Timed Code Minutes- PT 0 minute(s)  -                User Key  (r) = Recorded By, (t) = Taken By, (c) = Cosigned By      Initials Name Provider Type     Radha Morrow, PT Physical Therapist                  Therapy Charges for Today       Code Description Service Date Service Provider Modifiers Qty    [id number]  PT EVAL MOD COMPLEXITY 3 9/16/2023 Radha Morrow, PT GP 1            PT G-Codes  Outcome Measure Options: AM-PAC 6 Clicks Basic Mobility (PT)  AM-PAC 6 Clicks Score (PT): 10  PT Discharge Summary  Anticipated Discharge  Disposition (PT): skilled nursing facility    Radha Morrow, PT  9/16/2023

## 2023-09-16 NOTE — PLAN OF CARE
Goal Outcome Evaluation:              Outcome Evaluation: Pt is 71 yo male admitted from rehab for right knee swelling.  Xray right knee showing knee effusion.  Pt s/p joint aspiration.  Ortho following.  Per RN, ortho stating pt with WBAT orders.  PMH:  b/l TKA, CHF, DM, YANA, HTN.  Pt presents with confusion and poor ability to state recent events or information on rehab stay.  Per RN, pt has been undergoing rehab.  Pt alert and oriented to person and place only.  Pt requiring modAx2 for bed mobility and transfers.  Pt exhibiting poor ability to weightbear on right LE due to pain with pivot transfer to chair.  Not safe to attempt ambulation at this time due to weakness, pain, and level of assist needed with transfers.  Attempt ambulation when appropriate.  Pt not safe to return home with wife at current level of function.  Plan return to SNF rehab to address deficits.      Anticipated Discharge Disposition (PT): skilled nursing facility

## 2023-09-16 NOTE — PLAN OF CARE
Goal Outcome Evaluation:      Patient doing well, appears more comfortable than last shift, less restless. Patient pain treated per MAR. Ace wraps and ice packs applied per ortho. Care plan ongoing

## 2023-09-16 NOTE — PROGRESS NOTES
[facility] Medicine Services   Daily Progress Note    Patient Name: Anurag Pickard  : 1950  MRN: [id number]  Primary Care Physician:  Bandar, No Known  Date of admission: 9/15/2023  Date and Time of Service: 2023    Brief clinical summary:  72-year-old male with history of obesity, diastolic heart failure, diabetes, erectile dysfunction, hypertension, hyperlipidemia, obstructive sleep apnea, pulmonary hypertension, hypothyroidism, GERD, vitamin D and B-12 deficiencies, Alzheimer's dementia and frequent falls was brought to the hospital for evaluation of acute right knee pain and swelling.  Knee aspiration completed by IR yesterday, aspirate demonstrated presence of calcium pyrophosphate crystals and negative cultures so far.  Patient was started on broad-spectrum antibiotics pending further analysis of knee aspirate.  Patient is unable to provide meaningful history due to Alzheimer's dementia.  History was obtained principally from his wife Alejandra    Subjective      Met patient resting in bed.  Wife, Alejandra at bedside.  Alejandra reiterated background information leading to his admission.  He has fallen several times, sometimes unwitnessed, nobody knew if he impacted his knees directly.  He did have a temperature 100.8 °F overnight.  Afebrile today    Objective      Vitals:   Temp:  [97.9 °F (36.6 °C)-100.8 °F (38.2 °C)] 98.5 °F (36.9 °C)  Heart Rate:  [] 84  Resp:  [15-18] 15  BP: (124-152)/() 129/88    Physical Exam   General appearance: Obese male resting in bed, not in distress, spouse at bedside  Mental status: Alert, correctly stated his name, location and thought the year was   CVS: Regular heart sounds, no murmurs, no gallops  Respiration: Unlabored breathing, no wheezes or rales  GI: Soft, nondistended, nontender, bowel sounds present  Right lower extremity: Mild swelling and tenderness without erythema of the right knee, decreased ROM of the right knee  joint, trace right leg edema  Left lower extremity: No acute abnormality, no edema  Psychiatry: Cooperative, not agitated     Result Review    Result Review:  I have personally reviewed the results from the time of this admission to 9/16/2023 12:36 EDT and agree with these findings:  [x]  Laboratory  [x]  Microbiology  [x]  Radiology  []  EKG/Telemetry   []  Cardiology/Vascular   []  Pathology  []  Old records  []  Other:      Assessment & Plan      atorvastatin, 40 mg, Oral, Daily  calcium gluconate, 2 g, Intravenous, Q2H  cefepime, 2,000 mg, Intravenous, Q8H  folic acid, 1 mg, Oral, Daily  insulin lispro, 2-9 Units, Subcutaneous, 4x Daily AC & at Bedtime  lidocaine, 10 mL, Injection, Once  metoprolol succinate XL, 25 mg, Oral, Daily  naproxen, 500 mg, Oral, BID With Meals  pantoprazole, 40 mg, Oral, BID AC  sodium chloride, 10 mL, Intravenous, Q12H  vancomycin, 1,750 mg, Intravenous, Q24H  vitamin B-12, 1,000 mcg, Oral, Daily       Pharmacy to Dose Cefepime,   Pharmacy to dose vancomycin,          Active Hospital Problems:  Active Hospital Problems    Diagnosis     **Right knee pain     Alzheimer's dementia      Plan:   #Acute right knee pain/effusion   -Suspect pseudogout  -Less likely septic arthritis based on negative fluid cultures  -Mainstay of pseudogout is aspiration and intrathecal steroid.  Indicates that steroid injection is not feasible, NSAIDs, colchicine and perhaps systemic steroids can be considered.  We are starting him on naproxen 500 mg twice daily for now.  Pending orthopedic evaluation.  Consider discontinuation of antibiotics within 48 hours if cultures remain negative    #Electrolyte disturbances, i.e. hypokalemia, hypomagnesemia, hypocalcemia  -Most likely due to poor intake in the last couple of weeks coupled with diuresis(  -Electrolyte replacement ordered  -Monitor levels closely and replace as needed    #Frequent falls with risk of further injuries  -PT/OT evaluation/early ambulation  when able    #Alzheimer's dementia, late onset  -Cooperative at the moment but remains at high risk of behavioral abnormalities and decompensation during hospital stay including agitation and delirium  -Provide supportive care and frequent reorientation  -Return to memory care postdischarge    #Chronic diastolic heart failure, not decompensated  Continue metoprolol    #Type 2 diabetes not on long-term insulin  -Continue sliding scale NovoLog for now  -A1c on this admission is 6.8%    #B12 deficiency  -Continue daily vitamin B12    #Hyperlipidemia  *Continue home statin    DVT prophylaxis:  Mechanical DVT prophylaxis orders are present.    CODE STATUS:    Level Of Support Discussed With: Health Care Surrogate  Code Status (Patient has no pulse and is not breathing): No CPR (Do Not Attempt to Resuscitate)  Medical Interventions (Patient has pulse or is breathing): Full Support      Disposition:  I expect patient to be discharged 1-2 days.      Electronically signed by Concepcion Duncan MD, 09/16/23, 12:36 EDT.  [facility] Hospitalist Team

## 2023-09-16 NOTE — PROGRESS NOTES
Orthopedic Consult      Patient: Anurag Pickard    Date of Admission: No admission date for patient encounter.    YOB: 1950    Medical Record Number: [id number]    Attending Physician: No att. providers found  Consulting Physician:       Chief Complaints: No admission diagnoses are documented for this encounter.      History of Present Illness: 72 y.o. male admitted to [facility] to services of No att. providers found with No admission diagnoses are documented for this encounter..  was consulted for further evaluation and treatment. Onset of symptoms was sudden in onset with right knee pain.  Symptoms are associated with pain and swelling over the right knee where he has had a right total knee arthroplasty several years ago.  Symptoms are aggravated by flexion and extension of the knee.   Symptoms improve with resting the knee anterior lateral aspect of the right knee.     Allergies   Allergen Reactions    Glipizide Other (See Comments)     Sugar too low    Lisinopril Cough       Medications:   Home Medications:  (Not in a hospital admission)      Current Medications:  Scheduled Meds:  Continuous Infusions:No current facility-administered medications for this visit.    PRN Meds:.       Past Medical History:   Diagnosis Date    Acute diastolic CHF (congestive heart failure) 01/03/2022    Ankle edema     Aortic stenosis     Moderate    Aortic valve replaced     B12 deficiency     Bell's palsy     Bleeds easily     Chipped tooth     top front    Diabetes mellitus     Dry skin     Erectile dysfunction     Falls     2 falls in last 3 months    Fatigue     Fragile skin     GERD (gastroesophageal reflux disease)     Heart murmur     Hyperlipidemia     Hypertension     Hypokalemia     Hypomagnesemia     YANA (obstructive sleep apnea)     cpap bring dos    Osteoarthritis     Pulmonary HTN     Rosacea     Vitamin D deficiency         Past Surgical History:   Procedure Laterality Date    AORTIC VALVE  REPAIR/REPLACEMENT N/A 03/28/2022    Procedure: AORTIC VALVE REPAIR/REPLACEMENT;  Surgeon: Dennis Brandon MD;  Location: [facility] CVOR;  Service: Cardiothoracic;  Laterality: N/A;  aortic valve replacement with 27mm [employer] magna ease    CARDIAC CATHETERIZATION      CARDIAC CATHETERIZATION N/A 12/17/2021    Procedure: Right and Left Heart Cath;  Surgeon: Juan Castellon DO;  Location: [facility] CATH INVASIVE LOCATION;  Service: Cardiology;  Laterality: N/A;    COLONOSCOPY      JOINT REPLACEMENT Bilateral 2021    knee    TONSILLECTOMY      TOTAL KNEE ARTHROPLASTY      TOTAL KNEE ARTHROPLASTY Left 05/04/2021    Procedure: TOTAL KNEE ARTHROPLASTY;  Surgeon: Otf Redmond MD;  Location: [facility] MAIN OR;  Service: Orthopedics;  Laterality: Left;        Social History     Occupational History    Not on file   Tobacco Use    Smoking status: Never    Smokeless tobacco: Never   Vaping Use    Vaping Use: Never used   Substance and Sexual Activity    Alcohol use: Yes     Alcohol/week: 14.0 standard drinks     Types: 14 Drinks containing 0.5 oz of alcohol per week     Comment: 2 bourbons every night    Drug use: No    Sexual activity: Defer      Social History     Social History Narrative    Not on file        Family History   Problem Relation Age of Onset    Diabetes Mother     Other Mother     Other Father     Heart attack Father     Other Sister     Diabetes Sister     Heart disease Sister     Diabetes Brother     Other Other     Diabetes Other          Review of Systems:   HEENT: Patient denies any headaches, vision changes, change in hearing, or tinnitus.  Patient denies any rhinorrhea,epistaxis, sinus pain, mouth or dental problems, sore throat or hoarseness, or dysphagia  Pulmonary: Patient denies any cough, congestion, SOA, or wheezing  Cardiovascular: Patient denies any chest pain, dyspnea, palpitations, weakness, intolerance of exercise, varicosities, swelling of extremities, known  murmur  Gastrointestinal:  Patient denies nausea, vomiting, diarrhea, constipation, loss  of appetite, change in appetite, dysphagia, gas, heartburn, melena, change in bowel habits, use of laxatives or other drugs to alter the function of the gastrointestinal tract.  Genital/Urinary: Patient denies dysuria, change in color of urine, change in frequency of urination, pain with urgency, incontinence, retention, or nocturia.  Musculoskeletal: Patient denies increased warmth; redness; or swelling of joints; limitation of function; deformity; crepitation: pain in a joint or an extremity, the neck, or the back, especially with movement.  Neurological: Patient denies dizziness, tremor, ataxia, difficulty in speaking, change in speech, paresthesia, loss of sensation, seizures, syncope, changes in memory.  Endocrine system: Patient denies tremors, palpitations, intolerance of heat or cold, polyuria, polydipsia, polyphagia, diaphoresis, exophthalmos, or goiter.  Psychological: Patient denies thoughts/plans of harming self or other; depression,  insomnia, night terrors, bucky, memory loss, disorientation.  Skin: Patient denies any bruising, rashes, discoloration, pruritus, wounds, ulcers, decubiti, changes in the hair or nails  Hematopoietic: Patient denies history of spontaneous or excessive bleeding, epistaxis, hematuria, melena, fatigue, enlarged or tender lymph nodes, pallor, history of anemia.    Physical Exam: 72 y.o. male  There were no vitals filed for this visit.  General Appearance:    Alert, cooperative, in no acute distress                      Head:  Normocephalic, without obvious abnormality, atraumatic   Eyes:          Lids and lashes normal, conjunctivae and sclerae normal, no icterus, no pallor, corneas clear, PERRLA   Ears:  Ears appear intact with no abnormalities noted   Throat: No oral lesions, no thrush, oral mucosa moist   Neck: No adenopathy, supple, trachea midline, no thyromegaly, no carotid bruit, no  JVD   Back:   No kyphosis present, no scoliosis present, no skin lesions,    erythema or scars, no tenderness to percussion or                   palpation, range of motion normal   Lungs:   Clear to auscultation,respirations regular, even and                unlabored    Heart:  Regular rhythm and normal rate, normal S1 and S2, no         murmur, no gallop, no rub, no click   Chest Wall:  No abnormalities observed   Abdomen:   Normal bowel sounds, no masses, no organomegaly, soft     nontender, nondistended, no guarding, no rebound                tenderness   Rectal:    Deferred   Extremities: Tenderness noted at anterior and lateral aspect of the right knee.  Moves all extremities well, no       edema, no cyanosis, no redness   Pulses: Pulses palpable and equal bilaterally   Skin: No bleeding, bruising or rash   Lymph nodes: No palpable adenopathy   Neurologic: Cranial nerves 2 - 12 grossly intact, sensation intact, DTR     present and equal bilaterally           Diagnostic Tests:  Admission on 09/15/2023   Component Date Value Ref Range Status    Sed Rate 09/15/2023 57 (H)  0 - 20 mm/hr Final    C-Reactive Protein 09/15/2023 12.13 (H)  0.00 - 0.50 mg/dL Final    Uric Acid 09/15/2023 5.5  3.4 - 7.0 mg/dL Final    proBNP 09/15/2023 5,125.0 (H)  0.0 - 900.0 pg/mL Final    Glucose 09/15/2023 150 (H)  65 - 99 mg/dL Final    BUN 09/15/2023 9  8 - 23 mg/dL Final    Creatinine 09/15/2023 0.98  0.76 - 1.27 mg/dL Final    Sodium 09/15/2023 141  136 - 145 mmol/L Final    Potassium 09/15/2023 3.2 (L)  3.5 - 5.2 mmol/L Final    Slight hemolysis detected by analyzer. Results may be affected.    Chloride 09/15/2023 104  98 - 107 mmol/L Final    CO2 09/15/2023 23.0  22.0 - 29.0 mmol/L Final    Calcium 09/15/2023 6.4 (L)  8.6 - 10.5 mg/dL Final    BUN/Creatinine Ratio 09/15/2023 9.2  7.0 - 25.0 Final    Anion Gap 09/15/2023 14.0  5.0 - 15.0 mmol/L Final    eGFR 09/15/2023 82.4  >60.0 mL/min/1.73 Final    WBC 09/15/2023 14.90 (H)   3.40 - 10.80 10*3/mm3 Final    RBC 09/15/2023 3.02 (L)  4.14 - 5.80 10*6/mm3 Final    Hemoglobin 09/15/2023 9.2 (L)  13.0 - 17.7 g/dL Final    Hematocrit 09/15/2023 27.8 (L)  37.5 - 51.0 % Final    MCV 09/15/2023 91.9  79.0 - 97.0 fL Final    MCH 09/15/2023 30.6  26.6 - 33.0 pg Final    MCHC 09/15/2023 33.3  31.5 - 35.7 g/dL Final    RDW 09/15/2023 14.1  12.3 - 15.4 % Final    RDW-SD 09/15/2023 45.1  37.0 - 54.0 fl Final    MPV 09/15/2023 11.4  6.0 - 12.0 fL Final    Platelets 09/15/2023 223  140 - 450 10*3/mm3 Final    Neutrophil % 09/15/2023 81.0 (H)  42.7 - 76.0 % Final    Lymphocyte % 09/15/2023 9.7 (L)  19.6 - 45.3 % Final    Monocyte % 09/15/2023 8.4  5.0 - 12.0 % Final    Eosinophil % 09/15/2023 0.3  0.3 - 6.2 % Final    Basophil % 09/15/2023 0.6  0.0 - 1.5 % Final    Neutrophils, Absolute 09/15/2023 12.00 (H)  1.70 - 7.00 10*3/mm3 Final    Lymphocytes, Absolute 09/15/2023 1.40  0.70 - 3.10 10*3/mm3 Final    Monocytes, Absolute 09/15/2023 1.30 (H)  0.10 - 0.90 10*3/mm3 Final    Eosinophils, Absolute 09/15/2023 0.00  0.00 - 0.40 10*3/mm3 Final    Basophils, Absolute 09/15/2023 0.10  0.00 - 0.20 10*3/mm3 Final    nRBC 09/15/2023 0.1  0.0 - 0.2 /100 WBC Final    RBC Morphology 09/15/2023 Normal  Normal Final    WBC Morphology 09/15/2023 Normal  Normal Final    Platelet Estimate 09/15/2023 Adequate  Normal Final    Large Platelets 09/15/2023 Slight/1+  None Seen Final    Giant Platelets 09/15/2023 Slight/1+  None Seen Final    Crystals, Fluid 09/15/2023 Few Calcium pyrophosphate, intracellular only   Final    Body Fluid Culture 09/15/2023 No growth   Preliminary    Gram Stain 09/15/2023 Moderate (3+) WBCs per low power field   Preliminary    Gram Stain 09/15/2023 No organisms seen   Preliminary    Color, Fluid 09/15/2023 Yellow   Final    Appearance, Fluid 09/15/2023 Cloudy (A)  Clear Final    Nucleated Cells, Fluid 09/15/2023 32,000  /mm3 Final    Neutrophils, Fluid 09/15/2023 94  % Final    Monocytes, Fluid  09/15/2023 6  % Final    Hemoglobin A1C 09/15/2023 6.80 (H)  4.80 - 5.60 % Final    Magnesium 09/15/2023 0.5 (C)  1.6 - 2.4 mg/dL Final    Lactate 09/15/2023 1.1  0.5 - 2.0 mmol/L Final    QT Interval 09/15/2023 386  ms Preliminary    QTC Interval 09/15/2023 480  ms Preliminary    Glucose 09/15/2023 178 (H)  70 - 105 mg/dL Final    Serial Number: [id number]:  [id number]    Glucose 09/15/2023 229 (H)  65 - 99 mg/dL Final    BUN 09/15/2023 12  8 - 23 mg/dL Final    Creatinine 09/15/2023 1.01  0.76 - 1.27 mg/dL Final    Sodium 09/15/2023 138  136 - 145 mmol/L Final    Potassium 09/15/2023 3.1 (L)  3.5 - 5.2 mmol/L Final    Chloride 09/15/2023 102  98 - 107 mmol/L Final    CO2 09/15/2023 24.0  22.0 - 29.0 mmol/L Final    Calcium 09/15/2023 5.9 (C)  8.6 - 10.5 mg/dL Final    BUN/Creatinine Ratio 09/15/2023 11.9  7.0 - 25.0 Final    Anion Gap 09/15/2023 12.0  5.0 - 15.0 mmol/L Final    eGFR 09/15/2023 79.5  >60.0 mL/min/1.73 Final    WBC 09/15/2023 10.70  3.40 - 10.80 10*3/mm3 Final    RBC 09/15/2023 3.16 (L)  4.14 - 5.80 10*6/mm3 Final    Hemoglobin 09/15/2023 9.6 (L)  13.0 - 17.7 g/dL Final    Hematocrit 09/15/2023 29.1 (L)  37.5 - 51.0 % Final    MCV 09/15/2023 92.1  79.0 - 97.0 fL Final    MCH 09/15/2023 30.2  26.6 - 33.0 pg Final    MCHC 09/15/2023 32.8  31.5 - 35.7 g/dL Final    RDW 09/15/2023 14.7  12.3 - 15.4 % Final    RDW-SD 09/15/2023 46.8  37.0 - 54.0 fl Final    MPV 09/15/2023 10.4  6.0 - 12.0 fL Final    Platelets 09/15/2023 220  140 - 450 10*3/mm3 Final    Neutrophil % 09/15/2023 80.2 (H)  42.7 - 76.0 % Final    Lymphocyte % 09/15/2023 12.5 (L)  19.6 - 45.3 % Final    Monocyte % 09/15/2023 6.4  5.0 - 12.0 % Final    Eosinophil % 09/15/2023 0.2 (L)  0.3 - 6.2 % Final    Basophil % 09/15/2023 0.7  0.0 - 1.5 % Final    Neutrophils, Absolute 09/15/2023 8.60 (H)  1.70 - 7.00 10*3/mm3 Final    Lymphocytes, Absolute 09/15/2023 1.30  0.70 - 3.10 10*3/mm3 Final    Monocytes, Absolute 09/15/2023 0.70  0.10 -  0.90 10*3/mm3 Final    Eosinophils, Absolute 09/15/2023 0.00  0.00 - 0.40 10*3/mm3 Final    Basophils, Absolute 09/15/2023 0.10  0.00 - 0.20 10*3/mm3 Final    nRBC 09/15/2023 0.0  0.0 - 0.2 /100 WBC Final    Glucose 09/16/2023 165 (H)  70 - 105 mg/dL Final    Serial Number: [id number]:  [id number]    Glucose 09/16/2023 136 (H)  70 - 105 mg/dL Final    Serial Number: [id number]:  [id number]     No results found.    Assessment:    * No active hospital problems. *          Plan:  The patient voiced understanding of the risks, benefits, and alternative forms of treatment that were discussed and the patient consents to proceed with admission to the hospitalist and nonoperative care at this point.       Discharge Plan:  Unknown at this point  to home      Date: 9/16/2023    Yrn Doty MD      Time: Critical care 20 min     DICTATED UTILIZING DRAGON DICTATION

## 2023-09-17 LAB
ANION GAP SERPL CALCULATED.3IONS-SCNC: 9 MMOL/L (ref 5–15)
BASOPHILS # BLD AUTO: 0.1 10*3/MM3 (ref 0–0.2)
BASOPHILS NFR BLD AUTO: 1.1 % (ref 0–1.5)
BUN SERPL-MCNC: 12 MG/DL (ref 8–23)
BUN/CREAT SERPL: 11.9 (ref 7–25)
CALCIUM SPEC-SCNC: 7 MG/DL (ref 8.6–10.5)
CHLORIDE SERPL-SCNC: 105 MMOL/L (ref 98–107)
CO2 SERPL-SCNC: 23 MMOL/L (ref 22–29)
CREAT SERPL-MCNC: 1.01 MG/DL (ref 0.76–1.27)
DEPRECATED RDW RBC AUTO: 45.1 FL (ref 37–54)
EGFRCR SERPLBLD CKD-EPI 2021: 79.5 ML/MIN/1.73
EOSINOPHIL # BLD AUTO: 0.1 10*3/MM3 (ref 0–0.4)
EOSINOPHIL NFR BLD AUTO: 1.5 % (ref 0.3–6.2)
ERYTHROCYTE [DISTWIDTH] IN BLOOD BY AUTOMATED COUNT: 14.2 % (ref 12.3–15.4)
GLUCOSE BLDC GLUCOMTR-MCNC: 109 MG/DL (ref 70–105)
GLUCOSE BLDC GLUCOMTR-MCNC: 135 MG/DL (ref 70–105)
GLUCOSE BLDC GLUCOMTR-MCNC: 161 MG/DL (ref 70–105)
GLUCOSE BLDC GLUCOMTR-MCNC: 166 MG/DL (ref 70–105)
GLUCOSE SERPL-MCNC: 154 MG/DL (ref 65–99)
HCT VFR BLD AUTO: 25.3 % (ref 37.5–51)
HGB BLD-MCNC: 8.4 G/DL (ref 13–17.7)
LYMPHOCYTES # BLD AUTO: 1.1 10*3/MM3 (ref 0.7–3.1)
LYMPHOCYTES NFR BLD AUTO: 13.1 % (ref 19.6–45.3)
MAGNESIUM SERPL-MCNC: 1.6 MG/DL (ref 1.6–2.4)
MCH RBC QN AUTO: 30.4 PG (ref 26.6–33)
MCHC RBC AUTO-ENTMCNC: 33.3 G/DL (ref 31.5–35.7)
MCV RBC AUTO: 91.5 FL (ref 79–97)
MONOCYTES # BLD AUTO: 0.6 10*3/MM3 (ref 0.1–0.9)
MONOCYTES NFR BLD AUTO: 7 % (ref 5–12)
NEUTROPHILS NFR BLD AUTO: 6.2 10*3/MM3 (ref 1.7–7)
NEUTROPHILS NFR BLD AUTO: 77.3 % (ref 42.7–76)
NRBC BLD AUTO-RTO: 0.1 /100 WBC (ref 0–0.2)
PLATELET # BLD AUTO: 187 10*3/MM3 (ref 140–450)
PMV BLD AUTO: 9.8 FL (ref 6–12)
POTASSIUM SERPL-SCNC: 3.9 MMOL/L (ref 3.5–5.2)
RBC # BLD AUTO: 2.77 10*6/MM3 (ref 4.14–5.8)
SODIUM SERPL-SCNC: 137 MMOL/L (ref 136–145)
VANCOMYCIN TROUGH SERPL-MCNC: 6.9 MCG/ML (ref 5–20)
WBC NRBC COR # BLD: 8.1 10*3/MM3 (ref 3.4–10.8)

## 2023-09-17 PROCEDURE — 80202 ASSAY OF VANCOMYCIN: CPT

## 2023-09-17 PROCEDURE — 63710000001 INSULIN LISPRO (HUMAN) PER 5 UNITS

## 2023-09-17 PROCEDURE — 83735 ASSAY OF MAGNESIUM: CPT | Performed by: INTERNAL MEDICINE

## 2023-09-17 PROCEDURE — 36415 COLL VENOUS BLD VENIPUNCTURE: CPT | Performed by: INTERNAL MEDICINE

## 2023-09-17 PROCEDURE — 85025 COMPLETE CBC W/AUTO DIFF WBC: CPT | Performed by: INTERNAL MEDICINE

## 2023-09-17 PROCEDURE — 80048 BASIC METABOLIC PNL TOTAL CA: CPT | Performed by: INTERNAL MEDICINE

## 2023-09-17 PROCEDURE — 82948 REAGENT STRIP/BLOOD GLUCOSE: CPT

## 2023-09-17 PROCEDURE — 0 CEFEPIME PER 500 MG

## 2023-09-17 PROCEDURE — 25010000002 CALCIUM GLUCONATE 2-0.675 GM/100ML-% SOLUTION: Performed by: INTERNAL MEDICINE

## 2023-09-17 PROCEDURE — 25010000002 FUROSEMIDE PER 20 MG: Performed by: INTERNAL MEDICINE

## 2023-09-17 RX ORDER — POTASSIUM CHLORIDE 20 MEQ/1
40 TABLET, EXTENDED RELEASE ORAL ONCE
Status: COMPLETED | OUTPATIENT
Start: 2023-09-17 | End: 2023-09-17

## 2023-09-17 RX ORDER — CALCIUM GLUCONATE 20 MG/ML
2000 INJECTION, SOLUTION INTRAVENOUS ONCE
Status: COMPLETED | OUTPATIENT
Start: 2023-09-17 | End: 2023-09-17

## 2023-09-17 RX ORDER — FUROSEMIDE 10 MG/ML
40 INJECTION INTRAMUSCULAR; INTRAVENOUS ONCE
Status: COMPLETED | OUTPATIENT
Start: 2023-09-17 | End: 2023-09-17

## 2023-09-17 RX ADMIN — NAPROXEN 500 MG: 250 TABLET ORAL at 08:14

## 2023-09-17 RX ADMIN — FOLIC ACID 1 MG: 1 TABLET ORAL at 08:14

## 2023-09-17 RX ADMIN — PANTOPRAZOLE SODIUM 40 MG: 40 TABLET, DELAYED RELEASE ORAL at 17:33

## 2023-09-17 RX ADMIN — Medication 400 MG: at 11:21

## 2023-09-17 RX ADMIN — FUROSEMIDE 40 MG: 40 INJECTION, SOLUTION INTRAMUSCULAR; INTRAVENOUS at 11:21

## 2023-09-17 RX ADMIN — ATORVASTATIN CALCIUM 40 MG: 40 TABLET, FILM COATED ORAL at 08:14

## 2023-09-17 RX ADMIN — Medication 400 MG: at 20:43

## 2023-09-17 RX ADMIN — Medication 5 MG: at 20:43

## 2023-09-17 RX ADMIN — INSULIN LISPRO 2 UNITS: 100 INJECTION, SOLUTION INTRAVENOUS; SUBCUTANEOUS at 17:33

## 2023-09-17 RX ADMIN — Medication 10 ML: at 08:15

## 2023-09-17 RX ADMIN — PANTOPRAZOLE SODIUM 40 MG: 40 TABLET, DELAYED RELEASE ORAL at 08:14

## 2023-09-17 RX ADMIN — METOPROLOL TARTRATE 5 MG: 1 INJECTION, SOLUTION INTRAVENOUS at 21:10

## 2023-09-17 RX ADMIN — CEFEPIME 2000 MG: 2 INJECTION, POWDER, FOR SOLUTION INTRAVENOUS at 03:26

## 2023-09-17 RX ADMIN — CALCIUM GLUCONATE 2000 MG: 20 INJECTION, SOLUTION INTRAVENOUS at 11:23

## 2023-09-17 RX ADMIN — METOPROLOL SUCCINATE 25 MG: 25 TABLET, FILM COATED, EXTENDED RELEASE ORAL at 08:14

## 2023-09-17 RX ADMIN — CYANOCOBALAMIN TAB 1000 MCG 1000 MCG: 1000 TAB at 08:14

## 2023-09-17 RX ADMIN — POTASSIUM CHLORIDE 40 MEQ: 1500 TABLET, EXTENDED RELEASE ORAL at 11:21

## 2023-09-17 RX ADMIN — NAPROXEN 500 MG: 250 TABLET ORAL at 17:33

## 2023-09-17 NOTE — CASE MANAGEMENT/SOCIAL WORK
Discharge Planning Assessment   [facility]     Patient Name: Anurag Pickard  MRN: [id number]  Today's Date: 9/17/2023    Admit Date: 9/15/2023    Plan: from [employer]: [employer] (Private pay) No pasrr required.  Will need WC van. (has used [facility] [facility] in past if needed)   Discharge Needs Assessment       Row Name 09/17/23 1800       Living Environment    People in Home facility resident    Current Living Arrangements extended care facility    Primary Care Provided by other (see comments)    Provides Primary Care For no one, unable/limited ability to care for self    Family Caregiver if Needed other (see comments);spouse    Quality of Family Relationships helpful;involved       Resource/Environmental Concerns    Resource/Environmental Concerns none    Transportation Concerns none       Transition Planning    Patient/Family Anticipates Transition to long-term care facility    Patient/Family Anticipated Services at Transition none    Transportation Anticipated health plan transportation       Discharge Needs Assessment    Equipment Currently Used at Home walker, rolling;wheelchair    Concerns to be Addressed discharge planning    Anticipated Changes Related to Illness none    Equipment Needed After Discharge none    Provided Post Acute Provider List? N/A                   Discharge Plan       Row Name 09/17/23 1801       Plan    Plan from [facility] (Private pay) No pasrr required.  Will need WC van. (has used [facility] in past if needed)    Plan Comments CM received call back from spouse.  Delivered IMM via phone and emailed a copy to anabelle [email or web address].  pt has been at [facility] for Several weeks as spouse is unable to care for him (Alzheimers) at home any more.  Jonathan Conley is hii pcp but hasn't seen him since last November.  Spouse unsure of Pharmacy that [facility] uses.  Spouse has signed for patient to admit to the [facility] on Thurs/Friday to their memory care as private pay. discussed transport- 2  assist at this time.  spouse stated they had used [facility] wheelchair van in the past.      Row Name 09/17/23 1721       Plan    Plan Need CM assess.    Plan Comments attempted to call spouse to discuss d/c planning and review IMM.  no answer.  left generic voicemail. Dc barrier: Sitter at bedside;                  Continued Care and Services - Admitted Since 9/15/2023       Destination       Service Provider Request Status Selected Services Address Phone Fax Patient Preferred    [facility] Pending - Request Sent N/A [address] [facility] [id number] [phone] --                  Expected Discharge Date and Time       Expected Discharge Date Expected Discharge Time    Sep 18, 2023            Demographic Summary       Row Name 09/17/23 1757       General Information    Admission Type inpatient    Arrived From emergency department    Required Notices Provided Important Message from Medicare    Referral Source admission list    Reason for Consult discharge planning    Preferred Language English                   Functional Status       Row Name 09/17/23 1757       Functional Status    Usual Activity Tolerance fair    Current Activity Tolerance fair       Assessment of Health Literacy    How often do you have someone help you read hospital materials? Always    How often do you have problems learning about your medical condition because of difficulty understanding written information? Always    How often do you have a problem understanding what is told to you about your medical condition? Always    How confident are you filling out medical forms by yourself? Not at all    Health Literacy Low       Functional Status, IADL    Medications completely dependent    Meal Preparation completely dependent    Housekeeping completely dependent    Laundry completely dependent       Mental Status Summary    Recent Changes in Mental Status/Cognitive Functioning --  alzheimers                    Psychosocial    No documentation.                  Abuse/Neglect    No documentation.                  Legal    No documentation.                  Substance Abuse    No documentation.                  Patient Forms       Row Name 09/17/23 1806       Patient Forms    Important Message from Medicare ([facility]) Delivered    Delivered to Support person    Method of delivery Telephone    Details of attempted delivery delivered via phone and emailed a copy to spouse.      Row Name 09/17/23 1727       Patient Forms    Important Message from Medicare ([facility]) Attempted    Details of attempted delivery attempted to call spouse.                      Ana Jacobo RN

## 2023-09-17 NOTE — PROGRESS NOTES
"    [facility] Medicine Services   Daily Progress Note    Patient Name: Anurag Pickard  : 1950  MRN: [id number]  Primary Care Physician:  Provider, No Known  Date of admission: 9/15/2023  Date and Time of Service: 2023    Brief clinical summary:  72-year-old male with history of obesity, diastolic heart failure, diabetes, erectile dysfunction, hypertension, hyperlipidemia, obstructive sleep apnea, pulmonary hypertension, hypothyroidism, GERD, vitamin D and B-12 deficiencies, Alzheimer's dementia and frequent falls was brought to the hospital for evaluation of acute right knee pain and swelling.  Knee aspiration completed by IR on 9/15, aspirate demonstrated presence of calcium pyrophosphate crystals and negative cultures so far.  Antibiotics discontinued since suspicion for septic arthritis is low.     Subjective      Met patient resting in bedside chair. He is cheerful, jovial and conversational. Wife, Alejandra at bedside.  Alejandra endorses clinical improvement. States that his mental status is now about his baseline, and he is \"getting his humor back.\". Fever was documented one time on 9/15 and has not recurred since that time. His appetite is fair. Trace leg swelling has increased. Electrolyte abnormalities improved    Objective      Vitals:   Temp:  [97.5 °F (36.4 °C)-98 °F (36.7 °C)] 97.5 °F (36.4 °C)  Heart Rate:  [] 77  Resp:  [16-20] 16  BP: (103-126)/(66-84) 126/84    Physical Exam   General appearance: Obese male resting in bedside chair, not in distress, spouse at bedside  Mental status: Alert, oriented and conversational  CVS: Regular heart sounds, no murmurs, no gallops  Respiration: Unlabored breathing, no wheezes or rales  GI: Soft, nondistended, nontender, bowel sounds present  Right lower extremity: Mild swelling and tenderness without erythema of the right knee, decreased ROM of the right knee joint, trace leg edema now extends to the left  Psychiatry: " Cooperative, not agitated     Result Review    Result Review:  I have personally reviewed the results from the time of this admission to 9/17/2023 10:29 EDT and agree with these findings:  [x]  Laboratory  []  Microbiology  [x]  Radiology  []  EKG/Telemetry   []  Cardiology/Vascular   []  Pathology  []  Old records  []  Other:      Assessment & Plan      atorvastatin, 40 mg, Oral, Daily  calcium gluconate, 2,000 mg, Intravenous, Once  folic acid, 1 mg, Oral, Daily  furosemide, 40 mg, Intravenous, Once  insulin lispro, 2-9 Units, Subcutaneous, 4x Daily AC & at Bedtime  lidocaine, 10 mL, Injection, Once  magnesium oxide, 400 mg, Oral, BID  metoprolol succinate XL, 25 mg, Oral, Daily  naproxen, 500 mg, Oral, BID With Meals  pantoprazole, 40 mg, Oral, BID AC  potassium chloride, 40 mEq, Oral, Once  sodium chloride, 10 mL, Intravenous, Q12H  vitamin B-12, 1,000 mcg, Oral, Daily               Active Hospital Problems:  Active Hospital Problems    Diagnosis     **Right knee pain     Alzheimer's dementia      Plan:   #Acute right knee pain/effusion   -Suspect acute pseudogout arthritis  -Less likely septic arthritis based on negative fluid cultures  -Mainstay of pseudogout is aspiration and intrathecal steroid. In the case that steroid injection is not feasible, NSAIDs, colchicine and perhaps systemic steroids can be considered.  He was started on naproxen 500 mg twice daily, will continue same. Knee aspirate culture remains negative. Will dc antibiotics    #Electrolyte disturbances, i.e. hypokalemia, hypomagnesemia, hypocalcemia  -Most likely due to poor intake in the last couple of weeks coupled with diuresis  -Mag and K now WNL post replacement. Ca improved but not yet normal  -Will replace Ca  -Monitor levels closely and replace as needed    #Frequent falls with risk of further injuries  -PT/OT evaluation/early ambulation when able    #Alzheimer's dementia, late onset  -Cooperative at the moment but remains at high risk  of behavioral abnormalities and decompensation during hospital stay including agitation and delirium  -Provide supportive care and frequent reorientation  -Return to memory care postdischarge    #Chronic diastolic heart failure, not decompensated  Continue metoprolol  May restart diuretic, but watch electrolytes carefully    #Type 2 diabetes not on long-term insulin  -Continue sliding scale NovoLog for now  -A1c on this admission is 6.8%    #B12 deficiency  -Continue daily vitamin B12    #Hyperlipidemia  *Continue home statin    #General weakness  -Continue PT/OT    DVT prophylaxis:  Mechanical DVT prophylaxis orders are present.    CODE STATUS:    Level Of Support Discussed With: Health Care Surrogate  Code Status (Patient has no pulse and is not breathing): No CPR (Do Not Attempt to Resuscitate)  Medical Interventions (Patient has pulse or is breathing): Full Support      Disposition:  I expect patient to be discharged on 9/18      Electronically signed by Concepcion Duncan MD, 09/17/23, 10:29 EDT.  [facility] Hospitalist Team

## 2023-09-17 NOTE — PLAN OF CARE
Goal Outcome Evaluation:              Outcome Evaluation: Pt abed presently, A/ox1, sitter at bedside r/t impulsivity. No c/o pain/discomfort, no s/sx of distress noted. Pt vss call light in reach, plan of care on going. Abx d/c'd this shift r/t to no growth on cultures.

## 2023-09-17 NOTE — PLAN OF CARE
Goal Outcome Evaluation:                        Patient remains with sitter at bedside for safety of pulling at lines and impulsiveness to get up for transfers, bed alarm also on and functioning, continues abt therapy for R knee pending cultures from aspiration, only complains of mild soreness, knee wrapped, this nurse did apply ice packs, however patient removed

## 2023-09-17 NOTE — DISCHARGE PLACEMENT REQUEST
"Anurag Pickard \"Sj\" (72 y.o. Male)       Date of Birth   1950    Social Security Number       Address   [address] [place] [address] [place] [facility]    Home Phone   [phone]    MRN   [id number]       Yarsani   None    Marital Status                               Admission Date   9/15/23    Admission Type   Emergency    Admitting Provider   Concepcion Duncan MD    Attending Provider   Concepcion Duncan MD    Department, Room/Bed   [facility] SURGICAL INPATIENT, 4123/1       Discharge Date       Discharge Disposition       Discharge Destination                                 Attending Provider: Concepcion Duncan MD    Allergies: Glipizide, Lisinopril    Isolation: None   Infection: None   Code Status: No CPR    Ht: 177.8 cm (70\")   Wt: 117 kg (257 lb 11.5 oz)    Admission Cmt: None   Principal Problem: Right knee pain [M25.561]                   Active Insurance as of 9/15/2023       Primary Coverage       Payor Plan Insurance Group Employer/Plan Group    MEDICARE MEDICARE A & B        Payor Plan Address Payor Plan Phone Number Payor Plan Fax Number Effective Dates    [address] [phone]  11/1/2016 - None Entered    [facility] [id number]         Subscriber Name Subscriber Birth Date Member ID       SALAZARALISSAANURAG 1950 [id number]               Secondary Coverage       Payor Plan Insurance Group Employer/Plan Group    [employer] [place] [employer] [place]       Payor Plan Address Payor Plan Phone Number Payor Plan Fax Number Effective Dates    [address] [place] [address] [phone]  11/1/2016 - None Entered    [place] [address]         Subscriber Name Subscriber Birth Date Member ID       FELICITYANURAG MARCUS 1950 [id number]                     Emergency Contacts        (Rel.) Home Phone Work Phone Mobile Phone    YESENIA MATEUS (Spouse) [phone] -- [phone]                 History & Physical        Ruthann Low PA-C at 09/15/23 " 1722       Attestation signed by Todd Brandon MD at 23 0721    I have reviewed this documentation and agree.                      [facility] Medicine Services  History & Physical    Patient Name: Anurag Pickard  : 1950  MRN: [id number]  Primary Care Physician:  Provider, No Known  Date of admission: 9/15/2023  Date and Time of Service: 9/15/23 at 1722    Subjective      Chief Complaint: right knee pain    History of Present Illness: Anurag Pickard is a 72 y.o. male who presented to [facility] on 9/15/2023 c from [facility].  Patient's wife is at bedside and states patient has Alzheimer's dementia and is currently at baseline, she states patient's mental status worsens in the evening.  Patient is currently oriented to person only and gave some inappropriate answers to questions asked today.  Patient did say his right knee was painful earlier but denies any pain currently.  Patient's wife states she thinks he fell at the facility on Wednesday night but she received a call this morning that the patient's right knee was swollen and an x-ray was taken at the facility.  She states the x-ray was inconclusive so they advised the patient be sent to the ER for further evaluation.  Patient denies any shortness of breath or pain elsewhere.  Patient's wife states he no longer drinks alcohol and has not drank alcohol in 5 weeks.  Patient's wife denies any other issues that she is aware of at this time.    XR Knee 4+ View Right    Result Date: 9/15/2023  Impression: Knee effusion. Normal-appearing knee arthroplasty. Electronically Signed: Jeannie Crump MD  9/15/2023 11:39 AM EDT  Workstation ID: [id number]     No orders to display     In the ED, All vitals stable on admission except /105. All labs unremarkable except proBNP 5125, potassium 3.2, glucose 150, calcium 6.4, hemoglobin A1c 6.8, C-reactive protein 12.13, WBC 14.9, hemoglobin 9.2, sed rate 57. Patient  received Tylenol, cefepime, morphine, vancomycin in ED. Hospitalist was contacted to admit patient for further care and management.       Review of Systems   Unable to perform ROS: Dementia      Personal History     Past Medical History:   Diagnosis Date    Acute diastolic CHF (congestive heart failure) 01/03/2022    Ankle edema     Aortic stenosis     Moderate    Aortic valve replaced     B12 deficiency     Bell's palsy     Bleeds easily     Chipped tooth     top front    Diabetes mellitus     Dry skin     Erectile dysfunction     Falls     2 falls in last 3 months    Fatigue     Fragile skin     GERD (gastroesophageal reflux disease)     Heart murmur     Hyperlipidemia     Hypertension     Hypokalemia     Hypomagnesemia     YANA (obstructive sleep apnea)     cpap bring dos    Osteoarthritis     Pulmonary HTN     Rosacea     Vitamin D deficiency        Past Surgical History:   Procedure Laterality Date    AORTIC VALVE REPAIR/REPLACEMENT N/A 03/28/2022    Procedure: AORTIC VALVE REPAIR/REPLACEMENT;  Surgeon: Dennis Brandon MD;  Location: [facility] CVOR;  Service: Cardiothoracic;  Laterality: N/A;  aortic valve replacement with 27mm kulk[employer]i [employer] magna ease    CARDIAC CATHETERIZATION      CARDIAC CATHETERIZATION N/A 12/17/2021    Procedure: Right and Left Heart Cath;  Surgeon: Juan Castellon DO;  Location: [facility] CATH INVASIVE LOCATION;  Service: Cardiology;  Laterality: N/A;    COLONOSCOPY      JOINT REPLACEMENT Bilateral 2021    knee    TONSILLECTOMY      TOTAL KNEE ARTHROPLASTY      TOTAL KNEE ARTHROPLASTY Left 05/04/2021    Procedure: TOTAL KNEE ARTHROPLASTY;  Surgeon: Otf Redmond MD;  Location: [facility] MAIN OR;  Service: Orthopedics;  Laterality: Left;       Family History: family history includes Diabetes in his brother, mother, sister, and another family member; Heart attack in his father; Heart disease in his sister; Other in his father, mother, sister, and another family member.  Otherwise pertinent FHx was reviewed and not pertinent to current issue.    Social History:  reports that he has quit smoking. His smoking use included cigarettes. He has never used smokeless tobacco. He reports current alcohol use of about 14.0 standard drinks per week. He reports that he does not use drugs.    Home Medications:  Prior to Admission Medications       Prescriptions Last Dose Informant Patient Reported? Taking?    acetaminophen (TYLENOL) 325 MG tablet   Yes Yes    Take 2 tablets by mouth Every 4 (Four) Hours As Needed for Mild Pain.    atorvastatin (LIPITOR) 40 MG tablet   No Yes    Take 1 tablet by mouth once daily    docusate sodium (COLACE) 100 MG capsule   Yes Yes    Take 1 capsule by mouth At Night As Needed for Constipation.    ezetimibe (ZETIA) 10 MG tablet   No Yes    Take 1 tablet by mouth once daily    folic acid (FOLVITE) 1 MG tablet   No Yes    Take 1 tablet by mouth Daily for 360 days.    lansoprazole (PREVACID) 30 MG capsule   No Yes    Take 1 capsule by mouth Daily.    Melatonin 3 MG capsule   Yes Yes    Take 1 capsule by mouth At Night As Needed.    metFORMIN (GLUCOPHAGE) 1000 MG tablet   Yes Yes    Take 1 tablet by mouth 2 (Two) Times a Day With Meals.    metoprolol succinate XL (TOPROL-XL) 25 MG 24 hr tablet   Yes Yes    Take 1 tablet by mouth Daily.    multivitamin with minerals tablet tablet   Yes Yes    Take 1 tablet by mouth Daily.    ondansetron (ZOFRAN) 4 MG tablet   Yes Yes    Take 1 tablet by mouth 3 (Three) Times a Day As Needed for Nausea or Vomiting.    vitamin B-12 (CYANOCOBALAMIN) 1000 MCG tablet   Yes Yes    Take 1 tablet by mouth Daily.    Accu-Chek Guide test strip  Spouse/Significant Other No No    Use as instructed once daily   Dx e 11.9              Allergies:  Allergies   Allergen Reactions    Glipizide Other (See Comments)     Sugar too low    Lisinopril Cough       Objective      Vitals:   Temp:  [98 °F (36.7 °C)-98.5 °F (36.9 °C)] 98.1 °F (36.7 °C)  Heart Rate:   [76-97] 88  Resp:  [16-18] 16  BP: (124-158)/() 128/85    Physical Exam  Vitals and nursing note reviewed.   HENT:      Head: Normocephalic.   Eyes:      Extraocular Movements: Extraocular movements intact.      Pupils: Pupils are equal, round, and reactive to light.   Cardiovascular:      Rate and Rhythm: Normal rate and regular rhythm.      Pulses: Normal pulses.   Pulmonary:      Effort: Pulmonary effort is normal.      Breath sounds: Normal breath sounds.   Abdominal:      General: Bowel sounds are normal.      Palpations: Abdomen is soft.      Tenderness: There is no abdominal tenderness.   Musculoskeletal:         General: Normal range of motion.      Right lower le+ Edema present.      Left lower le+ Edema present.      Comments: Swelling of right knee, no erythema noted  No tenderness to palpation over right knee    Mild edema bilateral lower extremities   Skin:     General: Skin is warm and dry.   Neurological:      Mental Status: He is alert. Mental status is at baseline. He is disoriented.   Psychiatric:         Mood and Affect: Mood normal.        Result Review    Result Review:  I have personally reviewed the results from the time of this admission to 9/15/2023 18:50 EDT and agree with these findings:  [x]  Laboratory  [x]  Microbiology  [x]  Radiology  []  EKG/Telemetry   []  Cardiology/Vascular   []  Pathology  []  Old records  []  Other:  Most notable findings include:     CBC:      Lab 09/15/23  1138   WBC 14.90*   HEMOGLOBIN 9.2*   HEMATOCRIT 27.8*   PLATELETS 223   NEUTROS ABS 12.00*   LYMPHS ABS 1.40   MONOS ABS 1.30*   EOS ABS 0.00   MCV 91.9     CMP:        Lab 09/15/23  1138   SODIUM 141   POTASSIUM 3.2*   CHLORIDE 104   CO2 23.0   ANION GAP 14.0   BUN 9   CREATININE 0.98   EGFR 82.4   GLUCOSE 150*   CALCIUM 6.4*           Assessment & Plan        Active Hospital Problems:  Active Hospital Problems    Diagnosis     **Right knee pain     Alzheimer's dementia      Plan:     Right  knee pain  Status post bilateral TKA, most recent TKA was left TKA in May 2021  Possible septic arthritis vs calcium pyrophosphate disease  WBC 14.9, sed rate 57,  Cefepime and vancomycin initiated in ED  -Fluid from knee collection showed cloudy appearance, 94 neutrophils, 6 monocytes, 32,000 nucleated cells, few calcium pyrophosphate, intracellular only  -Will continue antibiotics for now but at this time suspect calcium pyrophosphate disease over septic arthritis but will continue the antibiotics until patient has been evaluated by orthopedic surgery.  -Tylenol ordered for pain as needed, patient denies any pain currently.  -Blood culture pending.  -Orthopedic surgery consult    Diabetes mellitus type II  -Un controlled   Lab Results   Component Value Date    GLUCOSE 150 (H) 09/15/2023    GLUCOSE 121 (H) 11/01/2022    GLUCOSE 130 (H) 05/16/2022    GLUCOSE 111 (H) 04/29/2022     -Hold home metformin  -Sliding scale insulin ordered  -Diabetic diet  -Monitor AC and HS    Chronic diastolic congestive heart failure, not in exacerbation  proBNP 5125  Chest x-ray ordered  Patient is not in respiratory distress, on room air SPO2 96%    Hypokalemia  Potassium 3.2  EKG ordered  Magnesium level ordered  Potassium replacement protocol ordered  Continue to monitor with labs    Hypocalcemia  Calcium 6.4  Calcium replacement protocol ordered  Continue to monitor with labs    Alzheimer's dementia  Fall precautions ordered    Hypertension  - Controlled   - BP: 128/85  - Continue home metoprolol  - Monitor while admitted    AS status post tissue aortic valve replacement March 2022  Hyperlipidemia  Continue home atorvastatin, Zetia not on hospital formulary    DVT prophylaxis:  Mechanical DVT prophylaxis orders are present.    CODE STATUS: Discussed CODE STATUS with patient's wife today, patient's wife presented power of  paperwork and it is in chart that she is his power of  for healthcare.  Wife states patient is  a DNR.  Level Of Support Discussed With: Health Care Surrogate  Code Status (Patient has no pulse and is not breathing): No CPR (Do Not Attempt to Resuscitate)  Medical Interventions (Patient has pulse or is breathing): Full Support    Admission Status:  I believe this patient meets observation status.    I discussed the patient's findings and my recommendations with patient, family, and attending physician Dr. Brandon .    This patient has been examined wearing appropriate Personal Protective Equipment and discussed with  attending physician Dr. Brandon . 09/15/23      Signature: Electronically signed by Ruthann Low PA-C, 09/15/23, 18:50 EDT.  [facility] Hospitalist Team    Electronically signed by Todd Brandon MD at 09/16/23 0799

## 2023-09-17 NOTE — CASE MANAGEMENT/SOCIAL WORK
Continued Stay Note  FABIAN Casey     Patient Name: Anurag Pickard  MRN: [id number]  Today's Date: 9/17/2023    Admit Date: 9/15/2023    Plan: Need CM assess.   Discharge Plan       Row Name 09/17/23 1660       Plan    Plan Need CM assess.    Plan Comments attempted to call spouse to discuss d/c planning and review IMM.  no answer.  left generic voicemail. Dc barrier: Sitter at bedside;                            Ana Jacobo RN

## 2023-09-18 LAB
ANION GAP SERPL CALCULATED.3IONS-SCNC: 12 MMOL/L (ref 5–15)
BUN SERPL-MCNC: 16 MG/DL (ref 8–23)
BUN/CREAT SERPL: 14 (ref 7–25)
CALCIUM SPEC-SCNC: 7.4 MG/DL (ref 8.6–10.5)
CHLORIDE SERPL-SCNC: 102 MMOL/L (ref 98–107)
CO2 SERPL-SCNC: 24 MMOL/L (ref 22–29)
CREAT SERPL-MCNC: 1.14 MG/DL (ref 0.76–1.27)
EGFRCR SERPLBLD CKD-EPI 2021: 68.8 ML/MIN/1.73
GLUCOSE BLDC GLUCOMTR-MCNC: 145 MG/DL (ref 70–105)
GLUCOSE BLDC GLUCOMTR-MCNC: 187 MG/DL (ref 70–105)
GLUCOSE BLDC GLUCOMTR-MCNC: 202 MG/DL (ref 70–105)
GLUCOSE SERPL-MCNC: 156 MG/DL (ref 65–99)
POTASSIUM SERPL-SCNC: 3.7 MMOL/L (ref 3.5–5.2)
SODIUM SERPL-SCNC: 138 MMOL/L (ref 136–145)
WHOLE BLOOD HOLD SPECIMEN: NORMAL

## 2023-09-18 PROCEDURE — 36415 COLL VENOUS BLD VENIPUNCTURE: CPT | Performed by: INTERNAL MEDICINE

## 2023-09-18 PROCEDURE — 97166 OT EVAL MOD COMPLEX 45 MIN: CPT

## 2023-09-18 PROCEDURE — 63710000001 INSULIN LISPRO (HUMAN) PER 5 UNITS

## 2023-09-18 PROCEDURE — 97116 GAIT TRAINING THERAPY: CPT

## 2023-09-18 PROCEDURE — 25010000002 CALCIUM GLUCONATE 2-0.675 GM/100ML-% SOLUTION: Performed by: INTERNAL MEDICINE

## 2023-09-18 PROCEDURE — 25010000002 LORAZEPAM PER 2 MG: Performed by: NURSE PRACTITIONER

## 2023-09-18 PROCEDURE — 80048 BASIC METABOLIC PNL TOTAL CA: CPT | Performed by: INTERNAL MEDICINE

## 2023-09-18 PROCEDURE — 82948 REAGENT STRIP/BLOOD GLUCOSE: CPT

## 2023-09-18 PROCEDURE — 97110 THERAPEUTIC EXERCISES: CPT

## 2023-09-18 PROCEDURE — 83735 ASSAY OF MAGNESIUM: CPT | Performed by: INTERNAL MEDICINE

## 2023-09-18 RX ORDER — FUROSEMIDE 40 MG/1
40 TABLET ORAL DAILY
Status: DISCONTINUED | OUTPATIENT
Start: 2023-09-18 | End: 2023-09-19

## 2023-09-18 RX ORDER — CALCIUM GLUCONATE 20 MG/ML
2000 INJECTION, SOLUTION INTRAVENOUS ONCE
Status: COMPLETED | OUTPATIENT
Start: 2023-09-18 | End: 2023-09-18

## 2023-09-18 RX ORDER — LORAZEPAM 2 MG/ML
0.25 INJECTION INTRAMUSCULAR ONCE
Status: COMPLETED | OUTPATIENT
Start: 2023-09-18 | End: 2023-09-18

## 2023-09-18 RX ORDER — POTASSIUM CHLORIDE 20 MEQ/1
20 TABLET, EXTENDED RELEASE ORAL DAILY
Status: DISCONTINUED | OUTPATIENT
Start: 2023-09-18 | End: 2023-09-20 | Stop reason: HOSPADM

## 2023-09-18 RX ADMIN — POTASSIUM CHLORIDE 20 MEQ: 1500 TABLET, EXTENDED RELEASE ORAL at 08:48

## 2023-09-18 RX ADMIN — NAPROXEN 500 MG: 250 TABLET ORAL at 08:49

## 2023-09-18 RX ADMIN — Medication 10 ML: at 20:01

## 2023-09-18 RX ADMIN — LORAZEPAM 0.25 MG: 2 INJECTION INTRAMUSCULAR; INTRAVENOUS at 19:58

## 2023-09-18 RX ADMIN — FUROSEMIDE 40 MG: 40 TABLET ORAL at 11:10

## 2023-09-18 RX ADMIN — ATORVASTATIN CALCIUM 40 MG: 40 TABLET, FILM COATED ORAL at 08:48

## 2023-09-18 RX ADMIN — CYANOCOBALAMIN TAB 1000 MCG 1000 MCG: 1000 TAB at 08:48

## 2023-09-18 RX ADMIN — INSULIN LISPRO 4 UNITS: 100 INJECTION, SOLUTION INTRAVENOUS; SUBCUTANEOUS at 21:58

## 2023-09-18 RX ADMIN — INSULIN LISPRO 2 UNITS: 100 INJECTION, SOLUTION INTRAVENOUS; SUBCUTANEOUS at 08:49

## 2023-09-18 RX ADMIN — ACETAMINOPHEN 500 MG: 500 TABLET, FILM COATED ORAL at 04:34

## 2023-09-18 RX ADMIN — METOPROLOL SUCCINATE 25 MG: 25 TABLET, FILM COATED, EXTENDED RELEASE ORAL at 08:48

## 2023-09-18 RX ADMIN — PANTOPRAZOLE SODIUM 40 MG: 40 TABLET, DELAYED RELEASE ORAL at 08:48

## 2023-09-18 RX ADMIN — INSULIN LISPRO 2 UNITS: 100 INJECTION, SOLUTION INTRAVENOUS; SUBCUTANEOUS at 17:34

## 2023-09-18 RX ADMIN — Medication 400 MG: at 20:01

## 2023-09-18 RX ADMIN — PANTOPRAZOLE SODIUM 40 MG: 40 TABLET, DELAYED RELEASE ORAL at 17:34

## 2023-09-18 RX ADMIN — CALCIUM GLUCONATE 2000 MG: 20 INJECTION, SOLUTION INTRAVENOUS at 09:46

## 2023-09-18 RX ADMIN — FOLIC ACID 1 MG: 1 TABLET ORAL at 08:48

## 2023-09-18 RX ADMIN — Medication 400 MG: at 08:48

## 2023-09-18 RX ADMIN — NAPROXEN 500 MG: 250 TABLET ORAL at 17:34

## 2023-09-18 NOTE — CASE MANAGEMENT/SOCIAL WORK
Continued Stay Note  FABIAN Casey     Patient Name: Anurag Pickard  MRN: [id number]  Today's Date: 9/18/2023    Admit Date: 9/15/2023    Plan: From the [facility], private pay. No pasrr required. Patient is to move to [facility] on 9/22.   Discharge Plan       Row Name 09/18/23 1309       Plan    Plan From the [facility], [facility] pay. No pasrr required. Patient is to move to [facility] on 9/22.    Plan Comments Spoke with Karie ALLEN at the [facility]. She confirms patient is moving to [facility] and is checking to see if he can go directly to there at hospital [facility]. ALEJANDRO states she will call  back with information regarding where he will dc to. Patient will need transportation.                             Expected Discharge Date and Time       Expected Discharge Date Expected Discharge Time    Sep 19, 2023               Valerie Hanley RN

## 2023-09-18 NOTE — PLAN OF CARE
Goal Outcome Evaluation:         Patient doing well, sitter at bedside due to confusion. Patient to dc back to facility once medically cleared. Care plan ongoing

## 2023-09-18 NOTE — THERAPY EVALUATION
Patient Name: Anurag Pickard  : 1950    MRN: [id number]                              Today's Date: 2023       Admit Date: 9/15/2023    Visit Dx:     ICD-10-CM ICD-9-CM   1. Pain and swelling of right knee  M25.561 719.46    M25.461 719.06   2. Pyogenic arthritis of right knee joint, due to unspecified organism  M00.9 711.06   3. Fall, initial encounter  W19.XXXA E888.9     Patient Active Problem List   Diagnosis    Aortic valve stenosis    Diabetes mellitus, type II    Gastroesophageal reflux disease    Hyperlipidemia    Hypogonadism    Obesity    Obstructive sleep apnea syndrome    Osteoarthritis    Pulmonary hypertension    Rosacea    Vitamin D deficiency    Memory loss    Ataxia    Alcohol abuse    Retinal disorder    Presbyopia    Nuclear senile cataract    History of laser assisted in situ keratomileusis    Benign essential hypertension    Screening PSA (prostate specific antigen)    Dyspnea on exertion    Acute diastolic CHF (congestive heart failure)    Nonrheumatic aortic valve stenosis    S/P AVR (aortic valve replacement)    Coronary artery disease involving native coronary artery of native heart without angina pectoris    Right knee pain    Alzheimer's dementia     Past Medical History:   Diagnosis Date    Acute diastolic CHF (congestive heart failure) 2022    Ankle edema     Aortic stenosis     Moderate    Aortic valve replaced     B12 deficiency     Bell's palsy     Bleeds easily     Chipped tooth     top front    Diabetes mellitus     Dry skin     Erectile dysfunction     Falls     2 falls in last 3 months    Fatigue     Fragile skin     GERD (gastroesophageal reflux disease)     Heart murmur     Hyperlipidemia     Hypertension     Hypokalemia     Hypomagnesemia     YANA (obstructive sleep apnea)     cpap bring dos    Osteoarthritis     Pulmonary HTN     Rosacea     Vitamin D deficiency      Past Surgical History:   Procedure Laterality Date    AORTIC VALVE REPAIR/REPLACEMENT N/A  03/28/2022    Procedure: AORTIC VALVE REPAIR/REPLACEMENT;  Surgeon: Dennis Brandon MD;  Location: [facility];  Service: Cardiothoracic;  Laterality: N/A;  aortic valve replacement with 27mm [employer] magna ease    CARDIAC CATHETERIZATION      CARDIAC CATHETERIZATION N/A 12/17/2021    Procedure: Right and Left Heart Cath;  Surgeon: Juan Castellon DO;  Location: [facility] CATH INVASIVE LOCATION;  Service: Cardiology;  Laterality: N/A;    COLONOSCOPY      JOINT REPLACEMENT Bilateral 2021    knee    TONSILLECTOMY      TOTAL KNEE ARTHROPLASTY      TOTAL KNEE ARTHROPLASTY Left 05/04/2021    Procedure: TOTAL KNEE ARTHROPLASTY;  Surgeon: Otf Redmond MD;  Location: [facility];  Service: Orthopedics;  Laterality: Left;      General Information       Row Name 09/18/23 1415          General Information    Existing Precautions/Restrictions fall  -     Barriers to Rehab previous functional deficit;cognitive status  Sundowning symptoms noteed in chart review that include some behavioral disturbances. At prior facility pt was using wc but at home he was walking w/ RW. ad been at rehab private pay in SNF for 4 weeks.  -       Row Name 09/18/23 1415          Living Environment    People in Home facility resident  in transition between SNF and memory care.  -       Row Name 09/18/23 1415          Home Main Entrance    Number of Stairs, Main Entrance none  -       Row Name 09/18/23 1415          Stairs Within Home, Primary    Number of Stairs, Within Home, Primary none  -       Row Name 09/18/23 1415          Cognition    Orientation Status (Cognition) oriented to;person;place;disoriented to;situation;time;verbal cues/prompts needed for orientation  -       Row Name 09/18/23 1415          Safety Issues, Functional Mobility    Safety Issues Affecting Function (Mobility) at risk behavior observed;awareness of need for assistance;insight into  deficits/self-awareness;judgment;problem-solving;sequencing abilities;safety precaution awareness  -     Impairments Affecting Function (Mobility) balance;endurance/activity tolerance  -               User Key  (r) = Recorded By, (t) = Taken By, (c) = Cosigned By      Initials Name Provider Type     Cintia Mc OT Occupational Therapist                     Mobility/ADL's       Row Name 09/18/23 1421          Bed Mobility    Comment, (Bed Mobility) up in chair  -       Row Name 09/18/23 1421          Transfers    Transfers sit-stand transfer;stand-sit transfer  -       Row Name 09/18/23 1421          Sit-Stand Transfer    Sit-Stand [place] (Transfers) supervision  -       Row Name 09/18/23 1421          Stand-Sit Transfer    Stand-Sit [place] (Transfers) supervision  -       Row Name 09/18/23 1421          Functional Mobility    Functional Mobility- Ind. Level standby assist  -     Functional Mobility- Comment woud benefit from use of RW. Pt does limo a bit on his Rt leg w/o RW but he denies pain. Pt walks in the large room, hallway, and accesses the bathroom with supervision. Prior faciity reports he was toileting himself.  -       Row Name 09/18/23 1421          Activities of Daily Living    BADL Assessment/Intervention lower body dressing;feeding;grooming  -       Row Name 09/18/23 1421          Mobility    Extremity Weight-bearing Status right lower extremity  -     Right Lower Extremity (Weight-bearing Status) weight-bearing as tolerated (WBAT)  -       Row Name 09/18/23 1421          Lower Body Dressing Assessment/Training    [place] Level (Lower Body Dressing) doff;don;socks;modified independence  -     Position (Lower Body Dressing) supported sitting  -     Comment, (Lower Body Dressing) makes figure-4 position with both legs and denies pain in Rt knee in doing so.  -       Row Name 09/18/23 1421          Self-Feeding Assessment/Training    [place] Level  (Feeding) feeding skills;independent  -       Row Name 09/18/23 1421          Grooming Assessment/Training    [place] Level (Grooming) grooming skills;set up;supervision  -     Position (Grooming) sink side;unsupported standing  -               User Key  (r) = Recorded By, (t) = Taken By, (c) = Cosigned By      Initials Name Provider Type     Cintia Mc, OT Occupational Therapist                   Obj/Interventions       Row Name 09/18/23 1423          Sensory Assessment (Somatosensory)    Sensory Assessment (Somatosensory) sensation intact  -       Row Name 09/18/23 1423          Vision Assessment/Intervention    Visual Impairment/Limitations WFL  -       Row Name 09/18/23 1423          Strength Comprehensive (MMT)    General Manual Muscle Testing (MMT) Assessment no strength deficits identified  -               User Key  (r) = Recorded By, (t) = Taken By, (c) = Cosigned By      Initials Name Provider Type     Cintia Mc, OT Occupational Therapist                   Goals/Plan       Row Name 09/18/23 1436          Bathing Goal 1 (OT)    Activity/Device (Bathing Goal 1, OT) bathing skills, all  -     [place] Level/Cues Needed (Bathing Goal 1, OT) set-up required;supervision required  -     Time Frame (Bathing Goal 1, OT) 2 weeks  -       Row Name 09/18/23 1436          Dressing Goal 1 (OT)    Activity/Device (Dressing Goal 1, OT) dressing skills, all  -     [place]/Cues Needed (Dressing Goal 1, OT) modified independence  -     Time Frame (Dressing Goal 1, OT) 2 weeks  -[facility] Name 09/18/23 1436          Toileting Goal 1 (OT)    Activity/Device (Toileting Goal 1, OT) toileting skills, all  -     [place] Level/Cues Needed (Toileting Goal 1, OT) modified independence  -     Time Frame (Toileting Goal 1, OT) 2 weeks  -       Row Name 09/18/23 1436          Therapy Assessment/Plan (OT)    Planned Therapy Interventions (OT) activity tolerance  training;adaptive equipment training;BADL retraining;cognitive/visual perception retraining;occupation/activity based interventions;patient/caregiver education/training;transfer/mobility retraining  -               User Key  (r) = Recorded By, (t) = Taken By, (c) = Cosigned By      Initials Name Provider Type     Cintia Mc OT Occupational Therapist                   Clinical Impression       Row Name 09/18/23 1424          Pain Assessment    Pretreatment Pain Rating 0/10 - no pain  -     Posttreatment Pain Rating 0/10 - no pain  -       Row Name 09/18/23 1424          Plan of Care Review    Plan of Care Reviewed With patient  -     Progress improving  -     Outcome Evaluation Pt is 71 y/o M with alzheimer's dementia who can become agitated in the evenings. He was placed in a SNF recently but a more appropriate residence was found by his spouse and at d/c he pans to go there where there is dedicated memory care. Pt had fallen and injured his knee at [facility] facility and was brought in when his mobility and pain did not quickly recover. His Xrays were normal so there were no acute fractures. This date his knee is feeling back to normal per his report except OT does note a mild limp when he walks. A RW would be appropriate for him to use at this time and he reports familiarity with this DME. The faciilty where he had been staying staed he was mainly using a W/C. He was oriented X2 this afternoon and able to converse, although it was apparent that sounds were not necessarily interpreted correctly (i.e. 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 The interaction was pleasant and pt demonstrates ability to dress himself with setup & cue and to groom and toilet himself with setup and distant supervision. He needed SBA to walk and completed transfers independently.  -       Row Name 09/18/23 1424          Therapy  Assessment/Plan (OT)    Rehab Potential (OT) good, to achieve stated therapy goals  -     Criteria for Skilled Therapeutic Interventions Met (OT) skilled treatment is necessary  -     Therapy Frequency (OT) 3 times/wk  -     Predicted Duration of Therapy Intervention (OT) until d/c  -       Row Name 09/18/23 1424          Therapy Plan Review/Discharge Plan (OT)    Anticipated Discharge Disposition (OT) skilled nursing facility;other (see comments)  memory care  -       Row Name 09/18/23 1424          Vital Signs    O2 Delivery Pre Treatment room air  -MH     Pre Patient Position Sitting  -MH     Intra Patient Position Standing  -MH     Post Patient Position Sitting  -MH       Row Name 09/18/23 1424          Positioning and Restraints    Pre-Treatment Position sitting in chair/recliner  -MH     Post Treatment Position chair  -MH     In Chair sitting;call light within reach;encouraged to call for assist;with other staff  sitter present  -               User Key  (r) = Recorded By, (t) = Taken By, (c) = Cosigned By      Initials Name Provider Type     Cintia Mc, OT Occupational Therapist                   Outcome Measures       Row Name 09/18/23 1052 09/18/23 0833       How much help from another person do you currently need...    Turning from your back to your side while in flat bed without using bedrails? 3  -EY 3  -EY    Moving from lying on back to sitting on the side of a flat bed without bedrails? 3  -EY 3  -EY    Moving to and from a bed to a chair (including a wheelchair)? 3  -EY 3  -EY    Standing up from a chair using your arms (e.g., wheelchair, bedside chair)? 3  -EY 3  -EY    Climbing 3-5 steps with a railing? 3  -EY 3  -EY    To walk in hospital room? 3  -EY 3  -EY    AM-PAC 6 Clicks Score (PT) 18  -EY 18  -EY    Highest level of mobility 6 --> Walked 10 steps or more  -EY 6 --> Walked 10 steps or more  -EY              User Key  (r) = Recorded By, (t) = Taken By, (c) = Cosigned By       Initials Name Provider Type    Dee Dee Jose, RN Registered Nurse                    Occupational Therapy Education       Title: PT OT SLP Therapies (In Progress)       Topic: Occupational Therapy (In Progress)       Point: ADL training (Not Started)       Description:   Instruct learner(s) on proper safety adaptation and remediation techniques during self care or transfers.   Instruct in proper use of assistive devices.                  Learner Progress:  Not documented in this visit.              Point: Home exercise program (Not Started)       Description:   Instruct learner(s) on appropriate technique for monitoring, assisting and/or progressing therapeutic exercises/activities.                  Learner Progress:  Not documented in this visit.              Point: Precautions (Done)       Description:   Instruct learner(s) on prescribed precautions during self-care and functional transfers.                  Learning Progress Summary             Patient Acceptance, E,TB, VU,NR by  at 9/18/2023 1437                         Point: Body mechanics (Done)       Description:   Instruct learner(s) on proper positioning and spine alignment during self-care, functional mobility activities and/or exercises.                  Learning Progress Summary             Patient Acceptance, E,TB, VU,NR by  at 9/18/2023 1437                                         User Key       Initials Effective Dates Name Provider Type Discipline     06/16/21 -  Cintia Mc, SD Occupational Therapist OT                  OT Recommendation and Plan  Planned Therapy Interventions (OT): activity tolerance training, adaptive equipment training, BADL retraining, cognitive/visual perception retraining, occupation/activity based interventions, patient/caregiver education/training, transfer/mobility retraining  Therapy Frequency (OT): 3 times/wk  Plan of Care Review  Plan of Care Reviewed With: patient  Progress: improving  Outcome Evaluation:  Pt is 73 y/o M with alzheimer's dementia who can become agitated in the evenings. He was placed in a SNF recently but a more appropriate residence was found by his spouse and at d/c he pans to go there where there is dedicated memory care. Pt had fallen and injured his knee at [facility] last facility and was brought in when his mobility and pain did not quickly recover. His Xrays were normal so there were no acute fractures. This date his knee is feeling back to normal per his report except OT does note a mild limp when he walks. A RW would be appropriate for him to use at this time and he reports familiarity with this DME. The faciilty where he had been staying staed he was mainly using a W/C. He was oriented X2 this afternoon and able to converse, although it was apparent that sounds were not necessarily interpreted correctly (i.e. 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 The interaction was pleasant and pt demonstrates ability to dress himself with setup & cue and to groom and toilet himself with setup and distant supervision. He needed SBA to walk and completed transfers independently.     Time Calculation:         Time Calculation- OT       Row Name 09/18/23 1437             Time Calculation-     OT Start Time 1400  -      OT Stop Time 1415  -      OT Time Calculation (min) 15 min  -      Total Timed Code Minutes- OT 0 minute(s)  -      OT Received On 09/18/23  -      OT - Next Appointment 09/20/23  -      OT Goal Re-Cert Due Date 10/02/23  -                User Key  (r) = Recorded By, (t) = Taken By, (c) = Cosigned By      Initials Name Provider Type     Cintia Mc OT Occupational Therapist                  Therapy Charges for Today       Code Description Service Date Service Provider Modifiers Qty    [id number]  OT EVAL MOD COMPLEXITY 3 9/18/2023 Cintia Mc OT GO 1                 Cintia Mc  OT  9/18/2023

## 2023-09-18 NOTE — THERAPY TREATMENT NOTE
"Subjective: Pt agreeable to therapeutic plan of care.     Objective:     Pt sitting up in chair, wife at bedside.     Bed mobility - N/A or Not attempted.  Transfers - SBA, stand from chair x 5  Ambulation - 150 feet CGA and with rolling walker, fatigues with longer gait distance.     Therapeutic Exercise - 10 Reps B LE AROM supported sitting / chair, full ROM R knee    Pain: 2 VAS   Location: R knee  Intervention for pain: N/A    Education: Gait Training    Assessment: Anurag Pickard presents with functional mobility impairments which indicate the need for skilled intervention. Pt sitting up in chair with full ROM R knee.  Tolerates BLE exercises in sitting, ambulating with RW x 150' with CGAx1.  Pt is pleasant and cooperative with therapy, baseline confusion/dementia.  Decreased therapy frequency 3x/week, can be up in room with walker and A from [facility] staff. Tolerating session today without incident. Will continue to follow and progress as tolerated.     Plan/Recommendations:   Moderate Intensity Therapy recommended post-acute care. This is recommended as therapy feels the patient would require 3-4 days per week and wouldn't tolerate \"3 hour daily\" rehab intensity. SNF would be the preferred choice. If the patient does not agree to SNF, arrange HH or OP depending on home bound status. If patient is medically complex, consider LTACH.. Pt requires no DME at discharge.     Pt desires Skilled Rehab placement at discharge. Pt cooperative; agreeable to therapeutic recommendations and plan of care.       Post-Tx Position: Up in Chair, Alarms activated, and Call light and personal items within reach  PPE: gloves and surgical mask      "

## 2023-09-18 NOTE — PLAN OF CARE
Goal Outcome Evaluation:                      Anurag Pickard presents with functional mobility impairments which indicate the need for skilled intervention. Pt sitting up in chair with full ROM R knee.  Tolerates BLE exercises in sitting, ambulating with RW x 150' with CGAx1.  Pt is pleasant and cooperative with therapy, baseline confusion/dementia.  Decreased therapy frequency 3x/week, can be up in room with walker and A from [facility] staff. Tolerating session today without incident. Will continue to follow and progress as tolerated.

## 2023-09-18 NOTE — PLAN OF CARE
Goal Outcome Evaluation:  Plan of Care Reviewed With: patient        Progress: improving  Outcome Evaluation: Pt is 71 y/o M with alzheimer's dementia who can become agitated in the evenings. He was placed in a SNF recently but a more appropriate residence was found by his spouse and at d/c he pans to go there where there is dedicated memory care. Pt had fallen and injured his knee at [facility] facility and was brought in when his mobility and pain did not quickly recover. His Xrays were normal so there were no acute fractures. This date his knee is feeling back to normal per his report except OT does note a mild limp when he walks. A RW would be appropriate for him to use at this time and he reports familiarity with this DME. The faciilty where he had been staying staed he was mainly using a W/C. He was oriented X2 this afternoon and able to converse, although it was apparent that sounds were not necessarily interpreted correctly (i.e. 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 The interaction was pleasant and pt demonstrates ability to dress himself with setup & cue and to groom and toilet himself with setup and distant supervision. He needed SBA to walk and completed transfers independently.      Anticipated Discharge Disposition (OT): skilled nursing facility, other (see comments) (memory care)

## 2023-09-18 NOTE — PROGRESS NOTES
[facility] Medicine Services   Daily Progress Note    Patient Name: Anurag Pickard  : 1950  MRN: [id number]  Primary Care Physician:  Provider, No Known  Date of admission: 9/15/2023  Date and Time of Service: 2023    Brief clinical summary:  72-year-old male with history of obesity, diastolic heart failure, diabetes, erectile dysfunction, hypertension, hyperlipidemia, obstructive sleep apnea, pulmonary hypertension, hypothyroidism, GERD, vitamin D and B-12 deficiencies, Alzheimer's dementia and frequent falls was brought to the hospital for evaluation of acute right knee pain and swelling.  Knee aspiration completed by IR on 9/15, aspirate demonstrated presence of calcium pyrophosphate crystals and negative cultures so far.  Antibiotics discontinued since suspicion for septic arthritis is low.     Subjective      Met patient resting in bedside chair. He is cheerful, jovial and conversational. Fever was documented one time on 9/15 and has not recurred since that time. His appetite is fair. Leg edema did increase, and so we restarted Lasix. Electrolyte abnormalities improved, except Ca at 7.4    Objective      Vitals:   Temp:  [97.3 °F (36.3 °C)-97.7 °F (36.5 °C)] 97.4 °F (36.3 °C)  Heart Rate:  [] 79  Resp:  [15-20] 16  BP: (107-128)/(70-84) 117/84    Physical Exam   General appearance: Obese male resting in bedside chair, not in distress, spouse at bedside  Mental status: Alert, oriented and conversational  CVS: Regular heart sounds, no murmurs, no gallops  Respiration: Unlabored breathing, no wheezes or rales  GI: Soft, nondistended, nontender, bowel sounds present  Right lower extremity: Mild swelling and tenderness without erythema of the right knee, decreased ROM of the right knee joint, trace leg edema now extends to the left  Psychiatry: Cooperative, not agitated     Result Review    Result Review:  I have personally reviewed the results from the time of this  admission to 9/18/2023 09:08 EDT and agree with these findings:  [x]  Laboratory  []  Microbiology  []  Radiology  []  EKG/Telemetry   []  Cardiology/Vascular   []  Pathology  []  Old records  []  Other:      Assessment & Plan      atorvastatin, 40 mg, Oral, Daily  calcium gluconate, 2,000 mg, Intravenous, Once  folic acid, 1 mg, Oral, Daily  insulin lispro, 2-9 Units, Subcutaneous, 4x Daily AC & at Bedtime  lidocaine, 10 mL, Injection, Once  magnesium oxide, 400 mg, Oral, BID  metoprolol succinate XL, 25 mg, Oral, Daily  naproxen, 500 mg, Oral, BID With Meals  pantoprazole, 40 mg, Oral, BID AC  potassium chloride, 20 mEq, Oral, Daily  sodium chloride, 10 mL, Intravenous, Q12H  vitamin B-12, 1,000 mcg, Oral, Daily               Active Hospital Problems:  Active Hospital Problems    Diagnosis     **Right knee pain     Alzheimer's dementia      Plan:   #Acute right knee pain/effusion   -Suspect acute pseudogout arthritis  -Less likely septic arthritis based on negative fluid cultures  -Mainstay of pseudogout is aspiration and intrathecal steroid. In the case that steroid injection is not feasible, NSAIDs, colchicine and perhaps systemic steroids can be considered.  He was started on naproxen 500 mg twice daily, will continue same. Knee aspirate culture remains negative. Will dc antibiotics    #Electrolyte disturbances, i.e. hypokalemia, hypomagnesemia, hypocalcemia  -Most likely due to poor intake in the last couple of weeks coupled with diuresis  -Mag and K now WNL post replacement. Ca improved but not yet normal  -Continue Ca replacement as needed  -BMP + Mg in am    #Frequent falls with risk of further injuries  -PT/OT evaluation/early ambulation when able    #Alzheimer's dementia, late onset  -Cooperative at the moment but remains at high risk of behavioral abnormalities and decompensation during hospital stay including agitation and delirium  -Provide supportive care and frequent reorientation  -Return to memory  care postdischarge    #Chronic diastolic heart failure, not decompensated  Continue metoprolol  Due to increasing fluid retention, restart home diuretic, but watch electrolytes carefully    #Type 2 diabetes not on long-term insulin  -Continue sliding scale NovoLog for now  -A1c on this admission is 6.8%    #B12 deficiency  -Continue daily vitamin B12    #Hyperlipidemia  *Continue home statin    #General weakness  -Continue PT/OT    DVT prophylaxis:  Mechanical DVT prophylaxis orders are present.    CODE STATUS:    Level Of Support Discussed With: Health Care Surrogate  Code Status (Patient has no pulse and is not breathing): No CPR (Do Not Attempt to Resuscitate)  Medical Interventions (Patient has pulse or is breathing): Full Support      Disposition:  I expect patient to be discharged on 9/19      Electronically signed by Concepcion Duncan MD, 09/18/23, 09:08 EDT.  [facility] Hospitalist Team

## 2023-09-18 NOTE — PLAN OF CARE
Goal Outcome Evaluation:                        Patient remains with sitter at bedside due to impulsiveness, has been up and down several times walking in hallways with walker and sitter, patient did pull out both IV's this shift, refused labs even after this nurse speaking with him about importance, lab to attempt later on, patient agitated at times.

## 2023-09-19 LAB
ANION GAP SERPL CALCULATED.3IONS-SCNC: 11 MMOL/L (ref 5–15)
BUN SERPL-MCNC: 18 MG/DL (ref 8–23)
BUN/CREAT SERPL: 14.5 (ref 7–25)
CALCIUM SPEC-SCNC: 7.6 MG/DL (ref 8.6–10.5)
CHLORIDE SERPL-SCNC: 103 MMOL/L (ref 98–107)
CO2 SERPL-SCNC: 25 MMOL/L (ref 22–29)
CREAT SERPL-MCNC: 1.24 MG/DL (ref 0.76–1.27)
EGFRCR SERPLBLD CKD-EPI 2021: 62.2 ML/MIN/1.73
GLUCOSE BLDC GLUCOMTR-MCNC: 105 MG/DL (ref 70–105)
GLUCOSE BLDC GLUCOMTR-MCNC: 118 MG/DL (ref 70–105)
GLUCOSE BLDC GLUCOMTR-MCNC: 138 MG/DL (ref 70–105)
GLUCOSE BLDC GLUCOMTR-MCNC: 215 MG/DL (ref 70–105)
GLUCOSE SERPL-MCNC: 120 MG/DL (ref 65–99)
MAGNESIUM SERPL-MCNC: 1.3 MG/DL (ref 1.6–2.4)
POTASSIUM SERPL-SCNC: 4.8 MMOL/L (ref 3.5–5.2)
SODIUM SERPL-SCNC: 139 MMOL/L (ref 136–145)

## 2023-09-19 PROCEDURE — 25010000002 HALOPERIDOL LACTATE PER 5 MG: Performed by: INTERNAL MEDICINE

## 2023-09-19 PROCEDURE — 25010000002 MAGNESIUM SULFATE 2 GM/50ML SOLUTION: Performed by: INTERNAL MEDICINE

## 2023-09-19 PROCEDURE — 63710000001 INSULIN LISPRO (HUMAN) PER 5 UNITS

## 2023-09-19 PROCEDURE — 87102 FUNGUS ISOLATION CULTURE: CPT | Performed by: INTERNAL MEDICINE

## 2023-09-19 PROCEDURE — 25010000002 CALCIUM GLUCONATE 2-0.675 GM/100ML-% SOLUTION: Performed by: INTERNAL MEDICINE

## 2023-09-19 PROCEDURE — 82948 REAGENT STRIP/BLOOD GLUCOSE: CPT

## 2023-09-19 RX ORDER — FUROSEMIDE 40 MG/1
40 TABLET ORAL
Status: DISCONTINUED | OUTPATIENT
Start: 2023-09-19 | End: 2023-09-20 | Stop reason: HOSPADM

## 2023-09-19 RX ORDER — CALCIUM GLUCONATE 20 MG/ML
2000 INJECTION, SOLUTION INTRAVENOUS ONCE
Status: COMPLETED | OUTPATIENT
Start: 2023-09-19 | End: 2023-09-19

## 2023-09-19 RX ORDER — HALOPERIDOL 5 MG/ML
2.5 INJECTION INTRAMUSCULAR ONCE
Status: COMPLETED | OUTPATIENT
Start: 2023-09-19 | End: 2023-09-19

## 2023-09-19 RX ORDER — MAGNESIUM SULFATE HEPTAHYDRATE 40 MG/ML
2 INJECTION, SOLUTION INTRAVENOUS
Status: DISPENSED | OUTPATIENT
Start: 2023-09-19 | End: 2023-09-19

## 2023-09-19 RX ADMIN — FOLIC ACID 1 MG: 1 TABLET ORAL at 09:51

## 2023-09-19 RX ADMIN — BREXPIPRAZOLE 0.5 MG: 1 TABLET ORAL at 16:21

## 2023-09-19 RX ADMIN — FUROSEMIDE 40 MG: 40 TABLET ORAL at 17:22

## 2023-09-19 RX ADMIN — NAPROXEN 500 MG: 250 TABLET ORAL at 17:22

## 2023-09-19 RX ADMIN — CALCIUM GLUCONATE 2000 MG: 20 INJECTION, SOLUTION INTRAVENOUS at 12:57

## 2023-09-19 RX ADMIN — PANTOPRAZOLE SODIUM 40 MG: 40 TABLET, DELAYED RELEASE ORAL at 17:22

## 2023-09-19 RX ADMIN — Medication 400 MG: at 09:51

## 2023-09-19 RX ADMIN — METOPROLOL SUCCINATE 25 MG: 25 TABLET, FILM COATED, EXTENDED RELEASE ORAL at 09:51

## 2023-09-19 RX ADMIN — FUROSEMIDE 40 MG: 40 TABLET ORAL at 09:51

## 2023-09-19 RX ADMIN — HALOPERIDOL LACTATE 2.5 MG: 5 INJECTION, SOLUTION INTRAMUSCULAR at 07:43

## 2023-09-19 RX ADMIN — ATORVASTATIN CALCIUM 40 MG: 40 TABLET, FILM COATED ORAL at 09:51

## 2023-09-19 RX ADMIN — PANTOPRAZOLE SODIUM 40 MG: 40 TABLET, DELAYED RELEASE ORAL at 09:51

## 2023-09-19 RX ADMIN — CYANOCOBALAMIN TAB 1000 MCG 1000 MCG: 1000 TAB at 09:51

## 2023-09-19 RX ADMIN — MAGNESIUM SULFATE HEPTAHYDRATE 2 G: 2 INJECTION, SOLUTION INTRAVENOUS at 02:58

## 2023-09-19 RX ADMIN — NAPROXEN 500 MG: 250 TABLET ORAL at 09:51

## 2023-09-19 RX ADMIN — MAGNESIUM SULFATE HEPTAHYDRATE 2 G: 2 INJECTION, SOLUTION INTRAVENOUS at 04:56

## 2023-09-19 RX ADMIN — POTASSIUM CHLORIDE 20 MEQ: 1500 TABLET, EXTENDED RELEASE ORAL at 09:55

## 2023-09-19 RX ADMIN — Medication 5 MG: at 00:00

## 2023-09-19 RX ADMIN — Medication 400 MG: at 20:48

## 2023-09-19 RX ADMIN — INSULIN LISPRO 4 UNITS: 100 INJECTION, SOLUTION INTRAVENOUS; SUBCUTANEOUS at 20:47

## 2023-09-19 RX ADMIN — Medication 10 ML: at 20:48

## 2023-09-19 NOTE — DISCHARGE PLACEMENT REQUEST
"Anurag Pickard \"Sj\" (72 y.o. Male)       Date of Birth   1950    Social Security Number       Address   [address] [place] [address] [place] [facility]    Home Phone   [phone]    MRN   [id number]       Restorationist   None    Marital Status                               Admission Date   9/15/23    Admission Type   Emergency    Admitting Provider   Concepcion Duncan MD    Attending Provider   Concepcion Duncan MD    Department, Room/Bed   [facility], 4123/1       Discharge Date       Discharge Disposition       Discharge Destination                                 Attending Provider: Concepcion Duncan MD    Allergies: Glipizide, Lisinopril    Isolation: None   Infection: None   Code Status: No CPR    Ht: 177.8 cm (70\")   Wt: 115 kg (253 lb 8.5 oz)    Admission Cmt: None   Principal Problem: Right knee pain [M25.561]                   Active Insurance as of 9/15/2023       Primary Coverage       Payor Plan Insurance Group Employer/Plan Group    MEDICARE MEDICARE A & B        Payor Plan Address Payor Plan Phone Number Payor Plan Fax Number Effective Dates     [address] [phone]  11/1/2016 - None Entered    [facility] [address]         Subscriber Name Subscriber Birth Date Member ID       ANURAG PICKARD 1950 [id number]               Secondary Coverage       Payor Plan Insurance Group Employer/Plan Group    [employer] [place] [employer] [place]       Payor Plan Address Payor Plan Phone Number Payor Plan Fax Number Effective Dates    [address] [place] [address] [phone]  11/1/2016 - None Entered    [place] [address]         Subscriber Name Subscriber Birth Date Member ID       ANURAG PICKARD 1950 [id number]                     Emergency Contacts        (Rel.) Home Phone Work Phone Mobile Phone    YESENIAMATEUS (Spouse) [phone] -- [phone]              Jacinto: NPI  [id number] Tax ID [id number]  Insurance Information  "                 MEDICARE/MEDICARE A & B Phone: [phone]    Subscriber: Anurag Pickard Subscriber#: [id number]    Group#: -- Precert#: --        [employer] [place] [place] Phone: [phone]    Subscriber: Anurag Pickard Subscriber#: [id number]    Group#: PLAN G Precert#: --             History & Physical        Ruthann Low PA-C at 09/15/23 1722       Attestation signed by Todd Brandon MD at 23 0721    I have reviewed this documentation and agree.                      [facility] Medicine Services  History & Physical    Patient Name: Anurag Pickard  : 1950  MRN: [id number]  Primary Care Physician:  Provider, No Known  Date of admission: 9/15/2023  Date and Time of Service: 9/15/23 at 1722    Subjective      Chief Complaint: right knee pain    History of Present Illness: Anurag Pickard is a 72 y.o. male who presented to [facility] on 9/15/2023 c from [facility].  Patient's wife is at bedside and states patient has Alzheimer's dementia and is currently at baseline, she states patient's mental status worsens in the evening.  Patient is currently oriented to person only and gave some inappropriate answers to questions asked today.  Patient did say his right knee was painful earlier but denies any pain currently.  Patient's wife states she thinks he fell at the facility on Wednesday night but she received a call this morning that the patient's right knee was swollen and an x-ray was taken at the facility.  She states the x-ray was inconclusive so they advised the patient be sent to the ER for further evaluation.  Patient denies any shortness of breath or pain elsewhere.  Patient's wife states he no longer drinks alcohol and has not drank alcohol in 5 weeks.  Patient's wife denies any other issues that she is aware of at this time.    XR Knee 4+ View Right    Result Date: 9/15/2023  Impression: Knee effusion. Normal-appearing knee  arthroplasty. Electronically Signed: Jeannie Crump MD  9/15/2023 11:39 AM EDT  Workstation ID: [id number]     No orders to display     In the ED, All vitals stable on admission except /105. All labs unremarkable except proBNP 5125, potassium 3.2, glucose 150, calcium 6.4, hemoglobin A1c 6.8, C-reactive protein 12.13, WBC 14.9, hemoglobin 9.2, sed rate 57. Patient received Tylenol, cefepime, morphine, vancomycin in ED. Hospitalist was contacted to admit patient for further care and management.       Review of Systems   Unable to perform ROS: Dementia      Personal History     Past Medical History:   Diagnosis Date    Acute diastolic CHF (congestive heart failure) 01/03/2022    Ankle edema     Aortic stenosis     Moderate    Aortic valve replaced     B12 deficiency     Bell's palsy     Bleeds easily     Chipped tooth     top front    Diabetes mellitus     Dry skin     Erectile dysfunction     Falls     2 falls in last 3 months    Fatigue     Fragile skin     GERD (gastroesophageal reflux disease)     Heart murmur     Hyperlipidemia     Hypertension     Hypokalemia     Hypomagnesemia     YANA (obstructive sleep apnea)     cpap bring dos    Osteoarthritis     Pulmonary HTN     Rosacea     Vitamin D deficiency        Past Surgical History:   Procedure Laterality Date    AORTIC VALVE REPAIR/REPLACEMENT N/A 03/28/2022    Procedure: AORTIC VALVE REPAIR/REPLACEMENT;  Surgeon: Dennis Brandon MD;  Location: [facility] CVOR;  Service: Cardiothoracic;  Laterality: N/A;  aortic valve replacement with 27mm kulkarni [employer] magna ease    CARDIAC CATHETERIZATION      CARDIAC CATHETERIZATION N/A 12/17/2021    Procedure: Right and Left Heart Cath;  Surgeon: Juan Castellon DO;  Location: [facility] CATH INVASIVE LOCATION;  Service: Cardiology;  Laterality: N/A;    COLONOSCOPY      JOINT REPLACEMENT Bilateral 2021    knee    TONSILLECTOMY      TOTAL KNEE ARTHROPLASTY      TOTAL KNEE ARTHROPLASTY Left 05/04/2021     Procedure: TOTAL KNEE ARTHROPLASTY;  Surgeon: Otf Redmond MD;  Location: [facility] MAIN OR;  Service: Orthopedics;  Laterality: Left;       Family History: family history includes Diabetes in his brother, mother, sister, and another family member; Heart attack in his father; Heart disease in his sister; Other in his father, mother, sister, and another family member. Otherwise pertinent FHx was reviewed and not pertinent to current issue.    Social History:  reports that he has quit smoking. His smoking use included cigarettes. He has never used smokeless tobacco. He reports current alcohol use of about 14.0 standard drinks per week. He reports that he does not use drugs.    Home Medications:  Prior to Admission Medications       Prescriptions Last Dose Informant Patient Reported? Taking?    acetaminophen (TYLENOL) 325 MG tablet   Yes Yes    Take 2 tablets by mouth Every 4 (Four) Hours As Needed for Mild Pain.    atorvastatin (LIPITOR) 40 MG tablet   No Yes    Take 1 tablet by mouth once daily    docusate sodium (COLACE) 100 MG capsule   Yes Yes    Take 1 capsule by mouth At Night As Needed for Constipation.    ezetimibe (ZETIA) 10 MG tablet   No Yes    Take 1 tablet by mouth once daily    folic acid (FOLVITE) 1 MG tablet   No Yes    Take 1 tablet by mouth Daily for 360 days.    lansoprazole (PREVACID) 30 MG capsule   No Yes    Take 1 capsule by mouth Daily.    Melatonin 3 MG capsule   Yes Yes    Take 1 capsule by mouth At Night As Needed.    metFORMIN (GLUCOPHAGE) 1000 MG tablet   Yes Yes    Take 1 tablet by mouth 2 (Two) Times a Day With Meals.    metoprolol succinate XL (TOPROL-XL) 25 MG 24 hr tablet   Yes Yes    Take 1 tablet by mouth Daily.    multivitamin with minerals tablet tablet   Yes Yes    Take 1 tablet by mouth Daily.    ondansetron (ZOFRAN) 4 MG tablet   Yes Yes    Take 1 tablet by mouth 3 (Three) Times a Day As Needed for Nausea or Vomiting.    vitamin B-12 (CYANOCOBALAMIN) 1000 MCG tablet   Yes Yes     Take 1 tablet by mouth Daily.    Accu-Chek Guide test strip  Spouse/Significant Other No No    Use as instructed once daily   Dx e 11.9              Allergies:  Allergies   Allergen Reactions    Glipizide Other (See Comments)     Sugar too low    Lisinopril Cough       Objective      Vitals:   Temp:  [98 °F (36.7 °C)-98.5 °F (36.9 °C)] 98.1 °F (36.7 °C)  Heart Rate:  [76-97] 88  Resp:  [16-18] 16  BP: (124-158)/() 128/85    Physical Exam  Vitals and nursing note reviewed.   HENT:      Head: Normocephalic.   Eyes:      Extraocular Movements: Extraocular movements intact.      Pupils: Pupils are equal, round, and reactive to light.   Cardiovascular:      Rate and Rhythm: Normal rate and regular rhythm.      Pulses: Normal pulses.   Pulmonary:      Effort: Pulmonary effort is normal.      Breath sounds: Normal breath sounds.   Abdominal:      General: Bowel sounds are normal.      Palpations: Abdomen is soft.      Tenderness: There is no abdominal tenderness.   Musculoskeletal:         General: Normal range of motion.      Right lower le+ Edema present.      Left lower le+ Edema present.      Comments: Swelling of right knee, no erythema noted  No tenderness to palpation over right knee    Mild edema bilateral lower extremities   Skin:     General: Skin is warm and dry.   Neurological:      Mental Status: He is alert. Mental status is at baseline. He is disoriented.   Psychiatric:         Mood and Affect: Mood normal.        Result Review    Result Review:  I have personally reviewed the results from the time of this admission to 9/15/2023 18:50 EDT and agree with these findings:  [x]  Laboratory  [x]  Microbiology  [x]  Radiology  []  EKG/Telemetry   []  Cardiology/Vascular   []  Pathology  []  Old records  []  Other:  Most notable findings include:     CBC:      Lab 09/15/23  1138   WBC 14.90*   HEMOGLOBIN 9.2*   HEMATOCRIT 27.8*   PLATELETS 223   NEUTROS ABS 12.00*   LYMPHS ABS 1.40   MONOS ABS  1.30*   EOS ABS 0.00   MCV 91.9     CMP:        Lab 09/15/23  1138   SODIUM 141   POTASSIUM 3.2*   CHLORIDE 104   CO2 23.0   ANION GAP 14.0   BUN 9   CREATININE 0.98   EGFR 82.4   GLUCOSE 150*   CALCIUM 6.4*           Assessment & Plan        Active Hospital Problems:  Active Hospital Problems    Diagnosis     **Right knee pain     Alzheimer's dementia      Plan:     Right knee pain  Status post bilateral TKA, most recent TKA was left TKA in May 2021  Possible septic arthritis vs calcium pyrophosphate disease  WBC 14.9, sed rate 57,  Cefepime and vancomycin initiated in ED  -Fluid from knee collection showed cloudy appearance, 94 neutrophils, 6 monocytes, 32,000 nucleated cells, few calcium pyrophosphate, intracellular only  -Will continue antibiotics for now but at this time suspect calcium pyrophosphate disease over septic arthritis but will continue the antibiotics until patient has been evaluated by orthopedic surgery.  -Tylenol ordered for pain as needed, patient denies any pain currently.  -Blood culture pending.  -Orthopedic surgery consult    Diabetes mellitus type II  -Un controlled   Lab Results   Component Value Date    GLUCOSE 150 (H) 09/15/2023    GLUCOSE 121 (H) 11/01/2022    GLUCOSE 130 (H) 05/16/2022    GLUCOSE 111 (H) 04/29/2022     -Hold home metformin  -Sliding scale insulin ordered  -Diabetic diet  -Monitor AC and HS    Chronic diastolic congestive heart failure, not in exacerbation  proBNP 5125  Chest x-ray ordered  Patient is not in respiratory distress, on room air SPO2 96%    Hypokalemia  Potassium 3.2  EKG ordered  Magnesium level ordered  Potassium replacement protocol ordered  Continue to monitor with labs    Hypocalcemia  Calcium 6.4  Calcium replacement protocol ordered  Continue to monitor with labs    Alzheimer's dementia  Fall precautions ordered    Hypertension  - Controlled   - BP: 128/85  - Continue home metoprolol  - Monitor while admitted    AS status post tissue aortic valve  replacement March 2022  Hyperlipidemia  Continue home atorvastatin, Zetia not on hospital formulary    DVT prophylaxis:  Mechanical DVT prophylaxis orders are present.    CODE STATUS: Discussed CODE STATUS with patient's wife today, patient's wife presented power of  paperwork and it is in chart that she is his power of  for healthcare.  Wife states patient is a DNR.  Level Of Support Discussed With: Health Care Surrogate  Code Status (Patient has no pulse and is not breathing): No CPR (Do Not Attempt to Resuscitate)  Medical Interventions (Patient has pulse or is breathing): Full Support    Admission Status:  I believe this patient meets observation status.    I discussed the patient's findings and my recommendations with patient, family, and attending physician Dr. Brandon .    This patient has been examined wearing appropriate Personal Protective Equipment and discussed with  attending physician Dr. Brandon . 09/15/23      Signature: Electronically signed by Ruthann Low PA-C, 09/15/23, 18:50 EDT.  [facility] Hospitalist Team    Electronically signed by Todd Brandon MD at 09/16/23 0721       Current Facility-Administered Medications   Medication Dose Route Frequency Provider Last Rate Last Admin    acetaminophen (TYLENOL) tablet 500 mg  500 mg Oral Q4H PRN Todd Brandon MD   500 mg at 09/18/23 0434    atorvastatin (LIPITOR) tablet 40 mg  40 mg Oral Daily Ruthann Low PA-C   40 mg at 09/19/23 0951    polyethylene glycol (MIRALAX) packet 17 g  17 g Oral Daily PRN Todd Brandon MD        And    bisacodyl (DULCOLAX) EC tablet 5 mg  5 mg Oral Daily PRN Todd Brandon MD        And    bisacodyl (DULCOLAX) suppository 10 mg  10 mg Rectal Daily PRN Todd Brandon MD        Brexpiprazole tablet 0.5 mg  0.5 mg Oral Daily Concepcion Duncan MD        calcium gluconate 2000-675 MG/100ML NACL IVPB  2,000 mg Intravenous Once Concepcion Duncan MD        Calcium Replacement - Follow Nurse / BPA Driven  Protocol   Does not apply PRN Ruthann Low PA-C        dextrose (D50W) (25 g/50 mL) IV injection 25 g  25 g Intravenous Q15 Min PRN Ruthann Low PA-C        dextrose (GLUTOSE) oral gel 15 g  15 g Oral Q15 Min PRN Ruthann Low PA-C        folic acid (FOLVITE) tablet 1 mg  1 mg Oral Daily Ruthann Low PA-C   1 mg at 09/19/23 0951    furosemide (LASIX) tablet 40 mg  40 mg Oral BID Concepcion Duncan MD        glucagon (GLUCAGEN) injection 1 mg  1 mg Intramuscular Q15 Min PRN Ruthann Low PA-C        insulin lispro (HUMALOG/ADMELOG) injection 2-9 Units  2-9 Units Subcutaneous 4x Daily AC & at Bedtime Ruthann Low PA-C   4 Units at 09/18/23 2158    lidocaine (XYLOCAINE) 1 % injection 10 mL  10 mL Injection Once Anna Dinero PA        magnesium oxide (MAG-OX) tablet 400 mg  400 mg Oral BID Concepcion Duncan MD   400 mg at 09/19/23 0951    Magnesium Standard Dose Replacement - Follow Nurse / BPA Driven Protocol   Does not apply PRN Ruthann Low PA-C        melatonin tablet 5 mg  5 mg Oral Nightly PRN Radha Villanueva, APRN   5 mg at 09/19/23 0000    metoprolol succinate XL (TOPROL-XL) 24 hr tablet 25 mg  25 mg Oral Daily Ruthann Low PA-C   25 mg at 09/19/23 0951    metoprolol tartrate (LOPRESSOR) injection 5 mg  5 mg Intravenous Q4H PRN Radha Villanueva, APRN   5 mg at 09/17/23 2110    morphine injection 2 mg  2 mg Intravenous Q4H PRN Concepcion Duncan MD   2 mg at 09/16/23 1200    naproxen (NAPROSYN) tablet 500 mg  500 mg Oral BID With Meals Concepcion Duncan MD   500 mg at 09/19/23 0951    nitroglycerin (NITROSTAT) SL tablet 0.4 mg  0.4 mg Sublingual Q5 Min PRN Todd Brandon MD        pantoprazole (PROTONIX) EC tablet 40 mg  40 mg Oral BID Ruthann Higginbotham PA-C   40 mg at 09/19/23 0951    potassium chloride (K-DUR,KLOR-CON) CR tablet 20 mEq  20 mEq Oral Daily Concepcion Duncan MD   20 mEq at 09/19/23 0955    Potassium Replacement - Follow Nurse / BPA Driven Protocol   Does not  apply PRN Ruthann Low PA-C        sodium chloride 0.9 % flush 10 mL  10 mL Intravenous PRN Anna Dinero PA        sodium chloride 0.9 % flush 10 mL  10 mL Intravenous Q12H Todd Brandon MD   10 mL at 09/18/23 2001    sodium chloride 0.9 % flush 10 mL  10 mL Intravenous PRN Todd Brandon MD        sodium chloride 0.9 % infusion 40 mL  40 mL Intravenous PRN Todd Brandon MD        vitamin B-12 (CYANOCOBALAMIN) tablet 1,000 mcg  1,000 mcg Oral Daily Ruthann Low PA-C   1,000 mcg at 09/19/23 0951         Lab Results (last 72 hours)       Procedure Component Value Units Date/Time    POC Glucose Once [[id number]]  (Abnormal) Collected: 09/19/23 1141    Specimen: Blood Updated: 09/19/23 1142     Glucose 138 mg/dL      Comment: Serial Number: [id number]:  [id number]       Body Fluid Culture - Body Fluid, Knee, Right [[id number]] Collected: 09/15/23 1427    Specimen: Body Fluid from Knee, Right Updated: 09/19/23 0953     Body Fluid Culture No growth at 4 days     Gram Stain Moderate (3+) WBCs per low power field      No organisms seen    POC Glucose Once [[id number]]  (Abnormal) Collected: 09/19/23 0759    Specimen: Blood Updated: 09/19/23 0800     Glucose 118 mg/dL      Comment: Serial Number: [id number]:  [id number]       Magnesium [[id number]]  (Abnormal) Collected: 09/18/23 2358    Specimen: Blood Updated: 09/19/23 0054     Magnesium 1.3 mg/dL     Basic Metabolic Panel [[id number]]  (Abnormal) Collected: 09/18/23 2358    Specimen: Blood Updated: 09/19/23 0053     Glucose 120 mg/dL      BUN 18 mg/dL      Creatinine 1.24 mg/dL      Sodium 139 mmol/L      Potassium 4.8 mmol/L      Comment: Result checked          Chloride 103 mmol/L      CO2 25.0 mmol/L      Calcium 7.6 mg/dL      BUN/Creatinine Ratio 14.5     Anion Gap 11.0 mmol/L      eGFR 62.2 mL/min/1.73     Narrative:      GFR Normal >60  Chronic Kidney Disease <60  Kidney Failure <15    The GFR formula is only valid for adults with stable renal  function between ages 18 and 70.    POC Glucose Once [[id number]]  (Abnormal) Collected: 09/18/23 2138    Specimen: Blood Updated: 09/18/23 2140     Glucose 202 mg/dL      Comment: Serial Number: [id number]:  [id number]       POC Glucose Once [[id number]]  (Abnormal) Collected: 09/18/23 1719    Specimen: Blood Updated: 09/18/23 1721     Glucose 187 mg/dL      Comment: Serial Number: [id number]:  [id number]       Blood Culture - Blood, Arm, Right [[id number]]  (Normal) Collected: 09/15/23 1657    Specimen: Blood from Arm, Right Updated: 09/18/23 1715     Blood Culture No growth at 3 days    Blood Culture - Blood, Arm, Right [[id number]]  (Normal) Collected: 09/15/23 1657    Specimen: Blood from Arm, Right Updated: 09/18/23 1715     Blood Culture No growth at 3 days    Narrative:      Less than seven (7) mL's of blood was collected.  Insufficient quantity may yield false negative results.    POC Glucose Once [[id number]]  (Abnormal) Collected: 09/18/23 1213    Specimen: Blood Updated: 09/18/23 1215     Glucose 145 mg/dL      Comment: Serial Number: [id number]:  [id number]       Extra Tubes [[id number]] Collected: 09/18/23 0612    Specimen: Blood, Venous Line Updated: 09/18/23 0715    Narrative:      The following orders were created for panel order Extra Tubes.  Procedure                               Abnormality         Status                     ---------                               -----------         ------                     Lavender Top[[id number]]                                     Final result                 Please view results for these tests on the individual orders.    Lavender Top [[id number]] Collected: 09/18/23 0612    Specimen: Blood Updated: 09/18/23 0715     Extra Tube hold for add-on     Comment: Auto resulted       Basic Metabolic Panel [[id number]]  (Abnormal) Collected: 09/18/23 0612    Specimen: Blood Updated: 09/18/23 0713     Glucose 156 mg/dL      BUN 16 mg/dL      Creatinine  1.14 mg/dL      Sodium 138 mmol/L      Potassium 3.7 mmol/L      Chloride 102 mmol/L      CO2 24.0 mmol/L      Calcium 7.4 mg/dL      BUN/Creatinine Ratio 14.0     Anion Gap 12.0 mmol/L      eGFR 68.8 mL/min/1.73     Narrative:      GFR Normal >60  Chronic Kidney Disease <60  Kidney Failure <15    The GFR formula is only valid for adults with stable renal function between ages 18 and 70.    POC Glucose Once [[id number]]  (Abnormal) Collected: 09/17/23 2125    Specimen: Blood Updated: 09/17/23 2127     Glucose 166 mg/dL      Comment: Serial Number: [id number]:  [id number]       POC Glucose Once [[id number]]  (Abnormal) Collected: 09/17/23 1642    Specimen: Blood Updated: 09/17/23 1644     Glucose 161 mg/dL      Comment: Serial Number: [id number]:  [id number]       POC Glucose Once [[id number]]  (Abnormal) Collected: 09/17/23 1120    Specimen: Blood Updated: 09/17/23 1121     Glucose 135 mg/dL      Comment: Serial Number: [id number]:  [id number]       Vancomycin, Trough Please draw prior to administration of vancomycin dose due at 0900. [[id number]]  (Normal) Collected: 09/17/23 0742    Specimen: Blood Updated: 09/17/23 0839     Vancomycin Trough 6.90 mcg/mL     Narrative:      Therapeutic Ranges for Vancomycin    Vancomycin Random   5.0-40.0 mcg/mL  Vancomycin Trough   5.0-20.0 mcg/mL  Vancomycin Peak     20.0-40.0 mcg/mL    POC Glucose Once [[id number]]  (Abnormal) Collected: 09/17/23 0715    Specimen: Blood Updated: 09/17/23 0716     Glucose 109 mg/dL      Comment: Serial Number: [id number]:  [id number]       Magnesium [[id number]]  (Normal) Collected: 09/17/23 0001    Specimen: Blood Updated: 09/17/23 0051     Magnesium 1.6 mg/dL     Basic Metabolic Panel [[id number]]  (Abnormal) Collected: 09/17/23 0001    Specimen: Blood Updated: 09/17/23 0029     Glucose 154 mg/dL      BUN 12 mg/dL      Creatinine 1.01 mg/dL      Sodium 137 mmol/L      Potassium 3.9 mmol/L      Chloride 105 mmol/L      CO2 23.0  mmol/L      Calcium 7.0 mg/dL      BUN/Creatinine Ratio 11.9     Anion Gap 9.0 mmol/L      eGFR 79.5 mL/min/1.73     Narrative:      GFR Normal >60  Chronic Kidney Disease <60  Kidney Failure <15    The GFR formula is only valid for adults with stable renal function between ages 18 and 70.    CBC & Differential [[id number]]  (Abnormal) Collected: 09/17/23 0001    Specimen: Blood Updated: 09/17/23 0009    Narrative:      The following orders were created for panel order CBC & Differential.  Procedure                               Abnormality         Status                     ---------                               -----------         ------                     CBC Auto Differential[[id number]]        Abnormal            Final result                 Please view results for these tests on the individual orders.    CBC Auto Differential [[id number]]  (Abnormal) Collected: 09/17/23 0001    Specimen: Blood Updated: 09/17/23 0009     WBC 8.10 10*3/mm3      RBC 2.77 10*6/mm3      Hemoglobin 8.4 g/dL      Hematocrit 25.3 %      MCV 91.5 fL      MCH 30.4 pg      MCHC 33.3 g/dL      RDW 14.2 %      RDW-SD 45.1 fl      MPV 9.8 fL      Platelets 187 10*3/mm3      Neutrophil % 77.3 %      Lymphocyte % 13.1 %      Monocyte % 7.0 %      Eosinophil % 1.5 %      Basophil % 1.1 %      Neutrophils, Absolute 6.20 10*3/mm3      Lymphocytes, Absolute 1.10 10*3/mm3      Monocytes, Absolute 0.60 10*3/mm3      Eosinophils, Absolute 0.10 10*3/mm3      Basophils, Absolute 0.10 10*3/mm3      nRBC 0.1 /100 WBC     POC Glucose Once [[id number]]  (Abnormal) Collected: 09/16/23 2049    Specimen: Blood Updated: 09/16/23 2100     Glucose 137 mg/dL      Comment: Serial Number: [id number]:  [id number]       POC Glucose Once [[id number]]  (Abnormal) Collected: 09/16/23 1653    Specimen: Blood Updated: 09/16/23 1654     Glucose 178 mg/dL      Comment: Serial Number: [id number]:  [id number]       Basic Metabolic Panel [[id number]]  (Abnormal)  Collected: 09/16/23 1407    Specimen: Blood Updated: 09/16/23 1440     Glucose 199 mg/dL      BUN 11 mg/dL      Creatinine 0.94 mg/dL      Sodium 143 mmol/L      Potassium 3.6 mmol/L      Chloride 107 mmol/L      CO2 25.0 mmol/L      Calcium 7.1 mg/dL      BUN/Creatinine Ratio 11.7     Anion Gap 11.0 mmol/L      eGFR 86.7 mL/min/1.73     Narrative:      GFR Normal >60  Chronic Kidney Disease <60  Kidney Failure <15    The GFR formula is only valid for adults with stable renal function between ages 18 and 70.    Glucose, Body Fluid - Synovial Fluid, Knee, Right [[id number]] Collected: 09/15/23 1427    Specimen: Synovial Fluid from Knee, Right Updated: 09/16/23 1308     Glucose, Fluid 68 mg/dL      Comment:              _________________________________________              : BODY FLUID TYPE :        GLUCOSE        :              :_________________:_______________________:              : Amniotic Fluid  :        45 - 76        :              :_________________:_______________________:              : Bile, Clear     :            < 5        :              :_________________:_______________________:              : Bile, Yellow    :            < 8        :              :_________________:_______________________:              : Lymph           :        48 - 200       :              :_________________:_______________________:              : Nasal Secretion :            < 10       :              :_________________:_______________________:              : Pleural Fluid   :        65 -  99       :              :_________________:_______________________:              : Saliva          :            <  2       :              : (Mixed Glands)  :                       :              :_________________:_______________________:              : Sweat           :            <  7       :              :_________________:_______________________:              : Synovial Fluid  :        65 -  99       :               :_________________:_______________________:              : Tears           :        76 - 288       :              :_________________:_______________________:               Edwige Nunez. Reference Intervals               for Adults and Children 2008. Ninth               edition (V9.1) [facility]; [place]: July 2009.       Narrative:      Performed at:  [address]  [id number]  : Andres Perez PhD, Phone:  [phone]    Protein, Body Fluid - Synovial Fluid, Knee, Right [[id number]] Collected: 09/15/23 1427    Specimen: Synovial Fluid from Knee, Right Updated: 09/16/23 1308     Protein, Fluid 3.6 g/dL      Comment:              _________________________________________              : BODY FLUID TYPE :     TOTAL PROTEIN     :              :_________________:_______________________:              : Amniotic Fluid  :               <0.4    :              :_________________:_______________________:              :                 : Nonmalignant: <3.0    :              : Ascitic Fluid   : Malignant:    >3.0    :              :_________________:_______________________:              : Bile, Clear     :               <0.9    :              :_________________:_______________________:              : Bile, Yellow    :          0.2 - 0.6    :              :_________________:_______________________:              : Lymph           :          2.2 - 6.0    :              :_________________:_______________________:              : Human Milk      :          1.9 - 2.0    :              :_________________: ______________________:              : Nasal Secretion :          0.1 - 3.5    :              :_________________:_______________________:              : Pancreatic      :          0.0 - 0.1    :              : Juice           :    (post stimulation) :              :_________________:_______________________:              :                 :  Transudate:   <0.3    :              : Pleural Fluid   : Exudate:      >0.3    :              :_________________:_______________________:              : Saliva          :          0.1 - 0.2    :              : (Mixed Glands)  :                       :              :_________________:_______________________:              : Synovial Fluid  :               <2.5    :              :_________________:_______________________:              : Tears           :          0.8 - 0.9    :              :_________________:_______________________:               Edwige Nunez. Reference Intervals               for  Adults and Children 2008. Ninth               Edition (V9.1) [facility],               [facility]; [place]: July 2009.       Narrative:      Performed at:  [address]  [id number]  : Andres Perez PhD, Phone:  [phone]          Imaging Results (Last 7 Days)       Procedure Component Value Units Date/Time    XR Chest 1 View [[id number]] Collected: 09/15/23 1907     Updated: 09/15/23 1911    Narrative:      XR CHEST 1 VW    Date of Exam: 9/15/2023 6:46 PM EDT    Indication: CHF    Comparison: 3/30/2022    Findings:  Unchanged enlarged cardiac silhouette. Sternotomy. No focal consolidation or overt pulmonary edema. No pleural effusion or pneumothorax.      Impression:      Impression:  Cardiomegaly without overt pulm edema or focal consolidation.    Electronically Signed: Ramakrishna Betancur MD    9/15/2023 7:08 PM EDT    Workstation ID: [id number]    XR Knee 4+ View Right [[id number]] Collected: 09/15/23 1139     Updated: 09/15/23 1142    Narrative:      XR KNEE 4+ VW RIGHT    Date of Exam: 9/15/2023 11:31 AM EDT    Indication: trauma, swelling    Comparison: None available.    Findings: 4 views. There is a suprapatellar effusion. There is a total knee replacement which appears in anatomic alignment. There are no fractures or hardware complications..       "Impression:      Impression:  Knee effusion. Normal-appearing knee arthroplasty.      Electronically Signed: Jeannie Crump MD    9/15/2023 11:39 AM EDT    Workstation ID: [id number]             Physician Progress Notes (last 72 hours)        Concepcion Duncan MD at [address] [place] [address] Medicine Services   Daily Progress Note    Patient Name: Anurag Pickard  : 1950  MRN: [id number]  Primary Care Physician:  Provider, No Known  Date of admission: 9/15/2023  Date and Time of Service: 2023    Brief clinical summary:  72-year-old male with history of obesity, diastolic heart failure, diabetes, erectile dysfunction, hypertension, hyperlipidemia, obstructive sleep apnea, pulmonary hypertension, hypothyroidism, GERD, vitamin D and B-12 deficiencies, Alzheimer's dementia and frequent falls was brought to the hospital for evaluation of acute right knee pain and swelling.  Knee aspiration completed by IR on 9/15, aspirate demonstrated presence of calcium pyrophosphate crystals and negative cultures so far.  Antibiotics discontinued since suspicion for septic arthritis is low.     Subjective      RN reports altered mental status this morning.  Patient was agitated, impulsive and attempted getting out of bed multiple times.  He received Haldol 2.5 mg IM x1 and eventually calmed.  Met him sleeping in bedside chair.  Bilateral leg edema appears slightly worse today as today.  Met wife on my second visit today room.  She states that his neurologist had started patient on Rexulti for agitation.  Advised against Seroquel due to what she described as an \"abnormal EKG\"    Objective      Vitals:   Temp:  [97.6 °F (36.4 °C)-98.2 °F (36.8 °C)] 98.2 °F (36.8 °C)  Heart Rate:  [] 76  Resp:  [16-17] 17  BP: (115-144)/(78-88) 144/88    Physical Exam   General appearance:   Mental status: sleeping in chair after Haldol administration  CVS: Regular heart sounds, no murmurs, no " gallops  Respiration: Unlabored breathing, no wheezes or rales  GI: Soft, nondistended, nontender, bowel sounds present  Right lower extremity: Increased BLE pitting edema  Psychiatry: agitation and impulsivity (reported)    Result Review    Result Review:  I have personally reviewed the results from the time of this admission to 9/19/2023 10:28 EDT and agree with these findings:  [x]  Laboratory  []  Microbiology  []  Radiology  []  EKG/Telemetry   []  Cardiology/Vascular   []  Pathology  []  Old records  []  Other:      Assessment & Plan      atorvastatin, 40 mg, Oral, Daily  calcium gluconate, 3,000 mg, Intravenous, Once  folic acid, 1 mg, Oral, Daily  furosemide, 40 mg, Oral, BID  insulin lispro, 2-9 Units, Subcutaneous, 4x Daily AC & at Bedtime  lidocaine, 10 mL, Injection, Once  magnesium oxide, 400 mg, Oral, BID  metoprolol succinate XL, 25 mg, Oral, Daily  naproxen, 500 mg, Oral, BID With Meals  pantoprazole, 40 mg, Oral, BID AC  potassium chloride, 20 mEq, Oral, Daily  sodium chloride, 10 mL, Intravenous, Q12H  vitamin B-12, 1,000 mcg, Oral, Daily               Active Hospital Problems:  Active Hospital Problems    Diagnosis     **Right knee pain     Alzheimer's dementia      Plan:   #Acute right knee pain/effusion   -Suspect secondary to acute pseudogout arthritis  -Septic arthritis is ruled out (negative fluid cultures)  -Mainstay of pseudogout is aspiration and intrathecal steroid. In the case that steroid injection is not feasible, NSAIDs, colchicine and perhaps systemic steroids can be considered.  He was started on naproxen 500 mg twice daily, will continue same. Knee aspirate culture remains negative. Will dc antibiotics    #Electrolyte disturbances, i.e. hypokalemia, hypomagnesemia, hypocalcemia  -Most likely due to poor intake in the last couple of weeks coupled with diuresis  -K at goal.  Mg and CEA still low  -Continue Ca and Mg replacement as needed  -BMP + Mg in am    #Frequent falls with risk  of further injuries  -PT/OT evaluation/early ambulation when able    #Late onset Alzheimer's dementia with behavioral abnormalities  -Agitation, confusion, impulsivity reported this morning  -Usually takes Rexulti which we do not carry.  Wife will bring Rexulti in later today  -Will require 24-hour supervision postdischarge.  -States that he was in skilled nursing prior to admission, but she wants him in memory care postdischarge    #Chronic diastolic heart failure, not decompensated  Continue metoprolol  Due to increasing fluid retention, we restarted home diuretic  -Increase Lasix to twice daily  -Ace wraps both legs    #Type 2 diabetes not on long-term insulin  -Continue sliding scale NovoLog for now  -A1c on this admission is 6.8%    #B12 deficiency  -Continue daily vitamin B12    #Hyperlipidemia  *Continue home statin    #General weakness  -Continue PT/OT    DVT prophylaxis:  Mechanical DVT prophylaxis orders are present.    CODE STATUS:    Level Of Support Discussed With: Health Care Surrogate  Code Status (Patient has no pulse and is not breathing): No CPR (Do Not Attempt to Resuscitate)  Medical Interventions (Patient has pulse or is breathing): Full Support      Disposition: Not yet stable for discharge due to agitation and worsening leg edema.  Anticipate discharge on       Electronically signed by Concepcion Duncan MD, 23, 10:28 EDT.  [facility]             Electronically signed by Concepcion Duncan MD at 23 1028       Concepcion Duncan MD at 23 0908              [facility] Medicine Services   Daily Progress Note    Patient Name: Anurag Pickard  : 1950  MRN: [id number]  Primary Care Physician:  Provider, No Known  Date of admission: 9/15/2023  Date and Time of Service: 2023    Brief clinical summary:  72-year-old male with history of obesity, diastolic heart failure, diabetes, erectile dysfunction, hypertension, hyperlipidemia,  obstructive sleep apnea, pulmonary hypertension, hypothyroidism, GERD, vitamin D and B-12 deficiencies, Alzheimer's dementia and frequent falls was brought to the hospital for evaluation of acute right knee pain and swelling.  Knee aspiration completed by IR on 9/15, aspirate demonstrated presence of calcium pyrophosphate crystals and negative cultures so far.  Antibiotics discontinued since suspicion for septic arthritis is low.     Subjective      Met patient resting in bedside chair. He is cheerful, jovial and conversational. Fever was documented one time on 9/15 and has not recurred since that time. His appetite is fair. Leg edema did increase, and so we restarted Lasix. Electrolyte abnormalities improved, except Ca at 7.4    Objective      Vitals:   Temp:  [97.3 °F (36.3 °C)-97.7 °F (36.5 °C)] 97.4 °F (36.3 °C)  Heart Rate:  [] 79  Resp:  [15-20] 16  BP: (107-128)/(70-84) 117/84    Physical Exam   General appearance: Obese male resting in bedside chair, not in distress, spouse at bedside  Mental status: Alert, oriented and conversational  CVS: Regular heart sounds, no murmurs, no gallops  Respiration: Unlabored breathing, no wheezes or rales  GI: Soft, nondistended, nontender, bowel sounds present  Right lower extremity: Mild swelling and tenderness without erythema of the right knee, decreased ROM of the right knee joint, trace leg edema now extends to the left  Psychiatry: Cooperative, not agitated     Result Review    Result Review:  I have personally reviewed the results from the time of this admission to 9/18/2023 09:08 EDT and agree with these findings:  [x]  Laboratory  []  Microbiology  []  Radiology  []  EKG/Telemetry   []  Cardiology/Vascular   []  Pathology  []  Old records  []  Other:      Assessment & Plan      atorvastatin, 40 mg, Oral, Daily  calcium gluconate, 2,000 mg, Intravenous, Once  folic acid, 1 mg, Oral, Daily  insulin lispro, 2-9 Units, Subcutaneous, 4x Daily AC & at  Bedtime  lidocaine, 10 mL, Injection, Once  magnesium oxide, 400 mg, Oral, BID  metoprolol succinate XL, 25 mg, Oral, Daily  naproxen, 500 mg, Oral, BID With Meals  pantoprazole, 40 mg, Oral, BID AC  potassium chloride, 20 mEq, Oral, Daily  sodium chloride, 10 mL, Intravenous, Q12H  vitamin B-12, 1,000 mcg, Oral, Daily               Active Hospital Problems:  Active Hospital Problems    Diagnosis     **Right knee pain     Alzheimer's dementia      Plan:   #Acute right knee pain/effusion   -Suspect acute pseudogout arthritis  -Less likely septic arthritis based on negative fluid cultures  -Mainstay of pseudogout is aspiration and intrathecal steroid. In the case that steroid injection is not feasible, NSAIDs, colchicine and perhaps systemic steroids can be considered.  He was started on naproxen 500 mg twice daily, will continue same. Knee aspirate culture remains negative. Will dc antibiotics    #Electrolyte disturbances, i.e. hypokalemia, hypomagnesemia, hypocalcemia  -Most likely due to poor intake in the last couple of weeks coupled with diuresis  -Mag and K now WNL post replacement. Ca improved but not yet normal  -Continue Ca replacement as needed  -BMP + Mg in am    #Frequent falls with risk of further injuries  -PT/OT evaluation/early ambulation when able    #Alzheimer's dementia, late onset  -Cooperative at the moment but remains at high risk of behavioral abnormalities and decompensation during hospital stay including agitation and delirium  -Provide supportive care and frequent reorientation  -Return to memory care postdischarge    #Chronic diastolic heart failure, not decompensated  Continue metoprolol  Due to increasing fluid retention, restart home diuretic, but watch electrolytes carefully    #Type 2 diabetes not on long-term insulin  -Continue sliding scale NovoLog for now  -A1c on this admission is 6.8%    #B12 deficiency  -Continue daily vitamin B12    #Hyperlipidemia  *Continue home  "statin    #General weakness  -Continue PT/OT    DVT prophylaxis:  Mechanical DVT prophylaxis orders are present.    CODE STATUS:    Level Of Support Discussed With: Health Care Surrogate  Code Status (Patient has no pulse and is not breathing): No CPR (Do Not Attempt to Resuscitate)  Medical Interventions (Patient has pulse or is breathing): Full Support      Disposition:  I expect patient to be discharged on       Electronically signed by Concepcion Duncan MD, 23, 09:08 EDT.  [facility]             Electronically signed by Concepcion Duncan MD at 23 0912       Concepcion Duncan MD at 23 1006              [facility] Medicine Services   Daily Progress Note    Patient Name: Anurag Pickard  : 1950  MRN: [id number]  Primary Care Physician:  Provider, No Known  Date of admission: 9/15/2023  Date and Time of Service: 2023    Brief clinical summary:  72-year-old male with history of obesity, diastolic heart failure, diabetes, erectile dysfunction, hypertension, hyperlipidemia, obstructive sleep apnea, pulmonary hypertension, hypothyroidism, GERD, vitamin D and B-12 deficiencies, Alzheimer's dementia and frequent falls was brought to the hospital for evaluation of acute right knee pain and swelling.  Knee aspiration completed by IR on 9/15, aspirate demonstrated presence of calcium pyrophosphate crystals and negative cultures so far.  Antibiotics discontinued since suspicion for septic arthritis is low.     Subjective      Met patient resting in bedside chair. He is cheerful, jovial and conversational. Wife, Alejandra at bedside.  Alejandra endorses clinical improvement. States that his mental status is now about his baseline, and he is \"getting his humor back.\". Fever was documented one time on 9/15 and has not recurred since that time. His appetite is fair. Trace leg swelling has increased. Electrolyte abnormalities improved    Objective      Vitals: "   Temp:  [97.5 °F (36.4 °C)-98 °F (36.7 °C)] 97.5 °F (36.4 °C)  Heart Rate:  [] 77  Resp:  [16-20] 16  BP: (103-126)/(66-84) 126/84    Physical Exam   General appearance: Obese male resting in bedside chair, not in distress, spouse at bedside  Mental status: Alert, oriented and conversational  CVS: Regular heart sounds, no murmurs, no gallops  Respiration: Unlabored breathing, no wheezes or rales  GI: Soft, nondistended, nontender, bowel sounds present  Right lower extremity: Mild swelling and tenderness without erythema of the right knee, decreased ROM of the right knee joint, trace leg edema now extends to the left  Psychiatry: Cooperative, not agitated     Result Review    Result Review:  I have personally reviewed the results from the time of this admission to 9/17/2023 10:29 EDT and agree with these findings:  [x]  Laboratory  []  Microbiology  [x]  Radiology  []  EKG/Telemetry   []  Cardiology/Vascular   []  Pathology  []  Old records  []  Other:      Assessment & Plan      atorvastatin, 40 mg, Oral, Daily  calcium gluconate, 2,000 mg, Intravenous, Once  folic acid, 1 mg, Oral, Daily  furosemide, 40 mg, Intravenous, Once  insulin lispro, 2-9 Units, Subcutaneous, 4x Daily AC & at Bedtime  lidocaine, 10 mL, Injection, Once  magnesium oxide, 400 mg, Oral, BID  metoprolol succinate XL, 25 mg, Oral, Daily  naproxen, 500 mg, Oral, BID With Meals  pantoprazole, 40 mg, Oral, BID AC  potassium chloride, 40 mEq, Oral, Once  sodium chloride, 10 mL, Intravenous, Q12H  vitamin B-12, 1,000 mcg, Oral, Daily               Active Hospital Problems:  Active Hospital Problems    Diagnosis     **Right knee pain     Alzheimer's dementia      Plan:   #Acute right knee pain/effusion   -Suspect acute pseudogout arthritis  -Less likely septic arthritis based on negative fluid cultures  -Mainstay of pseudogout is aspiration and intrathecal steroid. In the case that steroid injection is not feasible, NSAIDs, colchicine and perhaps  systemic steroids can be considered.  He was started on naproxen 500 mg twice daily, will continue same. Knee aspirate culture remains negative. Will dc antibiotics    #Electrolyte disturbances, i.e. hypokalemia, hypomagnesemia, hypocalcemia  -Most likely due to poor intake in the last couple of weeks coupled with diuresis  -Mag and K now WNL post replacement. Ca improved but not yet normal  -Will replace Ca  -Monitor levels closely and replace as needed    #Frequent falls with risk of further injuries  -PT/OT evaluation/early ambulation when able    #Alzheimer's dementia, late onset  -Cooperative at the moment but remains at high risk of behavioral abnormalities and decompensation during hospital stay including agitation and delirium  -Provide supportive care and frequent reorientation  -Return to memory care postdischarge    #Chronic diastolic heart failure, not decompensated  Continue metoprolol  May restart diuretic, but watch electrolytes carefully    #Type 2 diabetes not on long-term insulin  -Continue sliding scale NovoLog for now  -A1c on this admission is 6.8%    #B12 deficiency  -Continue daily vitamin B12    #Hyperlipidemia  *Continue home statin    #General weakness  -Continue PT/OT    DVT prophylaxis:  Mechanical DVT prophylaxis orders are present.    CODE STATUS:    Level Of Support Discussed With: Health Care Surrogate  Code Status (Patient has no pulse and is not breathing): No CPR (Do Not Attempt to Resuscitate)  Medical Interventions (Patient has pulse or is breathing): Full Support      Disposition:  I expect patient to be discharged on       Electronically signed by Concepcion Duncan MD, 23, 10:29 EDT.  [facility] [place]             Electronically signed by Concepcion Duncan MD at 23 1029          Physical Therapy Notes (last 72 hours)        Radha Morrow, PT at 23 1653  Version 1 of 1         Patient Name: Anurag Pickard  : 1950    MRN:  [id number]                              Today's Date: 9/16/2023       Admit Date: 9/15/2023    Visit Dx:     ICD-10-CM ICD-9-CM   1. Pain and swelling of right knee  M25.561 719.46    M25.461 719.06   2. Pyogenic arthritis of right knee joint, due to unspecified organism  M00.9 711.06   3. Fall, initial encounter  W19.XXXA E888.9     Patient Active Problem List   Diagnosis    Aortic valve stenosis    Diabetes mellitus, type II    Gastroesophageal reflux disease    Hyperlipidemia    Hypogonadism    Obesity    Obstructive sleep apnea syndrome    Osteoarthritis    Pulmonary hypertension    Rosacea    Vitamin D deficiency    Memory loss    Ataxia    Alcohol abuse    Retinal disorder    Presbyopia    Nuclear senile cataract    History of laser assisted in situ keratomileusis    Benign essential hypertension    Screening PSA (prostate specific antigen)    Dyspnea on exertion    Acute diastolic CHF (congestive heart failure)    Nonrheumatic aortic valve stenosis    S/P AVR (aortic valve replacement)    Coronary artery disease involving native coronary artery of native heart without angina pectoris    Right knee pain    Alzheimer's dementia     Past Medical History:   Diagnosis Date    Acute diastolic CHF (congestive heart failure) 01/03/2022    Ankle edema     Aortic stenosis     Moderate    Aortic valve replaced     B12 deficiency     Bell's palsy     Bleeds easily     Chipped tooth     top front    Diabetes mellitus     Dry skin     Erectile dysfunction     Falls     2 falls in last 3 months    Fatigue     Fragile skin     GERD (gastroesophageal reflux disease)     Heart murmur     Hyperlipidemia     Hypertension     Hypokalemia     Hypomagnesemia     YANA (obstructive sleep apnea)     cpap bring dos    Osteoarthritis     Pulmonary HTN     Rosacea     Vitamin D deficiency      Past Surgical History:   Procedure Laterality Date    AORTIC VALVE REPAIR/REPLACEMENT N/A 03/28/2022    Procedure: AORTIC VALVE REPAIR/REPLACEMENT;   Surgeon: Dennis Brandon MD;  Location: [facility] CVOR;  Service: Cardiothoracic;  Laterality: N/A;  aortic valve replacement with 27mm [employer] magna ease    CARDIAC CATHETERIZATION      CARDIAC CATHETERIZATION N/A 12/17/2021    Procedure: Right and Left Heart Cath;  Surgeon: Juan Castellon DO;  Location: [facility] CATH INVASIVE LOCATION;  Service: Cardiology;  Laterality: N/A;    COLONOSCOPY      JOINT REPLACEMENT Bilateral 2021    knee    TONSILLECTOMY      TOTAL KNEE ARTHROPLASTY      TOTAL KNEE ARTHROPLASTY Left 05/04/2021    Procedure: TOTAL KNEE ARTHROPLASTY;  Surgeon: Otf Redmond MD;  Location: [facility] MAIN OR;  Service: Orthopedics;  Laterality: Left;      General Information       Row Name 09/16/23 1645          Physical Therapy Time and Intention    Document Type evaluation  -     Mode of Treatment physical therapy  -       Row Name 09/16/23 1645          General Information    Patient Profile Reviewed yes  -HC     Prior Level of Function --  difficult to determine due to pt's confusion  -     Existing Precautions/Restrictions fall  -     Barriers to Rehab previous functional deficit;cognitive status  -       Row Name 09/16/23 1645          Living Environment    People in Home spouse  -       Row Name 09/16/23 1645          Cognition    Orientation Status (Cognition) oriented to;person;place  -       Row Name 09/16/23 1645          Safety Issues, Functional Mobility    Safety Issues Affecting Function (Mobility) judgment;insight into deficits/self-awareness;safety precaution awareness  -     Impairments Affecting Function (Mobility) balance;cognition;postural/trunk control;range of motion (ROM);strength;pain;endurance/activity tolerance  -               User Key  (r) = Recorded By, (t) = Taken By, (c) = Cosigned By      Initials Name Provider Type    HC Radha Morrow, PT Physical Therapist                   Mobility       Row Name 09/16/23 1646          Bed  Mobility    Bed Mobility supine-sit  -     Supine-Sit [place] (Bed Mobility) 2 person assist;moderate assist (50% patient effort)  -[facility] Name 09/16/23 1646          Bed-Chair Transfer    Bed-Chair [place] (Transfers) 2 person assist;moderate assist (50% patient effort)  -[facility] Name 09/16/23 1646          Sit-Stand Transfer    Sit-Stand [place] (Transfers) 2 person assist;moderate assist (50% patient effort)  -[facility] Name 09/16/23 1646          Gait/Stairs (Locomotion)    [place] Level (Gait) not tested  -[facility] Name 09/16/23 1646          Mobility    Extremity Weight-bearing Status right lower extremity  -     Right Lower Extremity (Weight-bearing Status) weight-bearing as tolerated (WBAT)  -               User Key  (r) = Recorded By, (t) = Taken By, (c) = Cosigned By      Initials Name Provider Type    HC Radha Morrow, PT Physical Therapist                   Obj/Interventions       [facility] Name 09/16/23 1646          Range of Motion Comprehensive    Comment, General Range of Motion right knee limited ROM due to pain (lacking ~10 degrees from neutral knee extension to 80 degrees flexion), right ankle WFL, left LE WFL  -[facility] Name 09/16/23 1646          Strength Comprehensive (MMT)    Comment, General Manual Muscle Testing (MMT) Assessment right knee 3-/5, left LE appears WFL  -[facility] Name 09/16/23 1646          Balance    Balance Assessment sitting static balance;sitting dynamic balance;standing static balance;standing dynamic balance  -     Static Sitting Balance contact guard  -     Dynamic Sitting Balance minimal assist  -     Static Standing Balance moderate assist;2-person assist  -     Dynamic Standing Balance moderate assist;2-person assist  -               User Key  (r) = Recorded By, (t) = Taken By, (c) = Cosigned By      Initials Name Provider Type    Radha Thomas, PT Physical Therapist                   Goals/Plan        Row Name 09/16/23 1652          Bed Mobility Goal 1 (PT)    Activity/Assistive Device (Bed Mobility Goal 1, PT) bed mobility activities, all  -HC     [place] Level/Cues Needed (Bed Mobility Goal 1, PT) minimum assist (75% or more patient effort)  -HC     Time Frame (Bed Mobility Goal 1, PT) 2 weeks  -       Row Name 09/16/23 1652          Transfer Goal 1 (PT)    Activity/Assistive Device (Transfer Goal 1, PT) transfers, all  -HC     [place] Level/Cues Needed (Transfer Goal 1, PT) minimum assist (75% or more patient effort)  -HC     Time Frame (Transfer Goal 1, PT) 2 weeks  -       Row Name 09/16/23 1652          Gait Training Goal 1 (PT)    Activity/Assistive Device (Gait Training Goal 1, PT) gait (walking locomotion);assistive device use  -HC     [place] Level (Gait Training Goal 1, PT) minimum assist (75% or more patient effort)  -HC     Distance (Gait Training Goal 1, PT) 25 ft  -HC     Time Frame (Gait Training Goal 1, PT) 2 weeks  -       Row Name 09/16/23 1652          Therapy Assessment/Plan (PT)    Planned Therapy Interventions (PT) balance training;bed mobility training;gait training;neuromuscular re-education;stair training;strengthening;patient/family education;transfer training;postural re-education;home exercise program;ROM (range of motion)  -               User Key  (r) = Recorded By, (t) = Taken By, (c) = Cosigned By      Initials Name Provider Type     Radha Morrow, PT Physical Therapist                   Clinical Impression       Row Name 09/16/23 1647          Pain    Additional Documentation Pain Scale: FACES Pre/Post-Treatment (Group)  -[facility] Name 09/16/23 1647          Pain Scale: FACES Pre/Post-Treatment    Pain: FACES Scale, Pretreatment 6-->hurts even more  -     Posttreatment Pain Rating 6-->hurts even more  -     Pre/Posttreatment Pain Comment right knee with mobility and weightbearing  -       Row Name 09/16/23 1647          Plan of Care Review     Outcome Evaluation Pt is 73 yo male admitted from rehab for right knee swelling.  Xray right knee showing knee effusion.  Pt s/p joint aspiration.  Ortho following.  Per RN, ortho stating pt with WBAT orders.  PMH:  b/l TKA, CHF, DM, YANA, HTN.  Pt presents with confusion and poor ability to state recent events or information on rehab stay.  Per RN, pt has been undergoing rehab.  Pt alert and oriented to person and place only.  Pt requiring modAx2 for bed mobility and transfers.  Pt exhibiting poor ability to weightbear on right LE due to pain with pivot transfer to chair.  Not safe to attempt ambulation at this time due to weakness, pain, and level of assist needed with transfers.  Attempt ambulation when appropriate.  Pt not safe to return home with wife at current level of function.  Plan return to SNF rehab to address deficits.  -       Row Name 09/16/23 1647          Therapy Assessment/Plan (PT)    Patient/Family Therapy Goals Statement (PT) decrease pain  -     Rehab Potential (PT) fair, will monitor progress closely  -     Criteria for Skilled Interventions Met (PT) yes;skilled treatment is necessary  -     Therapy Frequency (PT) 5 times/wk  -     Predicted Duration of Therapy Intervention (PT) until d/c  -HC       Row Name 09/16/23 1983          Positioning and Restraints    Pre-Treatment Position in bed  -HC     Post Treatment Position chair  -HC     In Chair notified nsg;call light within reach;encouraged to call for assist;exit alarm on  -               User Key  (r) = Recorded By, (t) = Taken By, (c) = Cosigned By      Initials Name Provider Type     Radha Morrow, PT Physical Therapist                   Outcome Measures       Row Name 09/16/23 1652 09/16/23 0855       How much help from another person do you currently need...    Turning from your back to your side while in flat bed without using bedrails? 2  -HC 2  -EY    Moving from lying on back to sitting on the side of a flat bed  without bedrails? 2  -HC 2  -EY    Moving to and from a bed to a chair (including a wheelchair)? 2  -HC 2  -EY    Standing up from a chair using your arms (e.g., wheelchair, bedside chair)? 2  -HC 2  -EY    Climbing 3-5 steps with a railing? 1  -HC 2  -EY    To walk in hospital room? 1  -HC 2  -EY    AM-PAC 6 Clicks Score (PT) 10  -HC 12  -EY    Highest level of mobility 4 --> Transferred to chair/commode  -HC 4 --> Transferred to chair/commode  -EY      Row Name 09/16/23 1652          Functional Assessment    Outcome Measure Options AM-PAC 6 Clicks Basic Mobility (PT)  -               User Key  (r) = Recorded By, (t) = Taken By, (c) = Cosigned By      Initials Name Provider Type     Radha Morrow, PT Physical Therapist    Dee Dee Jose, RN Registered Nurse                                 Physical Therapy Education       Title: PT OT SLP Therapies (In Progress)       Topic: Physical Therapy (In Progress)       Point: Mobility training (In Progress)       Learning Progress Summary             Patient Acceptance, E, NR by  at 9/16/2023 1652                         Point: Precautions (In Progress)       Learning Progress Summary             Patient Acceptance, E, NR by  at 9/16/2023 1652                                         User Key       Initials Effective Dates Name Provider Type Discipline     06/16/21 -  Radha Morrow, PT Physical Therapist PT                  PT Recommendation and Plan  Planned Therapy Interventions (PT): balance training, bed mobility training, gait training, neuromuscular re-education, stair training, strengthening, patient/family education, transfer training, postural re-education, home exercise program, ROM (range of motion)  Outcome Evaluation: Pt is 73 yo male admitted from rehab for right knee swelling.  Xray right knee showing knee effusion.  Pt s/p joint aspiration.  Ortho following.  Per RN, ortho stating pt with WBAT orders.  PMH:  b/l TKA, CHF, DM, YANA, HTN.  Pt  presents with confusion and poor ability to state recent events or information on rehab stay.  Per RN, pt has been undergoing rehab.  Pt alert and oriented to person and place only.  Pt requiring modAx2 for bed mobility and transfers.  Pt exhibiting poor ability to weightbear on right LE due to pain with pivot transfer to chair.  Not safe to attempt ambulation at this time due to weakness, pain, and level of assist needed with transfers.  Attempt ambulation when appropriate.  Pt not safe to return home with wife at current level of function.  Plan return to SNF rehab to address deficits.     Time Calculation:   PT Evaluation Complexity  History, PT Evaluation Complexity: 3 or more personal factors and/or comorbidities  Examination of Body Systems (PT Eval Complexity): total of 3 or more elements  Clinical Presentation (PT Evaluation Complexity): evolving  Clinical Decision Making (PT Evaluation Complexity): moderate complexity  Overall Complexity (PT Evaluation Complexity): moderate complexity     PT Charges       Row Name 09/16/23 1652             Time Calculation    Start Time 1510  -      Stop Time 1525  -      Time Calculation (min) 15 min  -      PT Received On 09/16/23  -      PT - Next Appointment 09/18/23  -      PT Goal Re-Cert Due Date 09/30/23  -         Time Calculation- PT    Total Timed Code Minutes- PT 0 minute(s)  -                User Key  (r) = Recorded By, (t) = Taken By, (c) = Cosigned By      Initials Name Provider Type     Radha Morrow, PT Physical Therapist                  Therapy Charges for Today       Code Description Service Date Service Provider Modifiers Qty    [id number]  PT EVAL MOD COMPLEXITY 3 9/16/2023 Radha Morrow, PT GP 1            PT G-Codes  Outcome Measure Options: AM-PAC 6 Clicks Basic Mobility (PT)  AM-PAC 6 Clicks Score (PT): 10  PT Discharge Summary  Anticipated Discharge Disposition (PT): skilled nursing facility    Radha Morrow  PT  9/16/2023      Electronically signed by Radha Morrow, PT at 09/16/23 1653       Radha Morrow, PT at 09/16/23 1653  Version 1 of 1         Goal Outcome Evaluation:              Outcome Evaluation: Pt is 73 yo male admitted from rehab for right knee swelling.  Xray right knee showing knee effusion.  Pt s/p joint aspiration.  Ortho following.  Per RN, ortho stating pt with WBAT orders.  PMH:  b/l TKA, CHF, DM, YANA, HTN.  Pt presents with confusion and poor ability to state recent events or information on rehab stay.  Per RN, pt has been undergoing rehab.  Pt alert and oriented to person and place only.  Pt requiring modAx2 for bed mobility and transfers.  Pt exhibiting poor ability to weightbear on right LE due to pain with pivot transfer to chair.  Not safe to attempt ambulation at this time due to weakness, pain, and level of assist needed with transfers.  Attempt ambulation when appropriate.  Pt not safe to return home with wife at current level of function.  Plan return to SNF rehab to address deficits.      Anticipated Discharge Disposition (PT): skilled nursing facility    Electronically signed by Radha Morrow, PT at 09/16/23 1653       Ruba Martínez PT at 09/18/23 0955  Version 1 of 1         Subjective: Pt agreeable to therapeutic plan of care.     Objective:     Pt sitting up in chair, wife at bedside.     Bed mobility - N/A or Not attempted.  Transfers - SBA, stand from chair x 5  Ambulation - 150 feet CGA and with rolling walker, fatigues with longer gait distance.     Therapeutic Exercise - 10 Reps B LE AROM supported sitting / chair, full ROM R knee    Pain: 2 VAS   Location: R knee  Intervention for pain: N/A    Education: Gait Training    Assessment: Anurag Pickard presents with functional mobility impairments which indicate the need for skilled intervention. Pt sitting up in chair with full ROM R knee.  Tolerates BLE exercises in sitting, ambulating with RW x 150' with CGAx1.  Pt  "is pleasant and cooperative with therapy, baseline confusion/dementia.  Decreased therapy frequency 3x/week, can be up in room with walker and A from [facility] [place] [place] staff. Tolerating session today without incident. Will continue to follow and progress as tolerated.     Plan/Recommendations:   Moderate Intensity Therapy recommended post-acute care. This is recommended as therapy feels the patient would require 3-4 days per week and wouldn't tolerate \"3 hour daily\" rehab intensity. SNF would be the preferred choice. If the patient does not agree to SNF, arrange HH or OP depending on home bound status. If patient is medically complex, consider LTACH.. Pt requires no DME at discharge.     Pt desires Skilled Rehab placement at discharge. Pt cooperative; agreeable to therapeutic recommendations and plan of care.       Post-Tx Position: Up in Chair, Alarms activated, and Call light and personal items within reach  PPE: gloves and surgical mask        Electronically signed by Ruba Martínez, PT at 23 [phone]       Ruba Martínez, PT at 23 1493  Version 1 of 1         Goal Outcome Evaluation:                      Anurag Pickard presents with functional mobility impairments which indicate the need for skilled intervention. Pt sitting up in chair with full ROM R knee.  Tolerates BLE exercises in sitting, ambulating with RW x 150' with CGAx1.  Pt is pleasant and cooperative with therapy, baseline confusion/dementia.  Decreased therapy frequency 3x/week, can be up in room with walker and A from [facility] [place] [place] staff. Tolerating session today without incident. Will continue to follow and progress as tolerated.       Electronically signed by Ruba Martínez, PT at 23 1325          Occupational Therapy Notes (last 72 hours)        Cintia cM, OT at 23 1438          Patient Name: Anurag Pickard  : 1950    MRN: [id number]                              Today's Date: 2023       Admit Date: 9/15/2023    Visit Dx:     " ICD-10-CM ICD-9-CM   1. Pain and swelling of right knee  M25.561 719.46    M25.461 719.06   2. Pyogenic arthritis of right knee joint, due to unspecified organism  M00.9 711.06   3. Fall, initial encounter  W19.XXXA E888.9     Patient Active Problem List   Diagnosis    Aortic valve stenosis    Diabetes mellitus, type II    Gastroesophageal reflux disease    Hyperlipidemia    Hypogonadism    Obesity    Obstructive sleep apnea syndrome    Osteoarthritis    Pulmonary hypertension    Rosacea    Vitamin D deficiency    Memory loss    Ataxia    Alcohol abuse    Retinal disorder    Presbyopia    Nuclear senile cataract    History of laser assisted in situ keratomileusis    Benign essential hypertension    Screening PSA (prostate specific antigen)    Dyspnea on exertion    Acute diastolic CHF (congestive heart failure)    Nonrheumatic aortic valve stenosis    S/P AVR (aortic valve replacement)    Coronary artery disease involving native coronary artery of native heart without angina pectoris    Right knee pain    Alzheimer's dementia     Past Medical History:   Diagnosis Date    Acute diastolic CHF (congestive heart failure) 01/03/2022    Ankle edema     Aortic stenosis     Moderate    Aortic valve replaced     B12 deficiency     Bell's palsy     Bleeds easily     Chipped tooth     top front    Diabetes mellitus     Dry skin     Erectile dysfunction     Falls     2 falls in last 3 months    Fatigue     Fragile skin     GERD (gastroesophageal reflux disease)     Heart murmur     Hyperlipidemia     Hypertension     Hypokalemia     Hypomagnesemia     YANA (obstructive sleep apnea)     cpap bring dos    Osteoarthritis     Pulmonary HTN     Rosacea     Vitamin D deficiency      Past Surgical History:   Procedure Laterality Date    AORTIC VALVE REPAIR/REPLACEMENT N/A 03/28/2022    Procedure: AORTIC VALVE REPAIR/REPLACEMENT;  Surgeon: Dennis Brandon MD;  Location: [facility];  Service: Cardiothoracic;  Laterality: N/A;  aortic  valve replacement with 27mm [employer] magna ease    CARDIAC CATHETERIZATION      CARDIAC CATHETERIZATION N/A 12/17/2021    Procedure: Right and Left Heart Cath;  Surgeon: Juan Castellon DO;  Location: [facility] CATH INVASIVE LOCATION;  Service: Cardiology;  Laterality: N/A;    COLONOSCOPY      JOINT REPLACEMENT Bilateral 2021    knee    TONSILLECTOMY      TOTAL KNEE ARTHROPLASTY      TOTAL KNEE ARTHROPLASTY Left 05/04/2021    Procedure: TOTAL KNEE ARTHROPLASTY;  Surgeon: Otf Redmond MD;  Location: [facility] MAIN OR;  Service: Orthopedics;  Laterality: Left;      General Information       Row Name 09/18/23 1415          General Information    Existing Precautions/Restrictions fall  -     Barriers to Rehab previous functional deficit;cognitive status  Sundowning symptoms noteed in chart review that include some behavioral disturbances. At prior facility pt was using wc but at home he was walking w/ RW. ad been at rehab private pay in SNF for 4 weeks.  -       Row Name 09/18/23 1415          Living Environment    People in Home facility resident  in transition between SNF and memory care.  -       Row Name 09/18/23 1415          Home Main Entrance    Number of Stairs, Main Entrance none  -       Row Name 09/18/23 1415          Stairs Within Home, Primary    Number of Stairs, Within Home, Primary none  -       Row Name 09/18/23 1415          Cognition    Orientation Status (Cognition) oriented to;person;place;disoriented to;situation;time;verbal cues/prompts needed for orientation  -       Row Name 09/18/23 1415          Safety Issues, Functional Mobility    Safety Issues Affecting Function (Mobility) at risk behavior observed;awareness of need for assistance;insight into deficits/self-awareness;judgment;problem-solving;sequencing abilities;safety precaution awareness  -     Impairments Affecting Function (Mobility) balance;endurance/activity tolerance  -               User Key   (r) = Recorded By, (t) = Taken By, (c) = Cosigned By      Initials Name Provider Type     Cintia Mc OT Occupational Therapist                     Mobility/ADL's       Row Name 09/18/23 1421          Bed Mobility    Comment, (Bed Mobility) up in chair  -[facility] Name 09/18/23 1421          Transfers    Transfers sit-stand transfer;stand-sit transfer  -       Row Name 09/18/23 1421          Sit-Stand Transfer    Sit-Stand [place] (Transfers) supervision  -       Row Name 09/18/23 1421          Stand-Sit Transfer    Stand-Sit [place] (Transfers) supervision  -[facility] Name 09/18/23 1421          Functional Mobility    Functional Mobility- Ind. Level standby assist  -     Functional Mobility- Comment woud benefit from use of RW. Pt does limo a bit on his Rt leg w/o RW but he denies pain. Pt walks in the large room, hallway, and accesses the bathroom with supervision. Prior faciity reports he was toileting himself.  -       Row Name 09/18/23 1421          Activities of Daily Living    BADL Assessment/Intervention lower body dressing;feeding;grooming  -[facility] Name 09/18/23 1421          Mobility    Extremity Weight-bearing Status right lower extremity  -     Right Lower Extremity (Weight-bearing Status) weight-bearing as tolerated (WBAT)  -[facility] Name 09/18/23 1421          Lower Body Dressing Assessment/Training    [place] Level (Lower Body Dressing) doff;don;socks;modified independence  -     Position (Lower Body Dressing) supported sitting  -     Comment, (Lower Body Dressing) makes figure-4 position with both legs and denies pain in Rt knee in doing so.  -       Row Name 09/18/23 1421          Self-Feeding Assessment/Training    [place] Level (Feeding) feeding skills;independent  -[facility] Name 09/18/23 1421          Grooming Assessment/Training    [place] Level (Grooming) grooming skills;set up;supervision  -     Position (Grooming) sink  side;unsupported standing  -               User Key  (r) = Recorded By, (t) = Taken By, (c) = Cosigned By      Initials Name Provider Type     Cintia Mc, SD Occupational Therapist                   Obj/Interventions       Row Name 09/18/23 1423          Sensory Assessment (Somatosensory)    Sensory Assessment (Somatosensory) sensation intact  -[facility] Name 09/18/23 1423          Vision Assessment/Intervention    Visual Impairment/Limitations WFL  -       Row Name 09/18/23 1423          Strength Comprehensive (MMT)    General Manual Muscle Testing (MMT) Assessment no strength deficits identified  -               User Key  (r) = Recorded By, (t) = Taken By, (c) = Cosigned By      Initials Name Provider Type     Cintia Mc, OT Occupational Therapist                   Goals/Plan       [facility] Name 09/18/23 1436          Bathing Goal 1 (OT)    Activity/Device (Bathing Goal 1, OT) bathing skills, all  -     [place] Level/Cues Needed (Bathing Goal 1, OT) set-up required;supervision required  -     Time Frame (Bathing Goal 1, OT) 2 weeks  -[facility]       Row Name 09/18/23 1436          Dressing Goal 1 (OT)    Activity/Device (Dressing Goal 1, OT) dressing skills, all  -MH     [place]/Cues Needed (Dressing Goal 1, OT) modified independence  -     Time Frame (Dressing Goal 1, OT) 2 weeks  -[facility]       Row Name 09/18/23 1436          Toileting Goal 1 (OT)    Activity/Device (Toileting Goal 1, OT) toileting skills, all  -     [place] Level/Cues Needed (Toileting Goal 1, OT) modified independence  -     Time Frame (Toileting Goal 1, OT) 2 weeks  -[facility] Name 09/18/23 1436          Therapy Assessment/Plan (OT)    Planned Therapy Interventions (OT) activity tolerance training;adaptive equipment training;BADL retraining;cognitive/visual perception retraining;occupation/activity based interventions;patient/caregiver education/training;transfer/mobility retraining  -               User Key   (r) = Recorded By, (t) = Taken By, (c) = Cosigned By      Initials Name Provider Type     Cintia Mc, SD Occupational Therapist                   Clinical Impression       Row Name 09/18/23 1424          Pain Assessment    Pretreatment Pain Rating 0/10 - no pain  -     Posttreatment Pain Rating 0/10 - no pain  -       Row Name 09/18/23 1424          Plan of Care Review    Plan of Care Reviewed With patient  -     Progress improving  -     Outcome Evaluation Pt is 71 y/o M with alzheimer's dementia who can become agitated in the evenings. He was placed in a SNF recently but a more appropriate residence was found by his spouse and at d/c he pans to go there where there is dedicated memory care. Pt had fallen and injured his knee at [facility] last facility and was brought in when his mobility and pain did not quickly recover. His Xrays were normal so there were no acute fractures. This date his knee is feeling back to normal per his report except OT does note a mild limp when he walks. A RW would be appropriate for him to use at this time and he reports familiarity with this DME. The faciilty where he had been staying staed he was mainly using a W/C. He was oriented X2 this afternoon and able to converse, although it was apparent that sounds were not necessarily interpreted correctly (i.e. 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 The interaction was pleasant and pt demonstrates ability to dress himself with setup & cue and to groom and toilet himself with setup and distant supervision. He needed SBA to walk and completed transfers independently.  -       Row Name 09/18/23 1424          Therapy Assessment/Plan (OT)    Rehab Potential (OT) good, to achieve stated therapy goals  -     Criteria for Skilled Therapeutic Interventions Met (OT) skilled treatment is necessary  -     Therapy Frequency (OT) 3 times/wk  -      Predicted Duration of Therapy Intervention (OT) until d/c  -       Row Name 09/18/23 1424          Therapy Plan Review/Discharge Plan (OT)    Anticipated Discharge Disposition (OT) skilled nursing facility;other (see comments)  memory care  -       Row Name 09/18/23 1424          Vital Signs    O2 Delivery Pre Treatment room air  -MH     Pre Patient Position Sitting  -MH     Intra Patient Position Standing  -MH     Post Patient Position Sitting  -MH       Row Name 09/18/23 1424          Positioning and Restraints    Pre-Treatment Position sitting in chair/recliner  -MH     Post Treatment Position chair  -MH     In Chair sitting;call light within reach;encouraged to call for assist;with other staff  sitter present  -               User Key  (r) = Recorded By, (t) = Taken By, (c) = Cosigned By      Initials Name Provider Type    Cintia Byrd, SD Occupational Therapist                   Outcome Measures       Row Name 09/18/23 1052 09/18/23 0833       How much help from another person do you currently need...    Turning from your back to your side while in flat bed without using bedrails? 3  -EY 3  -EY    Moving from lying on back to sitting on the side of a flat bed without bedrails? 3  -EY 3  -EY    Moving to and from a bed to a chair (including a wheelchair)? 3  -EY 3  -EY    Standing up from a chair using your arms (e.g., wheelchair, bedside chair)? 3  -EY 3  -EY    Climbing 3-5 steps with a railing? 3  -EY 3  -EY    To walk in hospital room? 3  -EY 3  -EY    AM-PAC 6 Clicks Score (PT) 18  -EY 18  -EY    Highest level of mobility 6 --> Walked 10 steps or more  -EY 6 --> Walked 10 steps or more  -EY              User Key  (r) = Recorded By, (t) = Taken By, (c) = Cosigned By      Initials Name Provider Type    Dee Dee Jose, RN Registered Nurse                    Occupational Therapy Education       Title: PT OT SLP Therapies (In Progress)       Topic: Occupational Therapy (In Progress)       Point: ADL  training (Not Started)       Description:   Instruct learner(s) on proper safety adaptation and remediation techniques during self care or transfers.   Instruct in proper use of assistive devices.                  Learner Progress:  Not documented in this visit.              Point: Home exercise program (Not Started)       Description:   Instruct learner(s) on appropriate technique for monitoring, assisting and/or progressing therapeutic exercises/activities.                  Learner Progress:  Not documented in this visit.              Point: Precautions (Done)       Description:   Instruct learner(s) on prescribed precautions during self-care and functional transfers.                  Learning Progress Summary             Patient Acceptance, E,TB, VU,NR by  at 9/18/2023 1437                         Point: Body mechanics (Done)       Description:   Instruct learner(s) on proper positioning and spine alignment during self-care, functional mobility activities and/or exercises.                  Learning Progress Summary             Patient Acceptance, E,TB, VU,NR by  at 9/18/2023 1437                                         User Key       Initials Effective Dates Name Provider Type Discipline     06/16/21 -  Cintia Mc, SD Occupational Therapist OT                  OT Recommendation and Plan  Planned Therapy Interventions (OT): activity tolerance training, adaptive equipment training, BADL retraining, cognitive/visual perception retraining, occupation/activity based interventions, patient/caregiver education/training, transfer/mobility retraining  Therapy Frequency (OT): 3 times/wk  Plan of Care Review  Plan of Care Reviewed With: patient  Progress: improving  Outcome Evaluation: Pt is 71 y/o M with alzheimer's dementia who can become agitated in the evenings. He was placed in a SNF recently but a more appropriate residence was found by his spouse and at d/c he pans to go there where there is dedicated  memory care. Pt had fallen and injured his knee at [facility] last facility and was brought in when his mobility and pain did not quickly recover. His Xrays were normal so there were no acute fractures. This date his knee is feeling back to normal per his report except OT does note a mild limp when he walks. A RW would be appropriate for him to use at this time and he reports familiarity with this DME. The faciilty where he had been staying staed he was mainly using a W/C. He was oriented X2 this afternoon and able to converse, although it was apparent that sounds were not necessarily interpreted correctly (i.e. 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 The interaction was pleasant and pt demonstrates ability to dress himself with setup & cue and to groom and toilet himself with setup and distant supervision. He needed SBA to walk and completed transfers independently.     Time Calculation:         Time Calculation- OT       Row Name 09/18/23 1437             Time Calculation-     OT Start Time 1400  -      OT Stop Time 1415  -      OT Time Calculation (min) 15 min  -      Total Timed Code Minutes- OT 0 minute(s)  -      OT Received On 09/18/23  -      OT - Next Appointment 09/20/23  -      OT Goal Re-Cert Due Date 10/02/23  -                User Key  (r) = Recorded By, (t) = Taken By, (c) = Cosigned By      Initials Name Provider Type     Cintia Mc OT Occupational Therapist                  Therapy Charges for Today       Code Description Service Date Service Provider Modifiers Qty    [id number]  OT EVAL MOD COMPLEXITY 3 9/18/2023 Cintia Mc OT GO 1                 Cintia Mc OT  9/18/2023    Electronically signed by Cintia Mc OT at 09/18/23 1438       Cintia Mc OT at 09/18/23 1437          Goal Outcome Evaluation:  Plan of Care Reviewed With: patient        Progress: improving  Outcome  Evaluation: Pt is 73 y/o M with alzheimer's dementia who can become agitated in the evenings. He was placed in a SNF recently but a more appropriate residence was found by his spouse and at d/c he pans to go there where there is dedicated memory care. Pt had fallen and injured his knee at [facility] last facility and was brought in when his mobility and pain did not quickly recover. His Xrays were normal so there were no acute fractures. This date his knee is feeling back to normal per his report except OT does note a mild limp when he walks. A RW would be appropriate for him to use at this time and he reports familiarity with this DME. The faciilty where he had been staying staed he was mainly using a W/C. He was oriented X2 this afternoon and able to converse, although it was apparent that sounds were not necessarily interpreted correctly (i.e. 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 The interaction was pleasant and pt demonstrates ability to dress himself with setup & cue and to groom and toilet himself with setup and distant supervision. He needed SBA to walk and completed transfers independently.      Anticipated Discharge Disposition (OT): skilled nursing facility, other (see comments) (memory care)    Electronically signed by Cintia Mc, OT at 09/18/23 0832

## 2023-09-19 NOTE — PLAN OF CARE
Goal Outcome Evaluation:              Outcome Evaluation: Pt has had no c/o pain this evening, has been confused & agitated.  VSS, call light in reach, plan of care ongoing

## 2023-09-19 NOTE — PLAN OF CARE
Goal Outcome Evaluation:      Patient very confused this AM, sitter at bedside however patient would not redirect. Patient very agitated, administered haldol per MD. Patient resting rest of shift, VSS.Sitter still at bedside, care plan ongoing

## 2023-09-19 NOTE — CASE MANAGEMENT/SOCIAL WORK
Continued Stay Note  FABIAN Casey     Patient Name: Anurag Pickard  MRN: [id number]  Today's Date: 9/19/2023    Admit Date: 9/15/2023    Plan: Patient will admit to [facility], in [facility] [place]. Will need dc summary faxed to [facility] at [phone] and DON final approval before he can admit there. [facility] will only take him between 11am and 2pm M-F. He must have don approval and dc summary sent before he is fully accepted.   Discharge Plan       Row Name 09/19/23 1248       Plan    Plan Patient will admit to [facility], in [facility] [place]. Will need dc summary faxed to [facility] at [phone] and DON final approval before he can admit there. [facility] will only take him between 11am and 2pm M-F. He must have don approval and dc summary sent before he is fully accepted.    Plan Comments Spoke with Jenni over the phone. Karie states to call the [facility] as they will accept patient at [place]. Call to Amanda ALLEN at [facility] and she gives the information as above. Amanda asks for H and P and notes to be faxed to her at [phone]. Notes sent through ad hoc in epic                         Expected Discharge Date and Time       Expected Discharge Date Expected Discharge Time    Sep 20, 2023               Valerie Hanley RN

## 2023-09-19 NOTE — PROGRESS NOTES
"    [facility] Medicine Services   Daily Progress Note    Patient Name: Anurag Pickard  : 1950  MRN: [id number]  Primary Care Physician:  Provider, No Known  Date of admission: 9/15/2023  Date and Time of Service: 2023    Brief clinical summary:  72-year-old male with history of obesity, diastolic heart failure, diabetes, erectile dysfunction, hypertension, hyperlipidemia, obstructive sleep apnea, pulmonary hypertension, hypothyroidism, GERD, vitamin D and B-12 deficiencies, Alzheimer's dementia and frequent falls was brought to the hospital for evaluation of acute right knee pain and swelling.  Knee aspiration completed by IR on 9/15, aspirate demonstrated presence of calcium pyrophosphate crystals and negative cultures so far.  Antibiotics discontinued since suspicion for septic arthritis is low.     Subjective      RN reports altered mental status this morning.  Patient was agitated, impulsive and attempted getting out of bed multiple times.  He received Haldol 2.5 mg IM x1 and eventually calmed.  Met him sleeping in bedside chair.  Bilateral leg edema appears slightly worse today as today.  Met wife on my second visit today room.  She states that his neurologist had started patient on Rexulti for agitation.  Advised against Seroquel due to what she described as an \"abnormal EKG\"    Objective      Vitals:   Temp:  [97.6 °F (36.4 °C)-98.2 °F (36.8 °C)] 98.2 °F (36.8 °C)  Heart Rate:  [] 76  Resp:  [16-17] 17  BP: (115-144)/(78-88) 144/88    Physical Exam   General appearance:   Mental status: sleeping in chair after Haldol administration  CVS: Regular heart sounds, no murmurs, no gallops  Respiration: Unlabored breathing, no wheezes or rales  GI: Soft, nondistended, nontender, bowel sounds present  Right lower extremity: Increased BLE pitting edema  Psychiatry: agitation and impulsivity (reported)    Result Review    Result Review:  I have personally reviewed the results " from the time of this admission to 9/19/2023 10:28 EDT and agree with these findings:  [x]  Laboratory  []  Microbiology  []  Radiology  []  EKG/Telemetry   []  Cardiology/Vascular   []  Pathology  []  Old records  []  Other:      Assessment & Plan      atorvastatin, 40 mg, Oral, Daily  calcium gluconate, 3,000 mg, Intravenous, Once  folic acid, 1 mg, Oral, Daily  furosemide, 40 mg, Oral, BID  insulin lispro, 2-9 Units, Subcutaneous, 4x Daily AC & at Bedtime  lidocaine, 10 mL, Injection, Once  magnesium oxide, 400 mg, Oral, BID  metoprolol succinate XL, 25 mg, Oral, Daily  naproxen, 500 mg, Oral, BID With Meals  pantoprazole, 40 mg, Oral, BID AC  potassium chloride, 20 mEq, Oral, Daily  sodium chloride, 10 mL, Intravenous, Q12H  vitamin B-12, 1,000 mcg, Oral, Daily               Active Hospital Problems:  Active Hospital Problems    Diagnosis     **Right knee pain     Alzheimer's dementia      Plan:   #Acute right knee pain/effusion   -Suspect secondary to acute pseudogout arthritis  -Septic arthritis is ruled out (negative fluid cultures)  -Mainstay of pseudogout is aspiration and intrathecal steroid. In the case that steroid injection is not feasible, NSAIDs, colchicine and perhaps systemic steroids can be considered.  He was started on naproxen 500 mg twice daily, will continue same. Knee aspirate culture remains negative. Will dc antibiotics    #Electrolyte disturbances, i.e. hypokalemia, hypomagnesemia, hypocalcemia  -Most likely due to poor intake in the last couple of weeks coupled with diuresis  -K at goal.  Mg and CEA still low  -Continue Ca and Mg replacement as needed  -BMP + Mg in am    #Frequent falls with risk of further injuries  -PT/OT evaluation/early ambulation when able    #Late onset Alzheimer's dementia with behavioral abnormalities  -Agitation, confusion, impulsivity reported this morning  -Usually takes Rexulti which we do not carry.  Wife will bring Rexulti in later today  -Will require  24-hour supervision postdischarge.  -States that he was in skilled nursing prior to admission, but she wants him in memory care postdischarge    #Chronic diastolic heart failure, not decompensated  Continue metoprolol  Due to increasing fluid retention, we restarted home diuretic  -Increase Lasix to twice daily  -Ace wraps both legs    #Type 2 diabetes not on long-term insulin  -Continue sliding scale NovoLog for now  -A1c on this admission is 6.8%    #B12 deficiency  -Continue daily vitamin B12    #Hyperlipidemia  *Continue home statin    #General weakness  -Continue PT/OT    DVT prophylaxis:  Mechanical DVT prophylaxis orders are present.    CODE STATUS:    Level Of Support Discussed With: Health Care Surrogate  Code Status (Patient has no pulse and is not breathing): No CPR (Do Not Attempt to Resuscitate)  Medical Interventions (Patient has pulse or is breathing): Full Support      Disposition: Not yet stable for discharge due to agitation and worsening leg edema.  Anticipate discharge on 9/21      Electronically signed by Concepcion Duncan MD, 09/19/23, 10:28 EDT.  [facility] Hospitalist Team

## 2023-09-19 NOTE — SIGNIFICANT NOTE
Nursing reported pt had some confusion and was very aggressive earlier this morning.  Pt was given haldol and has been sleeping.  Nursing agreeable to let pt rest today.

## 2023-09-20 VITALS
TEMPERATURE: 98 F | OXYGEN SATURATION: 99 % | RESPIRATION RATE: 17 BRPM | SYSTOLIC BLOOD PRESSURE: 133 MMHG | HEIGHT: 70 IN | DIASTOLIC BLOOD PRESSURE: 77 MMHG | WEIGHT: 253.53 LBS | HEART RATE: 99 BPM | BODY MASS INDEX: 36.3 KG/M2

## 2023-09-20 LAB
ANION GAP SERPL CALCULATED.3IONS-SCNC: 8 MMOL/L (ref 5–15)
BACTERIA FLD CULT: NORMAL
BACTERIA SPEC AEROBE CULT: NORMAL
BACTERIA SPEC AEROBE CULT: NORMAL
BUN SERPL-MCNC: 15 MG/DL (ref 8–23)
BUN/CREAT SERPL: 13.3 (ref 7–25)
CA-I SERPL ISE-MCNC: 1.08 MMOL/L (ref 1.2–1.3)
CALCIUM SPEC-SCNC: 8.1 MG/DL (ref 8.6–10.5)
CHLORIDE SERPL-SCNC: 104 MMOL/L (ref 98–107)
CO2 SERPL-SCNC: 29 MMOL/L (ref 22–29)
CREAT SERPL-MCNC: 1.13 MG/DL (ref 0.76–1.27)
EGFRCR SERPLBLD CKD-EPI 2021: 69.5 ML/MIN/1.73
GLUCOSE BLDC GLUCOMTR-MCNC: 179 MG/DL (ref 70–105)
GLUCOSE BLDC GLUCOMTR-MCNC: 193 MG/DL (ref 70–105)
GLUCOSE SERPL-MCNC: 216 MG/DL (ref 65–99)
GRAM STN SPEC: NORMAL
GRAM STN SPEC: NORMAL
MAGNESIUM SERPL-MCNC: 1.6 MG/DL (ref 1.6–2.4)
POTASSIUM SERPL-SCNC: 4.4 MMOL/L (ref 3.5–5.2)
QT INTERVAL: 386 MS
QTC INTERVAL: 480 MS
SODIUM SERPL-SCNC: 141 MMOL/L (ref 136–145)
WHOLE BLOOD HOLD SPECIMEN: NORMAL

## 2023-09-20 PROCEDURE — 80048 BASIC METABOLIC PNL TOTAL CA: CPT | Performed by: INTERNAL MEDICINE

## 2023-09-20 PROCEDURE — 63710000001 INSULIN LISPRO (HUMAN) PER 5 UNITS

## 2023-09-20 PROCEDURE — 82948 REAGENT STRIP/BLOOD GLUCOSE: CPT

## 2023-09-20 PROCEDURE — 82330 ASSAY OF CALCIUM: CPT | Performed by: INTERNAL MEDICINE

## 2023-09-20 PROCEDURE — 83735 ASSAY OF MAGNESIUM: CPT | Performed by: INTERNAL MEDICINE

## 2023-09-20 RX ORDER — POTASSIUM CHLORIDE 20 MEQ/1
20 TABLET, EXTENDED RELEASE ORAL DAILY
Qty: 30 TABLET | Refills: 0 | Status: SHIPPED | OUTPATIENT
Start: 2023-09-20

## 2023-09-20 RX ORDER — HYDROCODONE BITARTRATE AND ACETAMINOPHEN 5; 325 MG/1; MG/1
1 TABLET ORAL EVERY 8 HOURS PRN
Qty: 10 TABLET | Refills: 0 | Status: SHIPPED | OUTPATIENT
Start: 2023-09-20

## 2023-09-20 RX ORDER — FUROSEMIDE 40 MG/1
40 TABLET ORAL DAILY
Qty: 30 TABLET | Refills: 0 | Status: SHIPPED | OUTPATIENT
Start: 2023-09-20

## 2023-09-20 RX ADMIN — POTASSIUM CHLORIDE 20 MEQ: 1500 TABLET, EXTENDED RELEASE ORAL at 07:54

## 2023-09-20 RX ADMIN — BREXPIPRAZOLE 0.5 MG: 1 TABLET ORAL at 09:08

## 2023-09-20 RX ADMIN — Medication 400 MG: at 07:54

## 2023-09-20 RX ADMIN — INSULIN LISPRO 2 UNITS: 100 INJECTION, SOLUTION INTRAVENOUS; SUBCUTANEOUS at 07:57

## 2023-09-20 RX ADMIN — CYANOCOBALAMIN TAB 1000 MCG 1000 MCG: 1000 TAB at 07:54

## 2023-09-20 RX ADMIN — INSULIN LISPRO 2 UNITS: 100 INJECTION, SOLUTION INTRAVENOUS; SUBCUTANEOUS at 12:45

## 2023-09-20 RX ADMIN — ATORVASTATIN CALCIUM 40 MG: 40 TABLET, FILM COATED ORAL at 07:54

## 2023-09-20 RX ADMIN — FUROSEMIDE 40 MG: 40 TABLET ORAL at 07:54

## 2023-09-20 RX ADMIN — PANTOPRAZOLE SODIUM 40 MG: 40 TABLET, DELAYED RELEASE ORAL at 07:54

## 2023-09-20 RX ADMIN — FOLIC ACID 1 MG: 1 TABLET ORAL at 07:54

## 2023-09-20 RX ADMIN — METOPROLOL SUCCINATE 25 MG: 25 TABLET, FILM COATED, EXTENDED RELEASE ORAL at 07:53

## 2023-09-20 NOTE — PLAN OF CARE
Patient is confused, has attempted to get out of bed multiple times besides being redirected and re-educated on safety and call light use. Remote sitter at bedside, bed alarm is on. RN will continue to monitor pt and meet needs accordingly,     Problem: Adjustment to Illness (Sepsis/Septic Shock)  Goal: Optimal Coping  Intervention: Optimize Psychosocial Adjustment to Illness  Description: Acknowledge, normalize, validate intensity and complexity of patient and support system response to situation.  Provide opportunity for expression of thoughts, feelings and concerns; respond with compassion and reassurance.  Decrease stress and anxiety by providing information about patient’s status and treatment.  Facilitate support system presence and participation in care; consider providing a diary in intensive care situation.  Support coping by recognizing current coping strategies; provide aid in developing new strategies.  Acknowledge and normalize difficulty in managing life-long lifestyle changes and expectations.  Assess and monitor for signs and symptoms of psychologic distress, anxiety and depression.  Consider palliative care consult for goals of care conversation, if the condition is worsening despite treatment.  Recent Flowsheet Documentation  Taken 9/20/2023 0410 by Denisse Duffy RN  Family/Support System Care:   caregiver stress acknowledged   support provided  Taken 9/20/2023 0010 by Denisse Duffy, RN  Supportive Measures:   active listening utilized   self-care encouraged   goal-setting facilitated   decision-making supported   problem-solving facilitated   self-responsibility promoted  Family/Support System Care:   support provided   caregiver stress acknowledged     Problem: Bleeding (Sepsis/Septic Shock)  Goal: Absence of Bleeding  Intervention: Monitor and Manage Bleeding  Description: Maintain bleeding precautions; provide safe environment and gentle care activities, such as positioning, oral and  skin care.  Avoid invasive procedures and medication that increase risk of bleeding; monitor for signs of bleeding frequently.  Anticipate need for fluid volume replacement (e.g., intravenous fluid, blood products); use weight-based calculations.  Consider adjunctive supportive therapy, such as platelet infusion or heparin.  Provide protective hemostasis by applying direct pressure to a visible bleeding site.  Recent Flowsheet Documentation  Taken 9/20/2023 0410 by Denisse Duffy RN  Bleeding Precautions: blood pressure closely monitored     Problem: Glycemic Control Impaired (Sepsis/Septic Shock)  Goal: Blood Glucose Level Within Desired Range  Intervention: Optimize Glycemic Control  Description: Establish target blood glucose levels based on patient-specific factors, such as illness severity and comorbidity.  Document blood glucose levels and monitor trend.  If elevated blood glucose level is not within desired range, initiate insulin therapy to optimize glycemic control using an insulin management protocol.  Avoid hypoglycemic episodes by proactively adjusting insulin therapy if there is a change in condition, treatment, illness severity, medication or nutrition support therapy.  Recent Flowsheet Documentation  Taken 9/20/2023 0010 by Denisse Duffy RN  Glycemic Management: blood glucose monitored     Problem: Infection Progression (Sepsis/Septic Shock)  Goal: Absence of Infection Signs and Symptoms  Intervention: Initiate Sepsis Management  Description: Provide fluid therapy, such as crystalloid or albumin, to increase intravascular volume, organ perfusion and oxygen delivery.  Provide respiratory support, such as oxygen therapy, noninvasive or invasive positive pressure ventilation, to achieve oxygenation and ventilation goal; avoid hyperoxemia.  Obtain cultures prior to initiating antimicrobial therapy when possible. Do not delay for laboratory results in the presence of high suspicion or clinical  indicators.  Administer intravenous broad-spectrum antimicrobial therapy promptly.  Implement hemodynamic monitoring to guide intravascular support based on individual targeted parameters.  Determine and address underlying source of infection aggressively; implement transmission-based precautions and isolation, as indicated.  Recent Flowsheet Documentation  Taken 9/20/2023 0410 by Denisse Duffy RN  Infection Management: aseptic technique maintained  Infection Prevention:   environmental surveillance performed   hand hygiene promoted   personal protective equipment utilized   rest/sleep promoted   single patient room provided   visitors restricted/screened  Isolation Precautions: precautions maintained  Taken 9/20/2023 0010 by Denisse Duffy RN  Infection Prevention:   equipment surfaces disinfected   environmental surveillance performed   rest/sleep promoted   single patient room provided   hand hygiene promoted  Isolation Precautions: precautions maintained  Intervention: Promote Recovery  Description: Encourage pulmonary hygiene, such as cough-enhancement and airway-clearance techniques, that may include use of incentive spirometry, deep breathing and cough.  Encourage early rehabilitation and physical activity to optimize functional ability and activity tolerance, as well as minimize delirium.  Promote energy conservation; minimize oxygen demand and consumption by adjusting environment, decreasing stimulation, maintaining normothermia and treating pain.  Optimize fluid balance, nutrition intake, sleep and glycemic control to maintain tissue perfusion and enhance immune response.  Recent Flowsheet Documentation  Taken 9/20/2023 0410 by Denisse Duffy, RN  Activity Management:   ambulated to bathroom   activity minimized  Sleep/Rest Enhancement:   awakenings minimized   consistent schedule promoted   noise level reduced   relaxation techniques promoted   room darkened   regular sleep/rest pattern  promoted  Taken 9/20/2023 0010 by Denisse Duffy, RN  Activity Management: ambulated to bathroom  Airway/Ventilation Support: pulmonary hygiene promoted  Sleep/Rest Enhancement:   awakenings minimized   relaxation techniques promoted   consistent schedule promoted   noise level reduced   room darkened   therapeutic touch utilized  Intervention: Promote Stabilization  Description: Monitor for signs of fluid responsiveness and overload; consider fluid adjustment and diuretic therapy.  Anticipate use of vasoactive agent to support microperfusion and oxygen delivery; titrate to response.  Monitor laboratory value, diagnostic test and clinical status trends for signs of infection progression and multiple organ failure.  Assess effectiveness of and potential for de-escalation of the antimicrobial regimen daily.  Provide fever-reduction and comfort measures.  Monitor and manage electrolyte imbalance, such as hypocalcemia.  Use lung protective ventilation measures, such as low volume, inspiratory pressure, optimal positive end-expiratory pressure, to minimize the risk of ventilator-induced lung injury; ensure minute volume demands.  Prepare for supportive therapy, such as corticosteroid therapy, coagulopathy management, CRRT (continuous renal replacement therapy), hemofiltration and cardiac-assist device.  Recent Flowsheet Documentation  Taken 9/20/2023 0010 by Denisse Duffy, MARY  Fluid/Electrolyte Management: fluids adjusted     Problem: Fall Injury Risk  Goal: Absence of Fall and Fall-Related Injury  Intervention: Identify and Manage Contributors  Description: Develop a fall prevention plan with the patient and caregiver/family.  Provide reorientation, appropriate sensory stimulation and routines with changes in mental status to decrease risk of fall.  Promote use of personal vision and auditory aids.  Assess assistance level required for safe and effective self-care; provide support as needed, such as toileting,  mobilization. For age 65 and older, implement timed toileting with assistance.  Encourage physical activity, such as performance of mobility and self-care at highest level of patient ability, multicomponent exercise program and provision of appropriate assistive devices.  If fall occurs, assess the severity of injury; implement fall injury protocol. Determine the cause and revise fall injury prevention plan.  Regularly review medication contribution to fall risk; adjust medication administration times to minimize risk of falling.  Consider risk related to polypharmacy and age.  Balance adequate pain management with potential for oversedation.  Recent Flowsheet Documentation  Taken 9/20/2023 0410 by Denisse Duffy, RN  Medication Review/Management: medications reviewed  Self-Care Promotion:   independence encouraged   BADL personal objects within reach   BADL personal routines maintained  Taken 9/20/2023 0010 by Denisse Duffy, RN  Medication Review/Management:   medications reviewed   high-risk medications identified  Self-Care Promotion:   BADL personal objects within reach   BADL personal routines maintained   independence encouraged  Intervention: Promote Injury-Free Environment  Description: Provide a safe, barrier-free environment that encourages independent activity.  Keep care area uncluttered and well-lighted.  Determine need for increased observation or monitoring.  Avoid use of devices that minimize mobility, such as restraints or indwelling urinary catheter.  Recent Flowsheet Documentation  Taken 9/20/2023 0410 by Denisse Duffy, RN  Safety Promotion/Fall Prevention:   clutter free environment maintained   assistive device/personal items within reach   fall prevention program maintained   lighting adjusted   nonskid shoes/slippers when out of bed   room organization consistent   safety round/check completed  Taken 9/20/2023 0010 by Denisse Duffy, RN  Safety Promotion/Fall Prevention:   lighting  adjusted   clutter free environment maintained   assistive device/personal items within reach   nonskid shoes/slippers when out of bed   room organization consistent   safety round/check completed   fall prevention program maintained     Problem: Skin Injury Risk Increased  Goal: Skin Health and Integrity  Intervention: Promote and Optimize Oral Intake  Description: Perform a nutrition assessment; include a nutrition-focused physical exam.  Determine calorie, protein, vitamin, mineral and fluid requirements.  Assess for micronutrient deficiencies; supplement if depleted.  Assess need and assist with meal set-up and feeding.  Adjust diet or meal schedule based on preferences and tolerance.  Offer oral supplemental food or drinks to enhance calorie and protein intake.  Establish bowel elimination program to increase comfort and appetite.  Minimize unnecessary dietary restrictions to increase oral intake.  Provide and encourage oral hygiene to enhance desire to eat.  Consider enteral nutrition support if oral intake remains inadequate; provide parenteral nutrition if enteral is contraindicated.  Recent Flowsheet Documentation  Taken 9/20/2023 0010 by Denisse Duffy RN  Oral Nutrition Promotion:   medicated   rest periods promoted  Intervention: Optimize Skin Protection  Description: Perform a full pressure injury risk assessment, as indicated by screening, upon admission to care unit.  Reassess skin (injury risk, full inspection) frequently (e.g., scheduled interval, with change in condition) to provide optimal early detection and prevention.  Maintain adequate tissue perfusion (e.g., encourage fluid balance; avoid crossing legs, constrictive clothing or devices) to promote tissue oxygenation.  Maintain head of bed at lowest degree of elevation tolerated, considering medical condition and other restrictions.  Avoid positioning onto an area that remains reddened.  Minimize incontinence and moisture (e.g., toileting  schedule; moisture-wicking pad, diaper or incontinence collection device; skin moisture barrier).  Cleanse skin promptly and gently when soiled utilizing a pH-balanced cleanser.  Relieve and redistribute pressure (e.g., scheduled position changes, weight shifts, use of support surface, medical device repositioning, protective dressing application, use of positioning device, microclimate control, use of pressure-injury-monitor  Encourage increased activity, such as sitting in a chair at the bedside or early mobilization, when able to tolerate.  Recent Flowsheet Documentation  Taken 9/20/2023 0410 by Denisse Dufyf, RN  Head of Bed (HOB) Positioning: HOB at 30 degrees  Taken 9/20/2023 0010 by Denisse Duffy, RN  Pressure Reduction Techniques:   frequent weight shift encouraged   sit time limited to 2 hours   pressure points protected   weight shift assistance provided  Head of Bed (HOB) Positioning: HOB at 30 degrees  Pressure Reduction Devices: pressure-redistributing mattress utilized  Skin Protection:   adhesive use limited   tubing/devices free from skin contact     Problem: Pain Acute  Goal: Acceptable Pain Control and Functional Ability  Intervention: Prevent or Manage Pain  Description: Evaluate pain level, effect of treatment and patient response at regular intervals.  Minimize painful stimuli; coordinate care and adjust environment (e.g., light, noise, unnecessary movement); promote sleep/rest.  Match pharmacologic analgesia to severity and type of pain mechanism (e.g., neuropathic, muscle, inflammatory); consider multimodal approach (e.g., nonopioid, opioid, adjuvant).  Provide medication at regular intervals; titrate to patient response; premedicate for painful procedures.  Manage breakthrough pain with additional doses; consider rotation or switching medication.  Monitor for signs of substance tolerance (increased dose to reach desired effect, decreased effect with same dose).  Manage  medication-induced effects, such as constipation, nausea, pruritus, urinary retention, somnolence and dizziness.  Provide multimodal interventions, such as as physical activity, therapeutic exercise, yoga, TENS (transcutaneous electrical nerve stimulation) and manual therapy.  Train in functional activity modifications, such as body mechanics, posture, ergonomics, energy conservation and activity pacing.  Consider addition of complementary or alternative therapy, such as acupuncture, hypnosis or therapeutic touch.  Recent Flowsheet Documentation  Taken 9/20/2023 0410 by Denisse Duffy RN  Sleep/Rest Enhancement:   awakenings minimized   consistent schedule promoted   noise level reduced   relaxation techniques promoted   room darkened   regular sleep/rest pattern promoted  Medication Review/Management: medications reviewed  Taken 9/20/2023 0010 by Denisse Duffy RN  Sensory Stimulation Regulation:   care clustered   lighting decreased   quiet environment promoted  Bowel Elimination Promotion:   adequate fluid intake promoted   ambulation promoted  Sleep/Rest Enhancement:   awakenings minimized   relaxation techniques promoted   consistent schedule promoted   noise level reduced   room darkened   therapeutic touch utilized  Medication Review/Management:   medications reviewed   high-risk medications identified  Intervention: Optimize Psychosocial Wellbeing  Description: Facilitate patient’s self-control over pain by providing pain information and allowing choices in treatment.  Consider and address emotional response to pain.  Explore and promote use of coping strategies; address barriers to successful coping.  Evaluate and assist with psychosocial, cultural and spiritual factors impacting pain.  Modify pain perception using techniques, such as distraction, mindfulness, guided imagery, meditation or music.  Assess for risk factors for developing chronic pain, such as depression, fear, pain avoidance and pain  catastrophizing.  Consider referral for ongoing coping support, such as education, relaxation training and role of thoughts.  Recent Flowsheet Documentation  Taken 9/20/2023 0410 by Denisse Duffy RN  Diversional Activities:   smartphone   television  Spiritual Activities Assistance: hope instilled  Taken 9/20/2023 0010 by Denisse Duffy RN  Supportive Measures:   active listening utilized   self-care encouraged   goal-setting facilitated   decision-making supported   problem-solving facilitated   self-responsibility promoted  Diversional Activities: television  Spiritual Activities Assistance: hope instilled     Problem: Behavioral Health Comorbidity  Goal: Maintenance of Behavioral Health Symptom Control  Intervention: Maintain Behavioral Health Symptom Control  Description: Confirm mental health diagnosis and current treatment.  Evaluate adherence to previously identified self-management plan.  Advocate continuation of home strategies, including medication.  Evaluate effectiveness of self-management strategies and coping skills.  Communicate with providers to ensure continuity and follow-up at transition.  Recent Flowsheet Documentation  Taken 9/20/2023 0410 by Denisse Duffy RN  Medication Review/Management: medications reviewed  Taken 9/20/2023 0010 by Denisse Duffy RN  Medication Review/Management:   medications reviewed   high-risk medications identified     Problem: Heart Failure Comorbidity  Goal: Maintenance of Heart Failure Symptom Control  Intervention: Maintain Heart Failure-Management  Description: Evaluate adherence to home heart failure self-care regimen (e.g., medication, fluid balance, sodium intake, daily weight, physical activity, telemonitoring, support).  Advocate continuation of home medication and schedule.  Consider pharmacologic therapy administration time and effects (e.g., avoid giving diuretic prior to bedtime or nitrates on empty stomach).  Monitor response to pharmacologic  therapy, including weight fluctuations, blood pressure and electrolyte levels.  Monitor for signs and symptoms of anxiety and depression, including severity and duration; if present, provide psychosocial support.  Consider need for heart failure clinic or palliative care consult.  Recent Flowsheet Documentation  Taken 9/20/2023 0410 by Denisse Duffy RN  Medication Review/Management: medications reviewed  Taken 9/20/2023 0010 by Denisse Duffy, RN  Medication Review/Management:   medications reviewed   high-risk medications identified     Problem: Diabetes Comorbidity  Goal: Blood Glucose Level Within Targeted Range  Intervention: Monitor and Manage Glycemia  Description: Establish target blood glucose levels based on patient-specific factors, such as age, diabetes-related complications and illness severity.  Document blood glucose levels and monitor trend; advocate for adjustment to keep within targeted range.  Provide pharmacologic therapy to maintain blood glucose levels within targeted range.  Check blood glucose level if there is a change in mental or cognitive status.  Recognize, treat and document hypoglycemia event and potential cause.  Avoid hypoglycemic episodes by advocating for insulin dose adjustment when there is a change in condition, such as illness severity, decreased oral intake, missed or refused meals and snacks, as well as medication change that may include steroid tape  Recent Flowsheet Documentation  Taken 9/20/2023 0010 by Denisse Duffy RN  Glycemic Management: blood glucose monitored     Problem: Hypertension Comorbidity  Goal: Blood Pressure in Desired Range  Intervention: Maintain Blood Pressure Management  Description: Evaluate adherence to home antihypertensive regimen (e.g., exercise and activity, diet modification, medication).  Provide scheduled antihypertensive medication; consider administration time and effects (e.g., avoid giving diuretic prior to bedtime).  Monitor  response to antihypertensive medication therapy (e.g., blood pressure, electrolyte levels, medication effects).  Minimize risk of orthostatic hypotension; encourage caution with position changes, particularly if elderly.  Recent Flowsheet Documentation  Taken 9/20/2023 0410 by Denisse Duffy RN  Medication Review/Management: medications reviewed  Taken 9/20/2023 0010 by Denisse Duffy RN  Medication Review/Management:   medications reviewed   high-risk medications identified     Problem: Osteoarthritis Comorbidity  Goal: Maintenance of Osteoarthritis Symptom Control  Intervention: Maintain Osteoarthritis Symptom Control  Description: Evaluate adherence to self-management plan, such as medication, exercise and weight management.  Advocate for continuation of home regimen, such as medication, physical activity and thermal agents; monitor response.  Encourage participation in functional activities, such as mobility and ADLs (activities of daily living) to minimize decline associated with inactivity.  Facilitate use of patient-specific assistive devices, equipment or orthoses.  Evaluate effectiveness of coping skills; encourage expression of feelings, expectations and concerns related to disease management and quality of life; reinforce education to enhance management plan and wellbeing.  Recent Flowsheet Documentation  Taken 9/20/2023 0410 by Denisse Duffy RN  Activity Management:   ambulated to bathroom   activity minimized  Assistive Device Utilized: walker  Medication Review/Management: medications reviewed  Taken 9/20/2023 0010 by Denisse Duffy RN  Activity Management: ambulated to bathroom  Adaptive Equipment Use: used independently  Assistive Device Utilized: walker  Medication Review/Management:   medications reviewed   high-risk medications identified     Problem: Adult Inpatient Plan of Care  Goal: Absence of Hospital-Acquired Illness or Injury  Intervention: Identify and Manage Fall  Risk  Description: Perform standard risk assessment on admission using a validated tool or comprehensive approach appropriate to the patient; reassess fall risk frequently, with change in status or transfer to another level of care.  Communicate fall injury risk to interprofessional healthcare team.  Determine need for increased observation, equipment and environmental modification, such as low bed, signage and supportive, nonskid footwear.  Adjust safety measures to individual developmental age, stage and identified risk factors.  Reinforce the importance of safety and physical activity with patient and family.  Perform regular intentional rounding to assess need for position change, pain assessment and personal needs, including assistance with toileting.  Recent Flowsheet Documentation  Taken 9/20/2023 0410 by Denisse Duffy, RN  Safety Promotion/Fall Prevention:   clutter free environment maintained   assistive device/personal items within reach   fall prevention program maintained   lighting adjusted   nonskid shoes/slippers when out of bed   room organization consistent   safety round/check completed  Taken 9/20/2023 0010 by Denisse Duffy, RN  Safety Promotion/Fall Prevention:   lighting adjusted   clutter free environment maintained   assistive device/personal items within reach   nonskid shoes/slippers when out of bed   room organization consistent   safety round/check completed   fall prevention program maintained  Intervention: Prevent Skin Injury  Description: Perform a screening for skin injury risk, such as pressure or moisture associated skin damage on admission and at regular intervals throughout hospital stay.  Keep all areas of skin (especially folds) clean and dry.  Maintain adequate skin hydration.  Relieve and redistribute pressure and protect bony prominences; implement measures based on patient-specific risk factors.  Match turning and repositioning schedule to clinical  condition.  Encourage weight shift frequently; assist with reposition if unable to complete independently.  Float heels off bed; avoid pressure on the Achilles tendon.  Keep skin free from extended contact with medical devices.  Encourage functional activity and mobility, as early as tolerated.  Use aids (e.g., slide boards, mechanical lift) during transfer.  Recent Flowsheet Documentation  Taken 9/20/2023 0410 by Denisse Duffy RN  Body Position:   position changed independently   turned   right  Taken 9/20/2023 0010 by Denisse Duffy RN  Body Position:   supine   position changed independently  Skin Protection:   adhesive use limited   tubing/devices free from skin contact  Intervention: Prevent and Manage VTE (Venous Thromboembolism) Risk  Description: Assess for VTE (venous thromboembolism) risk.  Encourage and assist with early ambulation.  Initiate and maintain compression or other therapy, as indicated, based on identified risk in accordance with organizational protocol and provider order.  Encourage both active and passive leg exercises while in bed, if unable to ambulate.  Recent Flowsheet Documentation  Taken 9/20/2023 0410 by Denisse Duffy RN  Activity Management:   ambulated to bathroom   activity minimized  Range of Motion: ROM (range of motion) performed  Taken 9/20/2023 0010 by Denisse Duffy RN  Activity Management: ambulated to bathroom  Range of Motion: active ROM (range of motion) encouraged  Intervention: Prevent Infection  Description: Maintain skin and mucous membrane integrity; promote hand, oral and pulmonary hygiene.  Optimize fluid balance, nutrition, sleep and glycemic control to maximize infection resistance.  Identify potential sources of infection early to prevent or mitigate progression of infection (e.g., wound, lines, devices).  Evaluate ongoing need for invasive devices; remove promptly when no longer indicated.  Recent Flowsheet Documentation  Taken 9/20/2023 0410 by  Denisse Duffy, RN  Infection Prevention:   environmental surveillance performed   hand hygiene promoted   personal protective equipment utilized   rest/sleep promoted   single patient room provided   visitors restricted/screened  Taken 9/20/2023 0010 by Denisse Duffy RN  Infection Prevention:   equipment surfaces disinfected   environmental surveillance performed   rest/sleep promoted   single patient room provided   hand hygiene promoted  Goal: Optimal Comfort and Wellbeing  Intervention: Provide Person-Centered Care  Description: Use a family-focused approach to care.  Develop trust and rapport by proactively providing information, encouraging questions, addressing concerns and offering reassurance.  Acknowledge emotional response to hospitalization.  Recognize and utilize personal coping strategies.  Honor spiritual and cultural preferences.  Recent Flowsheet Documentation  Taken 9/20/2023 0410 by Denisse Duffy RN  Trust Relationship/Rapport:   care explained   choices provided   questions encouraged   questions answered   reassurance provided   thoughts/feelings acknowledged  Taken 9/20/2023 0010 by Denisse Duffy RN  Trust Relationship/Rapport:   care explained   choices provided   emotional support provided   reassurance provided   thoughts/feelings acknowledged   questions answered   empathic listening provided   Goal Outcome Evaluation:

## 2023-09-20 NOTE — PLAN OF CARE
Goal Outcome Evaluation:      Assessment: Anurag Pickard presents with functional mobility impairments which indicate the need for skilled intervention.Goals updated to reflect progress made. Patient reports very minimal pain to either knees and claims feeling better with ambulation. Tolerated 120 ft x 2 with CG/SBA using rw without loss of balance noted. Patient needed cues to normalize gait pattern as patient has tendency to present with wide KYUNG and shuffling gait. Tolerated seated and standing exercises as well without c/o pain.  Tolerating session today without incident. Will continue to follow and progress as tolerated. Patient should be safe to return to SNF/memory unit, may benefit from continued rehab services at the facility.

## 2023-09-20 NOTE — THERAPY TREATMENT NOTE
Subjective: Pt agreeable to therapeutic plan of care.    Objective:     Bed mobility - Modified-Independent  Transfers - SBA  Ambulation - 120 feet x 2 SBA, CGA, and with rolling walker    Therapeutic Exercise - 10 Reps B LE AROM unsupported sitting / EOB and standing    Vitals: WNL    Pain: 2 VAS   Location: both knees  Intervention for pain: Increased Activity    Education: Provided education on the importance of mobility in the acute care setting and Gait Training    Assessment: Anurag Pickard presents with functional mobility impairments which indicate the need for skilled intervention.Goals updated to reflect progress made. Patient reports very minimal pain to either knees and claims feeling better with ambulation. Tolerated 120 ft x 2 with CG/SBA using rw without loss of balance noted. Patient needed cues to normalize gait pattern as patient has tendency to present with wide KYUNG and shuffling gait. Tolerated seated and standing exercises as well without c/o pain.  Tolerating session today without incident. Will continue to follow and progress as tolerated. Patient should be safe to return to SNF/memory unit, may benefit from continued rehab services at the facility.    Plan/Recommendations:   Recommend return to ECF at discharge post-acute care with rehab services as pt does not appear at baseline status.. Pt requires no DME at discharge.     Pt desires Skilled Rehab placement at discharge. Pt cooperative; agreeable to therapeutic recommendations and plan of care.         Basic Mobility 6-click:  Rollin = Total, A lot = 2, A little = 3; 4 = None  Supine>Sit:   1 = Total, A lot = 2, A little = 3; 4 = None   Sit>Stand with arms:  1 = Total, A lot = 2, A little = 3; 4 = None  Bed>Chair:   1 = Total, A lot = 2, A little = 3; 4 = None  Ambulate in room:  1 = Total, A lot = 2, A little = 3; 4 = None  3-5 Steps with railin = Total, A lot = 2, A little = 3; 4 = None  Score: 22      Post-Tx  Position: Staff Present, sitting EOB  PPE: gloves and N95

## 2023-09-20 NOTE — DISCHARGE PLACEMENT REQUEST
"Anurag Blevins \"Sj\" (72 y.o. Male)       Date of Birth   1950    Social Security Number       Address   [address] [place] [address] [place] [facility]    Home Phone   [phone]    MRN   [id number]       [employer]   None    Marital Status                               Admission Date   9/15/23    Admission Type   Emergency    Admitting Provider       Attending Provider   John Paul Lemon MD    Department, Room/Bed   [facility] 2D, 262/1       Discharge Date       Discharge Disposition   Skilled Nursing Facility (DC - External)    Discharge Destination                                 Attending Provider: John Paul Lemon MD    Allergies: Glipizide, Lisinopril    Isolation: Contact   Infection: Candida Auris (rule out) (09/19/23)   Code Status: No CPR    Ht: 177.8 cm (70\")   Wt: 115 kg (253 lb 8.5 oz)    Admission Cmt: None   Principal Problem: Right knee pain [M25.561]                   Active Insurance as of 9/15/2023       Primary Coverage       Payor Plan Insurance Group Employer/Plan Group    MEDICARE MEDICARE A & B        Payor Plan Address Payor Plan Phone Number Payor Plan Fax Number Effective Dates    [address] [phone]  11/1/2016 - None Entered    [facility] [id number]         Subscriber Name Subscriber Birth Date Member ID       SALAZARCHANTELLE MAXWELLPREMA MARCUS 1950 [id number]               Secondary Coverage       Payor Plan Insurance Group Employer/Plan Group    [employer] [place] [employer] [place]       Payor Plan Address Payor Plan Phone Number Payor Plan Fax Number Effective Dates    [address] [place] [address] [phone]  11/1/2016 - None Entered    [place] [address]         Subscriber Name Subscriber Birth Date Member ID       ANURAG BLEVINS ANGELINE 1950 [id number]                     Emergency Contacts        (Rel.) Home Phone Work Phone Mobile Phone    YESENIA MATEUS (Spouse) [phone] -- [phone]             John Paul Lemon MD "   Physician  Hospitalist     Discharge Summary     Signed     Date of Service: 23 1140  Creation Time: 23     Signed       Expand All Collapse All                   [facility]  Discharge Summary     Date of Service: 23  Patient Name: Anurag Pickard  : 1950  MRN: [id number]     Date of Admission: 9/15/2023  Discharge Diagnosis:     Diagnosis Plan   1. Pain and swelling of right knee          2. Pyogenic arthritis of right knee joint, due to unspecified organism        Suspected       3. Fall, initial encounter          4. Acute pain of right knee  HYDROcodone-acetaminophen (Norco) 5-325 MG per tablet             Date of Discharge:  23  Primary Care Physician: Provider, No Known        Presenting Problem:   Right knee pain [M25.561]  Fall, initial encounter [W19.XXXA]  Pain and swelling of right knee [M25.561, M25.461]  Pyogenic arthritis of right knee joint, due to unspecified organism [M00.9]     Active and Resolved Hospital Problems:       Active Hospital Problems     Diagnosis POA    **Right knee pain [M25.561] Yes    Alzheimer's dementia [G30.9, F02.80] Yes       Resolved Hospital Problems   No resolved problems to display.            Hospital Course      Hospital Course:  Anurag Pickard is a 72 y.o. male admitted to [facility] to services of No att. providers found with No admission diagnoses are documented for this encounter..  was consulted for further evaluation and treatment. Onset of symptoms was sudden in onset with right knee pain.  Symptoms are associated with pain and swelling over the right knee where he has had a right total knee arthroplasty several years ago.  Symptoms are aggravated by flexion and extension of the knee.   Symptoms improve with resting the knee anterior lateral aspect of the right knee. Knee aspiration completed by IR on 9/15, aspirate demonstrated presence of calcium pyrophosphate crystals and negative  cultures so far. Antibiotics discontinued since suspicion for septic arthritis is low. Pt stable for dc; sx improved     #Acute right knee pain/effusion   -Suspect secondary to acute pseudogout arthritis  -Septic arthritis is ruled out (negative fluid cultures)  -Mainstay of pseudogout is aspiration and intrathecal steroid. In the case that steroid injection is not feasible, NSAIDs, colchicine and perhaps systemic steroids can be considered.  He was started on naproxen 500 mg twice daily, will continue same. Knee aspirate culture remains negative. Will dc antibiotics     #Electrolyte disturbances, i.e. hypokalemia, hypomagnesemia, hypocalcemia  -Most likely due to poor intake in the last couple of weeks coupled with diuresis  -K at goal.  Mg and CEA still low  -Continue Ca and Mg replacement as needed  -BMP + Mg in am     #Frequent falls with risk of further injuries  -PT/OT evaluation/early ambulation when able     #Late onset Alzheimer's dementia with behavioral abnormalities  -Agitation, confusion, impulsivity reported this morning  -Usually takes Rexulti which we do not carry.  Wife will bring Rexulti in later today  -Will require 24-hour supervision postdischarge.  -States that he was in skilled nursing prior to admission, but she wants him in memory care postdischarge     #Chronic diastolic heart failure, not decompensated  Continue metoprolol  Due to increasing fluid retention, we restarted home diuretic  -Increase Lasix to twice daily  -Ace wraps both legs     #Type 2 diabetes not on long-term insulin  -Continue sliding scale NovoLog for now  -A1c on this admission is 6.8%     #B12 deficiency  -Continue daily vitamin B12     #Hyperlipidemia  *Continue home statin     #General weakness  -Continue PT/OT                 Reasons For Change In Medications and Indications for New Medications:        Day of Discharge      Vital Signs:  Temp:  [97.7 °F (36.5 °C)-97.9 °F (36.6 °C)] 97.7 °F (36.5 °C)  Heart Rate:   [84-99] 99  Resp:  [17-23] 18  BP: (121-133)/(63-77) 133/77     Physical Exam:  Physical Exam   GENERAL: The patient is well developed and nontoxic.  HEENT: Nonicteric sclerae, PERRLA, EOMI. Oropharynx clear. Moist mucous membranes. Conjunctivae appear well perfused.  CHEST: Chest wall is nontender.  HEART: Regular rate and rhythm without murmurs. No MRG  LUNGS: Clear to auscultation bilaterally. No WRR  ABDOMEN: Soft, positive bowel sounds, nontender, no organomegaly.  RECTAL: Deferred.  SKIN: No rash, no excessive bruising, petechiae, or purpura.  NEUROLOGIC: Cranial nerves II-XII intact without motor/sensory deficit         Pertinent  and/or Most Recent Results      LAB RESULTS:             Lab 09/17/23  0001 09/15/23  2301 09/15/23  1938 09/15/23  1138   WBC 8.10 10.70  --  14.90*   HEMOGLOBIN 8.4* 9.6*  --  9.2*   HEMATOCRIT 25.3* 29.1*  --  27.8*   PLATELETS 187 220  --  223   NEUTROS ABS 6.20 8.60*  --  12.00*   LYMPHS ABS 1.10 1.30  --  1.40   MONOS ABS 0.60 0.70  --  1.30*   EOS ABS 0.10 0.00  --  0.00   MCV 91.5 92.1  --  91.9   SED RATE  --   --   --  57*   CRP  --   --   --  12.13*   LACTATE  --   --  1.1  --                     Lab 09/20/23  0117 09/18/23  2358 09/18/23  0612 09/17/23  0001 09/16/23  1407 09/15/23  2301 09/15/23  1138   SODIUM 141 139 138 137 143   < > 141   POTASSIUM 4.4 4.8 3.7 3.9 3.6   < > 3.2*   CHLORIDE 104 103 102 105 107   < > 104   CO2 29.0 25.0 24.0 23.0 25.0   < > 23.0   ANION GAP 8.0 11.0 12.0 9.0 11.0   < > 14.0   BUN 15 18 16 12 11   < > 9   CREATININE 1.13 1.24 1.14 1.01 0.94   < > 0.98   EGFR 69.5 62.2 68.8 79.5 86.7   < > 82.4   GLUCOSE 216* 120* 156* 154* 199*   < > 150*   CALCIUM 8.1* 7.6* 7.4* 7.0* 7.1*   < > 6.4*   IONIZED CALCIUM 1.08*  --   --   --   --   --   --    MAGNESIUM 1.6 1.3*  --  1.6  --   --  0.5*   HEMOGLOBIN A1C  --   --   --   --   --   --  6.80*    < > = values in this interval not displayed.                  Lab 09/15/23  1138   PROBNP 5,125.0*                   Brief Urine Lab Results  (Last result in the past 365 days)          Color   Clarity   Blood   Leuk Est   Nitrite   Protein   CREAT   Urine HCG         11/01/22 1105 Yellow    Clear    Negative    Negative    Negative    Negative                          Microbiology Results (last 10 days)         Procedure Component Value - Date/Time     Blood Culture - Blood, Arm, Right [[id number]]  (Normal) Collected: 09/15/23 1657     Lab Status: Preliminary result Specimen: Blood from Arm, Right Updated: 09/19/23 1715       Blood Culture No growth at 4 days     Blood Culture - Blood, Arm, Right [[id number]]  (Normal) Collected: 09/15/23 1657     Lab Status: Preliminary result Specimen: Blood from Arm, Right Updated: 09/19/23 1715       Blood Culture No growth at 4 days     Narrative:       Less than seven (7) mL's of blood was collected.  Insufficient quantity may yield false negative results.     Body Fluid Culture - Body Fluid, Knee, Right [[id number]] Collected: 09/15/23 1427     Lab Status: Final result Specimen: Body Fluid from Knee, Right Updated: 09/20/23 1053       Body Fluid Culture No growth at 5 days       Gram Stain Moderate (3+) WBCs per low power field         No organisms seen                  Last 30 day radiology impressions   XR Knee 4+ View Right     Result Date: 9/15/2023  Impression: Impression: Knee effusion. Normal-appearing knee arthroplasty. Electronically Signed: Jeannie Crump MD  9/15/2023 11:39 AM EDT  Workstation ID: [id number]     XR Chest 1 View     Result Date: 9/15/2023  Impression: Impression: Cardiomegaly without overt pulm edema or focal consolidation. Electronically Signed: Ramakrishna Betancur MD  9/15/2023 7:08 PM EDT  Workstation ID: [id number]       Results for orders placed during the hospital encounter of 03/25/22     Bilateral Carotid Duplex     Interpretation Summary  · Proximal right internal carotid artery plaque without significant stenosis.  · Proximal left internal carotid  artery plaque without significant stenosis.        Results for orders placed during the hospital encounter of 03/25/22     Bilateral Carotid Duplex     Interpretation Summary  · Proximal right internal carotid artery plaque without significant stenosis.  · Proximal left internal carotid artery plaque without significant stenosis.        Results for orders placed in visit on 03/28/22     Emergent/Open-Heart Anesthesia PAULO     Narrative  Emergent/Open-Heart Anesthesia PAULO     Procedure Performed: Emergent/Open-Heart Anesthesia PAULO  Start Time:  End Time:        General Procedure Information  Location performed:  OR  Intubated  Bite block placed  Heart visualized  Probe Insertion:  Easy  Probe Type:  Biplane and multiplane  Modalities:  Color flow mapping, pulse wave Doppler and continuous wave Doppler     Echocardiographic and Doppler Measurements     Ventricles     Right Ventricle:  Ejection Fraction 60%.  Left Ventricle:  Global Function normal.  Ejection Fraction 60%.                                               Anesthesia Information  Performed Personally  Anesthesiologist:  Van Frost MD        Echocardiogram Comments:  PAULO placed easily with no resistance ,  Lubricated , bite guard used     Pre cpb  LV EF 60 no wma  RV nl SF  MV tr/mild MR  AV Ca++ mild mod AI ,  Mod AS YOSI 1.2cm2 by cont  tv TR TR  Suspicious for smal PFO by CFD        Post cpb  #27 av well seated no para / trans leak seen  No other change        Labs Pending at Discharge:  Pending Labs         Order Current Status     CANDIDA AURIS SCREEN - Swab, Axilla Right, Axilla Left and Groin In process     Blood Culture - Blood, Arm, Right Preliminary result     Blood Culture - Blood, Arm, Right Preliminary result                Procedures Performed           Consults:   Consults         Date and Time Order Name Status Description     9/15/2023  4:16 PM Ortho (on-call MD unless specified)         9/15/2023  4:16 PM Hospitalist (on-call MD unless  specified)                       Discharge Details          Discharge Medications          New Medications         Instructions Start Date   furosemide 40 MG tablet  Commonly known as: LASIX    40 mg, Oral, Daily        HYDROcodone-acetaminophen 5-325 MG per tablet  Commonly known as: Norco    1 tablet, Oral, Every 8 Hours PRN        potassium chloride 20 MEQ CR tablet  Commonly known as: K-DUR,KLOR-CON    20 mEq, Oral, Daily                  Continue These Medications         Instructions Start Date   Accu-Chek Guide test strip  Generic drug: glucose blood    Use as instructed once daily   Dx e 11.9        acetaminophen 325 MG tablet  Commonly known as: TYLENOL    650 mg, Oral, Every 4 Hours PRN        atorvastatin 40 MG tablet  Commonly known as: LIPITOR    Take 1 tablet by mouth once daily        docusate sodium 100 MG capsule  Commonly known as: COLACE    100 mg, Oral, Nightly PRN        ezetimibe 10 MG tablet  Commonly known as: ZETIA    Take 1 tablet by mouth once daily        folic acid 1 MG tablet  Commonly known as: FOLVITE    1 mg, Oral, Daily        lansoprazole 30 MG capsule  Commonly known as: PREVACID    30 mg, Oral, Daily        Melatonin 3 MG capsule    1 capsule, Oral, Nightly PRN        metFORMIN 1000 MG tablet  Commonly known as: GLUCOPHAGE    1,000 mg, Oral, 2 Times Daily With Meals        metoprolol succinate XL 25 MG 24 hr tablet  Commonly known as: TOPROL-XL    25 mg, Oral, Daily        multivitamin with minerals tablet tablet    1 tablet, Oral, Daily        ondansetron 4 MG tablet  Commonly known as: ZOFRAN    4 mg, Oral, 3 Times Daily PRN        vitamin B-12 1000 MCG tablet  Commonly known as: CYANOCOBALAMIN    1,000 mcg, Oral, Daily                           Allergies   Allergen Reactions    Glipizide Other (See Comments)       Sugar too low    Lisinopril Cough            Discharge Disposition:   Skilled Nursing Facility (DC - External)     Diet:  Hospital:      Diet Order   Procedures     Diet: Diabetic Diets; Consistent Carbohydrate; Texture: Regular Texture (IDDSI 7); Fluid Consistency: Thin (IDDSI 0)            Discharge Activity:            CODE STATUS:      Code Status and Medical Interventions:   Ordered at: 09/15/23 1810     Level Of Support Discussed With:     Health Care Surrogate     Code Status (Patient has no pulse and is not breathing):     No CPR (Do Not Attempt to Resuscitate)     Medical Interventions (Patient has pulse or is breathing):     Full Support                   Future Appointments   Date Time Provider Department Center   9/27/2023 10:15 AM Juan Castellon DO [facility]               Time spent on Discharge including face to face service:  35 minutes           Signature: Electronically signed by John Paul Lemon MD, 09/20/23, 11:40 EDT.  [facility]ist Team

## 2023-09-20 NOTE — DISCHARGE SUMMARY
[facility] Medicine Services  Discharge Summary    Date of Service: 23  Patient Name: Anurag Pickard  : 1950  MRN: [id number]    Date of Admission: 9/15/2023  Discharge Diagnosis:    Diagnosis Plan   1. Pain and swelling of right knee        2. Pyogenic arthritis of right knee joint, due to unspecified organism      Suspected      3. Fall, initial encounter        4. Acute pain of right knee  HYDROcodone-acetaminophen (Norco) 5-325 MG per tablet          Date of Discharge:  23  Primary Care Physician: Provider, No Known      Presenting Problem:   Right knee pain [M25.561]  Fall, initial encounter [W19.XXXA]  Pain and swelling of right knee [M25.561, M25.461]  Pyogenic arthritis of right knee joint, due to unspecified organism [M00.9]    Active and Resolved Hospital Problems:  Active Hospital Problems    Diagnosis POA   • **Right knee pain [M25.561] Yes   • Alzheimer's dementia [G30.9, F02.80] Yes      Resolved Hospital Problems   No resolved problems to display.         Hospital Course     Hospital Course:  Anurag Pickard is a 72 y.o. male admitted to [facility] to services of No att. providers found with No admission diagnoses are documented for this encounter..  was consulted for further evaluation and treatment. Onset of symptoms was sudden in onset with right knee pain.  Symptoms are associated with pain and swelling over the right knee where he has had a right total knee arthroplasty several years ago.  Symptoms are aggravated by flexion and extension of the knee.   Symptoms improve with resting the knee anterior lateral aspect of the right knee. Knee aspiration completed by IR on 9/15, aspirate demonstrated presence of calcium pyrophosphate crystals and negative cultures so far. Antibiotics discontinued since suspicion for septic arthritis is low. Pt stable for dc; sx improved    #Acute right knee pain/effusion   -Suspect secondary to acute pseudogout  arthritis  -Septic arthritis is ruled out (negative fluid cultures)  -Mainstay of pseudogout is aspiration and intrathecal steroid. In the case that steroid injection is not feasible, NSAIDs, colchicine and perhaps systemic steroids can be considered.  He was started on naproxen 500 mg twice daily, will continue same. Knee aspirate culture remains negative. Will dc antibiotics     #Electrolyte disturbances, i.e. hypokalemia, hypomagnesemia, hypocalcemia  -Most likely due to poor intake in the last couple of weeks coupled with diuresis  -K at goal.  Mg and CEA still low  -Continue Ca and Mg replacement as needed  -BMP + Mg in am     #Frequent falls with risk of further injuries  -PT/OT evaluation/early ambulation when able     #Late onset Alzheimer's dementia with behavioral abnormalities  -Agitation, confusion, impulsivity reported this morning  -Usually takes Rexulti which we do not carry.  Wife will bring Rexulti in later today  -Will require 24-hour supervision postdischarge.  -States that he was in skilled nursing prior to admission, but she wants him in memory care postdischarge     #Chronic diastolic heart failure, not decompensated  Continue metoprolol  Due to increasing fluid retention, we restarted home diuretic  -Increase Lasix to twice daily  -Ace wraps both legs     #Type 2 diabetes not on long-term insulin  -Continue sliding scale NovoLog for now  -A1c on this admission is 6.8%     #B12 deficiency  -Continue daily vitamin B12     #Hyperlipidemia  *Continue home statin     #General weakness  -Continue PT/OT            Reasons For Change In Medications and Indications for New Medications:      Day of Discharge     Vital Signs:  Temp:  [97.7 °F (36.5 °C)-97.9 °F (36.6 °C)] 97.7 °F (36.5 °C)  Heart Rate:  [84-99] 99  Resp:  [17-23] 18  BP: (121-133)/(63-77) 133/77    Physical Exam:  Physical Exam   GENERAL: The patient is well developed and nontoxic.  HEENT: Nonicteric sclerae, PERRLA, EOMI. Oropharynx  clear. Moist mucous membranes. Conjunctivae appear well perfused.  CHEST: Chest wall is nontender.  HEART: Regular rate and rhythm without murmurs. No MRG  LUNGS: Clear to auscultation bilaterally. No WRR  ABDOMEN: Soft, positive bowel sounds, nontender, no organomegaly.  RECTAL: Deferred.  SKIN: No rash, no excessive bruising, petechiae, or purpura.  NEUROLOGIC: Cranial nerves II-XII intact without motor/sensory deficit       Pertinent  and/or Most Recent Results     LAB RESULTS:      Lab 09/17/23  0001 09/15/23  2301 09/15/23  1938 09/15/23  1138   WBC 8.10 10.70  --  14.90*   HEMOGLOBIN 8.4* 9.6*  --  9.2*   HEMATOCRIT 25.3* 29.1*  --  27.8*   PLATELETS 187 220  --  223   NEUTROS ABS 6.20 8.60*  --  12.00*   LYMPHS ABS 1.10 1.30  --  1.40   MONOS ABS 0.60 0.70  --  1.30*   EOS ABS 0.10 0.00  --  0.00   MCV 91.5 92.1  --  91.9   SED RATE  --   --   --  57*   CRP  --   --   --  12.13*   LACTATE  --   --  1.1  --          Lab 09/20/23  0117 09/18/23  2358 09/18/23  0612 09/17/23  0001 09/16/23  1407 09/15/23  2301 09/15/23  1138   SODIUM 141 139 138 137 143   < > 141   POTASSIUM 4.4 4.8 3.7 3.9 3.6   < > 3.2*   CHLORIDE 104 103 102 105 107   < > 104   CO2 29.0 25.0 24.0 23.0 25.0   < > 23.0   ANION GAP 8.0 11.0 12.0 9.0 11.0   < > 14.0   BUN 15 18 16 12 11   < > 9   CREATININE 1.13 1.24 1.14 1.01 0.94   < > 0.98   EGFR 69.5 62.2 68.8 79.5 86.7   < > 82.4   GLUCOSE 216* 120* 156* 154* 199*   < > 150*   CALCIUM 8.1* 7.6* 7.4* 7.0* 7.1*   < > 6.4*   IONIZED CALCIUM 1.08*  --   --   --   --   --   --    MAGNESIUM 1.6 1.3*  --  1.6  --   --  0.5*   HEMOGLOBIN A1C  --   --   --   --   --   --  6.80*    < > = values in this interval not displayed.             Lab 09/15/23  1138   PROBNP 5,125.0*                 Brief Urine Lab Results  (Last result in the past 365 days)        Color   Clarity   Blood   Leuk Est   Nitrite   Protein   CREAT   Urine HCG        11/01/22 1105 Yellow   Clear   Negative   Negative   Negative    Negative                 Microbiology Results (last 10 days)       Procedure Component Value - Date/Time    Blood Culture - Blood, Arm, Right [[id number]]  (Normal) Collected: 09/15/23 1657    Lab Status: Preliminary result Specimen: Blood from Arm, Right Updated: 09/19/23 1715     Blood Culture No growth at 4 days    Blood Culture - Blood, Arm, Right [[id number]]  (Normal) Collected: 09/15/23 1657    Lab Status: Preliminary result Specimen: Blood from Arm, Right Updated: 09/19/23 1715     Blood Culture No growth at 4 days    Narrative:      Less than seven (7) mL's of blood was collected.  Insufficient quantity may yield false negative results.    Body Fluid Culture - Body Fluid, Knee, Right [[id number]] Collected: 09/15/23 1427    Lab Status: Final result Specimen: Body Fluid from Knee, Right Updated: 09/20/23 1053     Body Fluid Culture No growth at 5 days     Gram Stain Moderate (3+) WBCs per low power field      No organisms seen            XR Knee 4+ View Right    Result Date: 9/15/2023  Impression: Impression: Knee effusion. Normal-appearing knee arthroplasty. Electronically Signed: Jeannie Crump MD  9/15/2023 11:39 AM EDT  Workstation ID: [id number]    XR Chest 1 View    Result Date: 9/15/2023  Impression: Impression: Cardiomegaly without overt pulm edema or focal consolidation. Electronically Signed: Ramakrishna Betancur MD  9/15/2023 7:08 PM EDT  Workstation ID: [id number]     Results for orders placed during the hospital encounter of 03/25/22    Bilateral Carotid Duplex    Interpretation Summary  · Proximal right internal carotid artery plaque without significant stenosis.  · Proximal left internal carotid artery plaque without significant stenosis.      Results for orders placed during the hospital encounter of 03/25/22    Bilateral Carotid Duplex    Interpretation Summary  · Proximal right internal carotid artery plaque without significant stenosis.  · Proximal left internal carotid artery plaque without  significant stenosis.      Results for orders placed in visit on 03/28/22    Emergent/Open-Heart Anesthesia PAULO    Narrative  Emergent/Open-Heart Anesthesia PAULO    Procedure Performed: Emergent/Open-Heart Anesthesia PAULO  Start Time:  End Time:      General Procedure Information  Location performed:  OR  Intubated  Bite block placed  Heart visualized  Probe Insertion:  Easy  Probe Type:  Biplane and multiplane  Modalities:  Color flow mapping, pulse wave Doppler and continuous wave Doppler    Echocardiographic and Doppler Measurements    Ventricles    Right Ventricle:  Ejection Fraction 60%.  Left Ventricle:  Global Function normal.  Ejection Fraction 60%.                                Anesthesia Information  Performed Personally  Anesthesiologist:  Van Frost MD      Echocardiogram Comments:  PAULO placed easily with no resistance ,  Lubricated , bite guard used    Pre cpb  LV EF 60 no wma  RV nl SF  MV tr/mild MR  AV Ca++ mild mod AI ,  Mod AS YOSI 1.2cm2 by cont  tv TR TR  Suspicious for smal PFO by CFD      Post cpb  #27 av well seated no para / trans leak seen  No other change      Labs Pending at Discharge:  Pending Labs       Order Current Status    CANDIDA AURIS SCREEN - Swab, Axilla Right, Axilla Left and Groin In process    Blood Culture - Blood, Arm, Right Preliminary result    Blood Culture - Blood, Arm, Right Preliminary result            Procedures Performed           Consults:   Consults       Date and Time Order Name Status Description    9/15/2023  4:16 PM Ortho (on-call MD unless specified)      9/15/2023  4:16 PM Hospitalist (on-call MD unless specified)                Discharge Details        Discharge Medications        New Medications        Instructions Start Date   furosemide 40 MG tablet  Commonly known as: LASIX   40 mg, Oral, Daily      HYDROcodone-acetaminophen 5-325 MG per tablet  Commonly known as: Norco   1 tablet, Oral, Every 8 Hours PRN      potassium chloride 20 MEQ CR  tablet  Commonly known as: K-DUR,KLOR-CON   20 mEq, Oral, Daily             Continue These Medications        Instructions Start Date   Accu-Chek Guide test strip  Generic drug: glucose blood   Use as instructed once daily   Dx e 11.9      acetaminophen 325 MG tablet  Commonly known as: TYLENOL   650 mg, Oral, Every 4 Hours PRN      atorvastatin 40 MG tablet  Commonly known as: LIPITOR   Take 1 tablet by mouth once daily      docusate sodium 100 MG capsule  Commonly known as: COLACE   100 mg, Oral, Nightly PRN      ezetimibe 10 MG tablet  Commonly known as: ZETIA   Take 1 tablet by mouth once daily      folic acid 1 MG tablet  Commonly known as: FOLVITE   1 mg, Oral, Daily      lansoprazole 30 MG capsule  Commonly known as: PREVACID   30 mg, Oral, Daily      Melatonin 3 MG capsule   1 capsule, Oral, Nightly PRN      metFORMIN 1000 MG tablet  Commonly known as: GLUCOPHAGE   1,000 mg, Oral, 2 Times Daily With Meals      metoprolol succinate XL 25 MG 24 hr tablet  Commonly known as: TOPROL-XL   25 mg, Oral, Daily      multivitamin with minerals tablet tablet   1 tablet, Oral, Daily      ondansetron 4 MG tablet  Commonly known as: ZOFRAN   4 mg, Oral, 3 Times Daily PRN      vitamin B-12 1000 MCG tablet  Commonly known as: CYANOCOBALAMIN   1,000 mcg, Oral, Daily               Allergies   Allergen Reactions   • Glipizide Other (See Comments)     Sugar too low   • Lisinopril Cough         Discharge Disposition:   Skilled Nursing Facility (DC - External)    Diet:  Hospital:  Diet Order   Procedures   • Diet: Diabetic Diets; Consistent Carbohydrate; Texture: Regular Texture (IDDSI 7); Fluid Consistency: Thin (IDDSI 0)         Discharge Activity:         CODE STATUS:  Code Status and Medical Interventions:   Ordered at: 09/15/23 1810     Level Of Support Discussed With:    Health Care Surrogate     Code Status (Patient has no pulse and is not breathing):    No CPR (Do Not Attempt to Resuscitate)     Medical Interventions  (Patient has pulse or is breathing):    Full Support         Future Appointments   Date Time Provider Department Center   9/27/2023 10:15 AM Juan Castellon DO [facility]           Time spent on Discharge including face to face service:  35 minutes        Signature: Electronically signed by John Paul Lemon MD, 09/20/23, 11:40 EDT.  [facility] Hospitalist Team   Paramedian Forehead Flap Text: A decision was made to reconstruct the defect utilizing an interpolation axial flap and a staged reconstruction.  A telfa template was made of the defect.  This telfa template was then used to outline the paramedian forehead pedicle flap.  The donor area for the pedicle flap was then injected with anesthesia.  The flap was excised through the skin and subcutaneous tissue down to the layer of the underlying musculature.  The pedicle flap was carefully excised within this deep plane to maintain its blood supply.  The edges of the donor site were undermined.   The donor site was closed in a primary fashion.  The pedicle was then rotated into position and sutured.  Once the tube was sutured into place, adequate blood supply was confirmed with blanching and refill.  The pedicle was then wrapped with xeroform gauze and dressed appropriately with a telfa and gauze bandage to ensure continued blood supply and protect the attached pedicle.

## 2023-09-20 NOTE — SIGNIFICANT NOTE
09/20/23 1314   OTHER   Discipline occupational therapist   Rehab Time/Intention   Session Not Performed other (see comments)  (pt is discharged. If they remain admitted, OT will FU tomorrow.)   Recommendation   OT - Next Appointment 09/21/23

## 2023-09-21 NOTE — CASE MANAGEMENT/SOCIAL WORK
Case Management Discharge Note      Final Note: [facility]    Provided Post Acute Provider List?: N/A  Transportation Services  W/C Van: Daniela Garcia    Final Discharge Disposition Code: 01 - home or self-care      Continued Stay Note  FABIAN Casey     Patient Name: Anurag Pickard  MRN: [id number]  Today's Date: 9/21/2023    Admit Date: 9/15/2023    Plan: Patient will admit to [facility], in [facility], at [place]. Will need dc summary faxed to Suki at [phone] and DON final approval before he can admit there. [facility] will only take him between 11am and 2pm M-F. He must have don approval and dc summary sent before he is fully accepted.   Discharge Plan       Row Name 09/21/23 1712       Plan    Plan Comments verified with Suki ok to come today.  Ok per Amanda once DC summary sent.   Dc summary faxed, verified reciept.  Set up trasnport with Daniela garcia.  Notified this would be after 2PM, again spoke with Suki who is ok for transport after 2PM.                       Expected Discharge Date and Time       Expected Discharge Date Expected Discharge Time    Sep 20, 2023             Phone communication or documentation only - no physical contact with patient or family.   Shonna Gallardo RN      Office Phone [phone]  Office Cell [phone]

## 2023-09-24 LAB — BACTERIA ISLT: NORMAL

## 2023-09-27 ENCOUNTER — OFFICE VISIT (OUTPATIENT)
Dept: CARDIOLOGY | Facility: CLINIC | Age: 73
End: 2023-09-27
Payer: MEDICARE

## 2023-09-27 VITALS
BODY MASS INDEX: 32.93 KG/M2 | WEIGHT: 230 LBS | HEIGHT: 70 IN | HEART RATE: 99 BPM | OXYGEN SATURATION: 99 % | DIASTOLIC BLOOD PRESSURE: 65 MMHG | SYSTOLIC BLOOD PRESSURE: 96 MMHG

## 2023-09-27 DIAGNOSIS — Z95.2 S/P AVR (AORTIC VALVE REPLACEMENT): ICD-10-CM

## 2023-09-27 DIAGNOSIS — I25.10 CORONARY ARTERY DISEASE INVOLVING NATIVE CORONARY ARTERY OF NATIVE HEART WITHOUT ANGINA PECTORIS: Primary | ICD-10-CM

## 2023-09-27 PROBLEM — I48.0 PAROXYSMAL ATRIAL FIBRILLATION: Status: ACTIVE | Noted: 2023-09-27

## 2023-09-27 PROCEDURE — 1160F RVW MEDS BY RX/DR IN RCRD: CPT | Performed by: INTERNAL MEDICINE

## 2023-09-27 PROCEDURE — 1159F MED LIST DOCD IN RCRD: CPT | Performed by: INTERNAL MEDICINE

## 2023-09-27 PROCEDURE — 3078F DIAST BP <80 MM HG: CPT | Performed by: INTERNAL MEDICINE

## 2023-09-27 PROCEDURE — 3074F SYST BP LT 130 MM HG: CPT | Performed by: INTERNAL MEDICINE

## 2023-09-27 PROCEDURE — 99214 OFFICE O/P EST MOD 30 MIN: CPT | Performed by: INTERNAL MEDICINE

## 2023-09-27 RX ORDER — LORAZEPAM 0.5 MG/1
TABLET ORAL
COMMUNITY
Start: 2023-09-23

## 2023-09-27 NOTE — PROGRESS NOTES
Cardiology Office Visit      Encounter Date:  09/27/2023    Patient ID:   Anurag Pickard is a 72 y.o. male.    Reason For Followup:  Paroxysmal atrial fibrillation  Aortic stenosis    Brief Clinical History:  Dear Dr. Portillo, No Known    I had the pleasure of seeing Anurag Pickard today. As you are well aware, this is a 72 y.o. male with no established history of ischemic heart disease.  He does have a history of aortic stenosis, hypertension, diabetes mellitus, hyperlipidemia, osteoarthritis, obesity, and antiplatelet therapy.  He presents today to follow-up on the above conditions.    Interval History:  He denies any chest pain pressure heaviness or tightness.  He does report fatigue.  He denies any PND orthopnea.  He denies any syncope or near syncope.    He is accompanied by his wife today who supplies supplemental historical information.  His cognitive impairment has declined significantly enough that he is now in a memory care unit.    He has been hospitalized twice in the past couple of months.  The first hospitalization was in August.  He had a significant amount of weakness.  He could not stand up.  He had significant nausea and vomiting.  He went to [facility] where he was found to have a significant electrolyte derangement with magnesium potassium and chloride levels being dangerously low.  These were replaced.  His cognitive status declined quite a bit during that admission.  He needed a sitter for most of the admission.  He was discharged to a skilled nursing facility.    While he was at the skilled nursing facility he developed what was eventually found out to be pseudogout.  He did go to the hospital while they were in the diagnostic phases of this however he was sent back to a memory care unit after this.    During his [facility] hospitalization he was noted to develop atrial fibrillation.  Anticoagulation was recommended given his elevated CHADS2 score however his wife was  "worried that placing him on an anticoagulant with his memory deficits and gait instability could lead to a catastrophic hemorrhage.  They have opted not to pursue anticoagulation.  He has taken Xarelto in the past.    He was started on Aricept however he had significant gastrointestinal issues with this and it was ultimately discontinued.    Assessment & Plan    Impressions:  Severe aortic stenosis status post tissue AVR March 2022  History of postoperative atrial fibrillation     Ambulatory ECG monitor October 2022 with no evidence of atrial fibrillation     Recurrence of atrial fibrillation noted in August of 2023  Hypertension  Hypertensive heart disease  Hyperlipidemia  Osteoarthritis  Diabetes mellitus  Obesity  Antiplatelet therapy  Dementia    Recommendations:  Continuation of his current cardiovascular regimen at the present time.     This includes antihypertensives, antiplatelet, statin, Zetia, and potassium.  Risk benefits and options of anticoagulation were discussed and patient's wife has decided to withhold anticoagulation due to his advanced dementia and significant fall risk.  Will need to make sure ECF is checking electrolytes regularly to avoid imbalace while on diuretics  Follow-up in 6 months sooner should there be difficulties.    Diagnoses and all orders for this visit:    1. Coronary artery disease involving native coronary artery of native heart without angina pectoris (Primary)  -     Basic Metabolic Panel; Future    2. S/P AVR (aortic valve replacement)            Objective:    Vitals:  Vitals:    09/27/23 0937   BP: 96/65   Pulse: 99   SpO2: 99%   Weight: 104 kg (230 lb)   Height: 177.8 cm (70\")     Body mass index is 33 kg/m².      Physical Exam:    General: Alert, cooperative, no distress, appears stated age  Head:  Normocephalic, atraumatic, mucous membranes moist  Eyes:  Conjunctiva/corneas clear, EOM's intact     Neck:  Supple,  no bruit    Lungs: Clear to auscultation bilaterally, no " wheezes rhonchi rales are noted  Chest wall: Minimal tenderness  Heart::  Regular rate and rhythm, S1 and S2 normal, 1/6 systolic ejection murmur.  No rub or gallop  Abdomen: Soft, non-tender, nondistended bowel sounds active.  Obese  Extremities: No cyanosis, clubbing, or edema  Pulses: 2+ and symmetric all extremities  Skin:  No rashes or lesions  Neuro/psych: A&O x3. CN II through XII are grossly intact with appropriate affect      Allergies:  Allergies   Allergen Reactions    Glipizide Other (See Comments)     Sugar too low    Lisinopril Cough       Medication Review:     Current Outpatient Medications:     Accu-Chek Guide test strip, Use as instructed once daily   Dx e 11.9, Disp: 100 each, Rfl: 12    acetaminophen (TYLENOL) 325 MG tablet, Take 2 tablets by mouth Every 4 (Four) Hours As Needed for Mild Pain., Disp: , Rfl:     atorvastatin (LIPITOR) 40 MG tablet, Take 1 tablet by mouth once daily, Disp: 90 tablet, Rfl: 0    docusate sodium (COLACE) 100 MG capsule, Take 1 capsule by mouth At Night As Needed for Constipation., Disp: , Rfl:     ezetimibe (ZETIA) 10 MG tablet, Take 1 tablet by mouth once daily, Disp: 90 tablet, Rfl: 0    folic acid (FOLVITE) 1 MG tablet, Take 1 tablet by mouth Daily for 360 days., Disp: 90 tablet, Rfl: 3    furosemide (LASIX) 40 MG tablet, Take 1 tablet by mouth Daily., Disp: 30 tablet, Rfl: 0    HYDROcodone-acetaminophen (Norco) 5-325 MG per tablet, Take 1 tablet by mouth Every 8 (Eight) Hours As Needed for Severe Pain., Disp: 10 tablet, Rfl: 0    lansoprazole (PREVACID) 30 MG capsule, Take 1 capsule by mouth Daily., Disp: 90 capsule, Rfl: 0    LORazepam (ATIVAN) 0.5 MG tablet, , Disp: , Rfl:     Melatonin 3 MG capsule, Take 1 capsule by mouth At Night As Needed., Disp: , Rfl:     metFORMIN (GLUCOPHAGE) 1000 MG tablet, Take 1 tablet by mouth 2 (Two) Times a Day With Meals., Disp: , Rfl:     metoprolol succinate XL (TOPROL-XL) 25 MG 24 hr tablet, Take 1 tablet by mouth Daily., Disp:  , Rfl:     multivitamin with minerals tablet tablet, Take 1 tablet by mouth Daily., Disp: , Rfl:     ondansetron (ZOFRAN) 4 MG tablet, Take 1 tablet by mouth 3 (Three) Times a Day As Needed for Nausea or Vomiting., Disp: , Rfl:     potassium chloride (K-DUR,KLOR-CON) 20 MEQ CR tablet, Take 1 tablet by mouth Daily., Disp: 30 tablet, Rfl: 0    vitamin B-12 (CYANOCOBALAMIN) 1000 MCG tablet, Take 1 tablet by mouth Daily., Disp: , Rfl:     Family History:  Family History   Problem Relation Age of Onset    Diabetes Mother     Other Mother     Other Father     Heart attack Father     Other Sister     Diabetes Sister     Heart disease Sister     Diabetes Brother     Other Other     Diabetes Other        Past Medical History:  Past Medical History:   Diagnosis Date    Acute diastolic CHF (congestive heart failure) 01/03/2022    Ankle edema     Aortic stenosis     Moderate    Aortic valve replaced     B12 deficiency     Bell's palsy     Bleeds easily     Chipped tooth     top front    Diabetes mellitus     Dry skin     Erectile dysfunction     Falls     2 falls in last 3 months    Fatigue     Fragile skin     GERD (gastroesophageal reflux disease)     Heart murmur     Hyperlipidemia     Hypertension     Hypokalemia     Hypomagnesemia     YANA (obstructive sleep apnea)     cpap bring dos    Osteoarthritis     Paroxysmal atrial fibrillation 9/27/2023    Pulmonary HTN     Rosacea     Vitamin D deficiency        Past Surgical History:  Past Surgical History:   Procedure Laterality Date    AORTIC VALVE REPAIR/REPLACEMENT N/A 03/28/2022    Procedure: AORTIC VALVE REPAIR/REPLACEMENT;  Surgeon: Dennis Brandon MD;  Location: [facility] CVOR;  Service: Cardiothoracic;  Laterality: N/A;  aortic valve replacement with 27mm [employer] magna ease    CARDIAC CATHETERIZATION      CARDIAC CATHETERIZATION N/A 12/17/2021    Procedure: Right and Left Heart Cath;  Surgeon: Juan Castellon DO;  Location: [facility] CATH INVASIVE  LOCATION;  Service: Cardiology;  Laterality: N/A;    COLONOSCOPY      JOINT REPLACEMENT Bilateral 2021    knee    TONSILLECTOMY      TOTAL KNEE ARTHROPLASTY      TOTAL KNEE ARTHROPLASTY Left 05/04/2021    Procedure: TOTAL KNEE ARTHROPLASTY;  Surgeon: Otf Redmond MD;  Location: [facility] MAIN OR;  Service: Orthopedics;  Laterality: Left;       Social History:  Social History     Socioeconomic History    Marital status:    Tobacco Use    Smoking status: Never    Smokeless tobacco: Never   Vaping Use    Vaping Use: Never used   Substance and Sexual Activity    Alcohol use: Yes     Alcohol/week: 14.0 standard drinks     Types: 14 Drinks containing 0.5 oz of alcohol per week     Comment: 2 bourbons every night    Drug use: No    Sexual activity: Defer       Review of Systems:  The following systems were reviewed as they relate to the cardiovascular system: Constitutional, Eyes, ENT, Cardiovascular, Respiratory, Gastrointestinal, Integumentary, Neurological, Psychiatric, Hematologic, Endocrine, Musculoskeletal, and Genitourinary. The pertinent cardiovascular findings are reported above with all other cardiovascular points within those systems being negative.    Diagnostic Study Review:     Current Electrocardiogram:  Procedures no new EKG.  EKG dated 15 September 2023 demonstrates atrial fibrillation with a ventricular rate of 83 bpm.    Laboratory Data:  Lab Results   Component Value Date    GLUCOSE 216 (H) 09/20/2023    BUN 15 09/20/2023    CREATININE 1.13 09/20/2023    EGFRIFNONA 74 12/15/2021    EGFRIFAFRI 96 05/20/2016    BCR 13.3 09/20/2023    K 4.4 09/20/2023    CO2 29.0 09/20/2023    CALCIUM 8.1 (L) 09/20/2023    ALBUMIN 4.30 11/01/2022    LABIL2 1.5 06/14/2019    AST 20 11/01/2022    ALT 9 11/01/2022     Lab Results   Component Value Date    GLUCOSE 216 (H) 09/20/2023    CALCIUM 8.1 (L) 09/20/2023     09/20/2023    K 4.4 09/20/2023    CO2 29.0 09/20/2023     09/20/2023    BUN 15 09/20/2023     CREATININE 1.13 09/20/2023    EGFRIFAFRI 96 05/20/2016    EGFRIFNONA 74 12/15/2021    BCR 13.3 09/20/2023    ANIONGAP 8.0 09/20/2023     Lab Results   Component Value Date    WBC 8.10 09/17/2023    HGB 8.4 (L) 09/17/2023    HCT 25.3 (L) 09/17/2023    MCV 91.5 09/17/2023     09/17/2023     Lab Results   Component Value Date    CHOL 119 04/29/2022    TRIG 96 04/29/2022    HDL 27 (L) 04/29/2022    LDL 74 04/29/2022     Lab Results   Component Value Date    HGBA1C 6.80 (H) 09/15/2023     Lab Results   Component Value Date    INR 0.96 03/29/2022    INR 1.09 03/28/2022    INR <0.93 (L) 03/25/2022    PROTIME 10.7 03/29/2022    PROTIME 12.0 (H) 03/28/2022    PROTIME 10.3 03/25/2022       Most Recent Echo:  Results for orders placed in visit on 03/28/22    Emergent/Open-Heart Anesthesia PAULO    Narrative  Emergent/Open-Heart Anesthesia PAULO    Procedure Performed: Emergent/Open-Heart Anesthesia PAULO  Start Time:  End Time:      General Procedure Information  Location performed:  OR  Intubated  Bite block placed  Heart visualized  Probe Insertion:  Easy  Probe Type:  Biplane and multiplane  Modalities:  Color flow mapping, pulse wave Doppler and continuous wave Doppler    Echocardiographic and Doppler Measurements    Ventricles    Right Ventricle:  Ejection Fraction 60%.  Left Ventricle:  Global Function normal.  Ejection Fraction 60%.                                Anesthesia Information  Performed Personally  Anesthesiologist:  Van Frost MD      Echocardiogram Comments:  PAULO placed easily with no resistance ,  Lubricated , bite guard used    Pre cpb  LV EF 60 no wma  RV nl SF  MV tr/mild MR  AV Ca++ mild mod AI ,  Mod AS YOSI 1.2cm2 by cont  tv TR TR  Suspicious for smal PFO by CFD      Post cpb  #27 av well seated no para / trans leak seen  No other change       Most Recent Stress Test:  Results for orders placed during the hospital encounter of 05/24/22    Treadmill Stress Test    Interpretation Summary  ·  Arrhythmias were not significant during stress.  · Equivocal ECG evidence of myocardial ischemia.  · Indeterminate clinical evidence of myocardial ischemia.  · Findings consistent with an indeterminate ECG stress test.  · Patient appropriate to begin cardiac rehab       Most Recent Cardiac Catheterization:   Results for orders placed during the hospital encounter of 12/17/21    Cardiac Catheterization/Vascular Study    Narrative  Cardiac Catheterization Operative Report    Anurag Pickard  [id number]  12/17/2021  @PCP@    He underwent cardiac catheterization.    Indications for the procedure include: Aortic stenosis.    Procedure Details:  The risks, benefits, complications, treatment options, and expected outcomes were discussed with the patient. The patient and/or family concurred with the proposed plan, giving informed consent.    After informed consent the patient was brought to the cath lab after appropriate IV hydration was begun and oral premedication was given. He was further sedated with fentanyl and midazolam. He was prepped and draped in the usual manner. Using the modified Seldinger access technique, a 6 [place] sheath was placed in the femoral artery and a 7 [place] sheath was placed in the femoral vein. A left and right heart catheterization with coronary arteriography was performed. Findings are discussed below.    After the procedure was completed, sedation was stopped and the sheaths and catheters were all removed. Hemostasis was achieved per established hospital protocols.    Conscious sedation:  Conscious sedation was performed according to protocol.  I supervised and directed an independent trained observer with the assistance of monitoring the patient's level of consciousness and physiologic status throughout the procedure.  Intraoperative service time was 60 minutes.    Findings:    Hemodynamics  LVEDP: Unable to obtain  LV: Unable to obtain  Ao: 132/61  RA: 6  RV: 42/4  PA: 43/15  PAWP:  15  Cardiac output Diane: 6.84 (2.85)  Cardiac output thermal: 7.27 (3.03)  Left Main  no significant disease  RCA  luminal irregularities with no significant disease  LAD  luminal irregularities with no significant disease  Circ  luminal irregularities with no significant disease  SVG(s)  not applicable  SVITLANA  not applicable  LV  not imaged  Coronary Dominance  circumflex    Estimated Blood Loss:  Minimal    Specimens: None    Complications:  None; patient tolerated the procedure well.    Disposition: PACU - hemodynamically stable    Condition: stable    Impressions:  No angiographic evidence for significant epicardial occlusive disease  Severe aortic stenosis  Moderate pulmonary hypertension    Recommendations:  Structural heart disease referral for TAVR versus SAVR       NOTE: The following portions of the patient's note were reviewed, confirmed and/or updated this visit as appropriate: History of present illness/Interval history, physical examination, assessment & plan, allergies, current medications, past family history, past medical history, past social history, past surgical history and problem list.

## 2023-10-27 ENCOUNTER — HOSPITAL ENCOUNTER (INPATIENT)
Facility: HOSPITAL | Age: 73
LOS: 4 days | Discharge: NURSING, REHAB OR LONG-TERM CARE | End: 2023-11-01
Attending: EMERGENCY MEDICINE | Admitting: INTERNAL MEDICINE
Payer: MEDICARE

## 2023-10-27 DIAGNOSIS — F02.B4 MODERATE LATE ONSET ALZHEIMER'S DEMENTIA WITH ANXIETY: ICD-10-CM

## 2023-10-27 DIAGNOSIS — F02.80 ALZHEIMER'S DEMENTIA, UNSPECIFIED DEMENTIA SEVERITY, UNSPECIFIED TIMING OF DEMENTIA ONSET, UNSPECIFIED WHETHER BEHAVIORAL, PSYCHOTIC, OR MOOD DISTURBANCE OR ANXIETY: Chronic | ICD-10-CM

## 2023-10-27 DIAGNOSIS — E83.51 HYPOCALCEMIA: Primary | ICD-10-CM

## 2023-10-27 DIAGNOSIS — Z95.2 S/P AVR (AORTIC VALVE REPLACEMENT): ICD-10-CM

## 2023-10-27 DIAGNOSIS — E83.42 HYPOMAGNESEMIA: ICD-10-CM

## 2023-10-27 DIAGNOSIS — G30.9 ALZHEIMER'S DEMENTIA, UNSPECIFIED DEMENTIA SEVERITY, UNSPECIFIED TIMING OF DEMENTIA ONSET, UNSPECIFIED WHETHER BEHAVIORAL, PSYCHOTIC, OR MOOD DISTURBANCE OR ANXIETY: Chronic | ICD-10-CM

## 2023-10-27 DIAGNOSIS — G30.1 MODERATE LATE ONSET ALZHEIMER'S DEMENTIA WITH ANXIETY: ICD-10-CM

## 2023-10-27 PROBLEM — E87.8 ELECTROLYTE ABNORMALITY: Status: ACTIVE | Noted: 2023-10-27

## 2023-10-27 LAB
ALBUMIN SERPL-MCNC: 3.6 G/DL (ref 3.5–5.2)
ALBUMIN/GLOB SERPL: 1.1 G/DL
ALP SERPL-CCNC: 69 U/L (ref 39–117)
ALT SERPL W P-5'-P-CCNC: 19 U/L (ref 1–41)
ANION GAP SERPL CALCULATED.3IONS-SCNC: 14 MMOL/L (ref 5–15)
AST SERPL-CCNC: 38 U/L (ref 1–40)
BASOPHILS # BLD AUTO: 0.04 10*3/MM3 (ref 0–0.2)
BASOPHILS NFR BLD AUTO: 0.4 % (ref 0–1.5)
BILIRUB SERPL-MCNC: 0.3 MG/DL (ref 0–1.2)
BUN SERPL-MCNC: 23 MG/DL (ref 8–23)
BUN/CREAT SERPL: 21.3 (ref 7–25)
CALCIUM SPEC-SCNC: 5.8 MG/DL (ref 8.6–10.5)
CHLORIDE SERPL-SCNC: 102 MMOL/L (ref 98–107)
CO2 SERPL-SCNC: 28 MMOL/L (ref 22–29)
CREAT SERPL-MCNC: 1.08 MG/DL (ref 0.76–1.27)
DEPRECATED RDW RBC AUTO: 42.8 FL (ref 37–54)
EGFRCR SERPLBLD CKD-EPI 2021: 73.4 ML/MIN/1.73
EOSINOPHIL # BLD AUTO: 0.03 10*3/MM3 (ref 0–0.4)
EOSINOPHIL NFR BLD AUTO: 0.3 % (ref 0.3–6.2)
ERYTHROCYTE [DISTWIDTH] IN BLOOD BY AUTOMATED COUNT: 13.3 % (ref 12.3–15.4)
GLOBULIN UR ELPH-MCNC: 3.4 GM/DL
GLUCOSE SERPL-MCNC: 209 MG/DL (ref 65–99)
HCT VFR BLD AUTO: 25.7 % (ref 37.5–51)
HGB BLD-MCNC: 8.4 G/DL (ref 13–17.7)
IMM GRANULOCYTES # BLD AUTO: 0.04 10*3/MM3 (ref 0–0.05)
IMM GRANULOCYTES NFR BLD AUTO: 0.4 % (ref 0–0.5)
LYMPHOCYTES # BLD AUTO: 0.98 10*3/MM3 (ref 0.7–3.1)
LYMPHOCYTES NFR BLD AUTO: 10.1 % (ref 19.6–45.3)
MAGNESIUM SERPL-MCNC: 0.7 MG/DL (ref 1.6–2.4)
MCH RBC QN AUTO: 28.9 PG (ref 26.6–33)
MCHC RBC AUTO-ENTMCNC: 32.7 G/DL (ref 31.5–35.7)
MCV RBC AUTO: 88.3 FL (ref 79–97)
MONOCYTES # BLD AUTO: 0.43 10*3/MM3 (ref 0.1–0.9)
MONOCYTES NFR BLD AUTO: 4.4 % (ref 5–12)
NEUTROPHILS NFR BLD AUTO: 8.19 10*3/MM3 (ref 1.7–7)
NEUTROPHILS NFR BLD AUTO: 84.4 % (ref 42.7–76)
NRBC BLD AUTO-RTO: 0 /100 WBC (ref 0–0.2)
PHOSPHATE SERPL-MCNC: 5.4 MG/DL (ref 2.5–4.5)
PLATELET # BLD AUTO: 279 10*3/MM3 (ref 140–450)
PMV BLD AUTO: 10.7 FL (ref 6–12)
POTASSIUM SERPL-SCNC: 4.9 MMOL/L (ref 3.5–5.2)
PROT SERPL-MCNC: 7 G/DL (ref 6–8.5)
RBC # BLD AUTO: 2.91 10*6/MM3 (ref 4.14–5.8)
SODIUM SERPL-SCNC: 144 MMOL/L (ref 136–145)
WBC NRBC COR # BLD: 9.71 10*3/MM3 (ref 3.4–10.8)

## 2023-10-27 PROCEDURE — 25010000002 MAGNESIUM SULFATE 2 GM/50ML SOLUTION: Performed by: INTERNAL MEDICINE

## 2023-10-27 PROCEDURE — 93010 ELECTROCARDIOGRAM REPORT: CPT | Performed by: STUDENT IN AN ORGANIZED HEALTH CARE EDUCATION/TRAINING PROGRAM

## 2023-10-27 PROCEDURE — 63710000001 INSULIN LISPRO (HUMAN) PER 5 UNITS: Performed by: NURSE PRACTITIONER

## 2023-10-27 PROCEDURE — 25810000003 SODIUM CHLORIDE 0.9 % SOLUTION: Performed by: INTERNAL MEDICINE

## 2023-10-27 PROCEDURE — 83735 ASSAY OF MAGNESIUM: CPT | Performed by: EMERGENCY MEDICINE

## 2023-10-27 PROCEDURE — 84100 ASSAY OF PHOSPHORUS: CPT | Performed by: EMERGENCY MEDICINE

## 2023-10-27 PROCEDURE — 25010000002 CALCIUM GLUCONATE-NACL 1-0.675 GM/50ML-% SOLUTION: Performed by: INTERNAL MEDICINE

## 2023-10-27 PROCEDURE — 99285 EMERGENCY DEPT VISIT HI MDM: CPT

## 2023-10-27 PROCEDURE — G0378 HOSPITAL OBSERVATION PER HR: HCPCS

## 2023-10-27 PROCEDURE — 85025 COMPLETE CBC W/AUTO DIFF WBC: CPT | Performed by: EMERGENCY MEDICINE

## 2023-10-27 PROCEDURE — 93005 ELECTROCARDIOGRAM TRACING: CPT | Performed by: EMERGENCY MEDICINE

## 2023-10-27 PROCEDURE — 80053 COMPREHEN METABOLIC PANEL: CPT | Performed by: EMERGENCY MEDICINE

## 2023-10-27 RX ORDER — SODIUM CHLORIDE 0.9 % (FLUSH) 0.9 %
10 SYRINGE (ML) INJECTION AS NEEDED
Status: DISCONTINUED | OUTPATIENT
Start: 2023-10-27 | End: 2023-11-01 | Stop reason: HOSPADM

## 2023-10-27 RX ORDER — POLYETHYLENE GLYCOL 3350 17 G/17G
17 POWDER, FOR SOLUTION ORAL DAILY PRN
Status: DISCONTINUED | OUTPATIENT
Start: 2023-10-27 | End: 2023-11-01 | Stop reason: HOSPADM

## 2023-10-27 RX ORDER — BISACODYL 5 MG/1
5 TABLET, DELAYED RELEASE ORAL DAILY PRN
Status: DISCONTINUED | OUTPATIENT
Start: 2023-10-27 | End: 2023-11-01 | Stop reason: HOSPADM

## 2023-10-27 RX ORDER — ACETAMINOPHEN 160 MG/5ML
650 SOLUTION ORAL EVERY 4 HOURS PRN
Status: DISCONTINUED | OUTPATIENT
Start: 2023-10-27 | End: 2023-11-01 | Stop reason: HOSPADM

## 2023-10-27 RX ORDER — ACETAMINOPHEN 650 MG/1
650 SUPPOSITORY RECTAL EVERY 4 HOURS PRN
Status: DISCONTINUED | OUTPATIENT
Start: 2023-10-27 | End: 2023-11-01 | Stop reason: HOSPADM

## 2023-10-27 RX ORDER — ACETAMINOPHEN 325 MG/1
650 TABLET ORAL EVERY 4 HOURS PRN
Status: DISCONTINUED | OUTPATIENT
Start: 2023-10-27 | End: 2023-11-01 | Stop reason: HOSPADM

## 2023-10-27 RX ORDER — IBUPROFEN 600 MG/1
1 TABLET ORAL
Status: DISCONTINUED | OUTPATIENT
Start: 2023-10-27 | End: 2023-11-01 | Stop reason: HOSPADM

## 2023-10-27 RX ORDER — ONDANSETRON 4 MG/1
4 TABLET, FILM COATED ORAL EVERY 6 HOURS PRN
Status: DISCONTINUED | OUTPATIENT
Start: 2023-10-27 | End: 2023-11-01 | Stop reason: HOSPADM

## 2023-10-27 RX ORDER — SPIRONOLACTONE 25 MG/1
25 TABLET ORAL EVERY MORNING
COMMUNITY
End: 2023-11-01 | Stop reason: HOSPADM

## 2023-10-27 RX ORDER — SODIUM CHLORIDE 0.9 % (FLUSH) 0.9 %
10 SYRINGE (ML) INJECTION EVERY 12 HOURS SCHEDULED
Status: DISCONTINUED | OUTPATIENT
Start: 2023-10-27 | End: 2023-11-01 | Stop reason: HOSPADM

## 2023-10-27 RX ORDER — SERTRALINE HYDROCHLORIDE 25 MG/1
25 TABLET, FILM COATED ORAL DAILY
COMMUNITY

## 2023-10-27 RX ORDER — AMOXICILLIN 250 MG
2 CAPSULE ORAL 2 TIMES DAILY
Status: DISCONTINUED | OUTPATIENT
Start: 2023-10-27 | End: 2023-11-01 | Stop reason: HOSPADM

## 2023-10-27 RX ORDER — SODIUM CHLORIDE 9 MG/ML
40 INJECTION, SOLUTION INTRAVENOUS AS NEEDED
Status: DISCONTINUED | OUTPATIENT
Start: 2023-10-27 | End: 2023-11-01 | Stop reason: HOSPADM

## 2023-10-27 RX ORDER — LORAZEPAM 1 MG/1
1 TABLET ORAL EVERY 6 HOURS PRN
COMMUNITY
End: 2023-11-01 | Stop reason: HOSPADM

## 2023-10-27 RX ORDER — BREXPIPRAZOLE 2 MG/1
1 TABLET ORAL DAILY
COMMUNITY

## 2023-10-27 RX ORDER — MAGNESIUM SULFATE HEPTAHYDRATE 40 MG/ML
2 INJECTION, SOLUTION INTRAVENOUS
Status: COMPLETED | OUTPATIENT
Start: 2023-10-27 | End: 2023-10-28

## 2023-10-27 RX ORDER — INSULIN LISPRO 100 [IU]/ML
2-9 INJECTION, SOLUTION INTRAVENOUS; SUBCUTANEOUS
Status: DISCONTINUED | OUTPATIENT
Start: 2023-10-27 | End: 2023-11-01 | Stop reason: HOSPADM

## 2023-10-27 RX ORDER — DEXTROSE MONOHYDRATE 25 G/50ML
25 INJECTION, SOLUTION INTRAVENOUS
Status: DISCONTINUED | OUTPATIENT
Start: 2023-10-27 | End: 2023-11-01 | Stop reason: HOSPADM

## 2023-10-27 RX ORDER — CALCIUM GLUCONATE 20 MG/ML
2000 INJECTION, SOLUTION INTRAVENOUS
Status: COMPLETED | OUTPATIENT
Start: 2023-10-27 | End: 2023-10-28

## 2023-10-27 RX ORDER — BISACODYL 10 MG
10 SUPPOSITORY, RECTAL RECTAL DAILY PRN
Status: DISCONTINUED | OUTPATIENT
Start: 2023-10-27 | End: 2023-11-01 | Stop reason: HOSPADM

## 2023-10-27 RX ORDER — NICOTINE POLACRILEX 4 MG
15 LOZENGE BUCCAL
Status: DISCONTINUED | OUTPATIENT
Start: 2023-10-27 | End: 2023-11-01 | Stop reason: HOSPADM

## 2023-10-27 RX ORDER — MIDODRINE HYDROCHLORIDE 2.5 MG/1
2.5 TABLET ORAL
COMMUNITY

## 2023-10-27 RX ORDER — ONDANSETRON 2 MG/ML
4 INJECTION INTRAMUSCULAR; INTRAVENOUS EVERY 6 HOURS PRN
Status: DISCONTINUED | OUTPATIENT
Start: 2023-10-27 | End: 2023-11-01 | Stop reason: HOSPADM

## 2023-10-27 RX ORDER — CALCIUM GLUCONATE 20 MG/ML
1000 INJECTION, SOLUTION INTRAVENOUS
Status: COMPLETED | OUTPATIENT
Start: 2023-10-27 | End: 2023-10-27

## 2023-10-27 RX ORDER — CALCIUM CARBONATE 500 MG/1
2 TABLET, CHEWABLE ORAL 2 TIMES DAILY
COMMUNITY

## 2023-10-27 RX ORDER — LORAZEPAM 1 MG/1
1 TABLET ORAL
COMMUNITY
End: 2023-11-01 | Stop reason: HOSPADM

## 2023-10-27 RX ORDER — MAGNESIUM SULFATE HEPTAHYDRATE 40 MG/ML
2 INJECTION, SOLUTION INTRAVENOUS ONCE
Status: COMPLETED | OUTPATIENT
Start: 2023-10-27 | End: 2023-10-27

## 2023-10-27 RX ADMIN — MAGNESIUM SULFATE HEPTAHYDRATE 2 G: 2 INJECTION, SOLUTION INTRAVENOUS at 21:39

## 2023-10-27 RX ADMIN — MAGNESIUM SULFATE HEPTAHYDRATE 2 G: 2 INJECTION, SOLUTION INTRAVENOUS at 22:36

## 2023-10-27 RX ADMIN — SODIUM CHLORIDE 100 ML/HR: 9 INJECTION, SOLUTION INTRAVENOUS at 21:53

## 2023-10-27 RX ADMIN — CALCIUM GLUCONATE 1000 MG: 20 INJECTION, SOLUTION INTRAVENOUS at 22:12

## 2023-10-27 RX ADMIN — INSULIN LISPRO 4 UNITS: 100 INJECTION, SOLUTION INTRAVENOUS; SUBCUTANEOUS at 22:01

## 2023-10-27 NOTE — ED PROVIDER NOTES
EMERGENCY DEPARTMENT ENCOUNTER    Room Number:  21/21  Date seen:  10/27/2023  PCP: System, Provider Not In  Historian: Patient, wife at bedside, EMS who brought patient, nursing home records      HPI:  Chief Complaint: Electrolyte problems  A complete HPI/ROS/PMH/PSH/SH/FH are unobtainable due to: Patient with chronic dementia  Context: Anurag Pickard is a 72 y.o. male who presents to the ED c/o electrolyte problems.  Patient himself has no complaints but was sent for reported problems with the electrolytes.  According to the wife he has had 2 prior admissions in the last several months related to electrolyte disturbance.      MEDICAL RECORD REVIEW (non ED)  I reviewed prior medical records note the patient was hospitalized about 1 month ago with pain and swelling of the right knee felt to be related to pyogenic arthritis.  Chronic conditions include Alzheimer's dementia, history of alcohol abuse, diabetes    PAST MEDICAL HISTORY  Active Ambulatory Problems     Diagnosis Date Noted    Aortic valve stenosis 02/13/2018    Diabetes mellitus, type II 10/19/2011    Gastroesophageal reflux disease 04/03/2012    Hyperlipidemia 05/18/2016    Hypogonadism 08/04/2014    Obesity 12/05/2019    Obstructive sleep apnea syndrome 03/09/2018    Osteoarthritis 10/19/2011    Pulmonary hypertension 02/13/2018    Rosacea 02/13/2018    Vitamin D deficiency 10/18/2018    Memory loss 12/05/2019    Ataxia 12/05/2019    Alcohol abuse 12/05/2019    Retinal disorder 05/19/2016    Presbyopia 05/19/2016    Nuclear senile cataract 05/19/2016    History of laser assisted in situ keratomileusis 05/19/2016    Benign essential hypertension 04/05/2021    Screening PSA (prostate specific antigen) 04/05/2021    Dyspnea on exertion 12/09/2021    Acute diastolic CHF (congestive heart failure) 01/03/2022    Nonrheumatic aortic valve stenosis 03/28/2022    S/P AVR (aortic valve replacement) 04/22/2022    Coronary artery disease involving native  coronary artery of native heart without angina pectoris 05/13/2022    Right knee pain 09/15/2023    Alzheimer's dementia 09/15/2023    Paroxysmal atrial fibrillation 09/27/2023     Resolved Ambulatory Problems     Diagnosis Date Noted    B12 deficiency 03/15/2018    Chronic diarrhea 10/18/2018    Hypertension 10/19/2011    Hypocalcemia 06/28/2018    Hypoglycemia secondary to sulfonylurea 02/13/2018    Hypokalemia 02/13/2018    Hypomagnesemia 02/13/2018    Orthostatic hypotension 06/13/2018    Renal failure 10/18/2018    Localized, primary osteoarthritis 04/05/2021    Encounter for hepatitis C screening test for low risk patient 04/05/2021    Aortic ejection murmur 04/05/2021    Arthritis of left knee 05/04/2021    Aftercare following joint replacement surgery 08/17/2021     Past Medical History:   Diagnosis Date    Ankle edema     Aortic stenosis     Aortic valve replaced     Bell's palsy     Bleeds easily     Chipped tooth     Diabetes mellitus     Dry skin     Erectile dysfunction     Falls     Fatigue     Fragile skin     GERD (gastroesophageal reflux disease)     Heart murmur     YANA (obstructive sleep apnea)     Pulmonary HTN          PAST SURGICAL HISTORY  Past Surgical History:   Procedure Laterality Date    AORTIC VALVE REPAIR/REPLACEMENT N/A 03/28/2022    Procedure: AORTIC VALVE REPAIR/REPLACEMENT;  Surgeon: Dennis Brandon MD;  Location: [facility] CVOR;  Service: Cardiothoracic;  Laterality: N/A;  aortic valve replacement with 27mm [employer] magna ease    CARDIAC CATHETERIZATION      CARDIAC CATHETERIZATION N/A 12/17/2021    Procedure: Right and Left Heart Cath;  Surgeon: Juan Castellon DO;  Location: [facility] CATH INVASIVE LOCATION;  Service: Cardiology;  Laterality: N/A;    COLONOSCOPY      JOINT REPLACEMENT Bilateral 2021    knee    TONSILLECTOMY      TOTAL KNEE ARTHROPLASTY      TOTAL KNEE ARTHROPLASTY Left 05/04/2021    Procedure: TOTAL KNEE ARTHROPLASTY;  Surgeon: Otf Redmond  MD MISAEL;  Location: [facility] MAIN OR;  Service: Orthopedics;  Laterality: Left;         FAMILY HISTORY  Family History   Problem Relation Age of Onset    Diabetes Mother     Other Mother     Other Father     Heart attack Father     Other Sister     Diabetes Sister     Heart disease Sister     Diabetes Brother     Other Other     Diabetes Other          SOCIAL HISTORY  Social History     Socioeconomic History    Marital status:    Tobacco Use    Smoking status: Never    Smokeless tobacco: Never   Vaping Use    Vaping Use: Never used   Substance and Sexual Activity    Alcohol use: Yes     Alcohol/week: 14.0 standard drinks of alcohol     Types: 14 Drinks containing 0.5 oz of alcohol per week     Comment: 2 bourbons every night    Drug use: No    Sexual activity: Defer         ALLERGIES  Glipizide, Haldol [haloperidol], and Lisinopril        REVIEW OF SYSTEMS  Review of Systems   Unable to perform ROS: Dementia            PHYSICAL EXAM  ED Triage Vitals [10/27/23 1803]   Temp Heart Rate Resp BP SpO2   97.2 °F (36.2 °C) 93 16 134/66 96 %      Temp src Heart Rate Source Patient Position BP Location FiO2 (%)   Tympanic Monitor -- -- --       Physical Exam    GENERAL: Alert male in no obvious distress, well-appearing.  Triage vitals are reviewed and benign.  HENT: nares patent  EYES: no scleral icterus  CV: regular rhythm, regular rate  RESPIRATORY: normal effort, clear to auscultation bilaterally  ABDOMEN: soft, nontender to palpation  MUSCULOSKELETAL: no deformity  NEURO: Strength sensation and coordination are grossly intact.  Speech and mentation are unremarkable  SKIN: warm, dry      Vital signs and nursing notes reviewed.          LAB RESULTS  Recent Results (from the past 24 hour(s))   ECG 12 Lead Electrolyte Imbalance    Collection Time: 10/27/23  6:54 PM   Result Value Ref Range    QT Interval 379 ms    QTC Interval 553 ms   Comprehensive Metabolic Panel    Collection Time: 10/27/23  7:03 PM    Specimen: Arm,  Right; Blood   Result Value Ref Range    Glucose 209 (H) 65 - 99 mg/dL    BUN 23 8 - 23 mg/dL    Creatinine 1.08 0.76 - 1.27 mg/dL    Sodium 144 136 - 145 mmol/L    Potassium 4.9 3.5 - 5.2 mmol/L    Chloride 102 98 - 107 mmol/L    CO2 28.0 22.0 - 29.0 mmol/L    Calcium 5.8 (C) 8.6 - 10.5 mg/dL    Total Protein 7.0 6.0 - 8.5 g/dL    Albumin 3.6 3.5 - 5.2 g/dL    ALT (SGPT) 19 1 - 41 U/L    AST (SGOT) 38 1 - 40 U/L    Alkaline Phosphatase 69 39 - 117 U/L    Total Bilirubin 0.3 0.0 - 1.2 mg/dL    Globulin 3.4 gm/dL    A/G Ratio 1.1 g/dL    BUN/Creatinine Ratio 21.3 7.0 - 25.0    Anion Gap 14.0 5.0 - 15.0 mmol/L    eGFR 73.4 >60.0 mL/min/1.73   Magnesium    Collection Time: 10/27/23  7:03 PM    Specimen: Arm, Right; Blood   Result Value Ref Range    Magnesium 0.7 (C) 1.6 - 2.4 mg/dL   Phosphorus    Collection Time: 10/27/23  7:03 PM    Specimen: Arm, Right; Blood   Result Value Ref Range    Phosphorus 5.4 (H) 2.5 - 4.5 mg/dL   CBC Auto Differential    Collection Time: 10/27/23  7:03 PM    Specimen: Arm, Right; Blood   Result Value Ref Range    WBC 9.71 3.40 - 10.80 10*3/mm3    RBC 2.91 (L) 4.14 - 5.80 10*6/mm3    Hemoglobin 8.4 (L) 13.0 - 17.7 g/dL    Hematocrit 25.7 (L) 37.5 - 51.0 %    MCV 88.3 79.0 - 97.0 fL    MCH 28.9 26.6 - 33.0 pg    MCHC 32.7 31.5 - 35.7 g/dL    RDW 13.3 12.3 - 15.4 %    RDW-SD 42.8 37.0 - 54.0 fl    MPV 10.7 6.0 - 12.0 fL    Platelets 279 140 - 450 10*3/mm3    Neutrophil % 84.4 (H) 42.7 - 76.0 %    Lymphocyte % 10.1 (L) 19.6 - 45.3 %    Monocyte % 4.4 (L) 5.0 - 12.0 %    Eosinophil % 0.3 0.3 - 6.2 %    Basophil % 0.4 0.0 - 1.5 %    Immature Grans % 0.4 0.0 - 0.5 %    Neutrophils, Absolute 8.19 (H) 1.70 - 7.00 10*3/mm3    Lymphocytes, Absolute 0.98 0.70 - 3.10 10*3/mm3    Monocytes, Absolute 0.43 0.10 - 0.90 10*3/mm3    Eosinophils, Absolute 0.03 0.00 - 0.40 10*3/mm3    Basophils, Absolute 0.04 0.00 - 0.20 10*3/mm3    Immature Grans, Absolute 0.04 0.00 - 0.05 10*3/mm3    nRBC 0.0 0.0 - 0.2 /100  WBC       Ordered the above labs and independently interpreted results. My findings will be discussed in the medical decision making section below        RADIOLOGY  No Radiology Exams Resulted Within Past 24 Hours          PROCEDURES  Procedures          MEDICATIONS GIVEN IN ER  Medications   sodium chloride 0.9 % flush 10 mL (has no administration in time range)   Magnesium Standard Dose Replacement - Follow Nurse / BPA Driven Protocol (has no administration in time range)   Calcium Replacement - Follow Nurse / BPA Driven Protocol (has no administration in time range)   calcium gluconate 1000 Mg/50ml 0.675% NaCl IV SOLN (has no administration in time range)     Followed by   calcium gluconate 2000-675 MG/100ML NACL IVPB (has no administration in time range)   sodium chloride 0.9 % flush 10 mL (has no administration in time range)   sodium chloride 0.9 % flush 10 mL (has no administration in time range)   sodium chloride 0.9 % infusion 40 mL (has no administration in time range)   sennosides-docusate (PERICOLACE) 8.6-50 MG per tablet 2 tablet (has no administration in time range)     And   polyethylene glycol (MIRALAX) packet 17 g (has no administration in time range)     And   bisacodyl (DULCOLAX) EC tablet 5 mg (has no administration in time range)     And   bisacodyl (DULCOLAX) suppository 10 mg (has no administration in time range)   dextrose (GLUTOSE) oral gel 15 g (has no administration in time range)   dextrose (D50W) (25 g/50 mL) IV injection 25 g (has no administration in time range)   glucagon (GLUCAGEN) injection 1 mg (has no administration in time range)   insulin lispro (HUMALOG/ADMELOG) injection 2-9 Units (has no administration in time range)   acetaminophen (TYLENOL) tablet 650 mg (has no administration in time range)     Or   acetaminophen (TYLENOL) 160 MG/5ML oral solution 650 mg (has no administration in time range)     Or   acetaminophen (TYLENOL) suppository 650 mg (has no administration in time  range)   ondansetron (ZOFRAN) tablet 4 mg (has no administration in time range)     Or   ondansetron (ZOFRAN) injection 4 mg (has no administration in time range)   magnesium sulfate 2g/50 mL (PREMIX) infusion (2 g Intravenous New Bag 10/27/23 9388)     Followed by   magnesium sulfate 2g/50 mL (PREMIX) infusion (has no administration in time range)   sodium chloride 0.9 % bolus (100 mL/hr Intravenous New Bag 10/27/23 9279)               MEDICAL DECISION MAKING, PROGRESS, and CONSULTS    All labs have been independently reviewed by me.  All radiology studies have been reviewed by me and I have also reviewed the radiology report.   EKG's independently viewed and interpreted by me.  Discussion below represents my analysis of pertinent findings related to patient's condition, differential diagnosis, treatment plan and final disposition.      Additional sources:  - Discussed/ obtained information from independent historians: EMS, nursing home notes, wife at bedside    - External (non-ED) record review: Please see documented above    - Chronic or social conditions impacting care: Chronic dementia    - Shared decision making: I discussed ED evaluation and treatment plan with patient who is in agreement.  Patient sent from rehab facility for significant electrolyte disturbance.  Found to have magnesium of 0.7 and calcium of 5.4.    Will admit for electrolyte replacement.  Etiology of electrolyte deficiencies unclear.  Consider renal consultation.      Orders placed during this visit:  Orders Placed This Encounter   Procedures    Comprehensive Metabolic Panel    Magnesium    Phosphorus    CBC Auto Differential    Calcium    Basic Metabolic Panel    CBC (No Diff)    Diet: Cardiac Diets, Diabetic Diets; Healthy Heart (2-3 Na+); Consistent Carbohydrate; Texture: Regular Texture (IDDSI 7); Fluid Consistency: Thin (IDDSI 0)    Monitor Blood Pressure    Vital Signs    Intake & Output    Weigh Patient    Oral Care    Saline Lock &  Maintain IV Access    Place Sequential Compression Device    Maintain Sequential Compression Device    Pulse Oximetry, Continuous    Up With Assistance    Daily Weights    Neurovascular Checks    Code Status and Medical Interventions:    [facility]HA (on-call MD unless specified) Details    Incentive Spirometry    Oxygen Therapy- Nasal Cannula; Titrate 1-6 LPM Per SpO2; 90 - 95%    POC Glucose 4x Daily Before Meals & at Bedtime    ECG 12 Lead Electrolyte Imbalance    Insert Peripheral IV    Insert Peripheral IV    Initiate Observation Status    CBC & Differential           Differential diagnosis:    Please see as documented below in ED course      Independent interpretation of labs, radiology studies, and discussions with consultants:  ED Course as of 10/27/23 2159   Fri Oct 27, 2023   1922 EKG independently interpreted by me    Time 6:54 PM    A-fib 128  P waves-A-fib  QRS-normal axis, normal QRS  ST, T wave-diffuse T wave flattening, prolonged QT interval    When compared to 9/2023 heart rate is faster [DB]   2021 KSP-62-fuxy-old male sent with reported electrolyte disturbance.  He does not have specific complaints here.    On exam he is well-appearing and does have some chronic dementia.  Vitals unremarkable    Assessment-we will go ahead and check some electrolytes to look for electrolyte disturbance.  Patient well-appearing. [DB]   2022 Labs reviewed notable for pretty significant hypomagnesemia with magnesium of 1.7.  Also with pretty severe hypocalcemia with calcium of 5.8.  Phosphorus is mildly elevated at 5.4.    Renal function fairly unremarkable without renal disease.  Potassium and bicarb normal    CBC does show some chronic anemia with hemoglobin of 8.4. [DB]   2022 Complicated patient who has had recurrent electrolyte disturbances.  Will admit with electrolyte replacement including magnesium and calcium.  Will consult nephrology to see patient in a.m. [DB]   2024 I have initiated a protocol driven magnesium and  calcium replacement protocols.  We will contact [facility] about admission. [DB]      ED Course User Index  [DB] Ryan Akins MD               DIAGNOSIS  Final diagnoses:   Hypocalcemia   Hypomagnesemia   S/P AVR (aortic valve replacement)   Alzheimer's dementia, unspecified dementia severity, unspecified timing of dementia onset, unspecified whether behavioral, psychotic, or mood disturbance or anxiety         DISPOSITION  Admission            Latest Documented Vital Signs:  As of 21:59 EDT  BP- 101/81 HR- 112 Temp- 97.2 °F (36.2 °C) (Tympanic) O2 sat- 99%              --    Please note that portions of this were completed with a voice recognition program.       Note Disclaimer: At [facility], we believe that sharing information builds trust and better relationships. You are receiving this note because you are receiving care at [facility] or recently visited. It is possible you will see health information before a provider has talked with you about it. This kind of information can be easy to misunderstand. To help you fully understand what it means for your health, we urge you to discuss this note with your provider.             Ryan Akins MD  10/27/23 4391

## 2023-10-28 PROBLEM — I50.32 CHRONIC DIASTOLIC CHF (CONGESTIVE HEART FAILURE): Status: ACTIVE | Noted: 2022-01-03

## 2023-10-28 LAB
25(OH)D3 SERPL-MCNC: 23.7 NG/ML (ref 30–100)
ALBUMIN SERPL-MCNC: 3.6 G/DL (ref 3.5–5.2)
ANION GAP SERPL CALCULATED.3IONS-SCNC: 11.9 MMOL/L (ref 5–15)
B PARAPERT DNA SPEC QL NAA+PROBE: NOT DETECTED
B PERT DNA SPEC QL NAA+PROBE: NOT DETECTED
BUN SERPL-MCNC: 14 MG/DL (ref 8–23)
BUN/CREAT SERPL: 19.4 (ref 7–25)
C PNEUM DNA NPH QL NAA+NON-PROBE: NOT DETECTED
CA-I BLD-MCNC: 3.7 MG/DL (ref 4.6–5.4)
CA-I BLD-MCNC: 3.8 MG/DL (ref 4.6–5.4)
CA-I SERPL ISE-MCNC: 0.93 MMOL/L (ref 1.15–1.35)
CA-I SERPL ISE-MCNC: 0.96 MMOL/L (ref 1.15–1.35)
CALCIUM SPEC-SCNC: 7.3 MG/DL (ref 8.6–10.5)
CHLORIDE SERPL-SCNC: 100 MMOL/L (ref 98–107)
CO2 SERPL-SCNC: 27.1 MMOL/L (ref 22–29)
CREAT SERPL-MCNC: 0.72 MG/DL (ref 0.76–1.27)
DEPRECATED RDW RBC AUTO: 43.6 FL (ref 37–54)
EGFRCR SERPLBLD CKD-EPI 2021: 97.7 ML/MIN/1.73
ERYTHROCYTE [DISTWIDTH] IN BLOOD BY AUTOMATED COUNT: 13.3 % (ref 12.3–15.4)
FLUAV SUBTYP SPEC NAA+PROBE: NOT DETECTED
FLUBV RNA ISLT QL NAA+PROBE: NOT DETECTED
GLUCOSE BLDC GLUCOMTR-MCNC: 128 MG/DL (ref 70–130)
GLUCOSE BLDC GLUCOMTR-MCNC: 129 MG/DL (ref 70–130)
GLUCOSE BLDC GLUCOMTR-MCNC: 164 MG/DL (ref 70–130)
GLUCOSE BLDC GLUCOMTR-MCNC: 204 MG/DL (ref 70–130)
GLUCOSE SERPL-MCNC: 164 MG/DL (ref 65–99)
HADV DNA SPEC NAA+PROBE: NOT DETECTED
HCOV 229E RNA SPEC QL NAA+PROBE: NOT DETECTED
HCOV HKU1 RNA SPEC QL NAA+PROBE: NOT DETECTED
HCOV NL63 RNA SPEC QL NAA+PROBE: NOT DETECTED
HCOV OC43 RNA SPEC QL NAA+PROBE: NOT DETECTED
HCT VFR BLD AUTO: 25.5 % (ref 37.5–51)
HGB BLD-MCNC: 8.3 G/DL (ref 13–17.7)
HMPV RNA NPH QL NAA+NON-PROBE: NOT DETECTED
HPIV1 RNA ISLT QL NAA+PROBE: NOT DETECTED
HPIV2 RNA SPEC QL NAA+PROBE: NOT DETECTED
HPIV3 RNA NPH QL NAA+PROBE: NOT DETECTED
HPIV4 P GENE NPH QL NAA+PROBE: NOT DETECTED
M PNEUMO IGG SER IA-ACNC: NOT DETECTED
MAGNESIUM SERPL-MCNC: 2 MG/DL (ref 1.6–2.4)
MCH RBC QN AUTO: 28.5 PG (ref 26.6–33)
MCHC RBC AUTO-ENTMCNC: 32.5 G/DL (ref 31.5–35.7)
MCV RBC AUTO: 87.6 FL (ref 79–97)
PHOSPHATE SERPL-MCNC: 2.7 MG/DL (ref 2.5–4.5)
PLATELET # BLD AUTO: 278 10*3/MM3 (ref 140–450)
PMV BLD AUTO: 10.7 FL (ref 6–12)
POTASSIUM SERPL-SCNC: 3.2 MMOL/L (ref 3.5–5.2)
PTH-INTACT SERPL-MCNC: 96.6 PG/ML (ref 15–65)
RBC # BLD AUTO: 2.91 10*6/MM3 (ref 4.14–5.8)
RHINOVIRUS RNA SPEC NAA+PROBE: NOT DETECTED
RSV RNA NPH QL NAA+NON-PROBE: NOT DETECTED
SARS-COV-2 RNA NPH QL NAA+NON-PROBE: NOT DETECTED
SODIUM SERPL-SCNC: 139 MMOL/L (ref 136–145)
WBC NRBC COR # BLD: 8.54 10*3/MM3 (ref 3.4–10.8)

## 2023-10-28 PROCEDURE — 80069 RENAL FUNCTION PANEL: CPT | Performed by: INTERNAL MEDICINE

## 2023-10-28 PROCEDURE — 82948 REAGENT STRIP/BLOOD GLUCOSE: CPT

## 2023-10-28 PROCEDURE — 82330 ASSAY OF CALCIUM: CPT | Performed by: INTERNAL MEDICINE

## 2023-10-28 PROCEDURE — 0202U NFCT DS 22 TRGT SARS-COV-2: CPT | Performed by: INTERNAL MEDICINE

## 2023-10-28 PROCEDURE — 25010000002 MAGNESIUM SULFATE 2 GM/50ML SOLUTION: Performed by: INTERNAL MEDICINE

## 2023-10-28 PROCEDURE — 25010000002 CALCIUM GLUCONATE 2-0.675 GM/100ML-% SOLUTION: Performed by: INTERNAL MEDICINE

## 2023-10-28 PROCEDURE — 36415 COLL VENOUS BLD VENIPUNCTURE: CPT | Performed by: NURSE PRACTITIONER

## 2023-10-28 PROCEDURE — 63710000001 INSULIN LISPRO (HUMAN) PER 5 UNITS: Performed by: NURSE PRACTITIONER

## 2023-10-28 PROCEDURE — 83970 ASSAY OF PARATHORMONE: CPT | Performed by: INTERNAL MEDICINE

## 2023-10-28 PROCEDURE — 83735 ASSAY OF MAGNESIUM: CPT | Performed by: INTERNAL MEDICINE

## 2023-10-28 PROCEDURE — 85027 COMPLETE CBC AUTOMATED: CPT | Performed by: NURSE PRACTITIONER

## 2023-10-28 PROCEDURE — 82306 VITAMIN D 25 HYDROXY: CPT | Performed by: INTERNAL MEDICINE

## 2023-10-28 RX ORDER — MAGNESIUM SULFATE HEPTAHYDRATE 40 MG/ML
2 INJECTION, SOLUTION INTRAVENOUS ONCE
Status: DISCONTINUED | OUTPATIENT
Start: 2023-10-28 | End: 2023-10-31

## 2023-10-28 RX ORDER — ATORVASTATIN CALCIUM 20 MG/1
40 TABLET, FILM COATED ORAL DAILY
Status: DISCONTINUED | OUTPATIENT
Start: 2023-10-28 | End: 2023-11-01 | Stop reason: HOSPADM

## 2023-10-28 RX ORDER — LORAZEPAM 1 MG/1
1 TABLET ORAL EVERY 6 HOURS PRN
Status: DISCONTINUED | OUTPATIENT
Start: 2023-10-28 | End: 2023-10-28

## 2023-10-28 RX ORDER — CHOLECALCIFEROL (VITAMIN D3) 125 MCG
1000 CAPSULE ORAL DAILY
Status: DISCONTINUED | OUTPATIENT
Start: 2023-10-28 | End: 2023-11-01 | Stop reason: HOSPADM

## 2023-10-28 RX ORDER — MIDODRINE HYDROCHLORIDE 2.5 MG/1
2.5 TABLET ORAL
Status: DISCONTINUED | OUTPATIENT
Start: 2023-10-28 | End: 2023-11-01 | Stop reason: HOSPADM

## 2023-10-28 RX ORDER — ERGOCALCIFEROL 1.25 MG/1
50000 CAPSULE ORAL
Status: DISCONTINUED | OUTPATIENT
Start: 2023-10-28 | End: 2023-11-01 | Stop reason: HOSPADM

## 2023-10-28 RX ORDER — FAMOTIDINE 20 MG/1
20 TABLET, FILM COATED ORAL
Status: DISCONTINUED | OUTPATIENT
Start: 2023-10-28 | End: 2023-11-01 | Stop reason: HOSPADM

## 2023-10-28 RX ORDER — CALCIUM CARBONATE 500 MG/1
2 TABLET, CHEWABLE ORAL 2 TIMES DAILY
Status: DISCONTINUED | OUTPATIENT
Start: 2023-10-28 | End: 2023-11-01 | Stop reason: HOSPADM

## 2023-10-28 RX ORDER — FOLIC ACID 1 MG/1
1 TABLET ORAL DAILY
Status: COMPLETED | OUTPATIENT
Start: 2023-10-28 | End: 2023-10-28

## 2023-10-28 RX ORDER — SERTRALINE HYDROCHLORIDE 25 MG/1
25 TABLET, FILM COATED ORAL DAILY
Status: DISCONTINUED | OUTPATIENT
Start: 2023-10-28 | End: 2023-11-01 | Stop reason: HOSPADM

## 2023-10-28 RX ORDER — METOPROLOL SUCCINATE 25 MG/1
25 TABLET, EXTENDED RELEASE ORAL DAILY
Status: DISCONTINUED | OUTPATIENT
Start: 2023-10-28 | End: 2023-11-01 | Stop reason: HOSPADM

## 2023-10-28 RX ORDER — HYDROCODONE BITARTRATE AND ACETAMINOPHEN 5; 325 MG/1; MG/1
1 TABLET ORAL EVERY 8 HOURS PRN
Status: DISCONTINUED | OUTPATIENT
Start: 2023-10-28 | End: 2023-11-01 | Stop reason: HOSPADM

## 2023-10-28 RX ORDER — CHOLECALCIFEROL (VITAMIN D3) 125 MCG
5 CAPSULE ORAL NIGHTLY PRN
Status: DISCONTINUED | OUTPATIENT
Start: 2023-10-28 | End: 2023-10-30

## 2023-10-28 RX ORDER — MULTIPLE VITAMINS W/ MINERALS TAB 9MG-400MCG
1 TAB ORAL DAILY
Status: DISCONTINUED | OUTPATIENT
Start: 2023-10-28 | End: 2023-11-01 | Stop reason: HOSPADM

## 2023-10-28 RX ORDER — ONDANSETRON 4 MG/1
4 TABLET, FILM COATED ORAL 3 TIMES DAILY PRN
Status: DISCONTINUED | OUTPATIENT
Start: 2023-10-28 | End: 2023-10-28 | Stop reason: SDUPTHER

## 2023-10-28 RX ORDER — LORAZEPAM 0.5 MG/1
0.5 TABLET ORAL EVERY 6 HOURS PRN
Status: DISCONTINUED | OUTPATIENT
Start: 2023-10-28 | End: 2023-11-01 | Stop reason: HOSPADM

## 2023-10-28 RX ORDER — LORAZEPAM 1 MG/1
1 TABLET ORAL NIGHTLY
Status: DISCONTINUED | OUTPATIENT
Start: 2023-10-28 | End: 2023-10-28

## 2023-10-28 RX ORDER — POTASSIUM CHLORIDE 750 MG/1
20 TABLET, FILM COATED, EXTENDED RELEASE ORAL ONCE
Status: COMPLETED | OUTPATIENT
Start: 2023-10-28 | End: 2023-10-28

## 2023-10-28 RX ORDER — PANTOPRAZOLE SODIUM 40 MG/1
40 TABLET, DELAYED RELEASE ORAL
Status: DISCONTINUED | OUTPATIENT
Start: 2023-10-28 | End: 2023-10-28

## 2023-10-28 RX ORDER — LORAZEPAM 0.5 MG/1
0.5 TABLET ORAL NIGHTLY PRN
Status: DISCONTINUED | OUTPATIENT
Start: 2023-10-28 | End: 2023-10-28

## 2023-10-28 RX ADMIN — DOCUSATE SODIUM 50MG AND SENNOSIDES 8.6MG 2 TABLET: 8.6; 5 TABLET, FILM COATED ORAL at 20:34

## 2023-10-28 RX ADMIN — METOPROLOL SUCCINATE 25 MG: 25 TABLET, EXTENDED RELEASE ORAL at 08:38

## 2023-10-28 RX ADMIN — INSULIN LISPRO 2 UNITS: 100 INJECTION, SOLUTION INTRAVENOUS; SUBCUTANEOUS at 12:18

## 2023-10-28 RX ADMIN — INSULIN LISPRO 4 UNITS: 100 INJECTION, SOLUTION INTRAVENOUS; SUBCUTANEOUS at 17:43

## 2023-10-28 RX ADMIN — MAGNESIUM SULFATE HEPTAHYDRATE 2 G: 2 INJECTION, SOLUTION INTRAVENOUS at 02:51

## 2023-10-28 RX ADMIN — MAGNESIUM SULFATE HEPTAHYDRATE 2 G: 2 INJECTION, SOLUTION INTRAVENOUS at 00:39

## 2023-10-28 RX ADMIN — ERGOCALCIFEROL 50000 UNITS: 1.25 CAPSULE ORAL at 17:53

## 2023-10-28 RX ADMIN — HYDROCODONE BITARTRATE AND ACETAMINOPHEN 1 TABLET: 5; 325 TABLET ORAL at 23:45

## 2023-10-28 RX ADMIN — CARBIDOPA AND LEVODOPA 2.5 MG: 50; 200 TABLET, EXTENDED RELEASE ORAL at 08:38

## 2023-10-28 RX ADMIN — FAMOTIDINE 20 MG: 20 TABLET, FILM COATED ORAL at 17:44

## 2023-10-28 RX ADMIN — MAGNESIUM SULFATE HEPTAHYDRATE 2 G: 2 INJECTION, SOLUTION INTRAVENOUS at 06:08

## 2023-10-28 RX ADMIN — Medication 5 MG: at 20:34

## 2023-10-28 RX ADMIN — ANTACID TABLETS 2 TABLET: 500 TABLET, CHEWABLE ORAL at 08:38

## 2023-10-28 RX ADMIN — FOLIC ACID 1 MG: 1 TABLET ORAL at 08:38

## 2023-10-28 RX ADMIN — SERTRALINE 25 MG: 25 TABLET, FILM COATED ORAL at 08:38

## 2023-10-28 RX ADMIN — LORAZEPAM 0.5 MG: 0.5 TABLET ORAL at 22:10

## 2023-10-28 RX ADMIN — CALCIUM GLUCONATE 2000 MG: 20 INJECTION, SOLUTION INTRAVENOUS at 00:31

## 2023-10-28 RX ADMIN — CARBIDOPA AND LEVODOPA 2.5 MG: 50; 200 TABLET, EXTENDED RELEASE ORAL at 12:10

## 2023-10-28 RX ADMIN — ANTACID TABLETS 2 TABLET: 500 TABLET, CHEWABLE ORAL at 20:34

## 2023-10-28 RX ADMIN — POTASSIUM CHLORIDE 20 MEQ: 750 TABLET, EXTENDED RELEASE ORAL at 14:45

## 2023-10-28 RX ADMIN — DOCUSATE SODIUM 50MG AND SENNOSIDES 8.6MG 2 TABLET: 8.6; 5 TABLET, FILM COATED ORAL at 08:38

## 2023-10-28 RX ADMIN — CALCIUM GLUCONATE 2000 MG: 20 INJECTION, SOLUTION INTRAVENOUS at 03:37

## 2023-10-28 RX ADMIN — Medication 10 ML: at 20:35

## 2023-10-28 RX ADMIN — Medication 1000 MCG: at 08:38

## 2023-10-28 RX ADMIN — MULTIPLE VITAMINS W/ MINERALS TAB 1 TABLET: TAB at 08:38

## 2023-10-28 RX ADMIN — PANTOPRAZOLE SODIUM 40 MG: 40 TABLET, DELAYED RELEASE ORAL at 06:08

## 2023-10-28 RX ADMIN — CALCIUM GLUCONATE 2000 MG: 20 INJECTION, SOLUTION INTRAVENOUS at 05:53

## 2023-10-28 RX ADMIN — ATORVASTATIN CALCIUM 40 MG: 20 TABLET, FILM COATED ORAL at 08:38

## 2023-10-28 RX ADMIN — Medication 10 ML: at 08:44

## 2023-10-28 RX ADMIN — CARBIDOPA AND LEVODOPA 2.5 MG: 50; 200 TABLET, EXTENDED RELEASE ORAL at 17:50

## 2023-10-28 RX ADMIN — Medication 10 ML: at 01:44

## 2023-10-28 RX ADMIN — LORAZEPAM 1 MG: 1 TABLET ORAL at 02:01

## 2023-10-28 NOTE — PLAN OF CARE
Goal Outcome Evaluation:  Plan of Care Reviewed With: patient           Outcome Evaluation: Very confused. Pulled iv lines. Reoriented multiple times.1L via NC. VSS.Electrolytes replacement infusing.

## 2023-10-28 NOTE — CONSULTS
RENAL/KCC:    Referring Provider: Dr. Nunez  Reason for Consultation: Hypocalcemia, Hypomagnesemia    Subjective     Chief complaint: Electrolyte abnormalities    History of present illness:  Patient is a 73 yo WM who was directed to the ER for severe hypocalcemia and hypomagnesemia.  Phos noted to be elevated also.  He denies any N/V/D or decreased PO intake.  He denies any new medications.  He denies any perioral or digital numbness or tingling.  He is on Lasix and Aldactone at home.  He denies any other complaints.    History  Past Medical History:   Diagnosis Date    Acute diastolic CHF (congestive heart failure) 01/03/2022    Ankle edema     Aortic stenosis     Moderate    Aortic valve replaced     B12 deficiency     Bell's palsy     Bleeds easily     Chipped tooth     top front    Diabetes mellitus     Dry skin     Erectile dysfunction     Falls     2 falls in last 3 months    Fatigue     Fragile skin     GERD (gastroesophageal reflux disease)     Heart murmur     Hyperlipidemia     Hypertension     Hypokalemia     Hypomagnesemia     YANA (obstructive sleep apnea)     cpap bring dos    Osteoarthritis     Paroxysmal atrial fibrillation 9/27/2023    Pulmonary HTN     Rosacea     Vitamin D deficiency    ,   Past Surgical History:   Procedure Laterality Date    AORTIC VALVE REPAIR/REPLACEMENT N/A 03/28/2022    Procedure: AORTIC VALVE REPAIR/REPLACEMENT;  Surgeon: Dennis Brandon MD;  Location: [facility] CVOR;  Service: Cardiothoracic;  Laterality: N/A;  aortic valve replacement with 27mm [employer] magna ease    CARDIAC CATHETERIZATION      CARDIAC CATHETERIZATION N/A 12/17/2021    Procedure: Right and Left Heart Cath;  Surgeon: Juan Castellon DO;  Location: [facility] CATH INVASIVE LOCATION;  Service: Cardiology;  Laterality: N/A;    COLONOSCOPY      JOINT REPLACEMENT Bilateral 2021    knee    TONSILLECTOMY      TOTAL KNEE ARTHROPLASTY      TOTAL KNEE ARTHROPLASTY Left 05/04/2021    Procedure:  TOTAL KNEE ARTHROPLASTY;  Surgeon: Otf Redmond MD;  Location: [facility] MAIN OR;  Service: Orthopedics;  Laterality: Left;   ,   Family History   Problem Relation Age of Onset    Diabetes Mother     Other Mother     Other Father     Heart attack Father     Other Sister     Diabetes Sister     Heart disease Sister     Diabetes Brother     Other Other     Diabetes Other    ,   Social History     Socioeconomic History    Marital status:    Tobacco Use    Smoking status: Never    Smokeless tobacco: Never   Vaping Use    Vaping Use: Never used   Substance and Sexual Activity    Alcohol use: Yes     Alcohol/week: 14.0 standard drinks of alcohol     Types: 14 Drinks containing 0.5 oz of alcohol per week     Comment: 2 bourbons every night    Drug use: No    Sexual activity: Defer     E-cigarette/Vaping    E-cigarette/Vaping Use Never User     Passive Exposure No     Counseling Given No      E-cigarette/Vaping Substances    Nicotine No     THC No     CBD No     Flavoring No      E-cigarette/Vaping Devices    Disposable No     Pre-filled or Refillable Cartridge No     Refillable Tank No     Pre-filled Pod No          ,   Medications Prior to Admission   Medication Sig Dispense Refill Last Dose    Accu-Chek Guide test strip Use as instructed once daily   Dx e 11.9 100 each 12     acetaminophen (TYLENOL) 325 MG tablet Take 2 tablets by mouth Every 4 (Four) Hours As Needed for Mild Pain.   Past Week    atorvastatin (LIPITOR) 40 MG tablet Take 1 tablet by mouth once daily 90 tablet 0 10/26/2023 at 2100    Brexpiprazole (Rexulti) 2 MG tablet Take 1 tablet by mouth Daily.   10/27/2023 at 0900    calcium carbonate (TUMS) 500 MG chewable tablet Chew 2 tablets 2 (Two) Times a Day. For hypocalcemia   10/27/2023 at 0900    folic acid (FOLVITE) 1 MG tablet Take 1 tablet by mouth Daily for 360 days. 90 tablet 3 10/27/2023 at 0900    furosemide (LASIX) 40 MG tablet Take 1 tablet by mouth Daily. 30 tablet 0 10/27/2023 at 0900     lansoprazole (PREVACID) 30 MG capsule Take 1 capsule by mouth Daily. 90 capsule 0 10/27/2023 at 0800    LORazepam (ATIVAN) 1 MG tablet Take 1 tablet by mouth every night at bedtime.   10/26/2023 at 2100    Melatonin 10 MG capsule Take 1 capsule by mouth At Night As Needed.   10/26/2023 at 2100    metFORMIN (GLUCOPHAGE) 1000 MG tablet Take 1 tablet by mouth 2 (Two) Times a Day With Meals.   10/27/2023 at 0800    metoprolol succinate XL (TOPROL-XL) 25 MG 24 hr tablet Take 1 tablet by mouth Daily.   10/27/2023 at 0900    midodrine (PROAMATINE) 2.5 MG tablet Take 1 tablet by mouth 3 (Three) Times a Day Before Meals.   10/27/2023 at 0200    multivitamin with minerals tablet tablet Take 1 tablet by mouth Daily.   10/27/2023 at 0900    sertraline (ZOLOFT) 25 MG tablet Take 1 tablet by mouth Daily.   10/27/2023 at 0900    spironolactone (ALDACTONE) 25 MG tablet Take 1 tablet by mouth Every Morning.   10/27/2023 at 0900    vitamin B-12 (CYANOCOBALAMIN) 1000 MCG tablet Take 1 tablet by mouth Daily.   10/27/2023 at 0900    docusate sodium (COLACE) 100 MG capsule Take 1 capsule by mouth At Night As Needed for Constipation.   Unknown    HYDROcodone-acetaminophen (Norco) 5-325 MG per tablet Take 1 tablet by mouth Every 8 (Eight) Hours As Needed for Severe Pain. 10 tablet 0 Unknown    LORazepam (ATIVAN) 1 MG tablet Take 1 tablet by mouth Every 6 (Six) Hours As Needed for Anxiety (for anxiety, restlessness, agitation).   Unknown    ondansetron (ZOFRAN) 4 MG tablet Take 1 tablet by mouth 3 (Three) Times a Day As Needed for Nausea or Vomiting.   Unknown   , Scheduled Meds:  atorvastatin, 40 mg, Oral, Daily  calcium carbonate, 2 tablet, Oral, BID  famotidine, 20 mg, Oral, BID AC  insulin lispro, 2-9 Units, Subcutaneous, 4x Daily AC & at Bedtime  magnesium sulfate, 2 g, Intravenous, Once  metoprolol succinate XL, 25 mg, Oral, Daily  midodrine, 2.5 mg, Oral, TID AC  multivitamin with minerals, 1 tablet, Oral, Daily  potassium chloride,  20 mEq, Oral, Once  senna-docusate sodium, 2 tablet, Oral, BID  sertraline, 25 mg, Oral, Daily  sodium chloride, 10 mL, Intravenous, Q12H  vitamin B-12, 1,000 mcg, Oral, Daily  vitamin D, 50,000 Units, Oral, Q7 Days   , Continuous Infusions:  sodium chloride, 100 mL/hr, Last Rate: 100 mL/hr (10/27/23 2153)   , PRN Meds:    acetaminophen **OR** acetaminophen **OR** acetaminophen    senna-docusate sodium **AND** polyethylene glycol **AND** bisacodyl **AND** bisacodyl    Calcium Replacement - Follow Nurse / BPA Driven Protocol    dextrose    dextrose    glucagon (human recombinant)    HYDROcodone-acetaminophen    LORazepam    Magnesium Standard Dose Replacement - Follow Nurse / BPA Driven Protocol    melatonin    ondansetron **OR** ondansetron    Phosphorus Replacement - Follow Nurse / BPA Driven Protocol    Potassium Replacement - Follow Nurse / BPA Driven Protocol    sodium chloride    [COMPLETED] Insert Peripheral IV **AND** sodium chloride    sodium chloride    sodium chloride, and Allergies:  Glipizide, Haldol [haloperidol], and Lisinopril    Review of Systems  Pertinent items are noted in HPI    Objective     Vital Signs  Temp:  [97.2 °F (36.2 °C)-98.1 °F (36.7 °C)] 98.1 °F (36.7 °C)  Heart Rate:  [] 79  Resp:  [16-18] 18  BP: ()/(66-81) 108/75    No intake/output data recorded.  I/O last 3 completed shifts:  In: 170 [P.O.:120; I.V.:50]  Out: 300 [Urine:300]    Physical Exam:  GEN: Awake, NAD  ENT: PERRL, EOMI, MMM  NECK: Supple, no JVD  CHEST: CTAB, no W/R/C  CV: RRR, no M/G/R  ABD: Soft, NT, +BS  SKIN: Warm and Dry  NEURO: CN's intact      Results Review:   I reviewed the patient's new clinical results.    WBC WBC   Date Value Ref Range Status   10/28/2023 8.54 3.40 - 10.80 10*3/mm3 Final   10/27/2023 9.71 3.40 - 10.80 10*3/mm3 Final      HGB Hemoglobin   Date Value Ref Range Status   10/28/2023 8.3 (L) 13.0 - 17.7 g/dL Final   10/27/2023 8.4 (L) 13.0 - 17.7 g/dL Final      HCT Hematocrit   Date  "Value Ref Range Status   10/28/2023 25.5 (L) 37.5 - 51.0 % Final   10/27/2023 25.7 (L) 37.5 - 51.0 % Final      Platlets No results found for: \"LABPLAT\"   MCV MCV   Date Value Ref Range Status   10/28/2023 87.6 79.0 - 97.0 fL Final   10/27/2023 88.3 79.0 - 97.0 fL Final          Sodium Sodium   Date Value Ref Range Status   10/28/2023 139 136 - 145 mmol/L Final   10/27/2023 144 136 - 145 mmol/L Final      Potassium Potassium   Date Value Ref Range Status   10/28/2023 3.2 (L) 3.5 - 5.2 mmol/L Final   10/27/2023 4.9 3.5 - 5.2 mmol/L Final     Comment:     Specimen hemolyzed.  Results may be affected.      Chloride Chloride   Date Value Ref Range Status   10/28/2023 100 98 - 107 mmol/L Final   10/27/2023 102 98 - 107 mmol/L Final      CO2 CO2   Date Value Ref Range Status   10/28/2023 27.1 22.0 - 29.0 mmol/L Final   10/27/2023 28.0 22.0 - 29.0 mmol/L Final      BUN BUN   Date Value Ref Range Status   10/28/2023 14 8 - 23 mg/dL Final   10/27/2023 23 8 - 23 mg/dL Final      Creatinine Creatinine   Date Value Ref Range Status   10/28/2023 0.72 (L) 0.76 - 1.27 mg/dL Final   10/27/2023 1.08 0.76 - 1.27 mg/dL Final      Calcium Calcium   Date Value Ref Range Status   10/28/2023 7.3 (L) 8.6 - 10.5 mg/dL Final   10/27/2023 5.8 (C) 8.6 - 10.5 mg/dL Final      PO4 No results found for: \"CAPO4\"   Albumin Albumin   Date Value Ref Range Status   10/28/2023 3.6 3.5 - 5.2 g/dL Final   10/27/2023 3.6 3.5 - 5.2 g/dL Final      Magnesium Magnesium   Date Value Ref Range Status   10/28/2023 2.0 1.6 - 2.4 mg/dL Final   10/27/2023 0.7 (C) 1.6 - 2.4 mg/dL Final      Uric Acid No results found for: \"URICACID\"         atorvastatin, 40 mg, Oral, Daily  calcium carbonate, 2 tablet, Oral, BID  famotidine, 20 mg, Oral, BID AC  insulin lispro, 2-9 Units, Subcutaneous, 4x Daily AC & at Bedtime  magnesium sulfate, 2 g, Intravenous, Once  metoprolol succinate XL, 25 mg, Oral, Daily  midodrine, 2.5 mg, Oral, TID AC  multivitamin with minerals, 1 " tablet, Oral, Daily  potassium chloride, 20 mEq, Oral, Once  senna-docusate sodium, 2 tablet, Oral, BID  sertraline, 25 mg, Oral, Daily  sodium chloride, 10 mL, Intravenous, Q12H  vitamin B-12, 1,000 mcg, Oral, Daily  vitamin D, 50,000 Units, Oral, Q7 Days      sodium chloride, 100 mL/hr, Last Rate: 100 mL/hr (10/27/23 4948)        Assessment & Plan       Hypocalcemia - PTH appropriately elevated    Vit D deficiency    Hypomagnesemia    HyperPhosphatemia    Electrolyte abnormality    Diabetes mellitus, type II    S/P AVR (aortic valve replacement)    Alzheimer's dementia    Paroxysmal atrial fibrillation    PLAN: Patient with Vit D deficiency and also on Lasix.  This could be the explanation for his electrolyte abnormalities.  Ca and Mag have been replaced and are much improved.  Phos is WNL on most recent check.  Continue oral Ca and will add Vit D replacement.  Continue to hold Lasix and Aldactone today.  Will follow.      Ryan Stapleton MD  [facility]  10/28/23  @NOW

## 2023-10-28 NOTE — H&P
Patient Name:  Anurag Pickard  YOB: 1950  MRN:  [id number]  Admit Date:  10/27/2023  Patient Care Team:  System, Provider Not In as PCP - General  Juan Castellon DO as Consulting Physician (Cardiology)      Subjective   History Present Illness     Chief Complaint   Patient presents with    Abnormal Lab     History of Present Illness  Ramona Sweeney is a 72 year old male with a history of diastolic CHF, aortic valve replacement, HTN, HLD, YANA, PAF, and Alzheimer's dementia.  Unfortunately Mr. Blanchard is unable to provide reliable HPI information can Matthew to his dementia.  His wife is at bedside and able to provide medical history.  She states that over the last 10 weeks he has had recurrent issues with his electrolytes being low.  He was seen initially at [facility] where he had a 7-day hospitalization for replacement of his calcium and magnesium.  At this time he has been transitioning to a memory care unit at [facility] where he had a fall and was transferred to [facility] where he was evaluated for right knee pain, and was found to have pseudogout.  At time of admission his electrolytes were abnormal with  low magnesium, potassium, and calcium on September 15, but there was no resolution, or causative factor found..  He has since been at [facility] facility who the wife states has not reported any type of diarrhea, vomiting, or change in diuresis.  His wife does state that he has been very agitated during his last 2 admissions, but that he is much more calm today.  He is awake alert, cooperative follows all commands.  He is able to state his name date of birth and knows he is in the hospital but is otherwise unsure.  On exam he is awake, confused at his baseline his lungs are diminished bilaterally on auscultation, there is noted heart murmur, his heart rate is normal, irregular, abdomen is soft, nondistended, nontender with audible bowel sounds  throughout, he has trace bilateral lower extremity edema that his wife states is his baseline.     Initial evaluation in the emergency department feels he is hemodynamically stable blood pressure 96/70, respiratory rate 16, heart rate 58, oxygen saturation 96% on room air, he is afebrile with a Tmax of 97.2.  Is hyperglycemic with a glucose of 209, his calcium is 5.8 today, mag 0.7, phosphorus 6.4 hemoglobin 8.4, creatinine 2.81  EKG interpreted as atrial fibrillation with heart rate of 128    Mr.Mc Sandoval will be admitted for evaluation and treatment of electrolyte abnormalities with nephrology   Review of Systems   Unable to perform ROS: Dementia        Personal History     Past Medical History:   Diagnosis Date    Acute diastolic CHF (congestive heart failure) 01/03/2022    Ankle edema     Aortic stenosis     Moderate    Aortic valve replaced     B12 deficiency     Bell's palsy     Bleeds easily     Chipped tooth     top front    Diabetes mellitus     Dry skin     Erectile dysfunction     Falls     2 falls in last 3 months    Fatigue     Fragile skin     GERD (gastroesophageal reflux disease)     Heart murmur     Hyperlipidemia     Hypertension     Hypokalemia     Hypomagnesemia     YANA (obstructive sleep apnea)     cpap bring dos    Osteoarthritis     Paroxysmal atrial fibrillation 9/27/2023    Pulmonary HTN     Rosacea     Vitamin D deficiency      Past Surgical History:   Procedure Laterality Date    AORTIC VALVE REPAIR/REPLACEMENT N/A 03/28/2022    Procedure: AORTIC VALVE REPAIR/REPLACEMENT;  Surgeon: Dennis Brandon MD;  Location: [facility] CVOR;  Service: Cardiothoracic;  Laterality: N/A;  aortic valve replacement with 27mm kulk[employer]i [employer] magna ease    CARDIAC CATHETERIZATION      CARDIAC CATHETERIZATION N/A 12/17/2021    Procedure: Right and Left Heart Cath;  Surgeon: Juan Castellon DO;  Location: [facility] CATH INVASIVE LOCATION;  Service: Cardiology;  Laterality: N/A;    COLONOSCOPY       JOINT REPLACEMENT Bilateral 2021    knee    TONSILLECTOMY      TOTAL KNEE ARTHROPLASTY      TOTAL KNEE ARTHROPLASTY Left 05/04/2021    Procedure: TOTAL KNEE ARTHROPLASTY;  Surgeon: Otf Redmond MD;  Location: [facility] MAIN OR;  Service: Orthopedics;  Laterality: Left;     Family History   Problem Relation Age of Onset    Diabetes Mother     Other Mother     Other Father     Heart attack Father     Other Sister     Diabetes Sister     Heart disease Sister     Diabetes Brother     Other Other     Diabetes Other      Social History     Tobacco Use    Smoking status: Never    Smokeless tobacco: Never   Vaping Use    Vaping Use: Never used   Substance Use Topics    Alcohol use: Yes     Alcohol/week: 14.0 standard drinks of alcohol     Types: 14 Drinks containing 0.5 oz of alcohol per week     Comment: 2 bourbons every night    Drug use: No     No current facility-administered medications on file prior to encounter.     Current Outpatient Medications on File Prior to Encounter   Medication Sig Dispense Refill    Accu-Chek Guide test strip Use as instructed once daily   Dx e 11.9 100 each 12    acetaminophen (TYLENOL) 325 MG tablet Take 2 tablets by mouth Every 4 (Four) Hours As Needed for Mild Pain.      atorvastatin (LIPITOR) 40 MG tablet Take 1 tablet by mouth once daily 90 tablet 0    Brexpiprazole (Rexulti) 2 MG tablet Take 1 tablet by mouth Daily.      calcium carbonate (TUMS) 500 MG chewable tablet Chew 2 tablets 2 (Two) Times a Day. For hypocalcemia      folic acid (FOLVITE) 1 MG tablet Take 1 tablet by mouth Daily for 360 days. 90 tablet 3    furosemide (LASIX) 40 MG tablet Take 1 tablet by mouth Daily. 30 tablet 0    lansoprazole (PREVACID) 30 MG capsule Take 1 capsule by mouth Daily. 90 capsule 0    LORazepam (ATIVAN) 1 MG tablet Take 1 tablet by mouth every night at bedtime.      Melatonin 10 MG capsule Take 1 capsule by mouth At Night As Needed.      metFORMIN (GLUCOPHAGE) 1000 MG tablet Take 1 tablet by  mouth 2 (Two) Times a Day With Meals.      metoprolol succinate XL (TOPROL-XL) 25 MG 24 hr tablet Take 1 tablet by mouth Daily.      midodrine (PROAMATINE) 2.5 MG tablet Take 1 tablet by mouth 3 (Three) Times a Day Before Meals.      multivitamin with minerals tablet tablet Take 1 tablet by mouth Daily.      sertraline (ZOLOFT) 25 MG tablet Take 1 tablet by mouth Daily.      spironolactone (ALDACTONE) 25 MG tablet Take 1 tablet by mouth Every Morning.      vitamin B-12 (CYANOCOBALAMIN) 1000 MCG tablet Take 1 tablet by mouth Daily.      docusate sodium (COLACE) 100 MG capsule Take 1 capsule by mouth At Night As Needed for Constipation.      HYDROcodone-acetaminophen (Norco) 5-325 MG per tablet Take 1 tablet by mouth Every 8 (Eight) Hours As Needed for Severe Pain. 10 tablet 0    LORazepam (ATIVAN) 1 MG tablet Take 1 tablet by mouth Every 6 (Six) Hours As Needed for Anxiety (for anxiety, restlessness, agitation).      ondansetron (ZOFRAN) 4 MG tablet Take 1 tablet by mouth 3 (Three) Times a Day As Needed for Nausea or Vomiting.       Allergies   Allergen Reactions    Glipizide Unknown - Low Severity     Sugar too low    Haldol [Haloperidol] Unknown - High Severity    Lisinopril Cough       Objective    Objective     Vital Signs  Temp:  [97.2 °F (36.2 °C)-97.9 °F (36.6 °C)] 97.9 °F (36.6 °C)  Heart Rate:  [] 58  Resp:  [16-18] 18  BP: ()/(66-81) 96/70  SpO2:  [96 %-99 %] 99 %  on   ;   Device (Oxygen Therapy): room air  Body mass index is 32.33 kg/m².    Physical Exam  Vitals and nursing note reviewed.   Constitutional:       Appearance: He is obese. He is ill-appearing.   HENT:      Mouth/Throat:      Mouth: Mucous membranes are dry.   Eyes:      Conjunctiva/sclera: Conjunctivae normal.   Cardiovascular:      Rate and Rhythm: Normal rate.      Pulses: Normal pulses.           Radial pulses are 2+ on the right side and 2+ on the left side.        Dorsalis pedis pulses are 1+ on the right side and 1+ on the  left side.        Posterior tibial pulses are 1+ on the right side and 1+ on the left side.      Heart sounds: Murmur heard.   Pulmonary:      Effort: Pulmonary effort is normal.      Breath sounds: Examination of the right-lower field reveals decreased breath sounds. Examination of the left-lower field reveals decreased breath sounds.   Abdominal:      General: Bowel sounds are normal.      Palpations: Abdomen is soft.   Musculoskeletal:      Right lower leg: Edema present.      Left lower leg: Edema present.   Skin:     General: Skin is warm and dry.      Capillary Refill: Capillary refill takes less than 2 seconds.      Coloration: Skin is pale.   Neurological:      General: No focal deficit present.      Mental Status: He is alert. Mental status is at baseline.   Psychiatric:         Mood and Affect: Mood normal.         Behavior: Behavior is cooperative.         Cognition and Memory: Memory is impaired.         Results Review:  I reviewed the patient's new clinical results.  I reviewed the patient's new imaging results and agree with the interpretation.  I reviewed the patient's other test results and agree with the interpretation  I personally viewed and interpreted the patient's EKG/Telemetry data  Discussed with ED provider.    Lab Results (last 24 hours)       Procedure Component Value Units Date/Time    CBC & Differential [[id number]]  (Abnormal) Collected: 10/27/23 1903    Specimen: Blood from Arm, Right Updated: 10/27/23 1916    Narrative:      The following orders were created for panel order CBC & Differential.  Procedure                               Abnormality         Status                     ---------                               -----------         ------                     CBC Auto Differential[[id number]]        Abnormal            Final result                 Please view results for these tests on the individual orders.    Comprehensive Metabolic Panel [[id number]]  (Abnormal) Collected:  10/27/23 1903    Specimen: Blood from Arm, Right Updated: 10/27/23 1949     Glucose 209 mg/dL      BUN 23 mg/dL      Creatinine 1.08 mg/dL      Sodium 144 mmol/L      Potassium 4.9 mmol/L      Comment: Specimen hemolyzed.  Results may be affected.        Chloride 102 mmol/L      CO2 28.0 mmol/L      Calcium 5.8 mg/dL      Total Protein 7.0 g/dL      Albumin 3.6 g/dL      ALT (SGPT) 19 U/L      Comment: Specimen hemolyzed.  Results may be affected.        AST (SGOT) 38 U/L      Comment: Specimen hemolyzed.  Results may be affected.        Alkaline Phosphatase 69 U/L      Total Bilirubin 0.3 mg/dL      Globulin 3.4 gm/dL      A/G Ratio 1.1 g/dL      BUN/Creatinine Ratio 21.3     Anion Gap 14.0 mmol/L      eGFR 73.4 mL/min/1.73     Narrative:      GFR Normal >60  Chronic Kidney Disease <60  Kidney Failure <15    The GFR formula is only valid for adults with stable renal function between ages 18 and 70.    Magnesium [[id number]]  (Abnormal) Collected: 10/27/23 1903    Specimen: Blood from Arm, Right Updated: 10/27/23 2003     Magnesium 0.7 mg/dL     Phosphorus [[id number]]  (Abnormal) Collected: 10/27/23 1903    Specimen: Blood from Arm, Right Updated: 10/27/23 1932     Phosphorus 5.4 mg/dL     CBC Auto Differential [[id number]]  (Abnormal) Collected: 10/27/23 1903    Specimen: Blood from Arm, Right Updated: 10/27/23 1916     WBC 9.71 10*3/mm3      RBC 2.91 10*6/mm3      Hemoglobin 8.4 g/dL      Hematocrit 25.7 %      MCV 88.3 fL      MCH 28.9 pg      MCHC 32.7 g/dL      RDW 13.3 %      RDW-SD 42.8 fl      MPV 10.7 fL      Platelets 279 10*3/mm3      Neutrophil % 84.4 %      Lymphocyte % 10.1 %      Monocyte % 4.4 %      Eosinophil % 0.3 %      Basophil % 0.4 %      Immature Grans % 0.4 %      Neutrophils, Absolute 8.19 10*3/mm3      Lymphocytes, Absolute 0.98 10*3/mm3      Monocytes, Absolute 0.43 10*3/mm3      Eosinophils, Absolute 0.03 10*3/mm3      Basophils, Absolute 0.04 10*3/mm3      Immature Grans, Absolute 0.04  10*3/mm3      nRBC 0.0 /100 WBC             Imaging Results (Last 24 Hours)       ** No results found for the last 24 hours. **            Results for orders placed in visit on 03/28/22    Emergent/Open-Heart Anesthesia PAULO    Narrative  Emergent/Open-Heart Anesthesia PAULO    Procedure Performed: Emergent/Open-Heart Anesthesia PAULO  Start Time:  End Time:      General Procedure Information  Location performed:  OR  Intubated  Bite block placed  Heart visualized  Probe Insertion:  Easy  Probe Type:  Biplane and multiplane  Modalities:  Color flow mapping, pulse wave Doppler and continuous wave Doppler    Echocardiographic and Doppler Measurements    Ventricles    Right Ventricle:  Ejection Fraction 60%.  Left Ventricle:  Global Function normal.  Ejection Fraction 60%.                                Anesthesia Information  Performed Personally  Anesthesiologist:  Van Frost MD      Echocardiogram Comments:  PAULO placed easily with no resistance ,  Lubricated , bite guard used    Pre cpb  LV EF 60 no wma  RV nl SF  MV tr/mild MR  AV Ca++ mild mod AI ,  Mod AS YOSI 1.2cm2 by cont  tv TR TR  Suspicious for smal PFO by CFD      Post cpb  #27 av well seated no para / trans leak seen  No other change      ECG 12 Lead Electrolyte Imbalance   Preliminary Result   HEART RATE= 128  bpm   RR Interval= 469  ms   ME Interval=   ms   P Horizontal Axis=   deg   P Front Axis=   deg   QRSD Interval= 95  ms   QT Interval= 379  ms   QTcB= 553  ms   QRS Axis= 68  deg   T Wave Axis= -30  deg   - ABNORMAL ECG -   Atrial fibrillation   Borderline T abnormalities, inferior leads   Prolonged QT interval   Electronically Signed By:    Date and Time of Study: 2023-10-27 18:54:08           Assessment/Plan     Active Hospital Problems    Diagnosis  POA    **Electrolyte abnormality [E87.8]  Yes    Paroxysmal atrial fibrillation [I48.0]  Yes    Alzheimer's dementia [G30.9, F02.80]  Yes    S/P AVR (aortic valve replacement) [Z95.2]  Not  Applicable    Diabetes mellitus, type II [E11.9]  Yes      Resolved Hospital Problems   No resolved problems to display.     Electrolyte abnormality  Acute and recurring  Magnesium 0.7, calcium 5.8  replacement protocol initiated  Patient has not prior nephrology evaluation.  Neurology consulted  Monitor BMP  Continuous cardiac monitoring    Chronic diastolic congestive heart failure  Chronic  Hold home furosemide spironolactone for now with severe electrolyte abnormalities  Continue home metoprolol   Daily weights  Monitor I's and O's    Aortic stenosis  Status post AVR( aortic valve replacement)- May 2023-porcine valve    Coronary artery disease  Hyperlipidemia  Continue home statin  Continuous cardiac monitoring and pulse oximetry    Essential  hypertension  Chronic/Stable   Vital Signs per policy   Patient recently started on midodrine for hypotension    Proximal atrial fibrillation  Chronic  EKG interpreted as atrial fibrillation  Not on AC- due to his memory deficits related to Alzheimer's, and unstable gait he is at high risk of a catastrophic event   RC -metoprolol    Type 2 diabetes mellitus  Chronic  Accu-Cheks ACHS  SSI ordered for hyperglycemia  Hold home metformin  hyperglycemic at time of admission  Glycemic treatment per protocol    Alzheimer's dementia  Chronic   Continue home Ativan and sertraline  Hold home Rexulti -nonformulary also may be contributory to hyperglycemia        I discussed the patient's findings and my recommendations with patient and spouse.    VTE Prophylaxis - SCDs.  Code Status - Full code.       SERAFIN Franco  [facility]  10/28/23  01:34 EDT

## 2023-10-28 NOTE — ED NOTES
Nursing report ED to floor  Anurag Pickard  72 y.o.  male    HPI :   Chief Complaint   Patient presents with    Abnormal Lab       Admitting doctor:   David Reynoso MD    Admitting diagnosis:   The primary encounter diagnosis was Hypocalcemia. Diagnoses of Hypomagnesemia, S/P AVR (aortic valve replacement), and Alzheimer's dementia, unspecified dementia severity, unspecified timing of dementia onset, unspecified whether behavioral, psychotic, or mood disturbance or anxiety were also pertinent to this visit.    Code status:   Current Code Status       Date Active Code Status Order ID Comments User Context       10/27/2023 2131 CPR (Attempt to Resuscitate) [id number]  Eliza Mcfarland, SERAFIN ED        Question Answer    Code Status (Patient has no pulse and is not breathing) CPR (Attempt to Resuscitate)    Medical Interventions (Patient has pulse or is breathing) Full Support                    Allergies:   Glipizide, Haldol [haloperidol], and Lisinopril    Isolation:   No active isolations    Intake and Output    Intake/Output Summary (Last 24 hours) at 10/27/2023 2221  Last data filed at 10/27/2023 2209  Gross per 24 hour   Intake 50 ml   Output --   Net 50 ml       Weight:   There were no vitals filed for this visit.    Most recent vitals:   Vitals:    10/27/23 1803 10/27/23 1857 10/27/23 1901   BP: 134/66 123/74 101/81   BP Location:  Left arm    Patient Position:  Lying    Pulse: 93 112 112   Resp: 16 16    Temp: 97.2 °F (36.2 °C)     TempSrc: Tympanic     SpO2: 96% 99%        Active LDAs/IV Access:   Lines, Drains & Airways       Active LDAs       Name Placement date Placement time Site Days    Peripheral IV 10/27/23 1904 Right Antecubital 10/27/23  1904  Antecubital  less than 1    Peripheral IV 10/27/23 2211 Left;Distal Forearm 10/27/23  2211  Forearm  less than 1                    Labs (abnormal labs have a star):   Labs Reviewed   COMPREHENSIVE METABOLIC PANEL - Abnormal; Notable for the  following components:       Result Value    Glucose 209 (*)     Calcium 5.8 (*)     All other components within normal limits    Narrative:     GFR Normal >60  Chronic Kidney Disease <60  Kidney Failure <15    The GFR formula is only valid for adults with stable renal function between ages 18 and 70.   MAGNESIUM - Abnormal; Notable for the following components:    Magnesium 0.7 (*)     All other components within normal limits   PHOSPHORUS - Abnormal; Notable for the following components:    Phosphorus 5.4 (*)     All other components within normal limits   CBC WITH AUTO DIFFERENTIAL - Abnormal; Notable for the following components:    RBC 2.91 (*)     Hemoglobin 8.4 (*)     Hematocrit 25.7 (*)     Neutrophil % 84.4 (*)     Lymphocyte % 10.1 (*)     Monocyte % 4.4 (*)     Neutrophils, Absolute 8.19 (*)     All other components within normal limits   BASIC METABOLIC PANEL   CBC (NO DIFF)   POCT GLUCOSE FINGERSTICK   POCT GLUCOSE FINGERSTICK   POCT GLUCOSE FINGERSTICK   POCT GLUCOSE FINGERSTICK   POCT GLUCOSE FINGERSTICK   CBC AND DIFFERENTIAL    Narrative:     The following orders were created for panel order CBC & Differential.  Procedure                               Abnormality         Status                     ---------                               -----------         ------                     CBC Auto Differential[[id number]]        Abnormal            Final result                 Please view results for these tests on the individual orders.       EKG:   ECG 12 Lead Electrolyte Imbalance   Preliminary Result   HEART RATE= 128  bpm   RR Interval= 469  ms   GA Interval=   ms   P Horizontal Axis=   deg   P Front Axis=   deg   QRSD Interval= 95  ms   QT Interval= 379  ms   QTcB= 553  ms   QRS Axis= 68  deg   T Wave Axis= -30  deg   - ABNORMAL ECG -   Atrial fibrillation   Borderline T abnormalities, inferior leads   Prolonged QT interval   Electronically Signed By:    Date and Time of Study: 2023-10-27 18:54:08           Meds given in ED:   Medications   sodium chloride 0.9 % flush 10 mL (has no administration in time range)   Magnesium Standard Dose Replacement - Follow Nurse / BPA Driven Protocol (has no administration in time range)   Calcium Replacement - Follow Nurse / BPA Driven Protocol (has no administration in time range)   calcium gluconate 1000 Mg/50ml 0.675% NaCl IV SOLN (1,000 mg Intravenous New Bag 10/27/23 2212)     Followed by   calcium gluconate 2000-675 MG/100ML NACL IVPB (has no administration in time range)   sodium chloride 0.9 % flush 10 mL (has no administration in time range)   sodium chloride 0.9 % flush 10 mL (has no administration in time range)   sodium chloride 0.9 % infusion 40 mL (has no administration in time range)   sennosides-docusate (PERICOLACE) 8.6-50 MG per tablet 2 tablet (has no administration in time range)     And   polyethylene glycol (MIRALAX) packet 17 g (has no administration in time range)     And   bisacodyl (DULCOLAX) EC tablet 5 mg (has no administration in time range)     And   bisacodyl (DULCOLAX) suppository 10 mg (has no administration in time range)   dextrose (GLUTOSE) oral gel 15 g (has no administration in time range)   dextrose (D50W) (25 g/50 mL) IV injection 25 g (has no administration in time range)   glucagon (GLUCAGEN) injection 1 mg (has no administration in time range)   insulin lispro (HUMALOG/ADMELOG) injection 2-9 Units (4 Units Subcutaneous Given 10/27/23 2201)   acetaminophen (TYLENOL) tablet 650 mg (has no administration in time range)     Or   acetaminophen (TYLENOL) 160 MG/5ML oral solution 650 mg (has no administration in time range)     Or   acetaminophen (TYLENOL) suppository 650 mg (has no administration in time range)   ondansetron (ZOFRAN) tablet 4 mg (has no administration in time range)     Or   ondansetron (ZOFRAN) injection 4 mg (has no administration in time range)   magnesium sulfate 2g/50 mL (PREMIX) infusion (0 g Intravenous Stopped  10/27/23 2609)     Followed by   magnesium sulfate 2g/50 mL (PREMIX) infusion (has no administration in time range)   sodium chloride 0.9 % bolus (100 mL/hr Intravenous New Bag 10/27/23 9083)       Imaging results:  No radiology results for the last day    Ambulatory status:   Up w/ assist x1    Social issues:   Social History     Socioeconomic History    Marital status:    Tobacco Use    Smoking status: Never    Smokeless tobacco: Never   Vaping Use    Vaping Use: Never used   Substance and Sexual Activity    Alcohol use: Yes     Alcohol/week: 14.0 standard drinks of alcohol     Types: 14 Drinks containing 0.5 oz of alcohol per week     Comment: 2 bourbons every night    Drug use: No    Sexual activity: Defer       NIH Stroke Scale:       Mai Calabrese RN  10/27/23 22:21 EDT

## 2023-10-28 NOTE — PLAN OF CARE
Problem: Adult Inpatient Plan of Care  Goal: Plan of Care Review  Outcome: Ongoing, Progressing  Flowsheets (Taken 10/28/2023 9903)  Progress: improving  Plan of Care Reviewed With: patient  Outcome Evaluation: Pt vitals stable. Electrolytes improved. Potassium replaced today. BM today. Q2 turn. Pt safety maintained

## 2023-10-29 PROBLEM — Q21.12 PFO (PATENT FORAMEN OVALE): Status: ACTIVE | Noted: 2023-10-29

## 2023-10-29 PROBLEM — E11.65 TYPE 2 DIABETES MELLITUS WITH HYPERGLYCEMIA: Status: ACTIVE | Noted: 2023-10-29

## 2023-10-29 LAB
ALBUMIN SERPL-MCNC: 3.2 G/DL (ref 3.5–5.2)
ANION GAP SERPL CALCULATED.3IONS-SCNC: 11.6 MMOL/L (ref 5–15)
BUN SERPL-MCNC: 9 MG/DL (ref 8–23)
BUN/CREAT SERPL: 11.1 (ref 7–25)
CALCIUM SPEC-SCNC: 7.4 MG/DL (ref 8.6–10.5)
CHLORIDE SERPL-SCNC: 104 MMOL/L (ref 98–107)
CO2 SERPL-SCNC: 25.4 MMOL/L (ref 22–29)
CREAT SERPL-MCNC: 0.81 MG/DL (ref 0.76–1.27)
DEPRECATED RDW RBC AUTO: 41.7 FL (ref 37–54)
EGFRCR SERPLBLD CKD-EPI 2021: 94.3 ML/MIN/1.73
ERYTHROCYTE [DISTWIDTH] IN BLOOD BY AUTOMATED COUNT: 13.1 % (ref 12.3–15.4)
GLUCOSE BLDC GLUCOMTR-MCNC: 133 MG/DL (ref 70–130)
GLUCOSE BLDC GLUCOMTR-MCNC: 152 MG/DL (ref 70–130)
GLUCOSE BLDC GLUCOMTR-MCNC: 156 MG/DL (ref 70–130)
GLUCOSE BLDC GLUCOMTR-MCNC: 181 MG/DL (ref 70–130)
GLUCOSE SERPL-MCNC: 125 MG/DL (ref 65–99)
HBA1C MFR BLD: 7.9 % (ref 4.8–5.6)
HCT VFR BLD AUTO: 25.8 % (ref 37.5–51)
HGB BLD-MCNC: 8.2 G/DL (ref 13–17.7)
MAGNESIUM SERPL-MCNC: 1.6 MG/DL (ref 1.6–2.4)
MCH RBC QN AUTO: 28 PG (ref 26.6–33)
MCHC RBC AUTO-ENTMCNC: 31.8 G/DL (ref 31.5–35.7)
MCV RBC AUTO: 88.1 FL (ref 79–97)
PHOSPHATE SERPL-MCNC: 1.7 MG/DL (ref 2.5–4.5)
PHOSPHATE SERPL-MCNC: 1.7 MG/DL (ref 2.5–4.5)
PLATELET # BLD AUTO: 286 10*3/MM3 (ref 140–450)
PMV BLD AUTO: 10.3 FL (ref 6–12)
POTASSIUM SERPL-SCNC: 3.3 MMOL/L (ref 3.5–5.2)
POTASSIUM SERPL-SCNC: 4.3 MMOL/L (ref 3.5–5.2)
QT INTERVAL: 379 MS
QTC INTERVAL: 553 MS
RBC # BLD AUTO: 2.93 10*6/MM3 (ref 4.14–5.8)
SODIUM SERPL-SCNC: 141 MMOL/L (ref 136–145)
WBC NRBC COR # BLD: 7.05 10*3/MM3 (ref 3.4–10.8)

## 2023-10-29 PROCEDURE — 83735 ASSAY OF MAGNESIUM: CPT | Performed by: INTERNAL MEDICINE

## 2023-10-29 PROCEDURE — 84100 ASSAY OF PHOSPHORUS: CPT | Performed by: INTERNAL MEDICINE

## 2023-10-29 PROCEDURE — 83036 HEMOGLOBIN GLYCOSYLATED A1C: CPT | Performed by: INTERNAL MEDICINE

## 2023-10-29 PROCEDURE — 85027 COMPLETE CBC AUTOMATED: CPT | Performed by: INTERNAL MEDICINE

## 2023-10-29 PROCEDURE — 80069 RENAL FUNCTION PANEL: CPT | Performed by: INTERNAL MEDICINE

## 2023-10-29 PROCEDURE — 82948 REAGENT STRIP/BLOOD GLUCOSE: CPT

## 2023-10-29 PROCEDURE — 25810000003 SODIUM CHLORIDE 0.9 % SOLUTION: Performed by: INTERNAL MEDICINE

## 2023-10-29 PROCEDURE — 63710000001 INSULIN LISPRO (HUMAN) PER 5 UNITS: Performed by: NURSE PRACTITIONER

## 2023-10-29 PROCEDURE — 84132 ASSAY OF SERUM POTASSIUM: CPT | Performed by: INTERNAL MEDICINE

## 2023-10-29 RX ORDER — POTASSIUM CHLORIDE 750 MG/1
40 TABLET, FILM COATED, EXTENDED RELEASE ORAL EVERY 4 HOURS
Status: COMPLETED | OUTPATIENT
Start: 2023-10-29 | End: 2023-10-29

## 2023-10-29 RX ORDER — METFORMIN HYDROCHLORIDE 500 MG/1
500 TABLET, EXTENDED RELEASE ORAL
Status: DISCONTINUED | OUTPATIENT
Start: 2023-10-30 | End: 2023-10-30

## 2023-10-29 RX ORDER — LORAZEPAM 0.5 MG/1
0.5 TABLET ORAL ONCE
Status: COMPLETED | OUTPATIENT
Start: 2023-10-29 | End: 2023-10-29

## 2023-10-29 RX ORDER — SODIUM PHOSPHATE IN 0.9 % NACL 15MMOL/100
15 PLASTIC BAG, INJECTION (ML) INTRAVENOUS ONCE
Status: COMPLETED | OUTPATIENT
Start: 2023-10-29 | End: 2023-10-29

## 2023-10-29 RX ADMIN — Medication 1000 MCG: at 08:37

## 2023-10-29 RX ADMIN — CARBIDOPA AND LEVODOPA 2.5 MG: 50; 200 TABLET, EXTENDED RELEASE ORAL at 12:20

## 2023-10-29 RX ADMIN — POTASSIUM CHLORIDE 40 MEQ: 750 TABLET, EXTENDED RELEASE ORAL at 12:20

## 2023-10-29 RX ADMIN — SERTRALINE 25 MG: 25 TABLET, FILM COATED ORAL at 08:37

## 2023-10-29 RX ADMIN — Medication 10 ML: at 08:42

## 2023-10-29 RX ADMIN — Medication 10 ML: at 20:15

## 2023-10-29 RX ADMIN — ANTACID TABLETS 2 TABLET: 500 TABLET, CHEWABLE ORAL at 20:15

## 2023-10-29 RX ADMIN — CARBIDOPA AND LEVODOPA 2.5 MG: 50; 200 TABLET, EXTENDED RELEASE ORAL at 17:25

## 2023-10-29 RX ADMIN — CARBIDOPA AND LEVODOPA 2.5 MG: 50; 200 TABLET, EXTENDED RELEASE ORAL at 08:38

## 2023-10-29 RX ADMIN — Medication 5 MG: at 21:13

## 2023-10-29 RX ADMIN — Medication 2 PACKET: at 08:37

## 2023-10-29 RX ADMIN — METOPROLOL SUCCINATE 25 MG: 25 TABLET, EXTENDED RELEASE ORAL at 08:37

## 2023-10-29 RX ADMIN — FAMOTIDINE 20 MG: 20 TABLET, FILM COATED ORAL at 08:37

## 2023-10-29 RX ADMIN — ANTACID TABLETS 2 TABLET: 500 TABLET, CHEWABLE ORAL at 08:37

## 2023-10-29 RX ADMIN — LORAZEPAM 0.5 MG: 0.5 TABLET ORAL at 01:12

## 2023-10-29 RX ADMIN — DOCUSATE SODIUM 50MG AND SENNOSIDES 8.6MG 2 TABLET: 8.6; 5 TABLET, FILM COATED ORAL at 08:38

## 2023-10-29 RX ADMIN — INSULIN LISPRO 2 UNITS: 100 INJECTION, SOLUTION INTRAVENOUS; SUBCUTANEOUS at 21:16

## 2023-10-29 RX ADMIN — MULTIPLE VITAMINS W/ MINERALS TAB 1 TABLET: TAB at 08:37

## 2023-10-29 RX ADMIN — POTASSIUM CHLORIDE 40 MEQ: 750 TABLET, EXTENDED RELEASE ORAL at 08:38

## 2023-10-29 RX ADMIN — SODIUM PHOSPHATE, MONOBASIC, MONOHYDRATE AND SODIUM PHOSPHATE, DIBASIC, ANHYDROUS 15 MMOL: 276; 142 INJECTION, SOLUTION INTRAVENOUS at 20:15

## 2023-10-29 RX ADMIN — INSULIN LISPRO 2 UNITS: 100 INJECTION, SOLUTION INTRAVENOUS; SUBCUTANEOUS at 12:20

## 2023-10-29 RX ADMIN — FAMOTIDINE 20 MG: 20 TABLET, FILM COATED ORAL at 17:25

## 2023-10-29 RX ADMIN — ATORVASTATIN CALCIUM 40 MG: 20 TABLET, FILM COATED ORAL at 08:38

## 2023-10-29 NOTE — PROGRESS NOTES
"    [facility] Medicine Services  PROGRESS NOTE    Patient Name: Anurag Pickard  : 1950  MRN: [id number]    Date of Admission: 10/27/2023  Primary Care Physician: System, Provider Not In    Subjective   Subjective     CC:  Follow-up abnormal electrolytes    Subjective:  Patient is demented and not oriented at this time.  He tells me he is \"wonderful.\"  His conversation is somewhat flight of thought.  He does not appear distressed and denies any complaints.    Review of Systems  Unreliable due to dementia but denies questions as per below  No current fevers or chills  No current shortness of breath or cough  No current nausea, vomiting, or diarrhea  No current chest pain or palpitations      Objective   Objective     Vital Signs:   Temp:  [97.3 °F (36.3 °C)-98.2 °F (36.8 °C)] 97.3 °F (36.3 °C)  Heart Rate:  [75-92] 92  Resp:  [18] 18  BP: ()/(61-90) 136/77        Physical Exam:  Constitutional:Awake, alert, chronically ill-appearing but nontoxic  HENT: NCAT, mucous membranes moist, neck supple  Respiratory: No cough or wheezing, breathing, good inspiration  Cardiovascular: Pulse rate is normal, normal radial pulses  Gastrointestinal: , soft, nontender, nondistended  Musculoskeletal: Elderly frail and chronically debilitated in appearance, mildly obese with BMI of 30, muscle wasting noted, mild lower extremity edema  Psychiatric: Calm affect, cooperative, conversational  Neurologic: Poor historian, no slurred speech or facial droop, able to follow instructions  Skin: No rashes or jaundice, warm      Results Reviewed:  Results from last 7 days   Lab Units 10/29/23  0448 10/28/23  1207 10/27/23  1903   WBC 10*3/mm3 7.05 8.54 9.71   HEMOGLOBIN g/dL 8.2* 8.3* 8.4*   HEMATOCRIT % 25.8* 25.5* 25.7*   PLATELETS 10*3/mm3 286 278 279     Results from last 7 days   Lab Units 10/29/23  0448 10/28/23  1207 10/27/23  1903   SODIUM mmol/L 141 139 144   POTASSIUM mmol/L 3.3* 3.2* 4.9   CHLORIDE mmol/L 104 100 102 "   CO2 mmol/L 25.4 27.1 28.0   BUN mg/dL 9 14 23   CREATININE mg/dL 0.81 0.72* 1.08   GLUCOSE mg/dL 125* 164* 209*   CALCIUM mg/dL 7.4* 7.3* 5.8*   ALK PHOS U/L  --   --  69   ALT (SGPT) U/L  --   --  19   AST (SGOT) U/L  --   --  38     Estimated Creatinine Clearance: 98.2 mL/min (by C-G formula based on SCr of 0.81 mg/dL).    Microbiology Results Abnormal       Procedure Component Value - Date/Time    Respiratory Panel PCR w/COVID-19(SARS-CoV-2) SIDNEY/PAVAN/FARHAD/PAD/COR/MAD/EWA In-House, NP Swab in UTM/VTM, 3-4 HR TAT - Swab, Nasopharynx [[id number]]  (Normal) Collected: 10/28/23 1209    Lab Status: Final result Specimen: Swab from Nasopharynx Updated: 10/28/23 1401     ADENOVIRUS, PCR Not Detected     Coronavirus 229E Not Detected     Coronavirus HKU1 Not Detected     Coronavirus NL63 Not Detected     Coronavirus OC43 Not Detected     COVID19 Not Detected     Human Metapneumovirus Not Detected     Human Rhinovirus/Enterovirus Not Detected     Influenza A PCR Not Detected     Influenza B PCR Not Detected     Parainfluenza Virus 1 Not Detected     Parainfluenza Virus 2 Not Detected     Parainfluenza Virus 3 Not Detected     Parainfluenza Virus 4 Not Detected     RSV, PCR Not Detected     Bordetella pertussis pcr Not Detected     Bordetella parapertussis PCR Not Detected     Chlamydophila pneumoniae PCR Not Detected     Mycoplasma pneumo by PCR Not Detected    Narrative:      In the setting of a positive respiratory panel with a viral infection PLUS a negative procalcitonin without other underlying concern for bacterial infection, consider observing off antibiotics or discontinuation of antibiotics and continue supportive care. If the respiratory panel is positive for atypical bacterial infection (Bordetella pertussis, Chlamydophila pneumoniae, or Mycoplasma pneumoniae), consider antibiotic de-escalation to target atypical bacterial infection.            Imaging Results (Last 24 Hours)       ** No results found for the  last 24 hours. **            Results for orders placed in visit on 03/28/22    Emergent/Open-Heart Anesthesia PAULO    Narrative  Emergent/Open-Heart Anesthesia PAULO    Procedure Performed: Emergent/Open-Heart Anesthesia PAULO  Start Time:  End Time:      General Procedure Information  Location performed:  OR  Intubated  Bite block placed  Heart visualized  Probe Insertion:  Easy  Probe Type:  Biplane and multiplane  Modalities:  Color flow mapping, pulse wave Doppler and continuous wave Doppler    Echocardiographic and Doppler Measurements    Ventricles    Right Ventricle:  Ejection Fraction 60%.  Left Ventricle:  Global Function normal.  Ejection Fraction 60%.                                Anesthesia Information  Performed Personally  Anesthesiologist:  Van Frost MD      Echocardiogram Comments:  PAULO placed easily with no resistance ,  Lubricated , bite guard used    Pre cpb  LV EF 60 no wma  RV nl SF  MV tr/mild MR  AV Ca++ mild mod AI ,  Mod AS YOSI 1.2cm2 by cont  tv TR TR  Suspicious for smal PFO by CFD      Post cpb  #27 av well seated no para / trans leak seen  No other change      I have reviewed the medications:  Scheduled Meds:atorvastatin, 40 mg, Oral, Daily  calcium carbonate, 2 tablet, Oral, BID  famotidine, 20 mg, Oral, BID AC  insulin lispro, 2-9 Units, Subcutaneous, 4x Daily AC & at Bedtime  magnesium sulfate, 2 g, Intravenous, Once  metoprolol succinate XL, 25 mg, Oral, Daily  midodrine, 2.5 mg, Oral, TID AC  multivitamin with minerals, 1 tablet, Oral, Daily  senna-docusate sodium, 2 tablet, Oral, BID  sertraline, 25 mg, Oral, Daily  sodium chloride, 10 mL, Intravenous, Q12H  vitamin B-12, 1,000 mcg, Oral, Daily  vitamin D, 50,000 Units, Oral, Q7 Days      Continuous Infusions:sodium chloride, 100 mL/hr, Last Rate: 100 mL/hr (10/27/23 0682)      PRN Meds:.  acetaminophen **OR** acetaminophen **OR** acetaminophen    senna-docusate sodium **AND** polyethylene glycol **AND** bisacodyl **AND**  bisacodyl    Calcium Replacement - Follow Nurse / BPA Driven Protocol    dextrose    dextrose    glucagon (human recombinant)    HYDROcodone-acetaminophen    LORazepam    Magnesium Standard Dose Replacement - Follow Nurse / BPA Driven Protocol    melatonin    ondansetron **OR** ondansetron    Phosphorus Replacement - Follow Nurse / BPA Driven Protocol    Potassium Replacement - Follow Nurse / BPA Driven Protocol    sodium chloride    [COMPLETED] Insert Peripheral IV **AND** sodium chloride    sodium chloride    sodium chloride    Assessment & Plan   Assessment & Plan     Active Hospital Problems    Diagnosis  POA    **Electrolyte abnormality [E87.8]  Yes    PFO (patent foramen ovale) [Q21.12]  Not Applicable    Type 2 diabetes mellitus with hyperglycemia [E11.65]  Yes    Paroxysmal atrial fibrillation [I48.0]  Yes    Alzheimer's dementia [G30.9, F02.80]  Yes    Coronary artery disease involving native coronary artery of native heart without angina pectoris [I25.10]  Yes    S/P AVR (aortic valve replacement) [Z95.2]  Not Applicable    Chronic diastolic CHF (congestive heart failure) [I50.32]  Yes    Obesity [E66.9]  Yes    Pulmonary hypertension [I27.20]  Yes    Hyperlipidemia [E78.5]  Yes    Diabetes mellitus, type II [E11.9]  Yes      Resolved Hospital Problems   No resolved problems to display.        Brief Hospital Course to date:  Anuarg Pickard is a 72 y.o. male     Discussion/plan:  All medical problems are new under my management today.  Previous records reviewed and echocardiogram noted as per above.  Agree with plan to hold diuretics for now in the setting of recurrent electrolyte abnormalities and what appears to be poor oral intake.  Plan for supportive care and symptom treatment.  Currently seems reasonably euvolemic with history of CHF and pulmonary hypertension.  Continue metoprolol for rate control for paroxysmal atrial fibrillation.  Midodrine for blood pressure support  Monitor glucose and adjust  insulin as needed.  A1c is 7.9.  Plan to start extended release metformin for now and monitor glucose.  Continue correction scale.  With recurrent illnesses in setting of dementia prognosis is somewhat guarded.  Plan for careful goals of care conversation pending clinical progress.  PT evaluation.    Electrolyte abnormality  Acute and recurring  Magnesium 0.7, calcium 5.8  replacement protocol initiated  Patient has not prior nephrology evaluation.  Neurology consulted  Monitor BMP  Continuous cardiac monitoring     Chronic diastolic congestive heart failure  Chronic  Hold home furosemide spironolactone for now with severe electrolyte abnormalities  Continue home metoprolol   Daily weights  Monitor I's and O's     Aortic stenosis  Status post AVR( aortic valve replacement)- May 2023-porcine valve     Coronary artery disease  Hyperlipidemia  Continue home statin  Continuous cardiac monitoring and pulse oximetry     Essential  hypertension  Chronic/Stable   Vital Signs per policy   Patient recently started on midodrine for hypotension     Proximal atrial fibrillation  Chronic  EKG interpreted as atrial fibrillation  Not on AC- due to his memory deficits related to Alzheimer's, and unstable gait he is at high risk of a catastrophic event   RC -metoprolol     Type 2 diabetes mellitus  Chronic  Accu-Cheks ACHS  SSI ordered for hyperglycemia  hyperglycemic at time of admission  Glycemic treatment per protocol     Alzheimer's dementia  Chronic   Continue home Ativan and sertraline  Hold home Rexulti -nonformulary also may be contributory to hyperglycemia           I discussed the patient's findings and my recommendations with patient and spouse.     VTE Prophylaxis - SCDs.    Disposition: pending    CODE STATUS:   Code Status and Medical Interventions:   Ordered at: 10/27/23 2131     Code Status (Patient has no pulse and is not breathing):    CPR (Attempt to Resuscitate)     Medical Interventions (Patient has pulse or is  breathing):    Full Support       Bob Byrne MD  10/29/23

## 2023-10-29 NOTE — PLAN OF CARE
"Goal Outcome Evaluation:  Plan of Care Reviewed With: patient        Progress: no change  Outcome Evaluation: Continued treatment for electrolyte abnormality. Potassium levels improved but no phos despite replacement. Another phos dose ordered. Remains disoriented. Believes he is at his office and has tried to call several \"staff meetings.\" Bilateral wrist restraints in place. Spouse aware of plan of care. Vitals stable. Fall precautions in place. Good appetite today. No further issues at this time.         "

## 2023-10-29 NOTE — PROGRESS NOTES
"RENAL/KCC:     LOS: 1 day    Patient Care Team:  System, Provider Not In as PCP - Juan Mckeon DO as Consulting Physician (Cardiology)    Chief Complaint:  Hypocalcemia, Hypomagnesemia    Subjective     Interval History:   Chart reviewed  Agitated and confused overnight    Objective     Vital Sign Min/Max for last 24 hours  Temp  Min: 97.3 °F (36.3 °C)  Max: 98.2 °F (36.8 °C)   BP  Min: 98/64  Max: 136/77   Pulse  Min: 75  Max: 92   Resp  Min: 18  Max: 18   SpO2  Min: 92 %  Max: 98 %   No data recorded   Weight  Min: 98.1 kg (216 lb 4.3 oz)  Max: 98.1 kg (216 lb 4.3 oz)     Flowsheet Rows      Flowsheet Row First Filed Value   Admission Height --   Admission Weight 102 kg (225 lb 5 oz) Documented at 10/27/2023 2312            No intake/output data recorded.  I/O last 3 completed shifts:  In: 290 [P.O.:240; I.V.:50]  Out: 2475 [Urine:2475]    Physical Exam:  GEN: Awake, NAD  ENT: PERRL, EOMI, MMM  NECK: Supple, no JVD  CHEST: CTAB, no W/R/C  CV: RRR, no M/G/R  ABD: Soft, NT, +BS  SKIN: Warm and Dry  NEURO: CN's intact      WBC WBC   Date Value Ref Range Status   10/29/2023 7.05 3.40 - 10.80 10*3/mm3 Final   10/28/2023 8.54 3.40 - 10.80 10*3/mm3 Final   10/27/2023 9.71 3.40 - 10.80 10*3/mm3 Final      HGB Hemoglobin   Date Value Ref Range Status   10/29/2023 8.2 (L) 13.0 - 17.7 g/dL Final   10/28/2023 8.3 (L) 13.0 - 17.7 g/dL Final   10/27/2023 8.4 (L) 13.0 - 17.7 g/dL Final      HCT Hematocrit   Date Value Ref Range Status   10/29/2023 25.8 (L) 37.5 - 51.0 % Final   10/28/2023 25.5 (L) 37.5 - 51.0 % Final   10/27/2023 25.7 (L) 37.5 - 51.0 % Final      Platlets No results found for: \"LABPLAT\"   MCV MCV   Date Value Ref Range Status   10/29/2023 88.1 79.0 - 97.0 fL Final   10/28/2023 87.6 79.0 - 97.0 fL Final   10/27/2023 88.3 79.0 - 97.0 fL Final          Sodium Sodium   Date Value Ref Range Status   10/29/2023 141 136 - 145 mmol/L Final   10/28/2023 139 136 - 145 mmol/L Final   10/27/2023 144 " "136 - 145 mmol/L Final      Potassium Potassium   Date Value Ref Range Status   10/29/2023 3.3 (L) 3.5 - 5.2 mmol/L Final   10/28/2023 3.2 (L) 3.5 - 5.2 mmol/L Final   10/27/2023 4.9 3.5 - 5.2 mmol/L Final     Comment:     Specimen hemolyzed.  Results may be affected.      Chloride Chloride   Date Value Ref Range Status   10/29/2023 104 98 - 107 mmol/L Final   10/28/2023 100 98 - 107 mmol/L Final   10/27/2023 102 98 - 107 mmol/L Final      CO2 CO2   Date Value Ref Range Status   10/29/2023 25.4 22.0 - 29.0 mmol/L Final   10/28/2023 27.1 22.0 - 29.0 mmol/L Final   10/27/2023 28.0 22.0 - 29.0 mmol/L Final      BUN BUN   Date Value Ref Range Status   10/29/2023 9 8 - 23 mg/dL Final   10/28/2023 14 8 - 23 mg/dL Final   10/27/2023 23 8 - 23 mg/dL Final      Creatinine Creatinine   Date Value Ref Range Status   10/29/2023 0.81 0.76 - 1.27 mg/dL Final   10/28/2023 0.72 (L) 0.76 - 1.27 mg/dL Final   10/27/2023 1.08 0.76 - 1.27 mg/dL Final      Calcium Calcium   Date Value Ref Range Status   10/29/2023 7.4 (L) 8.6 - 10.5 mg/dL Final   10/28/2023 7.3 (L) 8.6 - 10.5 mg/dL Final   10/27/2023 5.8 (C) 8.6 - 10.5 mg/dL Final      PO4 No results found for: \"CAPO4\"   Albumin Albumin   Date Value Ref Range Status   10/29/2023 3.2 (L) 3.5 - 5.2 g/dL Final   10/28/2023 3.6 3.5 - 5.2 g/dL Final   10/27/2023 3.6 3.5 - 5.2 g/dL Final      Magnesium Magnesium   Date Value Ref Range Status   10/29/2023 1.6 1.6 - 2.4 mg/dL Final   10/28/2023 2.0 1.6 - 2.4 mg/dL Final   10/27/2023 0.7 (C) 1.6 - 2.4 mg/dL Final      Uric Acid No results found for: \"URICACID\"        Results Review:     I reviewed the patient's new clinical results.    atorvastatin, 40 mg, Oral, Daily  calcium carbonate, 2 tablet, Oral, BID  famotidine, 20 mg, Oral, BID AC  insulin lispro, 2-9 Units, Subcutaneous, 4x Daily AC & at Bedtime  magnesium sulfate, 2 g, Intravenous, Once  metoprolol succinate XL, 25 mg, Oral, Daily  midodrine, 2.5 mg, Oral, TID AC  multivitamin with " minerals, 1 tablet, Oral, Daily  potassium chloride ER, 40 mEq, Oral, Q4H  senna-docusate sodium, 2 tablet, Oral, BID  sertraline, 25 mg, Oral, Daily  sodium chloride, 10 mL, Intravenous, Q12H  vitamin B-12, 1,000 mcg, Oral, Daily  vitamin D, 50,000 Units, Oral, Q7 Days      sodium chloride, 100 mL/hr, Last Rate: 100 mL/hr (10/27/23 8322)        Medication Review: Reviewed    Assessment & Plan       Hypocalcemia - PTH appropriately elevated    Vit D deficiency    Hypomagnesemia    Hypophosphatemia    Electrolyte abnormality    Diabetes mellitus, type II    S/P AVR (aortic valve replacement)    Alzheimer's dementia    Paroxysmal atrial fibrillation      Plan: Continue on Vit D and oral Ca replacement.  K and Phos replaced this AM.  Repeat levels this afternoon.  Renal function stable.  Continue off Lasix.  Will follow.    Ryan Stapleton MD  [facility]  10/29/23  10:13 EDT

## 2023-10-29 NOTE — PLAN OF CARE
Goal Outcome Evaluation:  Plan of Care Reviewed With: patient           Outcome Evaluation: Restless agitated confused. Reoriented. Notified on call NP. Ativan orderd x1 not effective. Attempted multiple times to get out of the bed despite pt teaching on safety. Constantly pulling lines tubing. Soft restraints applied to bilateral wrists. Notified NP again .Non -violent restraints ordered. Mary RN / Charge RN aware. Informed Alejandra HERNANDEZ/ marlen surpervisor. Will call spouse to inform re: non-violent restraints.

## 2023-10-29 NOTE — NURSING NOTE
"Patient yelled out into hallway about \"staff coming to meeting.\" Patient found to have his legs swung out of the bed. New abrasion noted on top of right hand due to pulling against restraint. Hand cleaned and bandaid applied.  "

## 2023-10-30 PROBLEM — R63.0 POOR APPETITE: Status: ACTIVE | Noted: 2023-10-30

## 2023-10-30 LAB
ANION GAP SERPL CALCULATED.3IONS-SCNC: 10 MMOL/L (ref 5–15)
BUN SERPL-MCNC: 11 MG/DL (ref 8–23)
BUN/CREAT SERPL: 13.1 (ref 7–25)
CALCIUM SPEC-SCNC: 7.7 MG/DL (ref 8.6–10.5)
CHLORIDE SERPL-SCNC: 108 MMOL/L (ref 98–107)
CO2 SERPL-SCNC: 24 MMOL/L (ref 22–29)
CREAT SERPL-MCNC: 0.84 MG/DL (ref 0.76–1.27)
DEPRECATED RDW RBC AUTO: 42 FL (ref 37–54)
EGFRCR SERPLBLD CKD-EPI 2021: 93.2 ML/MIN/1.73
ERYTHROCYTE [DISTWIDTH] IN BLOOD BY AUTOMATED COUNT: 13.1 % (ref 12.3–15.4)
GLUCOSE BLDC GLUCOMTR-MCNC: 113 MG/DL (ref 70–130)
GLUCOSE BLDC GLUCOMTR-MCNC: 154 MG/DL (ref 70–130)
GLUCOSE BLDC GLUCOMTR-MCNC: 192 MG/DL (ref 70–130)
GLUCOSE SERPL-MCNC: 118 MG/DL (ref 65–99)
HCT VFR BLD AUTO: 27.5 % (ref 37.5–51)
HGB BLD-MCNC: 8.7 G/DL (ref 13–17.7)
MAGNESIUM SERPL-MCNC: 1.7 MG/DL (ref 1.6–2.4)
MCH RBC QN AUTO: 27.6 PG (ref 26.6–33)
MCHC RBC AUTO-ENTMCNC: 31.6 G/DL (ref 31.5–35.7)
MCV RBC AUTO: 87.3 FL (ref 79–97)
PHOSPHATE SERPL-MCNC: 2.3 MG/DL (ref 2.5–4.5)
PLATELET # BLD AUTO: 313 10*3/MM3 (ref 140–450)
PMV BLD AUTO: 10.4 FL (ref 6–12)
POTASSIUM SERPL-SCNC: 4.1 MMOL/L (ref 3.5–5.2)
QT INTERVAL: 423 MS
QTC INTERVAL: 470 MS
RBC # BLD AUTO: 3.15 10*6/MM3 (ref 4.14–5.8)
SODIUM SERPL-SCNC: 142 MMOL/L (ref 136–145)
WBC NRBC COR # BLD: 8.85 10*3/MM3 (ref 3.4–10.8)

## 2023-10-30 PROCEDURE — 80048 BASIC METABOLIC PNL TOTAL CA: CPT | Performed by: INTERNAL MEDICINE

## 2023-10-30 PROCEDURE — 82948 REAGENT STRIP/BLOOD GLUCOSE: CPT

## 2023-10-30 PROCEDURE — 63710000001 INSULIN LISPRO (HUMAN) PER 5 UNITS: Performed by: NURSE PRACTITIONER

## 2023-10-30 PROCEDURE — 97162 PT EVAL MOD COMPLEX 30 MIN: CPT

## 2023-10-30 PROCEDURE — 25810000003 SODIUM CHLORIDE 0.9 % SOLUTION: Performed by: INTERNAL MEDICINE

## 2023-10-30 PROCEDURE — 97530 THERAPEUTIC ACTIVITIES: CPT

## 2023-10-30 PROCEDURE — 93010 ELECTROCARDIOGRAM REPORT: CPT | Performed by: INTERNAL MEDICINE

## 2023-10-30 PROCEDURE — 93005 ELECTROCARDIOGRAM TRACING: CPT | Performed by: INTERNAL MEDICINE

## 2023-10-30 PROCEDURE — 83735 ASSAY OF MAGNESIUM: CPT | Performed by: INTERNAL MEDICINE

## 2023-10-30 PROCEDURE — 85027 COMPLETE CBC AUTOMATED: CPT | Performed by: INTERNAL MEDICINE

## 2023-10-30 PROCEDURE — 84100 ASSAY OF PHOSPHORUS: CPT | Performed by: INTERNAL MEDICINE

## 2023-10-30 RX ORDER — SODIUM PHOSPHATE IN 0.9 % NACL 15MMOL/100
15 PLASTIC BAG, INJECTION (ML) INTRAVENOUS ONCE
Status: DISCONTINUED | OUTPATIENT
Start: 2023-10-30 | End: 2023-10-30

## 2023-10-30 RX ORDER — LORAZEPAM 0.5 MG/1
0.5 TABLET ORAL NIGHTLY
Status: DISCONTINUED | OUTPATIENT
Start: 2023-10-30 | End: 2023-11-01 | Stop reason: HOSPADM

## 2023-10-30 RX ORDER — CHOLECALCIFEROL (VITAMIN D3) 125 MCG
10 CAPSULE ORAL NIGHTLY
Status: DISCONTINUED | OUTPATIENT
Start: 2023-10-30 | End: 2023-11-01 | Stop reason: HOSPADM

## 2023-10-30 RX ORDER — METFORMIN HYDROCHLORIDE 500 MG/1
1000 TABLET, EXTENDED RELEASE ORAL
Status: DISCONTINUED | OUTPATIENT
Start: 2023-10-31 | End: 2023-11-01 | Stop reason: HOSPADM

## 2023-10-30 RX ORDER — SODIUM PHOSPHATE IN 0.9 % NACL 15MMOL/100
15 PLASTIC BAG, INJECTION (ML) INTRAVENOUS ONCE
Qty: 250 ML | Refills: 0 | Status: COMPLETED | OUTPATIENT
Start: 2023-10-30 | End: 2023-10-30

## 2023-10-30 RX ADMIN — Medication 1000 MCG: at 08:57

## 2023-10-30 RX ADMIN — CARBIDOPA AND LEVODOPA 2.5 MG: 50; 200 TABLET, EXTENDED RELEASE ORAL at 17:45

## 2023-10-30 RX ADMIN — METOPROLOL SUCCINATE 25 MG: 25 TABLET, EXTENDED RELEASE ORAL at 08:58

## 2023-10-30 RX ADMIN — INSULIN LISPRO 2 UNITS: 100 INJECTION, SOLUTION INTRAVENOUS; SUBCUTANEOUS at 12:34

## 2023-10-30 RX ADMIN — FAMOTIDINE 20 MG: 20 TABLET, FILM COATED ORAL at 06:40

## 2023-10-30 RX ADMIN — DOCUSATE SODIUM 50MG AND SENNOSIDES 8.6MG 2 TABLET: 8.6; 5 TABLET, FILM COATED ORAL at 08:57

## 2023-10-30 RX ADMIN — Medication 10 ML: at 09:00

## 2023-10-30 RX ADMIN — MULTIPLE VITAMINS W/ MINERALS TAB 1 TABLET: TAB at 08:57

## 2023-10-30 RX ADMIN — FAMOTIDINE 20 MG: 20 TABLET, FILM COATED ORAL at 17:45

## 2023-10-30 RX ADMIN — SERTRALINE 25 MG: 25 TABLET, FILM COATED ORAL at 08:57

## 2023-10-30 RX ADMIN — Medication 10 ML: at 20:47

## 2023-10-30 RX ADMIN — CARBIDOPA AND LEVODOPA 2.5 MG: 50; 200 TABLET, EXTENDED RELEASE ORAL at 09:32

## 2023-10-30 RX ADMIN — LORAZEPAM 0.5 MG: 0.5 TABLET ORAL at 20:46

## 2023-10-30 RX ADMIN — ATORVASTATIN CALCIUM 40 MG: 20 TABLET, FILM COATED ORAL at 08:57

## 2023-10-30 RX ADMIN — SODIUM PHOSPHATE, MONOBASIC, MONOHYDRATE AND SODIUM PHOSPHATE, DIBASIC, ANHYDROUS 15 MMOL: 276; 142 INJECTION, SOLUTION INTRAVENOUS at 09:32

## 2023-10-30 RX ADMIN — Medication 10 MG: at 20:46

## 2023-10-30 RX ADMIN — ANTACID TABLETS 2 TABLET: 500 TABLET, CHEWABLE ORAL at 20:48

## 2023-10-30 RX ADMIN — METFORMIN HYDROCHLORIDE 500 MG: 500 TABLET, EXTENDED RELEASE ORAL at 08:58

## 2023-10-30 RX ADMIN — ANTACID TABLETS 2 TABLET: 500 TABLET, CHEWABLE ORAL at 08:57

## 2023-10-30 RX ADMIN — INSULIN LISPRO 2 UNITS: 100 INJECTION, SOLUTION INTRAVENOUS; SUBCUTANEOUS at 20:46

## 2023-10-30 NOTE — PROGRESS NOTES
LOS: 2 days     Chief Complaint/ Reason for encounter: Multiple electrolyte abnormalities    Subjective   10/30/23 : He is awake and alert  Still in soft restraints due to confusion overnight  States good appetite with no nausea vomiting  No shortness of breath chest pain or edema, voiding well      Medical history reviewed:  History of Present Illness    Subjective    History taken from: Patient and chart    Vital Signs  Temp:  [97.7 °F (36.5 °C)-99.1 °F (37.3 °C)] 97.9 °F (36.6 °C)  Heart Rate:  [] 76  Resp:  [14-18] 18  BP: (106-142)/() 106/73       Wt Readings from Last 1 Encounters:   10/30/23 0528 99 kg (218 lb 4.1 oz)   10/29/23 0657 98.1 kg (216 lb 4.3 oz)   10/27/23 2312 102 kg (225 lb 5 oz)       Objective:  Vital signs: (most recent): Blood pressure 106/73, pulse 76, temperature 97.9 °F (36.6 °C), temperature source Oral, resp. rate 18, weight 99 kg (218 lb 4.1 oz), SpO2 99%.                Objective:  General Appearance:  Comfortable, chronically ill-appearing, in no acute distress and not in pain.  Awake, alert  HEENT: Mucous membranes moist, no injury, oropharynx clear  Lungs:  Normal effort and normal respiratory rate.  Breath sounds clear to auscultation.  No  respiratory distress.  No rales, decreased breath sounds or rhonchi.    Heart: Normal rate.  Regular rhythm.  S1, S2 normal.  No murmur.   Abdomen: Abdomen is soft.  Bowel sounds are normal, no abdominal tenderness.  There is no rebound or guarding  Extremities: Trace edema of bilateral lower extremities  Neurological: No focal motor or sensory deficits, pupils reactive  Skin:  Warm and dry.  No rash or cyanosis.       Results Review:    Intake/Output:     Intake/Output Summary (Last 24 hours) at 10/30/2023 1022  Last data filed at 10/30/2023 0819  Gross per 24 hour   Intake 0 ml   Output 550 ml   Net -550 ml         DATA:  Radiology and Labs:  The following labs independently reviewed by me. Additional labs ordered for tomorrow  a.m.  Interval notes, chart personally reviewed by me.   Old records independently reviewed showing previous chemistry panel about a month ago showed normal renal function, low calcium, borderline low magnesium  The following radiologic studies independently viewed by me, findings nothing new this admission  New problems include multiple critical electrolyte abnormalities  Discussed with patient himself at bedside    Risk/ complexity of medical care/ medical decision making moderate risk, electrolyte replacement    Labs:   Recent Results (from the past 24 hour(s))   POC Glucose Once    Collection Time: 10/29/23 12:08 PM    Specimen: Blood   Result Value Ref Range    Glucose 181 (H) 70 - 130 mg/dL   Phosphorus    Collection Time: 10/29/23  4:56 PM    Specimen: Blood   Result Value Ref Range    Phosphorus 1.7 (C) 2.5 - 4.5 mg/dL   Potassium    Collection Time: 10/29/23  4:56 PM    Specimen: Blood   Result Value Ref Range    Potassium 4.3 3.5 - 5.2 mmol/L   POC Glucose Once    Collection Time: 10/29/23  5:11 PM    Specimen: Blood   Result Value Ref Range    Glucose 133 (H) 70 - 130 mg/dL   POC Glucose Once    Collection Time: 10/29/23  9:08 PM    Specimen: Blood   Result Value Ref Range    Glucose 156 (H) 70 - 130 mg/dL   Phosphorus    Collection Time: 10/30/23  4:13 AM    Specimen: Blood   Result Value Ref Range    Phosphorus 2.3 (L) 2.5 - 4.5 mg/dL   CBC (No Diff)    Collection Time: 10/30/23  4:13 AM    Specimen: Blood   Result Value Ref Range    WBC 8.85 3.40 - 10.80 10*3/mm3    RBC 3.15 (L) 4.14 - 5.80 10*6/mm3    Hemoglobin 8.7 (L) 13.0 - 17.7 g/dL    Hematocrit 27.5 (L) 37.5 - 51.0 %    MCV 87.3 79.0 - 97.0 fL    MCH 27.6 26.6 - 33.0 pg    MCHC 31.6 31.5 - 35.7 g/dL    RDW 13.1 12.3 - 15.4 %    RDW-SD 42.0 37.0 - 54.0 fl    MPV 10.4 6.0 - 12.0 fL    Platelets 313 140 - 450 10*3/mm3   Basic Metabolic Panel    Collection Time: 10/30/23  4:13 AM    Specimen: Blood   Result Value Ref Range    Glucose 118 (H) 65 - 99  mg/dL    BUN 11 8 - 23 mg/dL    Creatinine 0.84 0.76 - 1.27 mg/dL    Sodium 142 136 - 145 mmol/L    Potassium 4.1 3.5 - 5.2 mmol/L    Chloride 108 (H) 98 - 107 mmol/L    CO2 24.0 22.0 - 29.0 mmol/L    Calcium 7.7 (L) 8.6 - 10.5 mg/dL    BUN/Creatinine Ratio 13.1 7.0 - 25.0    Anion Gap 10.0 5.0 - 15.0 mmol/L    eGFR 93.2 >60.0 mL/min/1.73   Magnesium    Collection Time: 10/30/23  4:13 AM    Specimen: Blood   Result Value Ref Range    Magnesium 1.7 1.6 - 2.4 mg/dL       Radiology:  Pertinent radiology studies were reviewed as described above      Medications have been reviewed separately in chart overview      ASSESSMENT:  Hypocalcemia - PTH appropriately elevated due to vitamin D deficiency    Vit D deficiency, on replacement    Hypomagnesemia, improving, fairly severe at presentation    Hypophosphatemia, improved    Diabetes mellitus, type II    S/P AVR (aortic valve replacement)    Alzheimer's dementia    Paroxysmal atrial fibrillation  Anemia        Plan:   Continue on oral Vit D and calcium replacement.    His renal function is normal and at baseline, okay to use electrolyte replacement protocol for additional electrolytes  Euvolemic.  Monitor off Lasix  PTH is appropriately elevated for hypocalcemia and vitamin D deficiency      Samson Underwood MD  [facility]   Office phone number: [phone]  Answering service phone number: [phone]    10/30/23  10:22 EDT    Dictation performed using Dragon dictation software

## 2023-10-30 NOTE — THERAPY EVALUATION
Patient Name: Anurag Pickard  : 1950    MRN: [id number]                              Today's Date: 10/30/2023       Admit Date: 10/27/2023    Visit Dx:     ICD-10-CM ICD-9-CM   1. Hypocalcemia  E83.51 275.41   2. Hypomagnesemia  E83.42 275.2   3. S/P AVR (aortic valve replacement)  Z95.2 V43.3   4. Alzheimer's dementia, unspecified dementia severity, unspecified timing of dementia onset, unspecified whether behavioral, psychotic, or mood disturbance or anxiety  G30.9 331.0    F02.80 294.10     Patient Active Problem List   Diagnosis    Aortic valve stenosis    Diabetes mellitus, type II    Gastroesophageal reflux disease    Hyperlipidemia    Hypogonadism    Obesity    Obstructive sleep apnea syndrome    Osteoarthritis    Pulmonary hypertension    Rosacea    Vitamin D deficiency    Memory loss    Ataxia    Alcohol abuse    Retinal disorder    Presbyopia    Nuclear senile cataract    History of laser assisted in situ keratomileusis    Benign essential hypertension    Screening PSA (prostate specific antigen)    Dyspnea on exertion    Chronic diastolic CHF (congestive heart failure)    Nonrheumatic aortic valve stenosis    S/P AVR (aortic valve replacement)    Coronary artery disease involving native coronary artery of native heart without angina pectoris    Right knee pain    Alzheimer's dementia    Paroxysmal atrial fibrillation    Electrolyte abnormality    PFO (patent foramen ovale)    Type 2 diabetes mellitus with hyperglycemia     Past Medical History:   Diagnosis Date    Acute diastolic CHF (congestive heart failure) 2022    Ankle edema     Aortic stenosis     Moderate    Aortic valve replaced     B12 deficiency     Bell's palsy     Bleeds easily     Chipped tooth     top front    Diabetes mellitus     Dry skin     Erectile dysfunction     Falls     2 falls in last 3 months    Fatigue     Fragile skin     GERD (gastroesophageal reflux disease)     Heart murmur     Hyperlipidemia      Hypertension     Hypokalemia     Hypomagnesemia     YANA (obstructive sleep apnea)     cpap bring dos    Osteoarthritis     Paroxysmal atrial fibrillation 9/27/2023    PFO (patent foramen ovale) 10/29/2023    Pulmonary HTN     Rosacea     Vitamin D deficiency      Past Surgical History:   Procedure Laterality Date    AORTIC VALVE REPAIR/REPLACEMENT N/A 03/28/2022    Procedure: AORTIC VALVE REPAIR/REPLACEMENT;  Surgeon: Dennis Brandon MD;  Location: [facility] CVOR;  Service: Cardiothoracic;  Laterality: N/A;  aortic valve replacement with 27mm [employer] magn[employer] ease    CARDIAC CATHETERIZATION      CARDIAC CATHETERIZATION N/A 12/17/2021    Procedure: Right and Left Heart Cath;  Surgeon: Juan Castellon DO;  Location: [facility] CATH INVASIVE LOCATION;  Service: Cardiology;  Laterality: N/A;    COLONOSCOPY      JOINT REPLACEMENT Bilateral 2021    knee    TONSILLECTOMY      TOTAL KNEE ARTHROPLASTY      TOTAL KNEE ARTHROPLASTY Left 05/04/2021    Procedure: TOTAL KNEE ARTHROPLASTY;  Surgeon: Otf Redmond MD;  Location: [facility] MAIN OR;  Service: Orthopedics;  Laterality: Left;      General Information       Row Name 10/30/23 0938          Physical Therapy Time and Intention    Document Type evaluation  Pt. admitted with an electrolyte abnormality;  h/o Alzheimers dementia  -MS     Mode of Treatment physical therapy;individual therapy  -MS       Row Name 10/30/23 0938          General Information    Patient Profile Reviewed yes  -MS     Prior Level of Function --  Use of Rwx for ambulation  -MS     Existing Precautions/Restrictions fall   Exit alarm;  No longer in bilateral wrist restraints;  To be left off per nursing request (Betty)  -MS     Barriers to Rehab cognitive status  Very pleasant and cooperative  -MS       Row Name 10/30/23 0938          Cognition    Orientation Status (Cognition) oriented to;person  -MS       Row Name 10/30/23 0922          Safety Issues, Functional Mobility    Comment,  Safety Issues/Impairments (Mobility) Gait belt used for safety.  -MS               User Key  (r) = Recorded By, (t) = Taken By, (c) = Cosigned By      Initials Name Provider Type    Luis Blairnessa PERES, PT Physical Therapist                   Mobility       Row Name 10/30/23 0940          Bed Mobility    Bed Mobility supine-sit;sit-supine  -MS     Supine-Sit [place] (Bed Mobility) minimum assist (75% patient effort)  -MS     Sit-Supine [place] (Bed Mobility) minimum assist (75% patient effort)  -MS       Row Name 10/30/23 0940          Sit-Stand Transfer    Sit-Stand [place] (Transfers) moderate assist (50% patient effort)  -MS     Assistive Device (Sit-Stand Transfers) walker, front-wheeled  -MS       Row Name 10/30/23 0940          Gait/Stairs (Locomotion)    [place] Level (Gait) minimum assist (75% patient effort);2 person assist  -MS     Assistive Device (Gait) walker, front-wheeled  -MS     Distance in Feet (Gait) 35 feet  -MS     Deviations/Abnormal Patterns (Gait) damian decreased;stride length decreased  -MS     Bilateral Gait Deviations forward flexed posture  -MS     Comment, (Gait/Stairs) Verbal/tactile cues given for posture correction and Rwx guidance.  -MS               User Key  (r) = Recorded By, (t) = Taken By, (c) = Cosigned By      Initials Name Provider Type    Luis Blairnessa PERES, PT Physical Therapist                   Obj/Interventions       Row Name 10/30/23 0941          Range of Motion Comprehensive    Comment, General Range of Motion BUE/LE (WFL's)  -MS       Row Name 10/30/23 0941          Strength Comprehensive (MMT)    Comment, General Manual Muscle Testing (MMT) Assessment BUE/LE (3+/5)  -MS               User Key  (r) = Recorded By, (t) = Taken By, (c) = Cosigned By      Initials Name Provider Type    MS PlazaPanda, PT Physical Therapist                   Goals/Plan       Row Name 10/30/23 0942          Bed Mobility Goal 1 (PT)    Activity/Assistive  Device (Bed Mobility Goal 1, PT) bed mobility activities, all  -MS     [place] Level/Cues Needed (Bed Mobility Goal 1, PT) contact guard required  -MS     Time Frame (Bed Mobility Goal 1, PT) long term goal (LTG);1 week  -MS       Row Name 10/30/23 0942          Transfer Goal 1 (PT)    Activity/Assistive Device (Transfer Goal 1, PT) transfers, all;walker, rolling  -MS     [place] Level/Cues Needed (Transfer Goal 1, PT) contact guard required  -MS     Time Frame (Transfer Goal 1, PT) long term goal (LTG);1 week  -MS       Row Name 10/30/23 0942          Gait Training Goal 1 (PT)    Activity/Assistive Device (Gait Training Goal 1, PT) gait (walking locomotion);walker, rolling  -MS     [place] Level (Gait Training Goal 1, PT) contact guard required  -MS     Distance (Gait Training Goal 1, PT) 100 feet  -MS     Time Frame (Gait Training Goal 1, PT) long term goal (LTG);1 week  -MS       Row Name 10/30/23 0942          Therapy Assessment/Plan (PT)    Planned Therapy Interventions (PT) balance training;bed mobility training;gait training;home exercise program;patient/family education;postural re-education;transfer training;strengthening  -MS               User Key  (r) = Recorded By, (t) = Taken By, (c) = Cosigned By      Initials Name Provider Type    Panda Blair, PT Physical Therapist                   Clinical Impression       Row Name 10/30/23 0941          Pain    Pretreatment Pain Rating 0/10 - no pain  -MS     Posttreatment Pain Rating 0/10 - no pain  -MS       Row Name 10/30/23 0941          Plan of Care Review    Plan of Care Reviewed With patient  -MS       Row Name 10/30/23 0941          Therapy Assessment/Plan (PT)    Rehab Potential (PT) good, to achieve stated therapy goals  -MS     Criteria for Skilled Interventions Met (PT) skilled treatment is necessary  -MS     Therapy Frequency (PT) 6 times/wk  -MS       Row Name 10/30/23 0941          Positioning and Restraints    Pre-Treatment  Position in bed  -MS     Post Treatment Position bed  -MS     In Bed notified nsg;supine;call light within reach;encouraged to call for assist;exit alarm on  All lines intact.  Per nurse request Bilateral soft wrist restraints were left off.  -MS               User Key  (r) = Recorded By, (t) = Taken By, (c) = Cosigned By      Initials Name Provider Type    MS BiswasPlazaPanda, PT Physical Therapist                   Outcome Measures       Row Name 10/30/23 0943          How much help from another person do you currently need...    Turning from your back to your side while in flat bed without using bedrails? 3  -MS     Moving from lying on back to sitting on the side of a flat bed without bedrails? 3  -MS     Moving to and from a bed to a chair (including a wheelchair)? 2  -MS     Standing up from a chair using your arms (e.g., wheelchair, bedside chair)? 2  -MS     Climbing 3-5 steps with a railing? 2  -MS     To walk in hospital room? 2  -MS     AM-PAC 6 Clicks Score (PT) 14  -MS     Highest level of mobility 4 --> Transferred to chair/commode  -MS       Row Name 10/30/23 0943          Functional Assessment    Outcome Measure Options AM-PAC 6 Clicks Basic Mobility (PT)  -MS               User Key  (r) = Recorded By, (t) = Taken By, (c) = Cosigned By      Initials Name Provider Type    MS PruitterPanda, PT Physical Therapist                                 Physical Therapy Education       Title: PT OT SLP Therapies (Done)       Topic: Physical Therapy (Done)       Point: Mobility training (Done)       Learning Progress Summary             Patient Acceptance, E,D, VU,NR by MS at 10/30/2023 0943                         Point: Home exercise program (Done)       Learning Progress Summary             Patient Acceptance, E,D, VU,NR by MS at 10/30/2023 0943                         Point: Body mechanics (Done)       Learning Progress Summary             Patient Acceptance, E,D, VU,NR by MS at 10/30/2023 0943                          Point: Precautions (Done)       Learning Progress Summary             Patient Acceptance, E,D, VU,NR by MS at 10/30/2023 0943                                         User Key       Initials Effective Dates Name Provider Type Discipline    MS 06/16/21 -  Panda Plaza, PT Physical Therapist PT                  PT Recommendation and Plan  Planned Therapy Interventions (PT): balance training, bed mobility training, gait training, home exercise program, patient/family education, postural re-education, transfer training, strengthening  Plan of Care Reviewed With: patient  Outcome Evaluation: Pt. is a 72 year old Male admitted to the hospital with an electrolyte abnormality.  Pt. also with h/o Alzheimers dementia.  Pt. reports using a Rwx for ambulation just prior to admission.  Pt. very pleasant this AM and follows simple, one step commands 100% of the time.  Pt. currently presents with decreased strength, decreased balance, and decreased tolerance to functional activity.  This AM, pt. able to ambulate 35 feet, Min. assist x 2, with use of Rwx.  Pt. requires Min. assist x 1 for bed mobility and Mod. assist x 1 for sit <-> stand transfers.  Verbal/tactile cues given during ambulation for posture correction and Rwx guidance.  Pt. will benefit from skilled inpt. P.T. to address his functional deficits and to assist pt. in regaining his maximum level of independence with functional mobility.     Time Calculation:         PT Charges       Row Name 10/30/23 0949             Time Calculation    Start Time 0855  -MS      Stop Time 0910  -MS      Time Calculation (min) 15 min  -MS      PT Received On 10/30/23  -MS      PT - Next Appointment 10/31/23  -MS      PT Goal Re-Cert Due Date 11/06/23  -MS         Time Calculation- PT    Total Timed Code Minutes- PT 14 minute(s)  -MS                User Key  (r) = Recorded By, (t) = Taken By, (c) = Cosigned By      Initials Name Provider Type    MS Panda Plaza,  PT Physical Therapist                  Therapy Charges for Today       Code Description Service Date Service Provider Modifiers Qty    62228115787 HC PT EVAL MOD COMPLEXITY 2 10/30/2023 Panda Plaza, PT GP 1    49440700743 HC PT THERAPEUTIC ACT EA 15 MIN 10/30/2023 Panda Plaza, PT GP 1    75962772472 HC PT THER SUPP EA 15 MIN 10/30/2023 Panda Plaza, PT GP 1            PT G-Codes  Outcome Measure Options: AM-PAC 6 Clicks Basic Mobility (PT)  AM-PAC 6 Clicks Score (PT): 14  PT Discharge Summary  Anticipated Discharge Disposition (PT): skilled nursing facility    Panda Plaza, PT  10/30/2023

## 2023-10-30 NOTE — PLAN OF CARE
Goal Outcome Evaluation:  Plan of Care Reviewed With: patient           Outcome Evaluation: Pt. is a 72 year old Male admitted to the hospital with an electrolyte abnormality.  Pt. also with h/o Alzheimers dementia.  Pt. reports using a Rwx for ambulation just prior to admission.  Pt. very pleasant this AM and follows simple, one step commands 100% of the time.  Pt. currently presents with decreased strength, decreased balance, and decreased tolerance to functional activity.  This AM, pt. able to ambulate 35 feet, Min. assist x 2, with use of Rwx.  Pt. requires Min. assist x 1 for bed mobility and Mod. assist x 1 for sit <-> stand transfers.  Verbal/tactile cues given during ambulation for posture correction and Rwx guidance.  Pt. will benefit from skilled inpt. P.T. to address his functional deficits and to assist pt. in regaining his maximum level of independence with functional mobility.      Anticipated Discharge Disposition (PT): skilled nursing facility

## 2023-10-30 NOTE — PLAN OF CARE
Goal Outcome Evaluation:  Plan of Care Reviewed With: patient        Progress: no change  Outcome Evaluation: VSS. No c/o pain or soa. Pt O2 maintained on room air overnight. Bilateral wrist restraints in place. Blood sugar at hs of 156. 2 units ssi given. Pt remains confused, PRN sleep aid given. AM labs show improvement in phos - 2.3 following second replacement. Hgb - 8.7. Improvement in Ca - 7.7. No BM this shift.

## 2023-10-30 NOTE — PROGRESS NOTES
[facility] Medicine Services  PROGRESS NOTE    Patient Name: Anurag Pickard  : 1950  MRN: [id number]    Date of Admission: 10/27/2023  Primary Care Physician: System, Provider Not In    Subjective   Subjective     CC:  Follow-up abnormal electrolytes    Subjective:  Patient says he is doing great today.  He seems very positive.    Review of Systems  Unreliable due to dementia but denies questions as per below  No current fevers or chills  No current shortness of breath or cough  No current nausea, vomiting, or diarrhea  No current chest pain or palpitations      Objective   Objective     Vital Signs:   Temp:  [97.7 °F (36.5 °C)-99.1 °F (37.3 °C)] 97.9 °F (36.6 °C)  Heart Rate:  [] 76  Resp:  [14-18] 18  BP: (106-142)/() 106/73        Physical Exam:  Constitutional:Awake, alert, chronically ill-appearing but nontoxic  HENT: NCAT, mucous membranes moist, neck supple  Respiratory: No cough or wheezing, breathing, good inspiration  Cardiovascular: Pulse rate is normal, normal radial pulses  Gastrointestinal: , soft, nontender, nondistended  Musculoskeletal: Elderly frail and chronically debilitated in appearance, mildly obese with BMI of 30, muscle wasting noted, minimal lower extremity edema  Psychiatric: Calm affect, cooperative, conversational  Neurologic: Poor historian, no slurred speech or facial droop, able to follow instructions  Skin: No rashes or jaundice, warm      Results Reviewed:  Results from last 7 days   Lab Units 10/30/23  0413 10/29/23  0448 10/28/23  120   WBC 10*3/mm3 8.85 7.05 8.54   HEMOGLOBIN g/dL 8.7* 8.2* 8.3*   HEMATOCRIT % 27.5* 25.8* 25.5*   PLATELETS 10*3/mm3 313 286 278     Results from last 7 days   Lab Units 10/30/23  0413 10/29/23  1656 10/29/23  0448 10/28/23  1207 10/27/23  1903   SODIUM mmol/L 142  --  141 139 144   POTASSIUM mmol/L 4.1 4.3 3.3* 3.2* 4.9   CHLORIDE mmol/L 108*  --  104 100 102   CO2 mmol/L 24.0  --  25.4 27.1 28.0   BUN mg/dL 11  --  9  14 23   CREATININE mg/dL 0.84  --  0.81 0.72* 1.08   GLUCOSE mg/dL 118*  --  125* 164* 209*   CALCIUM mg/dL 7.7*  --  7.4* 7.3* 5.8*   ALK PHOS U/L  --   --   --   --  69   ALT (SGPT) U/L  --   --   --   --  19   AST (SGOT) U/L  --   --   --   --  38     Estimated Creatinine Clearance: 95.1 mL/min (by C-G formula based on SCr of 0.84 mg/dL).    Microbiology Results Abnormal       Procedure Component Value - Date/Time    Respiratory Panel PCR w/COVID-19(SARS-CoV-2) S[facility]NEY/PAVAN/FARHAD/PAD/COR/MAD/EWA In-House, NP Swab in UTM/VTM, 3-4 HR TAT - Swab, Nasopharynx [[id number]]  (Normal) Collected: 10/28/23 1209    Lab Status: Final result Specimen: Swab from Nasopharynx Updated: 10/28/23 1405     ADENOVIRUS, PCR Not Detected     Coronavirus 229E Not Detected     Coronavirus HKU1 Not Detected     Coronavirus NL63 Not Detected     Coronavirus OC43 Not Detected     COVID19 Not Detected     Human Metapneumovirus Not Detected     Human Rhinovirus/Enterovirus Not Detected     Influenza A PCR Not Detected     Influenza B PCR Not Detected     Parainfluenza Virus 1 Not Detected     Parainfluenza Virus 2 Not Detected     Parainfluenza Virus 3 Not Detected     Parainfluenza Virus 4 Not Detected     RSV, PCR Not Detected     Bordetella pertussis pcr Not Detected     Bordetella parapertussis PCR Not Detected     Chlamydophila pneumoniae PCR Not Detected     Mycoplasma pneumo by PCR Not Detected    Narrative:      In the setting of a positive respiratory panel with a viral infection PLUS a negative procalcitonin without other underlying concern for bacterial infection, consider observing off antibiotics or discontinuation of antibiotics and continue supportive care. If the respiratory panel is positive for atypical bacterial infection (Bordetella pertussis, Chlamydophila pneumoniae, or Mycoplasma pneumoniae), consider antibiotic de-escalation to target atypical bacterial infection.            Imaging Results (Last 24 Hours)       ** No results  found for the last 24 hours. **            Results for orders placed in visit on 03/28/22    Emergent/Open-Heart Anesthesia PAULO    Narrative  Emergent/Open-Heart Anesthesia PAULO    Procedure Performed: Emergent/Open-Heart Anesthesia PAULO  Start Time:  End Time:      General Procedure Information  Location performed:  OR  Intubated  Bite block placed  Heart visualized  Probe Insertion:  Easy  Probe Type:  Biplane and multiplane  Modalities:  Color flow mapping, pulse wave Doppler and continuous wave Doppler    Echocardiographic and Doppler Measurements    Ventricles    Right Ventricle:  Ejection Fraction 60%.  Left Ventricle:  Global Function normal.  Ejection Fraction 60%.                                Anesthesia Information  Performed Personally  Anesthesiologist:  Van Frost MD      Echocardiogram Comments:  PAULO placed easily with no resistance ,  Lubricated , bite guard used    Pre cpb  LV EF 60 no wma  RV nl SF  MV tr/mild MR  AV Ca++ mild mod AI ,  Mod AS YOSI 1.2cm2 by cont  tv TR TR  Suspicious for smal PFO by CFD      Post cpb  #27 av well seated no para / trans leak seen  No other change      I have reviewed the medications:  Scheduled Meds:atorvastatin, 40 mg, Oral, Daily  calcium carbonate, 2 tablet, Oral, BID  famotidine, 20 mg, Oral, BID AC  insulin lispro, 2-9 Units, Subcutaneous, 4x Daily AC & at Bedtime  magnesium sulfate, 2 g, Intravenous, Once  metFORMIN ER, 500 mg, Oral, Daily With Breakfast  metoprolol succinate XL, 25 mg, Oral, Daily  midodrine, 2.5 mg, Oral, TID AC  multivitamin with minerals, 1 tablet, Oral, Daily  senna-docusate sodium, 2 tablet, Oral, BID  sertraline, 25 mg, Oral, Daily  sodium chloride, 10 mL, Intravenous, Q12H  sodium phosphate, 15 mmol, Intravenous, Once  vitamin B-12, 1,000 mcg, Oral, Daily  vitamin D, 50,000 Units, Oral, Q7 Days      Continuous Infusions:sodium chloride, 100 mL/hr, Last Rate: 100 mL/hr (10/27/23 3994)      PRN Meds:.  acetaminophen **OR**  acetaminophen **OR** acetaminophen    senna-docusate sodium **AND** polyethylene glycol **AND** bisacodyl **AND** bisacodyl    Calcium Replacement - Follow Nurse / BPA Driven Protocol    dextrose    dextrose    glucagon (human recombinant)    HYDROcodone-acetaminophen    LORazepam    Magnesium Standard Dose Replacement - Follow Nurse / BPA Driven Protocol    melatonin    ondansetron **OR** ondansetron    Phosphorus Replacement - Follow Nurse / BPA Driven Protocol    Potassium Replacement - Follow Nurse / BPA Driven Protocol    sodium chloride    [COMPLETED] Insert Peripheral IV **AND** sodium chloride    sodium chloride    sodium chloride    Assessment & Plan   Assessment & Plan     Active Hospital Problems    Diagnosis  POA    **Electrolyte abnormality [E87.8]  Yes    PFO (patent foramen ovale) [Q21.12]  Not Applicable    Type 2 diabetes mellitus with hyperglycemia [E11.65]  Yes    Paroxysmal atrial fibrillation [I48.0]  Yes    Alzheimer's dementia [G30.9, F02.80]  Yes    Coronary artery disease involving native coronary artery of native heart without angina pectoris [I25.10]  Yes    S/P AVR (aortic valve replacement) [Z95.2]  Not Applicable    Chronic diastolic CHF (congestive heart failure) [I50.32]  Yes    Obesity [E66.9]  Yes    Vitamin D deficiency [E55.9]  Yes    Pulmonary hypertension [I27.20]  Yes    Hyperlipidemia [E78.5]  Yes    Diabetes mellitus, type II [E11.9]  Yes      Resolved Hospital Problems   No resolved problems to display.        Brief Hospital Course to date:  Anurag Pickard is a 72 y.o. male     Discussion/plan:  Electrolytes remain abnormal today.  Plan to replace phosphorus and magnesium today.  Continue to trend electrolytes and replace further as needed.  We will continue to hold diuretic as volume status appears compensated.    Patient has been taken off restraints this morning at 9 AM.  And seems to be doing adequately.  I am trying to reach out to family to further discuss  management of sundowning and various treatment options.    Continue supportive care and symptom treatment.    Continue metoprolol for paroxysmal atrial fibrillation.  He is currently sinus rhythm and not having any issues however he does not seem to like the telemetry.  I think we need to take him off the telemetry as it is causing more agitation than benefit at this point.  Telemetry discontinued.    Monitor glucose and adjust insulin as needed.  A1c is 7.9.  Glucose improving on metformin extended release, increased to 1000 mg.  Continue correction scale.    With recurrent illnesses in setting of dementia prognosis is somewhat guarded.  Plan for careful goals of care conversation pending clinical progress.  PT evaluation.    Electrolyte abnormality  Acute and recurring  Initially severe abnormalities, magnesium 0.7, calcium 5.8  replacement protocol initiated  Patient has not prior nephrology evaluation.  Neurology consulted  Monitor BMP  Continuous cardiac monitoring  Diuretics held     Chronic diastolic congestive heart failure  Chronic  Hold home furosemide spironolactone for now with severe electrolyte abnormalities  Continue home metoprolol   Daily weights  Monitor I's and O's     Aortic stenosis  Status post AVR( aortic valve replacement)- May 2023-porcine valve     Coronary artery disease  Hyperlipidemia  Continue home statin  Continuous cardiac monitoring and pulse oximetry     Essential  hypertension  Chronic/Stable   Vital Signs per policy   Patient recently started on midodrine for hypotension     Proximal atrial fibrillation  Chronic  EKG interpreted as atrial fibrillation  Not on AC- due to his memory deficits related to Alzheimer's, and unstable gait he is at high risk of a catastrophic event   RC -metoprolol     Type 2 diabetes mellitus  Chronic  Accu-Cheks ACHS  SSI ordered for hyperglycemia  hyperglycemic at time of admission  Glycemic treatment per protocol  Metformin extended release started.      Alzheimer's dementia  Chronic   Continue home Ativan and sertraline  Hold home Rexulti -nonformulary also may be contributory to hyperglycemia           I discussed the patient's findings and my recommendations with patient and spouse.     VTE Prophylaxis - SCDs.    Disposition: pending    CODE STATUS:   Code Status and Medical Interventions:   Ordered at: 10/27/23 2131     Code Status (Patient has no pulse and is not breathing):    CPR (Attempt to Resuscitate)     Medical Interventions (Patient has pulse or is breathing):    Full Support       Bob Byrne MD  10/30/23

## 2023-10-30 NOTE — PROGRESS NOTES
Case discussed with his wife over the phone.  We discussed medication and medication options to treat his sundowning.  She would like him to continue lorazepam scheduled at night.  She would like scheduled melatonin at night.  She says she is working with her insurance to get the Rexulti that was recommended by his psychiatrist and cardiologist.  They are still waiting on approval for the 6 months of medication.  She would prefer that we not start a different mood stabilizer at this time.  I explained to her that he is doing much better and hopefully if he can stay out of restraints and have further correction to his electrolytes he can go back to his facility tomorrow.  She agrees with this plan.    Electronically signed by Bob Byrne MD, 10/30/23, 1:37 PM EDT.

## 2023-10-30 NOTE — PLAN OF CARE
Goal Outcome Evaluation:    Progress: improving  Outcome Evaluation: Mr. Pickard admitted on 10/27 for electrolyte imbalance. Phosphorus - 2.3 - replaced per protocol. Restraints discontinued - patient very cooperative, not pulling at lines or heart monitor. Remains pleasantly confused. Potential discharge back to [facility] tomorrow. No family at bedside. Daughter updated over the phone. Patient stable and needs met at this time.

## 2023-10-31 LAB
ANION GAP SERPL CALCULATED.3IONS-SCNC: 9 MMOL/L (ref 5–15)
BUN SERPL-MCNC: 11 MG/DL (ref 8–23)
BUN/CREAT SERPL: 13.4 (ref 7–25)
CALCIUM SPEC-SCNC: 8.2 MG/DL (ref 8.6–10.5)
CHLORIDE SERPL-SCNC: 107 MMOL/L (ref 98–107)
CO2 SERPL-SCNC: 24 MMOL/L (ref 22–29)
CREAT SERPL-MCNC: 0.82 MG/DL (ref 0.76–1.27)
DEPRECATED RDW RBC AUTO: 41 FL (ref 37–54)
EGFRCR SERPLBLD CKD-EPI 2021: 93.9 ML/MIN/1.73
ERYTHROCYTE [DISTWIDTH] IN BLOOD BY AUTOMATED COUNT: 13 % (ref 12.3–15.4)
GLUCOSE BLDC GLUCOMTR-MCNC: 106 MG/DL (ref 70–130)
GLUCOSE BLDC GLUCOMTR-MCNC: 165 MG/DL (ref 70–130)
GLUCOSE BLDC GLUCOMTR-MCNC: 166 MG/DL (ref 70–130)
GLUCOSE BLDC GLUCOMTR-MCNC: 90 MG/DL (ref 70–130)
GLUCOSE SERPL-MCNC: 94 MG/DL (ref 65–99)
HCT VFR BLD AUTO: 26.5 % (ref 37.5–51)
HGB BLD-MCNC: 8.7 G/DL (ref 13–17.7)
MAGNESIUM SERPL-MCNC: 1.5 MG/DL (ref 1.6–2.4)
MCH RBC QN AUTO: 28.5 PG (ref 26.6–33)
MCHC RBC AUTO-ENTMCNC: 32.8 G/DL (ref 31.5–35.7)
MCV RBC AUTO: 86.9 FL (ref 79–97)
PHOSPHATE SERPL-MCNC: 2.2 MG/DL (ref 2.5–4.5)
PHOSPHATE SERPL-MCNC: 2.5 MG/DL (ref 2.5–4.5)
PLATELET # BLD AUTO: 295 10*3/MM3 (ref 140–450)
PMV BLD AUTO: 10.1 FL (ref 6–12)
POTASSIUM SERPL-SCNC: 4 MMOL/L (ref 3.5–5.2)
RBC # BLD AUTO: 3.05 10*6/MM3 (ref 4.14–5.8)
SODIUM SERPL-SCNC: 140 MMOL/L (ref 136–145)
WBC NRBC COR # BLD: 8.69 10*3/MM3 (ref 3.4–10.8)

## 2023-10-31 PROCEDURE — 97530 THERAPEUTIC ACTIVITIES: CPT

## 2023-10-31 PROCEDURE — 83735 ASSAY OF MAGNESIUM: CPT | Performed by: INTERNAL MEDICINE

## 2023-10-31 PROCEDURE — 80048 BASIC METABOLIC PNL TOTAL CA: CPT | Performed by: INTERNAL MEDICINE

## 2023-10-31 PROCEDURE — 85027 COMPLETE CBC AUTOMATED: CPT | Performed by: INTERNAL MEDICINE

## 2023-10-31 PROCEDURE — 84100 ASSAY OF PHOSPHORUS: CPT | Performed by: INTERNAL MEDICINE

## 2023-10-31 PROCEDURE — 63710000001 INSULIN LISPRO (HUMAN) PER 5 UNITS: Performed by: NURSE PRACTITIONER

## 2023-10-31 PROCEDURE — 82948 REAGENT STRIP/BLOOD GLUCOSE: CPT

## 2023-10-31 RX ADMIN — Medication 1000 MCG: at 08:17

## 2023-10-31 RX ADMIN — METFORMIN HYDROCHLORIDE 1000 MG: 500 TABLET, EXTENDED RELEASE ORAL at 08:17

## 2023-10-31 RX ADMIN — LORAZEPAM 0.5 MG: 0.5 TABLET ORAL at 02:44

## 2023-10-31 RX ADMIN — METOPROLOL SUCCINATE 25 MG: 25 TABLET, EXTENDED RELEASE ORAL at 08:17

## 2023-10-31 RX ADMIN — ANTACID TABLETS 2 TABLET: 500 TABLET, CHEWABLE ORAL at 08:17

## 2023-10-31 RX ADMIN — Medication 2 PACKET: at 11:51

## 2023-10-31 RX ADMIN — CARBIDOPA AND LEVODOPA 2.5 MG: 50; 200 TABLET, EXTENDED RELEASE ORAL at 11:51

## 2023-10-31 RX ADMIN — Medication 2 PACKET: at 06:59

## 2023-10-31 RX ADMIN — Medication 10 MG: at 20:09

## 2023-10-31 RX ADMIN — CARBIDOPA AND LEVODOPA 2.5 MG: 50; 200 TABLET, EXTENDED RELEASE ORAL at 08:17

## 2023-10-31 RX ADMIN — FAMOTIDINE 20 MG: 20 TABLET, FILM COATED ORAL at 08:18

## 2023-10-31 RX ADMIN — MAGNESIUM OXIDE 400 MG (241.3 MG MAGNESIUM) TABLET 400 MG: TABLET at 20:09

## 2023-10-31 RX ADMIN — MAGNESIUM OXIDE 400 MG (241.3 MG MAGNESIUM) TABLET 400 MG: TABLET at 08:17

## 2023-10-31 RX ADMIN — LORAZEPAM 0.5 MG: 0.5 TABLET ORAL at 20:08

## 2023-10-31 RX ADMIN — Medication 10 ML: at 20:09

## 2023-10-31 RX ADMIN — CARBIDOPA AND LEVODOPA 2.5 MG: 50; 200 TABLET, EXTENDED RELEASE ORAL at 16:47

## 2023-10-31 RX ADMIN — INSULIN LISPRO 2 UNITS: 100 INJECTION, SOLUTION INTRAVENOUS; SUBCUTANEOUS at 20:23

## 2023-10-31 RX ADMIN — SERTRALINE 25 MG: 25 TABLET, FILM COATED ORAL at 08:17

## 2023-10-31 RX ADMIN — Medication 2 PACKET: at 16:47

## 2023-10-31 RX ADMIN — ANTACID TABLETS 2 TABLET: 500 TABLET, CHEWABLE ORAL at 20:09

## 2023-10-31 RX ADMIN — ATORVASTATIN CALCIUM 40 MG: 20 TABLET, FILM COATED ORAL at 08:17

## 2023-10-31 RX ADMIN — Medication 10 ML: at 08:18

## 2023-10-31 RX ADMIN — FAMOTIDINE 20 MG: 20 TABLET, FILM COATED ORAL at 16:47

## 2023-10-31 RX ADMIN — MULTIPLE VITAMINS W/ MINERALS TAB 1 TABLET: TAB at 08:17

## 2023-10-31 RX ADMIN — INSULIN LISPRO 2 UNITS: 100 INJECTION, SOLUTION INTRAVENOUS; SUBCUTANEOUS at 11:51

## 2023-10-31 NOTE — PROGRESS NOTES
LOS: 3 days     Chief Complaint/ Reason for encounter: Multiple electrolyte abnormalities    Subjective   10/30/23 : He is awake and alert  Still in soft restraints due to confusion overnight  States good appetite with no nausea vomiting  No shortness of breath chest pain or edema, voiding well    10/31: Doing well, more awake and alert  Out of restraints, states good appetite with no nausea vomiting  No shortness of breath or chest pain    Medical history reviewed:  History of Present Illness    Subjective    History taken from: Patient and chart    Vital Signs  Temp:  [97.5 °F (36.4 °C)-97.7 °F (36.5 °C)] 97.5 °F (36.4 °C)  Heart Rate:  [] 89  Resp:  [16-19] 19  BP: (121-134)/(75-90) 134/90       Wt Readings from Last 1 Encounters:   10/31/23 0525 109 kg (240 lb 4.8 oz)   10/30/23 0528 99 kg (218 lb 4.1 oz)   10/29/23 0657 98.1 kg (216 lb 4.3 oz)   10/27/23 2312 102 kg (225 lb 5 oz)       Objective:  Vital signs: (most recent): Blood pressure 134/90, pulse 89, temperature 97.5 °F (36.4 °C), temperature source Axillary, resp. rate 19, weight 109 kg (240 lb 4.8 oz), SpO2 99%.                Objective:  General Appearance:  Comfortable, chronically ill-appearing, in no acute distress and not in pain.  Awake, alert  HEENT: Mucous membranes moist, no injury, oropharynx clear  Lungs:  Normal effort and normal respiratory rate.  Breath sounds clear to auscultation.  No  respiratory distress.  No rales, decreased breath sounds or rhonchi.    Heart: Normal rate.  Regular rhythm.  S1, S2 normal.  No murmur.   Abdomen: Abdomen is soft.  Bowel sounds are normal, no abdominal tenderness.  There is no rebound or guarding  Extremities: Trace edema of bilateral lower extremities  Neurological: No focal motor or sensory deficits, pupils reactive  Skin:  Warm and dry.  No rash or cyanosis.       Results Review:    Intake/Output:     Intake/Output Summary (Last 24 hours) at 10/31/2023 1005  Last data filed at 10/31/2023  0123  Gross per 24 hour   Intake 240 ml   Output 500 ml   Net -260 ml         DATA:  Radiology and Labs:  The following labs independently reviewed by me. Additional labs ordered for tomorrow a.m.  Interval notes, chart personally reviewed by me.   Old records independently reviewed showing previous chemistry panel about a month ago showed normal renal function, low calcium, borderline low magnesium  The following radiologic studies independently viewed by me, findings nothing new this admission  Discussed with patient himself at bedside    Risk/ complexity of medical care/ medical decision making moderate risk, electrolyte replacement    Labs:   Recent Results (from the past 24 hour(s))   POC Glucose Once    Collection Time: 10/30/23 12:05 PM    Specimen: Blood   Result Value Ref Range    Glucose 154 (H) 70 - 130 mg/dL   ECG 12 Lead QT Measurement    Collection Time: 10/30/23  1:51 PM   Result Value Ref Range    QT Interval 423 ms    QTC Interval 470 ms   POC Glucose Once    Collection Time: 10/30/23  5:47 PM    Specimen: Blood   Result Value Ref Range    Glucose 113 70 - 130 mg/dL   POC Glucose Once    Collection Time: 10/30/23  8:11 PM    Specimen: Blood   Result Value Ref Range    Glucose 192 (H) 70 - 130 mg/dL   CBC (No Diff)    Collection Time: 10/31/23  4:02 AM    Specimen: Blood   Result Value Ref Range    WBC 8.69 3.40 - 10.80 10*3/mm3    RBC 3.05 (L) 4.14 - 5.80 10*6/mm3    Hemoglobin 8.7 (L) 13.0 - 17.7 g/dL    Hematocrit 26.5 (L) 37.5 - 51.0 %    MCV 86.9 79.0 - 97.0 fL    MCH 28.5 26.6 - 33.0 pg    MCHC 32.8 31.5 - 35.7 g/dL    RDW 13.0 12.3 - 15.4 %    RDW-SD 41.0 37.0 - 54.0 fl    MPV 10.1 6.0 - 12.0 fL    Platelets 295 140 - 450 10*3/mm3   Basic Metabolic Panel    Collection Time: 10/31/23  4:02 AM    Specimen: Blood   Result Value Ref Range    Glucose 94 65 - 99 mg/dL    BUN 11 8 - 23 mg/dL    Creatinine 0.82 0.76 - 1.27 mg/dL    Sodium 140 136 - 145 mmol/L    Potassium 4.0 3.5 - 5.2 mmol/L     Chloride 107 98 - 107 mmol/L    CO2 24.0 22.0 - 29.0 mmol/L    Calcium 8.2 (L) 8.6 - 10.5 mg/dL    BUN/Creatinine Ratio 13.4 7.0 - 25.0    Anion Gap 9.0 5.0 - 15.0 mmol/L    eGFR 93.9 >60.0 mL/min/1.73   Magnesium    Collection Time: 10/31/23  4:02 AM    Specimen: Blood   Result Value Ref Range    Magnesium 1.5 (L) 1.6 - 2.4 mg/dL   Phosphorus    Collection Time: 10/31/23  4:02 AM    Specimen: Blood   Result Value Ref Range    Phosphorus 2.2 (L) 2.5 - 4.5 mg/dL   POC Glucose Once    Collection Time: 10/31/23  8:01 AM    Specimen: Blood   Result Value Ref Range    Glucose 106 70 - 130 mg/dL       Radiology:  Pertinent radiology studies were reviewed as described above      Medications have been reviewed separately in chart overview      ASSESSMENT:  Hypocalcemia - PTH appropriately elevated due to vitamin D deficiency    Vit D deficiency, on replacement    Hypomagnesemia, improving, fairly severe at presentation    Hypophosphatemia, improved    Diabetes mellitus, type II    S/P AVR (aortic valve replacement)    Alzheimer's dementia    Paroxysmal atrial fibrillation  Anemia        Plan:   Renal function remained stable, calcium levels continue to normalize  Continue oral magnesium and phosphorus  Encourage nutrition  Continue on oral Vit D and calcium replacement.   His renal function is normal and at baseline, okay to use electrolyte replacement protocol for additional electrolyte replacement  Euvolemic.  Monitor off Lasix  PTH is appropriately elevated for hypocalcemia and vitamin D deficiency  Follow labs.  Discharge planning      Samson Underwood MD  [facility]   Office phone number: [phone]  Answering service phone number: [phone]    10/31/23  10:05 EDT    Dictation performed using Dragon dictation software

## 2023-10-31 NOTE — PLAN OF CARE
Goal Outcome Evaluation:  Plan of Care Reviewed With: patient        Progress: improving  Outcome Evaluation: VSS. O2 sats maintained on room air. No tele in place. No c/o pain or soa. Blood sugar at hs of 192; 2 units ssi given. Pt remained out of restraints, but still impulsive and anxious. Reorientation required following several attempts to exit the bed. PRN ativan given x1 following scheduled dose. Pt void via urinal and once in brief overnight. Continent BM x1 this shift.       Mag - 1.5; 400mg tablet added to be given w/AM meds.

## 2023-10-31 NOTE — PLAN OF CARE
Goal Outcome Evaluation:  Plan of Care Reviewed With: patient, spouse        Progress: improving  Outcome Evaluation: Pleasantly confused, up to chair with assist of 2.  Incontinent of urine several times.  Wife reports he does not eat much. Electrolytes replaced.   Alarm on.  MD would like night meds given around 2000.  To be d/c tomorrow at 1300 with transport set up to [place].   He does set off bed alarm trying to get up to void.

## 2023-10-31 NOTE — PLAN OF CARE
"Goal Outcome Evaluation:  Plan of Care Reviewed With: patient           Outcome Evaluation: Upon entering room, pt. supine in bed, awake/alert, and agreeable to work with P.T. this date.  Pt. able to ambulate 40 feet, Min. assist x1, with use of Rwx. Pt. requires Min. assist x 1 for bed mobility and Min. assist x 1 for sit <-> stand transfers.  Verbal/tactile cues given during ambulation for posture correction and to take \"big steps\" at times as he has a tendency to shuffle his feet.  Overall improved tolerance to functional activity this date with an increase in gait distance and decreased assist required for overall functional mobility.  Will continue to progress functional mobility as tolerated.      Anticipated Discharge Disposition (PT): skilled nursing facility  "

## 2023-10-31 NOTE — CASE MANAGEMENT/SOCIAL WORK
Discharge Planning Assessment  [facility]     Patient Name: Anurag Pickard  MRN: [id number]  Today's Date: 10/31/2023    Admit Date: 10/27/2023    Plan: Return to [facility] with Outpatient PT onsite. Spoke with nurse and he can return at D/C.  [facility] setup to transport tomorrow at 1300         Discharge Plan       Row Name 10/31/23 1637       Plan    Plan Return to [facility] with Outpatient PT onsite. Spoke with nurse and he can return at D/C.  [facility] setup to transport tomorrow at 1300    Patient/Family in Agreement with Plan yes    Plan Comments Met with pt. and wife at bedside. Pt has dementia, information obtained from wife. Explained roll of . Face sheet and pharmacy verified. Pt lives at [facility].  Called and spoke to nurse at [facility] and pt can return. Informed of plan for D/C tomorrow.  Home DME includes a walker.  Pt is dependent with ADLs. Pt's wife states facility has onsite PT through [facility] and she wants pt to have PT with them. Pt's PCP is Zaina BETANCOURT. Facility administers pt's meds and they use [facility]. At discharge, wife states pt will need ambulance transport. Plan for D/C tomorrow.  [facility] setup to transport tomorrow at 1300. Notified attending, nurse, and spouse. Explained that CCP would follow to assess for discharge needs.  Dylon Parish RN-BC                  Continued Care and Services - Admitted Since 10/27/2023    Coordination has not been started for this encounter.       Expected Discharge Date and Time       Expected Discharge Date Expected Discharge Time    Nov 1, 2023           Dylon Parish RN

## 2023-10-31 NOTE — THERAPY TREATMENT NOTE
Patient Name: Anurag Pickard  : 1950    MRN: [id number]                              Today's Date: 10/31/2023       Admit Date: 10/27/2023    Visit Dx:     ICD-10-CM ICD-9-CM   1. Hypocalcemia  E83.51 275.41   2. Hypomagnesemia  E83.42 275.2   3. S/P AVR (aortic valve replacement)  Z95.2 V43.3   4. Alzheimer's dementia, unspecified dementia severity, unspecified timing of dementia onset, unspecified whether behavioral, psychotic, or mood disturbance or anxiety  G30.9 331.0    F02.80 294.10     Patient Active Problem List   Diagnosis    Aortic valve stenosis    Diabetes mellitus, type II    Gastroesophageal reflux disease    Hyperlipidemia    Hypogonadism    Obesity    Obstructive sleep apnea syndrome    Osteoarthritis    Pulmonary hypertension    Rosacea    Vitamin D deficiency    Memory loss    Ataxia    Alcohol abuse    Retinal disorder    Presbyopia    Nuclear senile cataract    History of laser assisted in situ keratomileusis    Benign essential hypertension    Screening PSA (prostate specific antigen)    Dyspnea on exertion    Chronic diastolic CHF (congestive heart failure)    Nonrheumatic aortic valve stenosis    S/P AVR (aortic valve replacement)    Coronary artery disease involving native coronary artery of native heart without angina pectoris    Right knee pain    Alzheimer's dementia    Paroxysmal atrial fibrillation    Electrolyte abnormality    PFO (patent foramen ovale)    Type 2 diabetes mellitus with hyperglycemia    Poor appetite     Past Medical History:   Diagnosis Date    Acute diastolic CHF (congestive heart failure) 2022    Ankle edema     Aortic stenosis     Moderate    Aortic valve replaced     B12 deficiency     Bell's palsy     Bleeds easily     Chipped tooth     top front    Diabetes mellitus     Dry skin     Erectile dysfunction     Falls     2 falls in last 3 months    Fatigue     Fragile skin     GERD (gastroesophageal reflux disease)     Heart murmur      Hyperlipidemia     Hypertension     Hypokalemia     Hypomagnesemia     YANA (obstructive sleep apnea)     cpap bring dos    Osteoarthritis     Paroxysmal atrial fibrillation 9/27/2023    PFO (patent foramen ovale) 10/29/2023    Pulmonary HTN     Rosacea     Vitamin D deficiency      Past Surgical History:   Procedure Laterality Date    AORTIC VALVE REPAIR/REPLACEMENT N/A 03/28/2022    Procedure: AORTIC VALVE REPAIR/REPLACEMENT;  Surgeon: Dennis Brandon MD;  Location: [facility] CVOR;  Service: Cardiothoracic;  Laterality: N/A;  aortic valve replacement with 27mm [employer] magna ease    CARDIAC CATHETERIZATION      CARDIAC CATHETERIZATION N/A 12/17/2021    Procedure: Right and Left Heart Cath;  Surgeon: Juan Castellon DO;  Location: [facility] CATH INVASIVE LOCATION;  Service: Cardiology;  Laterality: N/A;    COLONOSCOPY      JOINT REPLACEMENT Bilateral 2021    knee    TONSILLECTOMY      TOTAL KNEE ARTHROPLASTY      TOTAL KNEE ARTHROPLASTY Left 05/04/2021    Procedure: TOTAL KNEE ARTHROPLASTY;  Surgeon: Otf Redmond MD;  Location: [facility] MAIN OR;  Service: Orthopedics;  Laterality: Left;      General Information       Row Name 10/31/23 0853          Physical Therapy Time and Intention    Document Type therapy note (daily note)  -MS     Mode of Treatment physical therapy;individual therapy  -MS       Row Name 10/31/23 0853          General Information    Patient Profile Reviewed yes  -MS     Existing Precautions/Restrictions fall   Exit alarm  -MS     Barriers to Rehab cognitive status  -MS       Row Name 10/31/23 0853          Cognition    Orientation Status (Cognition) oriented to;person  -MS       Row Name 10/31/23 0853          Safety Issues, Functional Mobility    Comment, Safety Issues/Impairments (Mobility) Gait belt used for safety.  -MS               User Key  (r) = Recorded By, (t) = Taken By, (c) = Cosigned By      Initials Name Provider Type    Panda Blair, PT Physical  "Therapist                   Mobility       Row Name 10/31/23 0854          Bed Mobility    Supine-Sit [place] (Bed Mobility) minimum assist (75% patient effort)  -MS       Row Name 10/31/23 0854          Sit-Stand Transfer    Sit-Stand [place] (Transfers) minimum assist (75% patient effort)  -MS     Assistive Device (Sit-Stand Transfers) walker, front-wheeled  -MS       Row Name 10/31/23 0854          Gait/Stairs (Locomotion)    [place] Level (Gait) minimum assist (75% patient effort)  -MS     Assistive Device (Gait) walker, front-wheeled  -MS     Distance in Feet (Gait) 40 feet  -MS     Deviations/Abnormal Patterns (Gait) damian decreased;stride length decreased  -MS     Bilateral Gait Deviations forward flexed posture  -MS     Comment, (Gait/Stairs) Verbal/tactile cues given for posture correction.  At times, pt. needs cues to take \"big steps\" during ambulation due to shuffling  -MS               User Key  (r) = Recorded By, (t) = Taken By, (c) = Cosigned By      Initials Name Provider Type    Panda Blair, PT Physical Therapist                   Obj/Interventions    No documentation.                  Goals/Plan    No documentation.                  Clinical Impression       Row Name 10/31/23 0855          Pain    Pretreatment Pain Rating 0/10 - no pain  -MS     Posttreatment Pain Rating 0/10 - no pain  -MS     Pre/Posttreatment Pain Comment No verbal/visual signs of pain  -MS       Row Name 10/31/23 0855          Positioning and Restraints    Pre-Treatment Position in bed  -MS     Post Treatment Position chair  -MS     In Chair notified nsg;reclined;sitting;call light within reach;encouraged to call for assist;exit alarm on  All lines intact.  -MS               User Key  (r) = Recorded By, (t) = Taken By, (c) = Cosigned By      Initials Name Provider Type    Panda Blair, PT Physical Therapist                   Outcome Measures       Row Name 10/31/23 0855 10/31/23 0815       How " much help from another person do you currently need...    Turning from your back to your side while in flat bed without using bedrails? 3  -MS 3  -KS    Moving from lying on back to sitting on the side of a flat bed without bedrails? 3  -MS 3  -KS    Moving to and from a bed to a chair (including a wheelchair)? 3  -MS 2  -KS    Standing up from a chair using your arms (e.g., wheelchair, bedside chair)? 3  -MS 2  -KS    Climbing 3-5 steps with a railing? 2  -MS 2  -KS    To walk in hospital room? 3  -MS 2  -KS    AM-PAC 6 Clicks Score (PT) 17  -MS 14  -KS    Highest level of mobility 5 --> Static standing  -MS 4 --> Transferred to chair/commode  -KS      Row Name 10/31/23 0855          Functional Assessment    Outcome Measure Options AM-PAC 6 Clicks Basic Mobility (PT)  -MS               User Key  (r) = Recorded By, (t) = Taken By, (c) = Cosigned By      Initials Name Provider Type    Panda Blair, PT Physical Therapist    Karie Rios, RN Registered Nurse                                 Physical Therapy Education       Title: PT OT SLP Therapies (Done)       Topic: Physical Therapy (Done)       Point: Mobility training (Done)       Learning Progress Summary             Patient Acceptance, E,D, VU,NR by MS at 10/31/2023 0855    Acceptance, E,D, VU,NR by MS at 10/30/2023 0943                         Point: Home exercise program (Done)       Learning Progress Summary             Patient Acceptance, E,D, VU,NR by MS at 10/30/2023 0943                         Point: Body mechanics (Done)       Learning Progress Summary             Patient Acceptance, E,D, VU,NR by MS at 10/31/2023 0855    Acceptance, E,D, VU,NR by MS at 10/30/2023 0943                         Point: Precautions (Done)       Learning Progress Summary             Patient Acceptance, E,D, VU,NR by MS at 10/31/2023 0855    Acceptance, E,D, VU,NR by MS at 10/30/2023 0943                                         User Key       Initials Effective  "Dates Name Provider Type Discipline    MS 06/16/21 -  Panda Plaza PT Physical Therapist PT                  PT Recommendation and Plan  Planned Therapy Interventions (PT): balance training, bed mobility training, gait training, home exercise program, patient/family education, postural re-education, transfer training, strengthening  Plan of Care Reviewed With: patient  Outcome Evaluation: Upon entering room, pt. supine in bed, awake/alert, and agreeable to work with P.T. this date.  Pt. able to ambulate 40 feet, Min. assist x1, with use of Rwx. Pt. requires Min. assist x 1 for bed mobility and Min. assist x 1 for sit <-> stand transfers.  Verbal/tactile cues given during ambulation for posture correction and to take \"big steps\" at times as he has a tendency to shuffle his feet.  Overall improved tolerance to functional activity this date with an increase in gait distance and decreased assist required for overall functional mobility.  Will continue to progress functional mobility as tolerated.     Time Calculation:         PT Charges       Row Name 10/31/23 0858             Time Calculation    Start Time 0830  -MS      Stop Time 0845  -MS      Time Calculation (min) 15 min  -MS      PT Received On 10/31/23  -MS      PT - Next Appointment 11/01/23  -MS         Time Calculation- PT    Total Timed Code Minutes- PT 14 minute(s)  -MS                User Key  (r) = Recorded By, (t) = Taken By, (c) = Cosigned By      Initials Name Provider Type    MS Plaza, Panda PERES PT Physical Therapist                  Therapy Charges for Today       Code Description Service Date Service Provider Modifiers Qty    80086476497 HC PT EVAL MOD COMPLEXITY 2 10/30/2023 Panda Plaza, PT GP 1    71736401956 HC PT THERAPEUTIC ACT EA 15 MIN 10/30/2023 Panda Plaza, PT GP 1    15220207993 HC PT THER SUPP EA 15 MIN 10/30/2023 Panda Plaza, PT GP 1    65228582626 HC PT THERAPEUTIC ACT EA 15 MIN 10/31/2023 Panda Plaza, PT " GP 1            PT G-Codes  Outcome Measure Options: AM-PAC 6 Clicks Basic Mobility (PT)  AM-PAC 6 Clicks Score (PT): 17  PT Discharge Summary  Anticipated Discharge Disposition (PT): skilled nursing facility    Panda Plaza, PT  10/31/2023

## 2023-10-31 NOTE — PROGRESS NOTES
[facility] Medicine Services  PROGRESS NOTE    Patient Name: Anurag Pickard  : 1950  MRN: [id number]    Date of Admission: 10/27/2023  Primary Care Physician: System, Provider Not In    Subjective   Subjective     CC:  Follow-up abnormal electrolytes    Subjective:  Patient says he is doing great today.  He seems very positive.    Review of Systems  Unreliable due to dementia but denies questions as per below  No current fevers or chills  No current shortness of breath or cough  No current nausea, vomiting, or diarrhea  No current chest pain or palpitations      Objective   Objective     Vital Signs:   Temp:  [97.5 °F (36.4 °C)-97.7 °F (36.5 °C)] 97.5 °F (36.4 °C)  Heart Rate:  [] 89  Resp:  [16-19] 19  BP: (121-134)/(75-90) 134/90        Physical Exam:  Constitutional:Awake, alert, chronically ill-appearing but nontoxic  HENT: NCAT, mucous membranes moist, neck supple  Respiratory: No cough or wheezing, breathing, good inspiration  Cardiovascular: Pulse rate is normal, normal radial pulses  Gastrointestinal: , soft, nontender, nondistended  Musculoskeletal: Elderly frail and chronically debilitated in appearance, mildly obese with BMI of 30, muscle wasting noted, minimal lower extremity edema  Psychiatric: Calm affect, cooperative, conversational  Neurologic: Poor historian, no slurred speech or facial droop, able to follow instructions  Skin: No rashes or jaundice, warm      Results Reviewed:  Results from last 7 days   Lab Units 10/31/23  0402 10/30/23  0413 10/29/23  0448   WBC 10*3/mm3 8.69 8.85 7.05   HEMOGLOBIN g/dL 8.7* 8.7* 8.2*   HEMATOCRIT % 26.5* 27.5* 25.8*   PLATELETS 10*3/mm3 295 313 286     Results from last 7 days   Lab Units 10/31/23  0402 10/30/23  0413 10/29/23  1656 10/29/23  0448 10/28/23  1207 10/27/23  1903   SODIUM mmol/L 140 142  --  141   < > 144   POTASSIUM mmol/L 4.0 4.1 4.3 3.3*   < > 4.9   CHLORIDE mmol/L 107 108*  --  104   < > 102   CO2 mmol/L 24.0 24.0  --   25.4   < > 28.0   BUN mg/dL 11 11  --  9   < > 23   CREATININE mg/dL 0.82 0.84  --  0.81   < > 1.08   GLUCOSE mg/dL 94 118*  --  125*   < > 209*   CALCIUM mg/dL 8.2* 7.7*  --  7.4*   < > 5.8*   ALK PHOS U/L  --   --   --   --   --  69   ALT (SGPT) U/L  --   --   --   --   --  19   AST (SGOT) U/L  --   --   --   --   --  38    < > = values in this interval not displayed.     Estimated Creatinine Clearance: 102.1 mL/min (by C-G formula based on SCr of 0.82 mg/dL).    Microbiology Results Abnormal       Procedure Component Value - Date/Time    Respiratory Panel PCR w/COVID-19(SARS-CoV-2) [facility] In-House, NP Swab in UTM/VTM, 3-4 HR TAT - Swab, Nasopharynx [[id number]]  (Normal) Collected: 10/28/23 1209    Lab Status: Final result Specimen: Swab from Nasopharynx Updated: 10/28/23 1406     ADENOVIRUS, PCR Not Detected     Coronavirus 229E Not Detected     Coronavirus HKU1 Not Detected     Coronavirus NL63 Not Detected     Coronavirus OC43 Not Detected     COVID19 Not Detected     Human Metapneumovirus Not Detected     Human Rhinovirus/Enterovirus Not Detected     Influenza A PCR Not Detected     Influenza B PCR Not Detected     Parainfluenza Virus 1 Not Detected     Parainfluenza Virus 2 Not Detected     Parainfluenza Virus 3 Not Detected     Parainfluenza Virus 4 Not Detected     RSV, PCR Not Detected     Bordetella pertussis pcr Not Detected     Bordetella parapertussis PCR Not Detected     Chlamydophila pneumoniae PCR Not Detected     Mycoplasma pneumo by PCR Not Detected    Narrative:      In the setting of a positive respiratory panel with a viral infection PLUS a negative procalcitonin without other underlying concern for bacterial infection, consider observing off antibiotics or discontinuation of antibiotics and continue supportive care. If the respiratory panel is positive for atypical bacterial infection (Bordetella pertussis, Chlamydophila pneumoniae, or Mycoplasma pneumoniae), consider  antibiotic de-escalation to target atypical bacterial infection.            Imaging Results (Last 24 Hours)       ** No results found for the last 24 hours. **            Results for orders placed in visit on 03/28/22    Emergent/Open-Heart Anesthesia PAULO    Narrative  Emergent/Open-Heart Anesthesia PAULO    Procedure Performed: Emergent/Open-Heart Anesthesia PAULO  Start Time:  End Time:      General Procedure Information  Location performed:  OR  Intubated  Bite block placed  Heart visualized  Probe Insertion:  Easy  Probe Type:  Biplane and multiplane  Modalities:  Color flow mapping, pulse wave Doppler and continuous wave Doppler    Echocardiographic and Doppler Measurements    Ventricles    Right Ventricle:  Ejection Fraction 60%.  Left Ventricle:  Global Function normal.  Ejection Fraction 60%.                                Anesthesia Information  Performed Personally  Anesthesiologist:  Van Frost MD      Echocardiogram Comments:  PAULO placed easily with no resistance ,  Lubricated , bite guard used    Pre cpb  LV EF 60 no wma  RV nl SF  MV tr/mild MR  AV Ca++ mild mod AI ,  Mod AS YOSI 1.2cm2 by cont  tv TR TR  Suspicious for smal PFO by CFD      Post cpb  #27 av well seated no para / trans leak seen  No other change      I have reviewed the medications:  Scheduled Meds:atorvastatin, 40 mg, Oral, Daily  calcium carbonate, 2 tablet, Oral, BID  famotidine, 20 mg, Oral, BID AC  insulin lispro, 2-9 Units, Subcutaneous, 4x Daily AC & at Bedtime  LORazepam, 0.5 mg, Oral, Nightly  magnesium oxide, 400 mg, Oral, BID  melatonin, 10 mg, Oral, Nightly  metFORMIN ER, 1,000 mg, Oral, Daily With Breakfast  metoprolol succinate XL, 25 mg, Oral, Daily  midodrine, 2.5 mg, Oral, TID AC  multivitamin with minerals, 1 tablet, Oral, Daily  potassium & sodium phosphates, 2 packet, Oral, TID AC  senna-docusate sodium, 2 tablet, Oral, BID  sertraline, 25 mg, Oral, Daily  sodium chloride, 10 mL, Intravenous, Q12H  vitamin B-12,  1,000 mcg, Oral, Daily  vitamin D, 50,000 Units, Oral, Q7 Days      Continuous Infusions:sodium chloride, 100 mL/hr, Last Rate: 100 mL/hr (10/27/23 9851)      PRN Meds:.  acetaminophen **OR** acetaminophen **OR** acetaminophen    senna-docusate sodium **AND** polyethylene glycol **AND** bisacodyl **AND** bisacodyl    Calcium Replacement - Follow Nurse / BPA Driven Protocol    dextrose    dextrose    glucagon (human recombinant)    HYDROcodone-acetaminophen    LORazepam    Magnesium Standard Dose Replacement - Follow Nurse / BPA Driven Protocol    ondansetron **OR** ondansetron    Phosphorus Replacement - Follow Nurse / BPA Driven Protocol    Potassium Replacement - Follow Nurse / BPA Driven Protocol    sodium chloride    [COMPLETED] Insert Peripheral IV **AND** sodium chloride    sodium chloride    sodium chloride    Assessment & Plan   Assessment & Plan     Active Hospital Problems    Diagnosis  POA    **Electrolyte abnormality [E87.8]  Yes    Poor appetite [R63.0]  Yes    PFO (patent foramen ovale) [Q21.12]  Not Applicable    Type 2 diabetes mellitus with hyperglycemia [E11.65]  Yes    Paroxysmal atrial fibrillation [I48.0]  Yes    Alzheimer's dementia [G30.9, F02.80]  Yes    Coronary artery disease involving native coronary artery of native heart without angina pectoris [I25.10]  Yes    S/P AVR (aortic valve replacement) [Z95.2]  Not Applicable    Chronic diastolic CHF (congestive heart failure) [I50.32]  Yes    Obesity [E66.9]  Yes    Vitamin D deficiency [E55.9]  Yes    Pulmonary hypertension [I27.20]  Yes    Hyperlipidemia [E78.5]  Yes    Diabetes mellitus, type II [E11.9]  Yes      Resolved Hospital Problems   No resolved problems to display.        Brief Hospital Course to date:  Anurag Pickard is a 72 y.o. male     Discussion/plan:  Electrolytes are improving.  Calcium is still low today.  Magnesium remains low.  Plan to replete and recheck levels today.  Phosphorus also remains low.  Suspect these are  mostly nutritionally related.  Encouraging patient to eat as much nutrition as possible.  Continue to hold diuretics.      Patient has been off restraints for 24 hours.  I reached out to the family and talked about various options of treatment for sundowning.  At this point they would like to continue to benzodiazepine therapy was on prior to the discharge.  They are interested in 1 mood stabilizer that unfortunately is nonformulary and they are having trouble getting approved for insurance.  We will hold off adding any other mood stabilizer for now as they were concerned about his history of prolonged QT.  EKG completed and QT is 470 which currently is acceptable.  No new cardiac issues or symptoms currently.    Continue supportive care and symptom treatment.    Continue metoprolol for paroxysmal atrial fibrillation.  He is currently sinus rhythm and not having any issues however he does not seem to like the telemetry.  I think we need to take him off the telemetry as it is causing more agitation than benefit at this point.  Telemetry discontinued.    Monitor glucose and adjust insulin as needed.  A1c is 7.9.  Glucose improving on metformin extended release, increased to 1000 mg.  Continue correction scale.    With recurrent illnesses in setting of dementia prognosis is somewhat guarded.  Plan for careful goals of care conversation pending clinical progress.  PT evaluation.    Electrolyte abnormality  Acute and recurring  Initially severe abnormalities, magnesium 0.7, calcium 5.8  replacement protocol initiated  Patient has not prior nephrology evaluation.  Neurology consulted  Monitor BMP  Continuous cardiac monitoring  Diuretics held     Chronic diastolic congestive heart failure  Chronic  Hold home furosemide spironolactone for now with severe electrolyte abnormalities  Continue home metoprolol   Daily weights  Monitor I's and O's     Aortic stenosis  Status post AVR( aortic valve replacement)- May 2023-porcine  valve     Coronary artery disease  Hyperlipidemia  Continue home statin  Continuous cardiac monitoring and pulse oximetry     Essential  hypertension  Chronic/Stable   Vital Signs per policy   Patient recently started on midodrine for hypotension     Proximal atrial fibrillation  Chronic  EKG interpreted as atrial fibrillation  Not on AC- due to his memory deficits related to Alzheimer's, and unstable gait he is at high risk of a catastrophic event   RC -metoprolol     Type 2 diabetes mellitus  Chronic  Accu-Cheks ACHS  SSI ordered for hyperglycemia  hyperglycemic at time of admission  Glycemic treatment per protocol  Metformin extended release started.     Alzheimer's dementia  Chronic   Continue home Ativan and sertraline  Hold home Rexulti -nonformulary also may be contributory to hyperglycemia           I discussed the patient's findings and my recommendations with patient and spouse.     VTE Prophylaxis - SCDs.    Disposition: To long-term facility tomorrow    CODE STATUS:   Code Status and Medical Interventions:   Ordered at: 10/27/23 2131     Code Status (Patient has no pulse and is not breathing):    CPR (Attempt to Resuscitate)     Medical Interventions (Patient has pulse or is breathing):    Full Support       Bob Byrne MD  10/31/23

## 2023-10-31 NOTE — CASE MANAGEMENT/SOCIAL WORK
"Physicians Statement of Medical Necessity for  Ambulance Transportation    GENERAL INFORMATION     Name: Anurag Pickard  YOB: 1950  Medicare #: [id number]   Transport Date: 11/1/2023 (Valid for round trips this date, or for scheduled repetitive trips for 60 days from the date signed below.)  Origin: [facility]  Destination: [facility]   Is the Patient's stay covered under Medicare Part A (PPS/DRG?)Yes  Closest appropriate facility? Yes  If this a hosp-hosp transfer? No  Is this a hospice patient? No    MEDICAL NECESSITY QUESTIONAIRE    Ambulance Transportation is medically necessary only if other means of transportation are contraindicated or would be potentially harmful to the patient.  To meet this requirement, the patient must be either \"bed confined\" or suffer from a condition such that transport by means other than an ambulance is contraindicated by the patient's condition.  The following questions must be answered by the healthcare professional signing below for this form to be valid:     1) Describe the MEDICAL CONDITION (physical and/or mental) of this patient AT THE TIME OF AMBULANCE TRANSPORT that requires the patient to be transported in an ambulance, and why transport by other means is contraindicated by the patient's condition:   Past Medical History:   Diagnosis Date    Acute diastolic CHF (congestive heart failure) 01/03/2022    Ankle edema     Aortic stenosis     Moderate    Aortic valve replaced     B12 deficiency     Bell's palsy     Bleeds easily     Chipped tooth     top front    Dementia     Diabetes mellitus     Dry skin     Erectile dysfunction     Falls     2 falls in last 3 months    Fatigue     Fragile skin     GERD (gastroesophageal reflux disease)     Heart murmur     Hyperlipidemia     Hypertension     Hypokalemia     Hypomagnesemia     YANA (obstructive sleep apnea)     cpap bring dos    Osteoarthritis     Paroxysmal atrial fibrillation " "09/27/2023    PFO (patent foramen ovale) 10/29/2023    Pulmonary HTN     Rosacea     Vitamin D deficiency       Past Surgical History:   Procedure Laterality Date    AORTIC VALVE REPAIR/REPLACEMENT N/A 03/28/2022    Procedure: AORTIC VALVE REPAIR/REPLACEMENT;  Surgeon: Dennis Brandon MD;  Location: [facility] CVOR;  Service: Cardiothoracic;  Laterality: N/A;  aortic valve replacement with 27mm [employer] magna ease    CARDIAC CATHETERIZATION      CARDIAC CATHETERIZATION N/A 12/17/2021    Procedure: Right and Left Heart Cath;  Surgeon: Juan Castellon DO;  Location: [facility] CATH INVASIVE LOCATION;  Service: Cardiology;  Laterality: N/A;    COLONOSCOPY      JOINT REPLACEMENT Bilateral 2021    knee    TONSILLECTOMY      TOTAL KNEE ARTHROPLASTY      TOTAL KNEE ARTHROPLASTY Left 05/04/2021    Procedure: TOTAL KNEE ARTHROPLASTY;  Surgeon: Otf Redmond MD;  Location: [facility] MAIN OR;  Service: Orthopedics;  Laterality: Left;      2) Is this patient \"bed confined\" as defined below?No    To be \"bed confined\" the patient must satisfy all three of the following criteria:  (1) unable to get up from bed without assistance; AND (2) unable to ambulate;  AND (3) unable to sit in a chair or wheelchair.  3) Can this patient safely be transported by car or wheelchair van (I.e., may safely sit during transport, without an attendant or monitoring?)No   4. In addition to completing questions 1-3 above, please check any of the following conditions that apply*:          *Note: supporting documentation for any boxes checked must be maintained in the patient's medical records Patient is confused, Need or possible need for restraints, and Other Dementia, multiple falls      SIGNATURE OF PHYSICIAN OR OTHER AUTHORIZED HEALTHCARE PROFESSIONAL    I certify that the above information is true and correct based on my evaluation of this patient, and represent that the patient requires transport by ambulance and that other " forms of transport are contraindicated.  I understand that this information will be used by the Centers for Medicare and Medicaid Services (CMS) to support the determiniation of medical necessity for ambulance services, and I represent that I have personal knowledge of the patient's condition at the time of transport.       If this box is checked, I also certify that the patient is physically or mentally incapable of signing the ambulance service's claim form and that the institution with which I am affiliated has furnished care, services or assistance to the patient.  My signature below is made on behalf of the patient pursuant to 42 .36(b)(4). In accordance with 42 .37, the specific reason(s) that the patient is physically or mentally incapable of signing the claim for is as follows:     Signature of Physician or Healthcare Professional  Date/Time:        (For Scheduled repetitive transport, this form is not valid for transports performed more than 60 days after this date).                                                                                                                                            --------------------------------------------------------------------------------------------  Printed Name and Credentials of Physician or Authorized Healthcare Professional     *Form must be signed by patient's attending physician for scheduled, repetitive transports,.  For non-repetitive ambulance transports, if unable to obtain the signature of the attending physician, any of the following may sign (please select below):     Physician  Clinical Nurse Specialist  Registered Nurse     Physician Assistant  Discharge Planner  Licensed Practical Nurse     Nurse Practitioner

## 2023-11-01 VITALS
OXYGEN SATURATION: 97 % | WEIGHT: 239.3 LBS | DIASTOLIC BLOOD PRESSURE: 77 MMHG | TEMPERATURE: 97.9 F | HEART RATE: 79 BPM | RESPIRATION RATE: 18 BRPM | BODY MASS INDEX: 34.34 KG/M2 | SYSTOLIC BLOOD PRESSURE: 126 MMHG

## 2023-11-01 PROBLEM — F02.B4 MODERATE LATE ONSET ALZHEIMER'S DEMENTIA WITH ANXIETY: Status: ACTIVE | Noted: 2023-09-15

## 2023-11-01 PROBLEM — I50.32 CHRONIC HEART FAILURE WITH PRESERVED EJECTION FRACTION (HFPEF): Status: ACTIVE | Noted: 2023-11-01

## 2023-11-01 PROBLEM — G30.1 MODERATE LATE ONSET ALZHEIMER'S DEMENTIA WITH ANXIETY: Status: ACTIVE | Noted: 2023-09-15

## 2023-11-01 LAB
ANION GAP SERPL CALCULATED.3IONS-SCNC: 10.2 MMOL/L (ref 5–15)
BUN SERPL-MCNC: 11 MG/DL (ref 8–23)
BUN/CREAT SERPL: 12.2 (ref 7–25)
CALCIUM SPEC-SCNC: 8.6 MG/DL (ref 8.6–10.5)
CHLORIDE SERPL-SCNC: 105 MMOL/L (ref 98–107)
CO2 SERPL-SCNC: 25.8 MMOL/L (ref 22–29)
CREAT SERPL-MCNC: 0.9 MG/DL (ref 0.76–1.27)
DEPRECATED RDW RBC AUTO: 40.5 FL (ref 37–54)
EGFRCR SERPLBLD CKD-EPI 2021: 91.3 ML/MIN/1.73
ERYTHROCYTE [DISTWIDTH] IN BLOOD BY AUTOMATED COUNT: 13 % (ref 12.3–15.4)
GLUCOSE BLDC GLUCOMTR-MCNC: 170 MG/DL (ref 70–130)
GLUCOSE BLDC GLUCOMTR-MCNC: 89 MG/DL (ref 70–130)
GLUCOSE SERPL-MCNC: 106 MG/DL (ref 65–99)
HCT VFR BLD AUTO: 27.8 % (ref 37.5–51)
HGB BLD-MCNC: 8.9 G/DL (ref 13–17.7)
MAGNESIUM SERPL-MCNC: 1.4 MG/DL (ref 1.6–2.4)
MCH RBC QN AUTO: 27.8 PG (ref 26.6–33)
MCHC RBC AUTO-ENTMCNC: 32 G/DL (ref 31.5–35.7)
MCV RBC AUTO: 86.9 FL (ref 79–97)
PLATELET # BLD AUTO: 333 10*3/MM3 (ref 140–450)
PMV BLD AUTO: 10.4 FL (ref 6–12)
POTASSIUM SERPL-SCNC: 4.3 MMOL/L (ref 3.5–5.2)
RBC # BLD AUTO: 3.2 10*6/MM3 (ref 4.14–5.8)
SODIUM SERPL-SCNC: 141 MMOL/L (ref 136–145)
WBC NRBC COR # BLD: 8.91 10*3/MM3 (ref 3.4–10.8)

## 2023-11-01 PROCEDURE — 80048 BASIC METABOLIC PNL TOTAL CA: CPT | Performed by: INTERNAL MEDICINE

## 2023-11-01 PROCEDURE — 83735 ASSAY OF MAGNESIUM: CPT | Performed by: INTERNAL MEDICINE

## 2023-11-01 PROCEDURE — 82948 REAGENT STRIP/BLOOD GLUCOSE: CPT

## 2023-11-01 PROCEDURE — 85027 COMPLETE CBC AUTOMATED: CPT | Performed by: INTERNAL MEDICINE

## 2023-11-01 PROCEDURE — 25010000002 MAGNESIUM SULFATE 4 GM/100ML SOLUTION: Performed by: INTERNAL MEDICINE

## 2023-11-01 PROCEDURE — 63710000001 INSULIN LISPRO (HUMAN) PER 5 UNITS: Performed by: NURSE PRACTITIONER

## 2023-11-01 RX ORDER — MAGNESIUM SULFATE HEPTAHYDRATE 40 MG/ML
4 INJECTION, SOLUTION INTRAVENOUS ONCE
Status: COMPLETED | OUTPATIENT
Start: 2023-11-01 | End: 2023-11-01

## 2023-11-01 RX ORDER — ERGOCALCIFEROL 1.25 MG/1
50000 CAPSULE ORAL
Qty: 5 CAPSULE | Refills: 0 | Status: SHIPPED | OUTPATIENT
Start: 2023-11-04

## 2023-11-01 RX ORDER — FAMOTIDINE 20 MG/1
10 TABLET, FILM COATED ORAL 2 TIMES DAILY PRN
Start: 2023-11-01

## 2023-11-01 RX ORDER — LORAZEPAM 0.5 MG/1
TABLET ORAL
Qty: 20 TABLET | Refills: 0 | Status: SHIPPED | OUTPATIENT
Start: 2023-11-01

## 2023-11-01 RX ORDER — METFORMIN HYDROCHLORIDE 500 MG/1
1000 TABLET, EXTENDED RELEASE ORAL
Start: 2023-11-02

## 2023-11-01 RX ORDER — POTASSIUM CHLORIDE 750 MG/1
10 TABLET, EXTENDED RELEASE ORAL DAILY
Start: 2023-11-01

## 2023-11-01 RX ORDER — CHOLECALCIFEROL (VITAMIN D3) 125 MCG
10 CAPSULE ORAL NIGHTLY
Start: 2023-11-01

## 2023-11-01 RX ORDER — FOLIC ACID 1 MG/1
1 TABLET ORAL DAILY
Start: 2023-11-01 | End: 2024-10-26

## 2023-11-01 RX ADMIN — FAMOTIDINE 20 MG: 20 TABLET, FILM COATED ORAL at 06:56

## 2023-11-01 RX ADMIN — CARBIDOPA AND LEVODOPA 2.5 MG: 50; 200 TABLET, EXTENDED RELEASE ORAL at 08:11

## 2023-11-01 RX ADMIN — MULTIPLE VITAMINS W/ MINERALS TAB 1 TABLET: TAB at 08:11

## 2023-11-01 RX ADMIN — Medication 2 PACKET: at 11:47

## 2023-11-01 RX ADMIN — METOPROLOL SUCCINATE 25 MG: 25 TABLET, EXTENDED RELEASE ORAL at 08:10

## 2023-11-01 RX ADMIN — Medication 1000 MCG: at 08:11

## 2023-11-01 RX ADMIN — MAGNESIUM SULFATE HEPTAHYDRATE 4 G: 40 INJECTION, SOLUTION INTRAVENOUS at 10:47

## 2023-11-01 RX ADMIN — MAGNESIUM SULFATE HEPTAHYDRATE 4 G: 40 INJECTION, SOLUTION INTRAVENOUS at 06:32

## 2023-11-01 RX ADMIN — LORAZEPAM 0.5 MG: 0.5 TABLET ORAL at 01:50

## 2023-11-01 RX ADMIN — Medication 2 PACKET: at 06:56

## 2023-11-01 RX ADMIN — ATORVASTATIN CALCIUM 40 MG: 20 TABLET, FILM COATED ORAL at 08:10

## 2023-11-01 RX ADMIN — ANTACID TABLETS 2 TABLET: 500 TABLET, CHEWABLE ORAL at 08:11

## 2023-11-01 RX ADMIN — Medication 10 ML: at 08:10

## 2023-11-01 RX ADMIN — INSULIN LISPRO 2 UNITS: 100 INJECTION, SOLUTION INTRAVENOUS; SUBCUTANEOUS at 12:27

## 2023-11-01 RX ADMIN — CARBIDOPA AND LEVODOPA 2.5 MG: 50; 200 TABLET, EXTENDED RELEASE ORAL at 11:47

## 2023-11-01 RX ADMIN — SERTRALINE 25 MG: 25 TABLET, FILM COATED ORAL at 08:11

## 2023-11-01 RX ADMIN — METFORMIN HYDROCHLORIDE 1000 MG: 500 TABLET, EXTENDED RELEASE ORAL at 08:16

## 2023-11-01 RX ADMIN — MAGNESIUM OXIDE 400 MG (241.3 MG MAGNESIUM) TABLET 400 MG: TABLET at 08:10

## 2023-11-01 NOTE — DISCHARGE SUMMARY
"    [facility] Medicine Services  DISCHARGE SUMMARY    Patient Name: Anurag Pickard  : 1950  MRN: [id number]    Date of Admission: 10/27/2023  6:04 PM  Date of Discharge:  2023  Primary Care Physician: Karie Hawkins, SERAFIN    Consults       Date and Time Order Name Status Description    10/28/2023  9:39 AM Inpatient Nephrology Consult Completed     10/27/2023  8:24 PM [facility] (on-call MD unless specified) Details              Hospital Course       Active Hospital Problems    Diagnosis  POA    **Electrolyte abnormality [E87.8]  Yes    Chronic heart failure with preserved ejection fraction (HFpEF) [I50.32]  Yes    Poor appetite [R63.0]  Yes    PFO (patent foramen ovale) [Q21.12]  Not Applicable    Type 2 diabetes mellitus with hyperglycemia [E11.65]  Yes    Paroxysmal atrial fibrillation [I48.0]  Yes    Moderate late onset Alzheimer's dementia with anxiety [G30.1, F02.B4]  Yes    Coronary artery disease involving native coronary artery of native heart without angina pectoris [I25.10]  Yes    S/P AVR (aortic valve replacement) [Z95.2]  Not Applicable    Chronic diastolic CHF (congestive heart failure) [I50.32]  Yes    Obesity [E66.9]  Yes    Vitamin D deficiency [E55.9]  Yes    Hypocalcemia [E83.51]  Yes    Pulmonary hypertension [I27.20]  Yes    Hypomagnesemia [E83.42]  Yes    Hyperlipidemia [E78.5]  Yes    Diabetes mellitus, type II [E11.9]  Yes      Resolved Hospital Problems   No resolved problems to display.      History of Present Illness as written on 10/28/2023 per the admitting team  \"aRmona Sweeney is a 72 year old male with a history of diastolic CHF, aortic valve replacement, HTN, HLD, YANA, PAF, and Alzheimer's dementia.  Unfortunately Mr. Blanchard is unable to provide reliable HPI information can Matthew to his dementia.  His wife is at bedside and able to provide medical history.  She states that over the last 10 weeks he has had recurrent issues with his electrolytes being low.  He was seen " "initially at [facility] where he had a 7-day hospitalization for replacement of his calcium and magnesium.  At this time he has been transitioning to a memory care unit at [facility] where he had a fall and was transferred to [facility] where he was evaluated for right knee pain, and was found to have pseudogout.  At time of admission his electrolytes were abnormal with  low magnesium, potassium, and calcium on September 15, but there was no resolution, or causative factor found..  He has since been at [facility] facility who the wife states has not reported any type of diarrhea, vomiting, or change in diuresis.  His wife does state that he has been very agitated during his last 2 admissions, but that he is much more calm today.  He is awake alert, cooperative follows all commands.  He is able to state his name date of birth and knows he is in the hospital but is otherwise unsure.  On exam he is awake, confused at his baseline his lungs are diminished bilaterally on auscultation, there is noted heart murmur, his heart rate is normal, irregular, abdomen is soft, nondistended, nontender with audible bowel sounds throughout, he has trace bilateral lower extremity edema that his wife states is his baseline.   Initial evaluation in the emergency department feels he is hemodynamically stable blood pressure 96/70, respiratory rate 16, heart rate 58, oxygen saturation 96% on room air, he is afebrile with a Tmax of 97.2.  Is hyperglycemic with a glucose of 209, his calcium is 5.8 today, mag 0.7, phosphorus 6.4 hemoglobin 8.4, creatinine 2.81  EKG interpreted as atrial fibrillation with heart rate of 128  Mr.Mc Sandoval will be admitted for evaluation and treatment of electrolyte abnormalities with nephrology \"    Hospital Course:  Anurag Pickard is a 72 y.o. male presented as per above and was admitted for assistance in management of electrolyte abnormalities with the assistance of the " nephrology consult team.    Patient had requirements for significant repletion of electrolytes.  He required several days of treatment to improve his electrolytes.  Clinically he does appear improved with the repletion that he has received.  His atrial fibrillation is now rate controlled.  His blood pressure is now normal.  He did appear dehydrated at the time of admission.  This is resolved.  Nephrology recommends to hold his diuretics at this time and continue to monitor his volume status on an outpatient basis.  He has been cleared to discharge by nephrology consult team.  He will discharge on electrolyte repletion as per below.  Recommend he recheck his electrolytes with primary care provider within 1 week and adjust replacement as needed.  Please note that the phosphorus dosing ordered is just for a total of 6 more doses.     I reached out to the family and talked about various options of treatment for sundowning.  At this point they would like to continue to benzodiazepine therapy was on prior to the discharge.  They are interested in 1 mood stabilizer that unfortunately is nonformulary as listed below and they are having trouble getting approved for insurance.  We will hold off adding any other mood stabilizer for now as they were concerned about his history of prolonged QT.  EKG completed and QT is 470 which currently is acceptable.  No new cardiac issues or symptoms currently.     Continue supportive care and symptom treatment.     Monitor glucose and adjust insulin as needed.  A1c is 7.9.  Glucose improving on metformin extended release, increased to 1000 mg.      With recurrent illnesses in setting of dementia prognosis is somewhat guarded.  I had discussions with family regarding goals of care conversation pending clinical progress.  They would like him to return to facility.       Electrolyte abnormality  Acute and recurring  Initially severe abnormalities, magnesium 0.7, calcium 5.8  replacement  protocol initiated  Patient has not prior nephrology evaluation.  Nephrology consulted, if this continues to be an issue could refer to an outpatient nephrologist if primary care feels this is needed.  Diuretics held  Clinically improved with significant repletion of electrolytes.     Chronic diastolic congestive heart failure  Chronic  Hold home furosemide spironolactone for now with severe electrolyte abnormalities  Continue home metoprolol        Aortic stenosis  Status post AVR( aortic valve replacement)- May 2023-porcine valve     Coronary artery disease  Hyperlipidemia  Continue home statin  Continuous cardiac monitoring and pulse oximetry     Essential  hypertension  Chronic/Stable   Vital Signs per policy   Patient recently started on midodrine for hypotension     Proximal atrial fibrillation  Chronic  EKG interpreted as atrial fibrillation  Not on AC- due to his memory deficits related to Alzheimer's, and unstable gait he is at high risk of a catastrophic event   RC -metoprolol     Type 2 diabetes mellitus  Chronic  Accu-Cheks ACHS  hyperglycemic at time of admission  Glycemic treatment per protocol  Metformin extended release started.  Doing much better, recommend reasonable diabetic diet at discharge     Alzheimer's dementia  Chronic   Continue home Ativan and sertraline  Hold home Rexulti, I spoke with family further and reportedly he has not been receiving this at facility as they are trying to get it approved from insurance.     At the time of discharge patient was told to take all medications as prescribed, keep all follow-up appointments, and call their doctor or return to the hospital with any worsening or concerning symptoms.    Please note that this note was made using Dragon voice recognition software        Day of Discharge     Subjective: Poor historian.  No new complaints.    Vital Signs:   Temp:  [97.3 °F (36.3 °C)-97.9 °F (36.6 °C)] 97.5 °F (36.4 °C)  Heart Rate:  [76-92] 92  Resp:  [18-19]  "18  BP: (122-127)/(78-80) 127/78     Physical Exam:    Constitutional:Awake, alert, chronically ill-appearing but nontoxic  HENT: NCAT, mucous membranes moist, neck supple  Respiratory: No cough or wheezing, breathing, good inspiration  Cardiovascular: Pulse rate is normal, normal radial pulses  Gastrointestinal: , soft, nontender, nondistended  Musculoskeletal: Elderly frail and chronically debilitated in appearance, mildly obese with BMI of 30, muscle wasting noted, minimal lower extremity edema  Psychiatric: Calm affect, cooperative, conversational  Neurologic: Poor historian, no slurred speech or facial droop, able to follow instructions  Skin: No rashes or jaundice, warm  Examination stable    Pertinent  and/or Most Recent Results     Results from last 7 days   Lab Units 11/01/23  0401 10/31/23  0402 10/30/23  0413 10/29/23  1656 10/29/23  0448 10/28/23  1207 10/27/23  1903   WBC 10*3/mm3 8.91 8.69 8.85  --  7.05 8.54 9.71   HEMOGLOBIN g/dL 8.9* 8.7* 8.7*  --  8.2* 8.3* 8.4*   HEMATOCRIT % 27.8* 26.5* 27.5*  --  25.8* 25.5* 25.7*   PLATELETS 10*3/mm3 333 295 313  --  286 278 279   SODIUM mmol/L 141 140 142  --  141 139 144   POTASSIUM mmol/L 4.3 4.0 4.1 4.3 3.3* 3.2* 4.9   CHLORIDE mmol/L 105 107 108*  --  104 100 102   CO2 mmol/L 25.8 24.0 24.0  --  25.4 27.1 28.0   BUN mg/dL 11 11 11  --  9 14 23   CREATININE mg/dL 0.90 0.82 0.84  --  0.81 0.72* 1.08   GLUCOSE mg/dL 106* 94 118*  --  125* 164* 209*   CALCIUM mg/dL 8.6 8.2* 7.7*  --  7.4* 7.3* 5.8*     Results from last 7 days   Lab Units 10/27/23  1903   BILIRUBIN mg/dL 0.3   ALK PHOS U/L 69   ALT (SGPT) U/L 19   AST (SGOT) U/L 38           Invalid input(s): \"TG\", \"LDLCALC\", \"LDLREALC\"  Results from last 7 days   Lab Units 10/29/23  0448   HEMOGLOBIN A1C % 7.90*       Brief Urine Lab Results  (Last result in the past 365 days)        Color   Clarity   Blood   Leuk Est   Nitrite   Protein   CREAT   Urine HCG        11/01/22 1105 Yellow   Clear   Negative   " Negative   Negative   Negative                   Microbiology Results Abnormal       Procedure Component Value - Date/Time    Respiratory Panel PCR w/COVID-19(SARS-CoV-2) SIDNEY/PAVAN/FARHAD/PAD/COR/MAD/EWA In-House, NP Swab in UTM/VTM, 3-4 HR TAT - Swab, Nasopharynx [[id number]]  (Normal) Collected: 10/28/23 1209    Lab Status: Final result Specimen: Swab from Nasopharynx Updated: 10/28/23 1409     ADENOVIRUS, PCR Not Detected     Coronavirus 229E Not Detected     Coronavirus HKU1 Not Detected     Coronavirus NL63 Not Detected     Coronavirus OC43 Not Detected     COVID19 Not Detected     Human Metapneumovirus Not Detected     Human Rhinovirus/Enterovirus Not Detected     Influenza A PCR Not Detected     Influenza B PCR Not Detected     Parainfluenza Virus 1 Not Detected     Parainfluenza Virus 2 Not Detected     Parainfluenza Virus 3 Not Detected     Parainfluenza Virus 4 Not Detected     RSV, PCR Not Detected     Bordetella pertussis pcr Not Detected     Bordetella parapertussis PCR Not Detected     Chlamydophila pneumoniae PCR Not Detected     Mycoplasma pneumo by PCR Not Detected    Narrative:      In the setting of a positive respiratory panel with a viral infection PLUS a negative procalcitonin without other underlying concern for bacterial infection, consider observing off antibiotics or discontinuation of antibiotics and continue supportive care. If the respiratory panel is positive for atypical bacterial infection (Bordetella pertussis, Chlamydophila pneumoniae, or Mycoplasma pneumoniae), consider antibiotic de-escalation to target atypical bacterial infection.               Results for orders placed during the hospital encounter of 03/25/22    Bilateral Carotid Duplex    Interpretation Summary  · Proximal right internal carotid artery plaque without significant stenosis.  · Proximal left internal carotid artery plaque without significant stenosis.      Results for orders placed during the hospital encounter of  03/25/22    Bilateral Carotid Duplex    Interpretation Summary  · Proximal right internal carotid artery plaque without significant stenosis.  · Proximal left internal carotid artery plaque without significant stenosis.      Results for orders placed in visit on 03/28/22    Emergent/Open-Heart Anesthesia PAULO    Narrative  Emergent/Open-Heart Anesthesia PAULO    Procedure Performed: Emergent/Open-Heart Anesthesia PAULO  Start Time:  End Time:      General Procedure Information  Location performed:  OR  Intubated  Bite block placed  Heart visualized  Probe Insertion:  Easy  Probe Type:  Biplane and multiplane  Modalities:  Color flow mapping, pulse wave Doppler and continuous wave Doppler    Echocardiographic and Doppler Measurements    Ventricles    Right Ventricle:  Ejection Fraction 60%.  Left Ventricle:  Global Function normal.  Ejection Fraction 60%.                                Anesthesia Information  Performed Personally  Anesthesiologist:  Van Frost MD      Echocardiogram Comments:  PAULO placed easily with no resistance ,  Lubricated , bite guard used    Pre cpb  LV EF 60 no wma  RV nl SF  MV tr/mild MR  AV Ca++ mild mod AI ,  Mod AS YOSI 1.2cm2 by cont  tv TR TR  Suspicious for smal PFO by CFD      Post cpb  #27 av well seated no para / trans leak seen  No other change        Discharge Details        Discharge Medications        New Medications        Instructions Start Date   famotidine 20 MG tablet  Commonly known as: PEPCID   10 mg, Oral, 2 Times Daily PRN      magnesium oxide 400 tablet tablet  Commonly known as: MAG-OX   400 mg, Oral, 2 Times Daily      melatonin 5 MG tablet tablet  Replaces: Melatonin 10 MG capsule   10 mg, Oral, Nightly      metFORMIN  MG 24 hr tablet  Commonly known as: GLUCOPHAGE-XR  Replaces: metFORMIN 1000 MG tablet   1,000 mg, Oral, Daily With Breakfast   Start Date: November 2, 2023     potassium & sodium phosphates 280-160-250 MG pack packet  Commonly known as:  PHOS-NAK   2 packets, Oral, 3 Times Daily Before Meals, For a total of 6 doses.      potassium chloride 10 MEQ CR tablet  Commonly known as: K-DUR,KLOR-CON   10 mEq, Oral, Daily      vitamin D 1.25 MG (90602 UT) capsule capsule  Commonly known as: ERGOCALCIFEROL   50,000 Units, Oral, Every 7 Days   Start Date: November 4, 2023            Changes to Medications        Instructions Start Date   LORazepam 0.5 MG tablet  Commonly known as: ATIVAN  What changed:   medication strength  how much to take  how to take this  when to take this  additional instructions  Another medication with the same name was removed. Continue taking this medication, and follow the directions you see here.   Use every 6 hours as needed for anxiety or agitation.  Also give scheduled every evening at 8 PM for bedtime.             Continue These Medications        Instructions Start Date   Accu-Chek Guide test strip  Generic drug: glucose blood   Use as instructed once daily   Dx e 11.9      acetaminophen 325 MG tablet  Commonly known as: TYLENOL   650 mg, Oral, Every 4 Hours PRN      atorvastatin 40 MG tablet  Commonly known as: LIPITOR   Take 1 tablet by mouth once daily      calcium carbonate 500 MG chewable tablet  Commonly known as: TUMS   2 tablets, Oral, 2 Times Daily, For hypocalcemia       docusate sodium 100 MG capsule  Commonly known as: COLACE   100 mg, Oral, Nightly PRN      folic acid 1 MG tablet  Commonly known as: FOLVITE   1 mg, Oral, Daily      HYDROcodone-acetaminophen 5-325 MG per tablet  Commonly known as: Norco   1 tablet, Oral, Every 8 Hours PRN      lansoprazole 30 MG capsule  Commonly known as: PREVACID   30 mg, Oral, Daily      metoprolol succinate XL 25 MG 24 hr tablet  Commonly known as: TOPROL-XL   25 mg, Oral, Daily      midodrine 2.5 MG tablet  Commonly known as: PROAMATINE   2.5 mg, Oral, 3 Times Daily Before Meals      multivitamin with minerals tablet tablet   1 tablet, Oral, Daily      ondansetron 4 MG  tablet  Commonly known as: ZOFRAN   4 mg, Oral, 3 Times Daily PRN      Rexulti 2 MG tablet  Generic drug: Brexpiprazole   1 tablet, Oral, Daily      sertraline 25 MG tablet  Commonly known as: ZOLOFT   25 mg, Oral, Daily      vitamin B-12 1000 MCG tablet  Commonly known as: CYANOCOBALAMIN   1,000 mcg, Oral, Daily             Stop These Medications      furosemide 40 MG tablet  Commonly known as: LASIX     Melatonin 10 MG capsule  Replaced by: melatonin 5 MG tablet tablet     metFORMIN 1000 MG tablet  Commonly known as: GLUCOPHAGE  Replaced by: metFORMIN  MG 24 hr tablet     spironolactone 25 MG tablet  Commonly known as: ALDACTONE              Allergies   Allergen Reactions    Glipizide Unknown - Low Severity     Sugar too low    Haldol [Haloperidol] Unknown - High Severity    Lisinopril Cough         Discharge Disposition:  Long Term Care (DC - External)    Diet:  Hospital:  Diet Order   Procedures    Diet: Cardiac Diets, Diabetic Diets; Healthy Heart (2-3 Na+); Consistent Carbohydrate; Texture: Regular Texture (IDDSI 7); Fluid Consistency: Thin (IDDSI 0)       Activity:  Activity Instructions       Activity as Tolerated      Up WIth Assist                   CODE STATUS:    Code Status and Medical Interventions:   Ordered at: 10/27/23 2131     Code Status (Patient has no pulse and is not breathing):    CPR (Attempt to Resuscitate)     Medical Interventions (Patient has pulse or is breathing):    Full Support       Future Appointments   Date Time Provider Department Center   3/29/2024 10:30 AM Juan Castellon DO [facility]       Additional Instructions for the Follow-ups that You Need to Schedule       Discharge Follow-up with PCP   As directed       Currently Documented PCP:    Karie Hawkins APRN    PCP Phone Number:    [phone]     Follow Up Details: Recommend primary care provider follow-up within 1 week for general hospital follow-up.  Recommend to check BMP, magnesium and  phosphorus levels in 1 week and adjust supplements as needed               Follow-up Information       Karie Hawkins, APRN .    Specialty: Family Medicine  Contact information:  [address] [facility] [address] [phone]               Karie Hawkins, APRN .    Specialty: Family Medicine  Why: Recommend primary care provider follow-up within 1 week for general hospital follow-up.  Recommend to check BMP, magnesium and phosphorus levels in 1 week and adjust supplements as needed  Contact information:  [address] [facility] [address] [phone]                                 Bob Byrne MD  11/01/23      Time Spent on Discharge:  I spent greater than 40 minutes on this discharge activity which included: face-to-face encounter with the patient, reviewing the data in the system, coordination of the care with the nursing staff as well as consultants, documentation, and entering orders.

## 2023-11-01 NOTE — PLAN OF CARE
Goal Outcome Evaluation:  Plan of Care Reviewed With: patient        Progress: no change  Outcome Evaluation: Still confused, but mainly pleasant. Scheduled and PRN ativan given for agitation control. VSS with intermitten check. Will continue to monitor. Plan to d/c today.          Never

## 2023-11-01 NOTE — PROGRESS NOTES
LOS: 4 days     Chief Complaint/ Reason for encounter: Multiple electrolyte abnormalities    Subjective   10/30/23 : He is awake and alert  Still in soft restraints due to confusion overnight  States good appetite with no nausea vomiting  No shortness of breath chest pain or edema, voiding well    10/31: Doing well, more awake and alert  Out of restraints, states good appetite with no nausea vomiting  No shortness of breath or chest pain    11/01: no acute events. Feeling ok. Sitting in chair. Denies sob or chest pain    Medical history reviewed:  History of Present Illness    Subjective    History taken from: Patient and chart    Vital Signs  Temp:  [97.3 °F (36.3 °C)-97.9 °F (36.6 °C)] 97.5 °F (36.4 °C)  Heart Rate:  [76-92] 92  Resp:  [18-19] 18  BP: (122-127)/(78-80) 127/78       Wt Readings from Last 1 Encounters:   11/01/23 0500 109 kg (239 lb 4.8 oz)   10/31/23 0525 109 kg (240 lb 4.8 oz)   10/30/23 0528 99 kg (218 lb 4.1 oz)   10/29/23 0657 98.1 kg (216 lb 4.3 oz)   10/27/23 2312 102 kg (225 lb 5 oz)       Objective:  Vital signs: (most recent): Blood pressure 127/78, pulse 92, temperature 97.5 °F (36.4 °C), temperature source Oral, resp. rate 18, weight 109 kg (239 lb 4.8 oz), SpO2 97%.                Objective:  General Appearance:  Comfortable, chronically ill-appearing, in no acute distress and not in pain.  Awake, alert  HEENT: Mucous membranes moist, no injury, oropharynx clear  Lungs:  Normal effort and normal respiratory rate.  Breath sounds clear to auscultation.  No  respiratory distress.  No rales, decreased breath sounds or rhonchi.    Heart: Normal rate.  Regular rhythm.  S1, S2 normal.  No murmur.   Abdomen: Abdomen is soft.  Bowel sounds are normal, no abdominal tenderness.  There is no rebound or guarding  Extremities: Trace edema of bilateral lower extremities  Neurological: No focal motor or sensory deficits, pupils reactive  Skin:  Warm and dry.  No rash or cyanosis.       Results  Review:    Intake/Output:     Intake/Output Summary (Last 24 hours) at 11/1/2023 0909  Last data filed at 10/31/2023 2026  Gross per 24 hour   Intake 240 ml   Output 200 ml   Net 40 ml         DATA:  Radiology and Labs:  The following labs independently reviewed by me. Additional labs ordered for tomorrow a.m.  Interval notes, chart personally reviewed by me.   Old records independently reviewed showing previous chemistry panel about a month ago showed normal renal function, low calcium, borderline low magnesium  The following radiologic studies independently viewed by me, findings nothing new this admission  Discussed with patient himself at bedside    Risk/ complexity of medical care/ medical decision making moderate risk, electrolyte replacement    Labs:   Recent Results (from the past 24 hour(s))   POC Glucose Once    Collection Time: 10/31/23 11:18 AM    Specimen: Blood   Result Value Ref Range    Glucose 166 (H) 70 - 130 mg/dL   Phosphorus    Collection Time: 10/31/23  2:42 PM    Specimen: Blood   Result Value Ref Range    Phosphorus 2.5 2.5 - 4.5 mg/dL   POC Glucose Once    Collection Time: 10/31/23  5:32 PM    Specimen: Blood   Result Value Ref Range    Glucose 90 70 - 130 mg/dL   POC Glucose Once    Collection Time: 10/31/23  8:18 PM    Specimen: Blood   Result Value Ref Range    Glucose 165 (H) 70 - 130 mg/dL   CBC (No Diff)    Collection Time: 11/01/23  4:01 AM    Specimen: Blood   Result Value Ref Range    WBC 8.91 3.40 - 10.80 10*3/mm3    RBC 3.20 (L) 4.14 - 5.80 10*6/mm3    Hemoglobin 8.9 (L) 13.0 - 17.7 g/dL    Hematocrit 27.8 (L) 37.5 - 51.0 %    MCV 86.9 79.0 - 97.0 fL    MCH 27.8 26.6 - 33.0 pg    MCHC 32.0 31.5 - 35.7 g/dL    RDW 13.0 12.3 - 15.4 %    RDW-SD 40.5 37.0 - 54.0 fl    MPV 10.4 6.0 - 12.0 fL    Platelets 333 140 - 450 10*3/mm3   Basic Metabolic Panel    Collection Time: 11/01/23  4:01 AM    Specimen: Blood   Result Value Ref Range    Glucose 106 (H) 65 - 99 mg/dL    BUN 11 8 - 23 mg/dL     Creatinine 0.90 0.76 - 1.27 mg/dL    Sodium 141 136 - 145 mmol/L    Potassium 4.3 3.5 - 5.2 mmol/L    Chloride 105 98 - 107 mmol/L    CO2 25.8 22.0 - 29.0 mmol/L    Calcium 8.6 8.6 - 10.5 mg/dL    BUN/Creatinine Ratio 12.2 7.0 - 25.0    Anion Gap 10.2 5.0 - 15.0 mmol/L    eGFR 91.3 >60.0 mL/min/1.73   Magnesium    Collection Time: 11/01/23  4:01 AM    Specimen: Blood   Result Value Ref Range    Magnesium 1.4 (L) 1.6 - 2.4 mg/dL   POC Glucose Once    Collection Time: 11/01/23  8:15 AM    Specimen: Blood   Result Value Ref Range    Glucose 89 70 - 130 mg/dL       Radiology:  Pertinent radiology studies were reviewed as described above      Medications have been reviewed separately in chart overview      ASSESSMENT:  Hypocalcemia - PTH appropriately elevated due to vitamin D deficiency    Vit D deficiency, on replacement    Hypomagnesemia, improving, fairly severe at presentation    Hypophosphatemia, improved    Diabetes mellitus, type II    S/P AVR (aortic valve replacement)    Alzheimer's dementia    Paroxysmal atrial fibrillation  Anemia        Plan:   Renal function stable at baseline.   Ca within normal range today  Continue oral magnesium and phosphorus, but give IV Mg toda  Encourage nutrition  Continue on oral Vit D and calcium replacement.   His renal function is normal and at baseline, okay to use electrolyte replacement protocol for additional electrolyte replacement  Euvolemic.  Monitor off Lasix  PTH is appropriately elevated for hypocalcemia and vitamin D deficiency  Follow labs.  Discharge planning      Giuliana Jiménez MD  [facility]   Office phone number: [phone]  Answering service phone number: [phone]    11/01/23  09:09 EDT

## 2023-11-01 NOTE — CASE MANAGEMENT/SOCIAL WORK
Case Management Discharge Note      Final Note: Return to [facility] with Outpatient PT onsite. Spoke with nurse and he can return at D/C. [facility] setup to transport tomorrow at 1300. Wife notified.         Selected Continued Care - Admitted Since 10/27/2023       Destination    No services have been selected for the patient.                Durable Medical Equipment    No services have been selected for the patient.                Dialysis/Infusion    No services have been selected for the patient.                Home Medical Care    No services have been selected for the patient.                Therapy    No services have been selected for the patient.                Community Resources    No services have been selected for the patient.                Community & DME    No services have been selected for the patient.                    Transportation Services  Ambulance: [facility]    Final Discharge Disposition Code: 01 - home or self-care

## 2023-11-01 NOTE — PROGRESS NOTES
Nutrition Services    Patient Name:  Anurag Pickard  YOB: 1950  MRN: [id number]  Admit Date:  10/27/2023  Assessment Date:  11/01/23    Summary: Nutrition screen for supplements  This is a 71 yo male with CHF, DM, admitted with electrolyte abnormalities  Po 50-75%  Drinking supplements, prefers strawberry  Labs glu 106, mg 1.4--replaced    Plan/Recommendation  Good po intake encouraged with all meals  Continue providing supplements as requested.    Will continue to follow clinical course and monitor nutritional needs.     CLINICAL NUTRITION ASSESSMENT      Reason for Assessment Other: Supplements ordered     Diagnosis/Problem   Abnormal electrolytes    Medical/Surgical History Past Medical History:   Diagnosis Date    Acute diastolic CHF (congestive heart failure) 01/03/2022    Ankle edema     Aortic stenosis     Moderate    Aortic valve replaced     B12 deficiency     Bell's palsy     Bleeds easily     Chipped tooth     top front    Dementia     Diabetes mellitus     Dry skin     Erectile dysfunction     Falls     2 falls in last 3 months    Fatigue     Fragile skin     GERD (gastroesophageal reflux disease)     Heart murmur     Hyperlipidemia     Hypertension     Hypokalemia     Hypomagnesemia     YANA (obstructive sleep apnea)     cpap bring dos    Osteoarthritis     Paroxysmal atrial fibrillation 09/27/2023    PFO (patent foramen ovale) 10/29/2023    Pulmonary HTN     Rosacea     Vitamin D deficiency        Past Surgical History:   Procedure Laterality Date    AORTIC VALVE REPAIR/REPLACEMENT N/A 03/28/2022    Procedure: AORTIC VALVE REPAIR/REPLACEMENT;  Surgeon: Dennis Brandon MD;  Location: [facility];  Service: Cardiothoracic;  Laterality: N/A;  aortic valve replacement with 27mm kulkarni [employer] magna ease    CARDIAC CATHETERIZATION      CARDIAC CATHETERIZATION N/A 12/17/2021    Procedure: Right and Left Heart Cath;  Surgeon: Juan Castellon DO;  Location: [facility] CATH  INVASIVE LOCATION;  Service: Cardiology;  Laterality: N/A;    COLONOSCOPY      JOINT REPLACEMENT Bilateral 2021    knee    TONSILLECTOMY      TOTAL KNEE ARTHROPLASTY      TOTAL KNEE ARTHROPLASTY Left 05/04/2021    Procedure: TOTAL KNEE ARTHROPLASTY;  Surgeon: Otf Redmond MD;  Location: [facility] MAIN OR;  Service: Orthopedics;  Laterality: Left;        Anthropometrics        Current Height  Current Weight  BMI kg/m2    Weight: 109 kg (239 lb 4.8 oz) (11/01/23 0500)  Body mass index is 34.34 kg/m².   Adjusted BMI (if applicable)    BMI Category Obese, Class I (30 - 34.9)   Ideal Body Weight (IBW) 160lb   Usual Body Weight (UBW) 250's   Weight Trend Loss, Gain   Weight History Wt Readings from Last 30 Encounters:   11/01/23 0500 109 kg (239 lb 4.8 oz)   10/31/23 0525 109 kg (240 lb 4.8 oz)   10/30/23 0528 99 kg (218 lb 4.1 oz)   10/29/23 0657 98.1 kg (216 lb 4.3 oz)   10/27/23 2312 102 kg (225 lb 5 oz)   09/27/23 0937 104 kg (230 lb)   09/18/23 0608 115 kg (253 lb 8.5 oz)   09/17/23 0412 117 kg (257 lb 11.5 oz)   09/16/23 0532 115 kg (253 lb)   09/15/23 2039 115 kg (252 lb 13.9 oz)   09/15/23 1029 110 kg (243 lb)   06/09/23 0948 110 kg (243 lb)   12/09/22 0845 114 kg (252 lb)   11/01/22 1000 113 kg (249 lb 9.6 oz)   09/22/22 1330 113 kg (250 lb)   09/01/22 0914 114 kg (251 lb)   05/13/22 1008 119 kg (262 lb)   05/12/22 1005 119 kg (262 lb 6.4 oz)   04/29/22 1031 118 kg (260 lb 12.8 oz)   04/21/22 1019 127 kg (279 lb)   04/04/22 0500 125 kg (275 lb 9.2 oz)   04/03/22 0524 127 kg (279 lb 3.2 oz)   04/02/22 0600 124 kg (274 lb 6.4 oz)   04/01/22 0310 125 kg (276 lb 0.3 oz)   03/31/22 0300 124 kg (272 lb 7.8 oz)   03/30/22 0600 126 kg (278 lb)   03/29/22 0541 125 kg (274 lb 14.6 oz)   03/28/22 0540 125 kg (274 lb 9.6 oz)   03/25/22 1135 127 kg (281 lb)   03/04/22 1313 128 kg (283 lb)   01/10/22 1156 130 kg (287 lb)   01/03/22 1405 129 kg (284 lb)   12/17/21 1011 126 kg (277 lb 5.4 oz)   12/08/21 1441 126 kg (278 lb)    11/09/21 0809 126 kg (277 lb)   08/17/21 1254 126 kg (277 lb)   05/27/21 1451 129 kg (285 lb)   05/14/21 1447 129 kg (284 lb)   05/04/21 0546 126 kg (277 lb 9.6 oz)   04/05/21 1528 127 kg (280 lb 4 oz)   12/23/19 1638 123 kg (272 lb 3.2 oz)   12/04/19 1512 124 kg (273 lb 6.4 oz)   06/14/19 0920 121 kg (265 lb 12.8 oz)   02/15/19 0844 128 kg (282 lb)   10/18/18 0907 121 kg (266 lb)      --  Labs       Pertinent Labs    Results from last 7 days   Lab Units 11/01/23  0401 10/31/23  0402 10/30/23  0413 10/28/23  1207 10/27/23  1903   SODIUM mmol/L 141 140 142   < > 144   POTASSIUM mmol/L 4.3 4.0 4.1   < > 4.9   CHLORIDE mmol/L 105 107 108*   < > 102   CO2 mmol/L 25.8 24.0 24.0   < > 28.0   BUN mg/dL 11 11 11   < > 23   CREATININE mg/dL 0.90 0.82 0.84   < > 1.08   CALCIUM mg/dL 8.6 8.2* 7.7*   < > 5.8*   BILIRUBIN mg/dL  --   --   --   --  0.3   ALK PHOS U/L  --   --   --   --  69   ALT (SGPT) U/L  --   --   --   --  19   AST (SGOT) U/L  --   --   --   --  38   GLUCOSE mg/dL 106* 94 118*   < > 209*    < > = values in this interval not displayed.     Results from last 7 days   Lab Units 11/01/23  0401 10/31/23  1442 10/31/23  0402 10/30/23  0413 10/29/23  1656 10/29/23  0448   MAGNESIUM mg/dL 1.4*  --  1.5* 1.7  --  1.6   PHOSPHORUS mg/dL  --  2.5 2.2* 2.3*   < > 1.7*   HEMOGLOBIN g/dL 8.9*  --  8.7* 8.7*  --  8.2*   HEMATOCRIT % 27.8*  --  26.5* 27.5*  --  25.8*   WBC 10*3/mm3 8.91  --  8.69 8.85  --  7.05   ALBUMIN g/dL  --   --   --   --   --  3.2*    < > = values in this interval not displayed.     Results from last 7 days   Lab Units 11/01/23  0401 10/31/23  0402 10/30/23  0413 10/29/23  0448 10/28/23  1207   PLATELETS 10*3/mm3 333 295 313 286 278     COVID19   Date Value Ref Range Status   10/28/2023 Not Detected Not Detected - Ref. Range Final     Lab Results   Component Value Date    HGBA1C 7.90 (H) 10/29/2023          Medications           Scheduled Medications atorvastatin, 40 mg, Oral, Daily  calcium  carbonate, 2 tablet, Oral, BID  famotidine, 20 mg, Oral, BID AC  insulin lispro, 2-9 Units, Subcutaneous, 4x Daily AC & at Bedtime  LORazepam, 0.5 mg, Oral, Nightly  magnesium oxide, 400 mg, Oral, BID  melatonin, 10 mg, Oral, Nightly  metFORMIN ER, 1,000 mg, Oral, Daily With Breakfast  metoprolol succinate XL, 25 mg, Oral, Daily  midodrine, 2.5 mg, Oral, TID AC  multivitamin with minerals, 1 tablet, Oral, Daily  potassium & sodium phosphates, 2 packet, Oral, TID AC  senna-docusate sodium, 2 tablet, Oral, BID  sertraline, 25 mg, Oral, Daily  sodium chloride, 10 mL, Intravenous, Q12H  vitamin B-12, 1,000 mcg, Oral, Daily  vitamin D, 50,000 Units, Oral, Q7 Days       Infusions sodium chloride, 100 mL/hr, Last Rate: 100 mL/hr (10/27/23 5156)       PRN Medications   acetaminophen **OR** acetaminophen **OR** acetaminophen    senna-docusate sodium **AND** polyethylene glycol **AND** bisacodyl **AND** bisacodyl    Calcium Replacement - Follow Nurse / BPA Driven Protocol    dextrose    dextrose    glucagon (human recombinant)    HYDROcodone-acetaminophen    LORazepam    Magnesium Standard Dose Replacement - Follow Nurse / BPA Driven Protocol    ondansetron **OR** ondansetron    Phosphorus Replacement - Follow Nurse / BPA Driven Protocol    Potassium Replacement - Follow Nurse / BPA Driven Protocol    sodium chloride    [COMPLETED] Insert Peripheral IV **AND** sodium chloride    sodium chloride    sodium chloride     Physical Findings          General Findings alert, obese   Oral/Mouth Cavity tooth or teeth missing   Edema  1+ (trace)   Gastrointestinal last bowel movement: 10/31   Skin  bruising   Tubes/Drains/Lines none   NFPE Not indicated at this time   --  Current Nutrition Orders & Evaluation of Intake       Oral Nutrition     Food Allergies NKFA   Current PO Diet Diet: Cardiac Diets, Diabetic Diets; Healthy Heart (2-3 Na+); Consistent Carbohydrate; Texture: Regular Texture (IDDSI 7); Fluid Consistency: Thin (IDDSI 0)    Supplement Boost Plus, BID   PO Evaluation     % PO Intake 50%    Factors Affecting Intake: weakness   --  PES STATEMENT / NUTRITION DIAGNOSIS      Nutrition Dx Problem  Problem: Inadequate Oral Intake  Etiology: Factors Affecting Nutrition - decrease appetite    Signs/Symptoms: PO intake and Report/Observation     NUTRITION INTERVENTION / PLAN OF CARE      Intervention Goal(s) Reduce/improve symptoms, Disease management/therapy, Increase intake, No significant weight loss, and PO intake goal %: 75+         RD Intervention/Action Interview for preferences, Supplement provided, Encourage intake, and Continue to monitor   --      Prescription/Orders:       PO Diet       Supplements Boost plus      Enteral Nutrition       Parenteral Nutrition    New Prescription Ordered? Continue same per protocol   --      Monitor/Evaluation Per protocol   Discharge Plan/Needs Pending clinical course   --    RD to follow per protocol.      Electronically signed by:  Yolis Yepez RD  11/01/23 11:36 EDT

## 2024-05-22 ENCOUNTER — OFFICE VISIT (OUTPATIENT)
Dept: CARDIOLOGY | Facility: CLINIC | Age: 74
End: 2024-05-22
Payer: MEDICARE

## 2024-05-22 VITALS
OXYGEN SATURATION: 98 % | BODY MASS INDEX: 30.64 KG/M2 | HEART RATE: 86 BPM | DIASTOLIC BLOOD PRESSURE: 88 MMHG | WEIGHT: 214 LBS | SYSTOLIC BLOOD PRESSURE: 118 MMHG | HEIGHT: 70 IN

## 2024-05-22 DIAGNOSIS — I50.32 CHRONIC HEART FAILURE WITH PRESERVED EJECTION FRACTION (HFPEF): ICD-10-CM

## 2024-05-22 DIAGNOSIS — I10 BENIGN ESSENTIAL HYPERTENSION: Primary | ICD-10-CM

## 2024-05-22 DIAGNOSIS — E78.2 MIXED HYPERLIPIDEMIA: ICD-10-CM

## 2024-05-22 DIAGNOSIS — I25.10 CORONARY ARTERY DISEASE INVOLVING NATIVE CORONARY ARTERY OF NATIVE HEART WITHOUT ANGINA PECTORIS: ICD-10-CM

## 2024-05-22 DIAGNOSIS — Z95.2 S/P AVR (AORTIC VALVE REPLACEMENT): ICD-10-CM

## 2024-05-22 RX ORDER — DIBASIC SODIUM PHOSPHATE, MONOBASIC POTASSIUM PHOSPHATE AND MONOBASIC SODIUM PHOSPHATE 852; 155; 130 MG/1; MG/1; MG/1
TABLET ORAL
COMMUNITY

## 2024-05-22 RX ORDER — SPIRONOLACTONE 25 MG/1
TABLET ORAL
COMMUNITY

## 2024-05-22 RX ORDER — FUROSEMIDE 20 MG/1
1 TABLET ORAL DAILY
COMMUNITY

## 2024-05-22 RX ORDER — INSULIN GLARGINE 100 [IU]/ML
INJECTION, SOLUTION SUBCUTANEOUS
COMMUNITY
Start: 2024-05-07

## 2024-05-22 NOTE — PROGRESS NOTES
Cardiology Office Visit      Encounter Date:  05/22/2024    Patient ID:   Anurag Pickard is a 73 y.o. male.    Reason For Followup:  Paroxysmal atrial fibrillation  Aortic stenosis    Brief Clinical History:  Dear Dr. Hawkins, SERAFIN Cabral    I had the pleasure of seeing Anurag Pickard today. As you are well aware, this is a 73 y.o. male with no established history of ischemic heart disease.  He does have a history of aortic stenosis, hypertension, diabetes mellitus, hyperlipidemia, osteoarthritis, obesity, and antiplatelet therapy.  He presents today to follow-up on the above conditions.    Interval History:  He denies any chest pain pressure heaviness or tightness.  He does report fatigue.  He denies any PND orthopnea.  He denies any syncope or near syncope.    He is accompanied by his wife today who supplies supplemental historical information.  His cognitive impairment has declined significantly enough that he is now in a memory care unit.    He has been hospitalized twice in the past couple of months.  The first hospitalization was in August.  He had a significant amount of weakness.  He could not stand up.  He had significant nausea and vomiting.  He went to [facility] where he was found to have a significant electrolyte derangement with magnesium potassium and chloride levels being dangerously low.  These were replaced.  His cognitive status declined quite a bit during that admission.  He needed a sitter for most of the admission.  He was discharged to a skilled nursing facility.    While he was at the skilled nursing facility he developed what was eventually found out to be pseudogout.  He did go to the hospital while they were in the diagnostic phases of this however he was sent back to a memory care unit after this.    During his [facility] hospitalization he was noted to develop atrial fibrillation.  Anticoagulation was recommended given his elevated CHADS2 score however his wife was  worried that placing him on an anticoagulant with his memory deficits and gait instability could lead to a catastrophic hemorrhage.  They have opted not to pursue anticoagulation.  He has taken Xarelto in the past.    He was started on Aricept however he had significant gastrointestinal issues with this and it was ultimately discontinued.    05/22/2024        He has had some issues with low blood pressure recently. He is on insulin now because of uncontrolled blood sugars. Metoprolol was stopped and he was placed on midodrine to get blood pressure up. He now has continuous glucose monitoring system. His dementia has worsened as well. He lives in the [facility].  His cognitive status unfortunately continues to decline.  He was started on Rexulti which has helped with his anger.  He is followed closely by the providers at the memory care unit where he resides.  We discussed just following up with us as needed.  His wife was agreeable to this.    Assessment & Plan    Impressions:  Severe aortic stenosis status post tissue AVR March 2022  History of postoperative atrial fibrillation     Ambulatory ECG monitor October 2022 with no evidence of atrial fibrillation     Recurrence of atrial fibrillation noted in August of 2023  Hypertension  Hypertensive heart disease  Hyperlipidemia  Osteoarthritis  Diabetes mellitus  Obesity  Antiplatelet therapy  Dementia    Recommendations:  Continuation of his current cardiovascular regimen at the present time.     This includes antihypertensives, antiplatelet, statin, Zetia, and potassium.  Risk benefits and options of anticoagulation have been discussed and patient's wife has decided to withhold anticoagulation due to his advanced dementia and significant fall risk.  Follow-up as needed    Diagnoses and all orders for this visit:    1. Benign essential hypertension (Primary)  -     ECG 12 Lead    2. Chronic heart failure with preserved ejection fraction (HFpEF)  -     ECG 12 Lead    3.  "Coronary artery disease involving native coronary artery of native heart without angina pectoris  -     ECG 12 Lead    4. Mixed hyperlipidemia  -     ECG 12 Lead    5. S/P AVR (aortic valve replacement)  -     ECG 12 Lead              Objective:    Vitals:  Vitals:    05/22/24 1455   BP: 118/88   Pulse: 86   SpO2: 98%   Weight: 97.1 kg (214 lb)   Height: 177.8 cm (70\")       Body mass index is 30.71 kg/m².      Physical Exam:    General: Alert, cooperative, no distress, appears stated age  Head:  Normocephalic, atraumatic, mucous membranes moist  Eyes:  Conjunctiva/corneas clear, EOM's intact     Neck:  Supple,  no bruit    Lungs: Clear to auscultation bilaterally, no wheezes rhonchi rales are noted  Chest wall: Minimal tenderness  Heart::  Regular rate and rhythm, S1 and S2 normal, 1/6 systolic ejection murmur.  No rub or gallop  Abdomen: Soft, non-tender, nondistended bowel sounds active.  Obese  Extremities: No cyanosis, clubbing, or edema  Pulses: 2+ and symmetric all extremities  Skin:  No rashes or lesions  Neuro/psych: A&O x3. CN II through XII are grossly intact with flat affect      Allergies:  Allergies   Allergen Reactions    Glipizide Unknown - Low Severity     Sugar too low    Haldol [Haloperidol] Unknown - High Severity    Lisinopril Cough       Medication Review:     Current Outpatient Medications:     acetaminophen (TYLENOL) 325 MG tablet, Take 2 tablets by mouth Every 4 (Four) Hours As Needed for Mild Pain., Disp: , Rfl:     atorvastatin (LIPITOR) 40 MG tablet, Take 1 tablet by mouth once daily, Disp: 90 tablet, Rfl: 0    Brexpiprazole (Rexulti) 2 MG tablet, Take 1 tablet by mouth Daily., Disp: , Rfl:     calcium carbonate (TUMS) 500 MG chewable tablet, Chew 2 tablets 2 (Two) Times a Day. For hypocalcemia, Disp: , Rfl:     docusate sodium (COLACE) 100 MG capsule, Take 1 capsule by mouth At Night As Needed for Constipation., Disp: , Rfl:     empagliflozin (Jardiance) 10 MG tablet tablet, Take 1 " tablet by mouth Daily., Disp: , Rfl:     folic acid (FOLVITE) 1 MG tablet, Take 1 tablet by mouth Daily for 360 days., Disp: , Rfl:     furosemide (LASIX) 20 MG tablet, Take 1 tablet by mouth Daily., Disp: , Rfl:     Insulin Glargine (BASAGLAR KWIKPEN) 100 UNIT/ML injection pen, Inject 12 units every day by subcutaneous route in the morning for 30 days., Disp: , Rfl:     lansoprazole (PREVACID) 30 MG capsule, Take 1 capsule by mouth Daily., Disp: 90 capsule, Rfl: 0    LORazepam (ATIVAN) 0.5 MG tablet, Use every 6 hours as needed for anxiety or agitation.  Also give scheduled every evening at 8 PM for bedtime., Disp: 20 tablet, Rfl: 0    magnesium oxide (MAG-OX) 400 tablet tablet, Take 1 tablet by mouth 2 (Two) Times a Day., Disp: 60 tablet, Rfl: 0    melatonin 5 MG tablet tablet, Take 2 tablets by mouth Every Night., Disp: , Rfl:     metFORMIN ER (GLUCOPHAGE-XR) 500 MG 24 hr tablet, Take 2 tablets by mouth Daily With Breakfast., Disp: , Rfl:     midodrine (PROAMATINE) 2.5 MG tablet, Take 4 tablets by mouth 3 (Three) Times a Day Before Meals., Disp: , Rfl:     multivitamin with minerals tablet tablet, Take 1 tablet by mouth Daily., Disp: , Rfl:     phosphorus (Phospha 250 Neutral) 155-852-130 MG tablet, Take 1 tablet 3 times a day by oral route with meal(s)., Disp: , Rfl:     potassium chloride (K-DUR,KLOR-CON) 10 MEQ CR tablet, Take 1 tablet by mouth Daily., Disp: , Rfl:     sertraline (ZOLOFT) 25 MG tablet, Take 1 tablet by mouth Daily., Disp: , Rfl:     spironolactone (ALDACTONE) 25 MG tablet, Take 1 tablet every day by oral route in the morning., Disp: , Rfl:     vitamin B-12 (CYANOCOBALAMIN) 1000 MCG tablet, Take 1 tablet by mouth Daily., Disp: , Rfl:     vitamin D (ERGOCALCIFEROL) 1.25 MG (65876 UT) capsule capsule, Take 1 capsule by mouth Every 7 (Seven) Days., Disp: 5 capsule, Rfl: 0    Accu-Chek Guide test strip, Use as instructed once daily   Dx e 11.9, Disp: 100 each, Rfl: 12    famotidine (PEPCID) 20 MG  tablet, Take 0.5 tablets by mouth 2 (Two) Times a Day As Needed for Heartburn or Indigestion. (Patient not taking: Reported on 5/22/2024), Disp: , Rfl:     HYDROcodone-acetaminophen (Norco) 5-325 MG per tablet, Take 1 tablet by mouth Every 8 (Eight) Hours As Needed for Severe Pain. (Patient not taking: Reported on 5/22/2024), Disp: 10 tablet, Rfl: 0    Family History:  Family History   Problem Relation Age of Onset    Diabetes Mother     Other Mother     Other Father     Heart attack Father     Other Sister     Diabetes Sister     Heart disease Sister     Diabetes Brother     Other Other     Diabetes Other        Past Medical History:  Past Medical History:   Diagnosis Date    Acute diastolic CHF (congestive heart failure) 01/03/2022    Ankle edema     Aortic stenosis     Moderate    Aortic valve replaced     B12 deficiency     Bell's palsy     Bleeds easily     Chipped tooth     top front    Dementia     Diabetes mellitus     Dry skin     Erectile dysfunction     Falls     2 falls in last 3 months    Fatigue     Fragile skin     GERD (gastroesophageal reflux disease)     Heart murmur     Hyperlipidemia     Hypertension     Hypokalemia     Hypomagnesemia     YANA (obstructive sleep apnea)     cpap bring dos    Osteoarthritis     Paroxysmal atrial fibrillation 09/27/2023    PFO (patent foramen ovale) 10/29/2023    Pulmonary HTN     Rosacea     Vitamin D deficiency        Past Surgical History:  Past Surgical History:   Procedure Laterality Date    AORTIC VALVE REPAIR/REPLACEMENT N/A 03/28/2022    Procedure: AORTIC VALVE REPAIR/REPLACEMENT;  Surgeon: Dennis Brandon MD;  Location: [facility] CVOR;  Service: Cardiothoracic;  Laterality: N/A;  aortic valve replacement with 27mm [employer] magna ease    CARDIAC CATHETERIZATION      CARDIAC CATHETERIZATION N/A 12/17/2021    Procedure: Right and Left Heart Cath;  Surgeon: Juan Castellon DO;  Location: [facility] CATH INVASIVE LOCATION;  Service:  Cardiology;  Laterality: N/A;    COLONOSCOPY      JOINT REPLACEMENT Bilateral 2021    knee    TONSILLECTOMY      TOTAL KNEE ARTHROPLASTY      TOTAL KNEE ARTHROPLASTY Left 05/04/2021    Procedure: TOTAL KNEE ARTHROPLASTY;  Surgeon: Otf Redmond MD;  Location[facility] MAIN OR;  Service: Orthopedics;  Laterality: Left;       Social History:  Social History     Socioeconomic History    Marital status:    Tobacco Use    Smoking status: Never    Smokeless tobacco: Never   Vaping Use    Vaping status: Never Used   Substance and Sexual Activity    Alcohol use: Yes     Alcohol/week: 14.0 standard drinks of alcohol     Types: 14 Drinks containing 0.5 oz of alcohol per week     Comment: 2 bourbons every night    Drug use: No    Sexual activity: Defer       Review of Systems:  The following systems were reviewed as they relate to the cardiovascular system: Constitutional, Eyes, ENT, Cardiovascular, Respiratory, Gastrointestinal, Integumentary, Neurological, Psychiatric, Hematologic, Endocrine, Musculoskeletal, and Genitourinary. The pertinent cardiovascular findings are reported above with all other cardiovascular points within those systems being negative.    Diagnostic Study Review:     Current Electrocardiogram:    ECG 12 Lead    Date/Time: 5/22/2024 5:02 PM  Performed by: Juan Castellon DO    Authorized by: Juan Castellon DO  Comparison: not compared with previous ECG   Previous ECG: no previous ECG available  Comments: Atrial fibrillation with a ventricular rate of 71 bpm.  Normal QT and QTc intervals.  Normal QRS axis.          Laboratory Data:  Lab Results   Component Value Date    GLUCOSE 106 (H) 11/01/2023    BUN 11 11/01/2023    CREATININE 0.90 11/01/2023    EGFRIFNONA 74 12/15/2021    EGFRIFAFRI 96 05/20/2016    BCR 12.2 11/01/2023    K 4.3 11/01/2023    CO2 25.8 11/01/2023    CALCIUM 8.6 11/01/2023    ALBUMIN 3.2 (L) 10/29/2023    LABIL2 1.5 06/14/2019    AST 38 10/27/2023    ALT  19 10/27/2023     Lab Results   Component Value Date    GLUCOSE 106 (H) 11/01/2023    CALCIUM 8.6 11/01/2023     11/01/2023    K 4.3 11/01/2023    CO2 25.8 11/01/2023     11/01/2023    BUN 11 11/01/2023    CREATININE 0.90 11/01/2023    EGFRIFAFRI 96 05/20/2016    EGFRIFNONA 74 12/15/2021    BCR 12.2 11/01/2023    ANIONGAP 10.2 11/01/2023     Lab Results   Component Value Date    WBC 8.91 11/01/2023    HGB 8.9 (L) 11/01/2023    HCT 27.8 (L) 11/01/2023    MCV 86.9 11/01/2023     11/01/2023     Lab Results   Component Value Date    CHOL 119 04/29/2022    TRIG 96 04/29/2022    HDL 27 (L) 04/29/2022    LDL 74 04/29/2022     Lab Results   Component Value Date    HGBA1C 7.90 (H) 10/29/2023     Lab Results   Component Value Date    INR 0.96 03/29/2022    INR 1.09 03/28/2022    INR <0.93 (L) 03/25/2022    PROTIME 10.7 03/29/2022    PROTIME 12.0 (H) 03/28/2022    PROTIME 10.3 03/25/2022       Most Recent Echo:  Results for orders placed in visit on 03/28/22    Emergent/Open-Heart Anesthesia PAULO    Narrative  Emergent/Open-Heart Anesthesia PAULO    Procedure Performed: Emergent/Open-Heart Anesthesia PAULO  Start Time:  End Time:      General Procedure Information  Location performed:  OR  Intubated  Bite block placed  Heart visualized  Probe Insertion:  Easy  Probe Type:  Biplane and multiplane  Modalities:  Color flow mapping, pulse wave Doppler and continuous wave Doppler    Echocardiographic and Doppler Measurements    Ventricles    Right Ventricle:  Ejection Fraction 60%.  Left Ventricle:  Global Function normal.  Ejection Fraction 60%.                                Anesthesia Information  Performed Personally  Anesthesiologist:  Van Frost MD      Echocardiogram Comments:  PAULO placed easily with no resistance ,  Lubricated , bite guard used    Pre cpb  LV EF 60 no wma  RV nl SF  MV tr/mild MR  AV Ca++ mild mod AI ,  Mod AS YOSI 1.2cm2 by cont  tv TR TR  Suspicious for smal PFO by CFD      Post  cpb  #27 av well seated no para / trans leak seen  No other change       Most Recent Stress Test:  Results for orders placed during the hospital encounter of 05/24/22    Treadmill Stress Test    Interpretation Summary  · Arrhythmias were not significant during stress.  · Equivocal ECG evidence of myocardial ischemia.  · Indeterminate clinical evidence of myocardial ischemia.  · Findings consistent with an indeterminate ECG stress test.  · Patient appropriate to begin cardiac rehab       Most Recent Cardiac Catheterization:   Results for orders placed during the hospital encounter of 12/17/21    Cardiac Catheterization/Vascular Study    Narrative  Cardiac Catheterization Operative Report    Anurag Pickard  [id number]  12/17/2021  @PCP@    He underwent cardiac catheterization.    Indications for the procedure include: Aortic stenosis.    Procedure Details:  The risks, benefits, complications, treatment options, and expected outcomes were discussed with the patient. The patient and/or family concurred with the proposed plan, giving informed consent.    After informed consent the patient was brought to the cath lab after appropriate IV hydration was begun and oral premedication was given. He was further sedated with fentanyl and midazolam. He was prepped and draped in the usual manner. Using the modified Seldinger access technique, a 6 Ivorian sheath was placed in the femoral artery and a 7 Ivorian sheath was placed in the femoral vein. A left and right heart catheterization with coronary arteriography was performed. Findings are discussed below.    After the procedure was completed, sedation was stopped and the sheaths and catheters were all removed. Hemostasis was achieved per established hospital protocols.    Conscious sedation:  Conscious sedation was performed according to protocol.  I supervised and directed an independent trained observer with the assistance of monitoring the patient's level of consciousness  "and physiologic status throughout the procedure.  Intraoperative service time was 60 minutes.    Findings:    Hemodynamics  LVEDP: Unable to obtain  LV: Unable to obtain  Ao: 132/61  RA: 6  RV: 42/4  PA: 43/15  PAWP: 15  Cardiac output Diane: 6.84 (2.85)  Cardiac output thermal: 7.27 (3.03)  Left Main  no significant disease  RCA  luminal irregularities with no significant disease  LAD  luminal irregularities with no significant disease  Circ  luminal irregularities with no significant disease  SVG(s)  not applicable  SVITLANA  not applicable  LV  not imaged  Coronary Dominance  circumflex    Estimated Blood Loss:  Minimal    Specimens: None    Complications:  None; patient tolerated the procedure well.    Disposition: PACU - hemodynamically stable    Condition: stable    Impressions:  No angiographic evidence for significant epicardial occlusive disease  Severe aortic stenosis  Moderate pulmonary hypertension    Recommendations:  Structural heart disease referral for TAVR versus SAVR       NOTE:     Today,05/22/2024 ,the following portions of the patient's note were reviewed, confirmed and/or updated as appropriate: History of present illness/Interval history, physical examination, assessment & plan, allergies, current medications, past family history, past medical history, past social history, past surgical history and problem list.    Labs pertinent to today's visit on 05/22/2024 (including but not limited to CBC, CMP, and lipid profiles) were requested from the patient's primary care provider/hospital/clinical laboratory.  If the labs were available for the visit, they were reviewed with the patient.  If they were not available, when received, special interest will be made to the labs pertinent to this visit.  The patient's most recent \"in-house\" labs are noted below and have been reviewed.  Outside labs pertinent to this visit are scanned into the record and have been reviewed.    Discussions held today, " 05/22/2024,regarding procedures included risk, benefits, and options including but not limited to: Death, MI, stroke, pain, bleeding, infection, and possible need for vascular/thoracic/cardiothoracic surgery.    Copied information within this note was reviewed and is current as of 05/22/2024.    Assessment and plan noted herein represents the current plan of care as of 05/22/2024.    Significant resources from our office and staff are inherent in engaging this patient today,05/22/2024,and in a continuous and active collaborative plan of care related to their chronic cardiovascular conditions outlined herein.  The management of these conditions requires the direction of our service with specialized clinical knowledge, skills, and experience.  This collaborative care includes but is not limited to patient education, expectations and responsibilities, shared decision making around therapeutic goals, and shared commitments to achieve those goals.

## 2024-07-30 ENCOUNTER — TELEPHONE (OUTPATIENT)
Dept: CARDIOLOGY | Facility: CLINIC | Age: 74
End: 2024-07-30

## 2024-07-30 NOTE — TELEPHONE ENCOUNTER
[facility] received a fax that requires your attention. The document has been indexed to the patient’s chart for your review.      Reason for sending: EXTERNAL MEDICAL RECORD NOTIFICATION     Documents Description: IBAN-NEVAEH [facility]-7.22.24    Name of Sender: NEVAEH [facility]    Date Indexed: 7.22.24

## 2024-10-16 ENCOUNTER — TELEPHONE (OUTPATIENT)
Dept: CARDIOLOGY | Facility: CLINIC | Age: 74
End: 2024-10-16

## 2024-10-16 NOTE — TELEPHONE ENCOUNTER
[facility] received a fax that requires your attention. The document has been indexed to the patient’s chart for your review.      Reason for sending: EXTERNAL MEDICAL RECORD NOTIFICATION     Documents Description: PN-DAMIAN DAVID DNP APRN-10.15.24    Name of Sender: DAMIAN DAVID DNP APRN     Date Indexed: 10.15.24

## (undated) DEVICE — CANN RETRGR STYLET RSCP 15F

## (undated) DEVICE — SUT ETHIBOND 2/0 CV V5  30IN PXX52

## (undated) DEVICE — TP UMB COTN 1/8X36 U12T

## (undated) DEVICE — 28 FR STRAIGHT – SOFT PVC CATHETER: Brand: PVC THORACIC CATHETERS

## (undated) DEVICE — Device

## (undated) DEVICE — PIN HOLD TEMP NOHEAD FLUT 1/8X3.5IN

## (undated) DEVICE — TOWEL,OR,DSP,ST,WHITE,DLX,4/PK,20PK/CS: Brand: MEDLINE

## (undated) DEVICE — SUT VIC COAT 2 CP 27IN

## (undated) DEVICE — DUAL CUT SAGITTAL BLADE

## (undated) DEVICE — 3M™ IOBAN™ 2 ANTIMICROBIAL INCISE DRAPE 6650EZ: Brand: IOBAN™ 2

## (undated) DEVICE — TUBING, SUCTION, 1/4" X 12', STRAIGHT: Brand: MEDLINE

## (undated) DEVICE — CONTAINER,SPECIMEN,OR STERILE,4OZ: Brand: MEDLINE

## (undated) DEVICE — BOOT SUT XRAY DETECT STD YEL/BLU CA/50

## (undated) DEVICE — SUT SILK 0 CT1 18IN 424H

## (undated) DEVICE — SOL IRR NACL 0.9PCT BT 1000ML

## (undated) DEVICE — CEMENT MIXING SYSTEM WITH FEMORAL BREAKWAY NOZZLE: Brand: REVOLUTION

## (undated) DEVICE — CANN AORT ROOT DLP VNT/8IN 14G 7F

## (undated) DEVICE — CATH DIAG IMPULSE MPA1 6F 100CM

## (undated) DEVICE — CABL BIPOL W/ALLGTR CLIP/SM 12FT

## (undated) DEVICE — UNDRGLV SURG BIOGEL PIMICROINDICATOR SYNTH SZ8 LF STRL

## (undated) DEVICE — ANTIBACTERIAL UNDYED BRAIDED (POLYGLACTIN 910), SYNTHETIC ABSORBABLE SUTURE: Brand: COATED VICRYL

## (undated) DEVICE — DRSNG GZ CURAD XEROFORM NONADHR OVERWRAP 5X9IN

## (undated) DEVICE — BLOOD TRANSFUSION FILTER: Brand: HAEMONETICS

## (undated) DEVICE — CATH DIAG EXPO .045 MPA1 5F 100CM

## (undated) DEVICE — KT SURG TURNOVER 050

## (undated) DEVICE — ELECTRD BLD EZ CLN STD 6.5IN

## (undated) DEVICE — SYS COLD/THRP POLAR/CARE/CUBE

## (undated) DEVICE — PAD COLD/THRP KN POLAR/CARE WRAP/ON XL

## (undated) DEVICE — SOLUTION,WATER,IRRIGATION,1000ML,STERILE: Brand: MEDLINE

## (undated) DEVICE — NDL PERC 1PRT THNWALL W/BASEPLT 18G 7CM

## (undated) DEVICE — SOL IRR NACL 0.9PCT ARTHROMATIC 3000ML

## (undated) DEVICE — STAPLER, SKIN, 35W, A: Brand: MEDLINE INDUSTRIES, INC.

## (undated) DEVICE — PINNACLE INTRODUCER SHEATH: Brand: PINNACLE

## (undated) DEVICE — SUT PROLN 4/0 V5 36IN 8935H

## (undated) DEVICE — SWAN-GANZ TRUE SIZE THERMODILUTION "S" TIP: Brand: SWAN-GANZ TRUE SIZE

## (undated) DEVICE — PK TOTL KN 50

## (undated) DEVICE — SUT VIC 2/0 CT1 36IN

## (undated) DEVICE — SCANLAN® VASCU-STATT® II SINGLE-USE BULLDOG CLAMP W/FIRMER CLAMPING PRESS - MIDI ANGLED 45° (YELLOW), CLAMPING PRESSURE 75-80 G (2/STERILE PKG): Brand: SCANLAN® VASCU-STATT® II SINGLE-USE BULLDOG CLAMP W/FIRMER CLAMPING PRESS

## (undated) DEVICE — SUT SILK 2/0 TIES 18IN A185H

## (undated) DEVICE — ACCESSRAIL PLATFORM (STANDARD BLADE): Brand: ACCESSRAIL PLATFORM (STANDARD BLADE)

## (undated) DEVICE — PENCL EVAC ULTRAVAC SMOKE W/BLD

## (undated) DEVICE — ORGANIZER SUT SHELIGH 3T 213013

## (undated) DEVICE — PK PERFUS CUST W/CARDIOPLEGIA

## (undated) DEVICE — MARKR SKIN W/RULR

## (undated) DEVICE — RADIFOCUS OBTURATOR: Brand: RADIFOCUS

## (undated) DEVICE — INTENDED FOR TISSUE SEPARATION, AND OTHER PROCEDURES THAT REQUIRE A SHARP SURGICAL BLADE TO PUNCTURE OR CUT.: Brand: BARD-PARKER ® CARBON RIB-BACK BLADES

## (undated) DEVICE — SYS PERFUS SEP PLATLT W TIPS CUST

## (undated) DEVICE — PK ATS CUST W CARDIOTOMY RESEVOIR

## (undated) DEVICE — ELECTRD DEFIB M/FUNC PROPADZ STRL 2PK

## (undated) DEVICE — DRSNG BARR INSUL KN POLAR/CARE S/ADHS LG

## (undated) DEVICE — SUT PROLN 4/0 V7 36IN 8975H

## (undated) DEVICE — SOL IRR H2O BTL 1000ML STRL

## (undated) DEVICE — PK OPN HEART WHT WRP 50

## (undated) DEVICE — IRRIGATOR BULB ASEPTO 60CC STRL

## (undated) DEVICE — ADAPT ANTEGRADE RETRGR

## (undated) DEVICE — SOL NACL 0.9PCT 1000ML

## (undated) DEVICE — GW FC J TFE/COAT .025 3MM 180CM

## (undated) DEVICE — PAD UNDERCAST WYTEX 6IN 4YD LF STRL

## (undated) DEVICE — SUT PROLN 3/0 V7 D/A 36IN 8976H

## (undated) DEVICE — SPONGE,DISSECTOR,ROUND CHERRY,XR,ST,5/PK: Brand: MEDLINE

## (undated) DEVICE — ADHS SKIN PREMIERPRO EXOFIN TOPICAL HI/VISC .5ML

## (undated) DEVICE — SUT ETHIB 2 CV V37 MS/4 30IN MX69G

## (undated) DEVICE — SUT SILK 2 SUTUPAK TIE 60IN SA8H 2STRAND

## (undated) DEVICE — GLV SURG SENSICARE PI ORTHO SZ8 LF STRL

## (undated) DEVICE — SENSR CERBRL O2 PK/2

## (undated) DEVICE — STERILE PATIENT PROTECTIVE PAD FOR IMP® KNEE POSITIONERS & COHESIVE WRAP (10 / CASE): Brand: DE MAYO KNEE POSITIONER®

## (undated) DEVICE — SUT SILK 4/0 TIES 18IN A183H

## (undated) DEVICE — SUT SILK 0 CT1 CR8 18IN C021D

## (undated) DEVICE — TEMP PACING WIRE: Brand: MYO/WIRE

## (undated) DEVICE — SUT PROLN 5/0 V5 36IN 8934H

## (undated) DEVICE — GAUZE,SPONGE,4"X4",32PLY,XRAY,STRL,LF: Brand: MEDLINE

## (undated) DEVICE — BG BLD SYS

## (undated) DEVICE — PRESSURE TUBING: Brand: TRUWAVE

## (undated) DEVICE — PK TRY HEART CATH 50

## (undated) DEVICE — COUNT NDL MAG XLG

## (undated) DEVICE — SLV SCD CALF HEMOFORCE DVT THERP REPROC MD

## (undated) DEVICE — PAD UNDERCAST WYTEX 4IN 4YD LF STRL

## (undated) DEVICE — CUFF TOURNI 1BLADDER 1PRT 30IN STRL

## (undated) DEVICE — HEMOCONCENTRATOR PERFUS LPS06

## (undated) DEVICE — REAMR PAT W/PILOT/HL 46MM

## (undated) DEVICE — CATH MPAC STP 6F: Brand: SUPER TORQUE

## (undated) DEVICE — CONNECT Y INTERSEPT W/LL 3/8 X 3/8 X 3/8IN

## (undated) DEVICE — SUT PDS 0 CT-1 Z340H

## (undated) DEVICE — SPNG GZ AVANT 6PLY 4X4IN STRL PK/2